# Patient Record
Sex: FEMALE | Race: WHITE | NOT HISPANIC OR LATINO | Employment: OTHER | URBAN - METROPOLITAN AREA
[De-identification: names, ages, dates, MRNs, and addresses within clinical notes are randomized per-mention and may not be internally consistent; named-entity substitution may affect disease eponyms.]

---

## 2017-11-29 ENCOUNTER — TELEPHONE (OUTPATIENT)
Dept: NEUROLOGY | Facility: MEDICAL CENTER | Age: 52
End: 2017-11-29

## 2017-11-29 ENCOUNTER — OFFICE VISIT (OUTPATIENT)
Dept: NEUROLOGY | Facility: MEDICAL CENTER | Age: 52
End: 2017-11-29
Payer: COMMERCIAL

## 2017-11-29 VITALS
TEMPERATURE: 98.4 F | HEIGHT: 66 IN | DIASTOLIC BLOOD PRESSURE: 70 MMHG | HEART RATE: 85 BPM | WEIGHT: 236 LBS | BODY MASS INDEX: 37.93 KG/M2 | OXYGEN SATURATION: 98 % | SYSTOLIC BLOOD PRESSURE: 110 MMHG | RESPIRATION RATE: 16 BRPM

## 2017-11-29 DIAGNOSIS — L93.0 LUPUS ERYTHEMATOSUS, UNSPECIFIED FORM: ICD-10-CM

## 2017-11-29 DIAGNOSIS — R42 DIZZY SPELLS: ICD-10-CM

## 2017-11-29 PROBLEM — M32.9 LUPUS (HCC): Status: ACTIVE | Noted: 2017-11-29

## 2017-11-29 PROCEDURE — 99204 OFFICE O/P NEW MOD 45 MIN: CPT | Performed by: PSYCHIATRY & NEUROLOGY

## 2017-11-29 RX ORDER — OXYCODONE AND ACETAMINOPHEN 10; 325 MG/1; MG/1
1-2 TABLET ORAL EVERY 4 HOURS PRN
Status: ON HOLD | COMMUNITY
End: 2021-10-14

## 2017-11-29 RX ORDER — HYDROXYCHLOROQUINE SULFATE 200 MG/1
400 TABLET, FILM COATED ORAL DAILY
COMMUNITY

## 2017-11-29 RX ORDER — MELOXICAM 7.5 MG/1
7.5 TABLET ORAL 2 TIMES DAILY
COMMUNITY
End: 2022-02-15

## 2017-11-29 NOTE — PATIENT INSTRUCTIONS
IMPRESSION:    1. Dizziness --since April 2017-- 50% of time, the patient could not even function, could not focus, come and go--responded to steroid-- the detailed symptoms -- see the patient's self-reports  2. Hot flashes in the both ears -- since April 2017  3. Hx of lupus, Fibromyalgia, Osteoarthritis (hip replacement)    PLAN/RECOMMENDATIONS:      Explain to the patient, I do agree with the CNS origin of these dizziness  These are stereotyped, recurrent and somewhat reversible-- which are characteristic of brain malfunction-- like subtle seizures  And these brain malfunction could be secondary to brain inflammation-- such as CNS lupus or autoimmune cerebritis    Infection and carcinoma are remotely possible --- less likely in my opinions    Therefore, we will offer  MRI of brain  EEG  Blood Tests    We may need to offer spinal tap after blood tests            SIGNATURE:  Lin Rascon    CC:  Dolly Araya M.D.

## 2017-11-30 RX ORDER — PREDNISONE 50 MG/1
50 TABLET ORAL DAILY
Qty: 10 TAB | Refills: 0 | Status: SHIPPED | OUTPATIENT
Start: 2017-11-30 | End: 2020-01-22

## 2017-11-30 NOTE — TELEPHONE ENCOUNTER
Patient called was seen today. She would like to try steroids and anti inflammatories, she was told to call if she wanted them.

## 2017-12-03 LAB
25(OH)D3+25(OH)D2 SERPL-MCNC: 55.7 NG/ML (ref 30–100)
ANA HOMOGEN TITR SER: ABNORMAL {TITER}
ANA INTERCELL BRIDGE TITR SER IF: ABNORMAL {TITER}
ANA NUCLEAR DOTS TITR SER IF: ABNORMAL {TITER}
ANA NUCLEAR MEMBRANE PORES TITR SER IF: ABNORMAL {TITER}
ANA NUCLEOLAR TITR SER: ABNORMAL {TITER}
ANA SPECKLED TITR SER: ABNORMAL {TITER}
ANA TITR SER IF: POSITIVE {TITER}
B2 GLYCOPROT1 IGG SER-ACNC: <9 GPI IGG UNITS (ref 0–20)
B2 GLYCOPROT1 IGM SER-ACNC: <9 GPI IGM UNITS (ref 0–32)
C3 SERPL-MCNC: 105 MG/DL (ref 82–167)
C4 SERPL-MCNC: 19 MG/DL (ref 14–44)
CARDIOLIPIN IGG SER IA-ACNC: <9 GPL U/ML (ref 0–14)
CARDIOLIPIN IGM SER IA-ACNC: <9 MPL U/ML (ref 0–12)
CCP IGA+IGG SERPL IA-ACNC: 6 UNITS (ref 0–19)
CENTROMERE AB TITR SER IF: ABNORMAL {TITER}
CENTROMERE B AB SER-ACNC: <0.2 AI (ref 0–0.9)
CHROMATIN AB SERPL-ACNC: <0.2 AI (ref 0–0.9)
CRP SERPL-MCNC: 1.2 MG/L (ref 0–4.9)
DEPRECATED CENTRIOLE AB TITR SER IF: ABNORMAL {TITER}
DSDNA AB SER-ACNC: 5 IU/ML (ref 0–9)
ENA JO1 AB SER-ACNC: <0.2 AI (ref 0–0.9)
ENA PCNA AB TITR SER IF: ABNORMAL {TITER}
ENA RNP AB SER-ACNC: <0.2 AI (ref 0–0.9)
ENA SCL70 AB SER-ACNC: <0.2 AI (ref 0–0.9)
ENA SM AB SER-ACNC: <0.2 AI (ref 0–0.9)
ENA SS-A AB SER-ACNC: <0.2 AI (ref 0–0.9)
ENA SS-B AB SER-ACNC: <0.2 AI (ref 0–0.9)
ERYTHROCYTE [SEDIMENTATION RATE] IN BLOOD BY WESTERGREN METHOD: 4 MM/HR (ref 0–40)
MITOTIC SPINDLE APP AB TITR SERPL IF: ABNORMAL {TITER}
NOTE 016271: ABNORMAL
RHEUMATOID FACT SERPL-ACNC: <10 IU/ML (ref 0–13.9)
THYROGLOB AB SERPL-ACNC: <1 IU/ML (ref 0–0.9)
THYROPEROXIDASE AB SERPL-ACNC: 11 IU/ML (ref 0–34)
TSH SERPL DL<=0.005 MIU/L-ACNC: 0.95 UIU/ML (ref 0.45–4.5)
VIT B12 SERPL-MCNC: 399 PG/ML (ref 211–946)

## 2017-12-07 ENCOUNTER — HOSPITAL ENCOUNTER (OUTPATIENT)
Dept: RADIOLOGY | Facility: MEDICAL CENTER | Age: 52
End: 2017-12-07
Attending: PSYCHIATRY & NEUROLOGY
Payer: COMMERCIAL

## 2017-12-07 DIAGNOSIS — L93.0 LUPUS ERYTHEMATOSUS, UNSPECIFIED FORM: ICD-10-CM

## 2017-12-07 DIAGNOSIS — R42 DIZZY SPELLS: ICD-10-CM

## 2017-12-07 PROCEDURE — 70551 MRI BRAIN STEM W/O DYE: CPT

## 2017-12-12 ENCOUNTER — TELEPHONE (OUTPATIENT)
Dept: NEUROLOGY | Facility: MEDICAL CENTER | Age: 52
End: 2017-12-12

## 2017-12-12 NOTE — TELEPHONE ENCOUNTER
Prednisone 50mg daily for 10 days   Reminds her--- after blood tests---   Also report to us after 10 days of steroid     Patient called has not started prednisone she went to the pharmacy and they told her to call here because there is no instructions to taper off medication when she was done with the 10 days and this was a high dose not to taper.    She did her MRI and wants results.    Please advise    Thank you

## 2017-12-14 NOTE — TELEPHONE ENCOUNTER
Do not need to listen to pharmacy   This is not high dose and no need to taper   This is just trial     Called and spoke to patient gave above information. I asked to call with any concerns.

## 2020-01-22 DIAGNOSIS — Z01.810 PRE-OPERATIVE CARDIOVASCULAR EXAMINATION: ICD-10-CM

## 2020-01-22 DIAGNOSIS — Z01.812 PRE-OPERATIVE LABORATORY EXAMINATION: ICD-10-CM

## 2020-01-22 LAB
ANION GAP SERPL CALC-SCNC: 11 MMOL/L (ref 0–11.9)
BUN SERPL-MCNC: 10 MG/DL (ref 8–22)
CALCIUM SERPL-MCNC: 9.7 MG/DL (ref 8.5–10.5)
CHLORIDE SERPL-SCNC: 104 MMOL/L (ref 96–112)
CO2 SERPL-SCNC: 26 MMOL/L (ref 20–33)
CREAT SERPL-MCNC: 0.93 MG/DL (ref 0.5–1.4)
ERYTHROCYTE [DISTWIDTH] IN BLOOD BY AUTOMATED COUNT: 45.5 FL (ref 35.9–50)
GLUCOSE SERPL-MCNC: 106 MG/DL (ref 65–99)
HCG SERPL QL: NEGATIVE
HCT VFR BLD AUTO: 44.4 % (ref 37–47)
HGB BLD-MCNC: 14.5 G/DL (ref 12–16)
MCH RBC QN AUTO: 29.9 PG (ref 27–33)
MCHC RBC AUTO-ENTMCNC: 32.7 G/DL (ref 33.6–35)
MCV RBC AUTO: 91.5 FL (ref 81.4–97.8)
PLATELET # BLD AUTO: 290 K/UL (ref 164–446)
PMV BLD AUTO: 9.4 FL (ref 9–12.9)
POTASSIUM SERPL-SCNC: 4.4 MMOL/L (ref 3.6–5.5)
RBC # BLD AUTO: 4.85 M/UL (ref 4.2–5.4)
SODIUM SERPL-SCNC: 141 MMOL/L (ref 135–145)
WBC # BLD AUTO: 5.1 K/UL (ref 4.8–10.8)

## 2020-01-22 PROCEDURE — 80048 BASIC METABOLIC PNL TOTAL CA: CPT

## 2020-01-22 PROCEDURE — 84703 CHORIONIC GONADOTROPIN ASSAY: CPT

## 2020-01-22 PROCEDURE — 36415 COLL VENOUS BLD VENIPUNCTURE: CPT

## 2020-01-22 PROCEDURE — 85027 COMPLETE CBC AUTOMATED: CPT

## 2020-01-22 RX ORDER — ROPINIROLE 1 MG/1
1 TABLET, FILM COATED ORAL 2 TIMES DAILY
COMMUNITY

## 2020-01-22 RX ORDER — AMLODIPINE BESYLATE 5 MG/1
5 TABLET ORAL EVERY EVENING
Status: ON HOLD | COMMUNITY
End: 2020-01-23

## 2020-01-22 RX ORDER — AZELASTINE HYDROCHLORIDE, FLUTICASONE PROPIONATE 137; 50 UG/1; UG/1
1 SPRAY, METERED NASAL 3 TIMES DAILY
COMMUNITY
End: 2022-02-15

## 2020-01-22 SDOH — HEALTH STABILITY: MENTAL HEALTH: HOW OFTEN DO YOU HAVE A DRINK CONTAINING ALCOHOL?: 2-4 TIMES A MONTH

## 2020-01-23 ENCOUNTER — HOSPITAL ENCOUNTER (OUTPATIENT)
Facility: MEDICAL CENTER | Age: 55
End: 2020-01-23
Attending: SURGERY | Admitting: SURGERY
Payer: COMMERCIAL

## 2020-01-23 ENCOUNTER — ANESTHESIA (OUTPATIENT)
Dept: SURGERY | Facility: MEDICAL CENTER | Age: 55
End: 2020-01-23
Payer: COMMERCIAL

## 2020-01-23 ENCOUNTER — ANESTHESIA EVENT (OUTPATIENT)
Dept: SURGERY | Facility: MEDICAL CENTER | Age: 55
End: 2020-01-23
Payer: COMMERCIAL

## 2020-01-23 VITALS
BODY MASS INDEX: 43.34 KG/M2 | DIASTOLIC BLOOD PRESSURE: 58 MMHG | WEIGHT: 260.14 LBS | OXYGEN SATURATION: 96 % | SYSTOLIC BLOOD PRESSURE: 125 MMHG | HEART RATE: 69 BPM | HEIGHT: 65 IN | RESPIRATION RATE: 18 BRPM | TEMPERATURE: 98.5 F

## 2020-01-23 PROCEDURE — 500423 HCHG DRESSING, ABD COMBINE: Performed by: SURGERY

## 2020-01-23 PROCEDURE — 160002 HCHG RECOVERY MINUTES (STAT): Performed by: SURGERY

## 2020-01-23 PROCEDURE — 160025 RECOVERY II MINUTES (STATS): Performed by: SURGERY

## 2020-01-23 PROCEDURE — 160035 HCHG PACU - 1ST 60 MINS PHASE I: Performed by: SURGERY

## 2020-01-23 PROCEDURE — A6407 PACKING STRIPS, NON-IMPREG: HCPCS | Performed by: SURGERY

## 2020-01-23 PROCEDURE — 160046 HCHG PACU - 1ST 60 MINS PHASE II: Performed by: SURGERY

## 2020-01-23 PROCEDURE — 700102 HCHG RX REV CODE 250 W/ 637 OVERRIDE(OP): Performed by: ANESTHESIOLOGY

## 2020-01-23 PROCEDURE — 700101 HCHG RX REV CODE 250

## 2020-01-23 PROCEDURE — A9270 NON-COVERED ITEM OR SERVICE: HCPCS | Performed by: ANESTHESIOLOGY

## 2020-01-23 PROCEDURE — 700101 HCHG RX REV CODE 250: Performed by: ANESTHESIOLOGY

## 2020-01-23 PROCEDURE — 160048 HCHG OR STATISTICAL LEVEL 1-5: Performed by: SURGERY

## 2020-01-23 PROCEDURE — 160009 HCHG ANES TIME/MIN: Performed by: SURGERY

## 2020-01-23 PROCEDURE — 160027 HCHG SURGERY MINUTES - 1ST 30 MINS LEVEL 2: Performed by: SURGERY

## 2020-01-23 PROCEDURE — 160038 HCHG SURGERY MINUTES - EA ADDL 1 MIN LEVEL 2: Performed by: SURGERY

## 2020-01-23 PROCEDURE — 700101 HCHG RX REV CODE 250: Performed by: SURGERY

## 2020-01-23 PROCEDURE — 700111 HCHG RX REV CODE 636 W/ 250 OVERRIDE (IP): Performed by: ANESTHESIOLOGY

## 2020-01-23 PROCEDURE — 700105 HCHG RX REV CODE 258: Performed by: SURGERY

## 2020-01-23 RX ORDER — OXYCODONE HCL 5 MG/5 ML
10 SOLUTION, ORAL ORAL
Status: DISCONTINUED | OUTPATIENT
Start: 2020-01-23 | End: 2020-01-23 | Stop reason: HOSPADM

## 2020-01-23 RX ORDER — ONDANSETRON 2 MG/ML
INJECTION INTRAMUSCULAR; INTRAVENOUS PRN
Status: DISCONTINUED | OUTPATIENT
Start: 2020-01-23 | End: 2020-01-23 | Stop reason: HOSPADM

## 2020-01-23 RX ORDER — GABAPENTIN 300 MG/1
300 CAPSULE ORAL ONCE
Status: COMPLETED | OUTPATIENT
Start: 2020-01-23 | End: 2020-01-23

## 2020-01-23 RX ORDER — CEFAZOLIN SODIUM 1 G/3ML
INJECTION, POWDER, FOR SOLUTION INTRAMUSCULAR; INTRAVENOUS PRN
Status: DISCONTINUED | OUTPATIENT
Start: 2020-01-23 | End: 2020-01-23 | Stop reason: SURG

## 2020-01-23 RX ORDER — DIPHENHYDRAMINE HYDROCHLORIDE 50 MG/ML
12.5 INJECTION INTRAMUSCULAR; INTRAVENOUS
Status: DISCONTINUED | OUTPATIENT
Start: 2020-01-23 | End: 2020-01-23 | Stop reason: HOSPADM

## 2020-01-23 RX ORDER — LIDOCAINE HYDROCHLORIDE 40 MG/ML
SOLUTION TOPICAL PRN
Status: DISCONTINUED | OUTPATIENT
Start: 2020-01-23 | End: 2020-01-23 | Stop reason: SURG

## 2020-01-23 RX ORDER — HALOPERIDOL 5 MG/ML
1 INJECTION INTRAMUSCULAR
Status: DISCONTINUED | OUTPATIENT
Start: 2020-01-23 | End: 2020-01-23 | Stop reason: HOSPADM

## 2020-01-23 RX ORDER — SODIUM CHLORIDE, SODIUM LACTATE, POTASSIUM CHLORIDE, CALCIUM CHLORIDE 600; 310; 30; 20 MG/100ML; MG/100ML; MG/100ML; MG/100ML
INJECTION, SOLUTION INTRAVENOUS CONTINUOUS
Status: DISCONTINUED | OUTPATIENT
Start: 2020-01-23 | End: 2020-01-23 | Stop reason: HOSPADM

## 2020-01-23 RX ORDER — ONDANSETRON 2 MG/ML
4 INJECTION INTRAMUSCULAR; INTRAVENOUS
Status: DISCONTINUED | OUTPATIENT
Start: 2020-01-23 | End: 2020-01-23 | Stop reason: HOSPADM

## 2020-01-23 RX ORDER — BUPIVACAINE HYDROCHLORIDE AND EPINEPHRINE 5; 5 MG/ML; UG/ML
INJECTION, SOLUTION EPIDURAL; INTRACAUDAL; PERINEURAL
Status: DISCONTINUED | OUTPATIENT
Start: 2020-01-23 | End: 2020-01-23 | Stop reason: HOSPADM

## 2020-01-23 RX ORDER — LABETALOL HYDROCHLORIDE 5 MG/ML
5 INJECTION, SOLUTION INTRAVENOUS
Status: DISCONTINUED | OUTPATIENT
Start: 2020-01-23 | End: 2020-01-23 | Stop reason: HOSPADM

## 2020-01-23 RX ORDER — MEPERIDINE HYDROCHLORIDE 25 MG/ML
25 INJECTION INTRAMUSCULAR; INTRAVENOUS; SUBCUTANEOUS
Status: DISCONTINUED | OUTPATIENT
Start: 2020-01-23 | End: 2020-01-23 | Stop reason: HOSPADM

## 2020-01-23 RX ORDER — MEPERIDINE HYDROCHLORIDE 25 MG/ML
INJECTION INTRAMUSCULAR; INTRAVENOUS; SUBCUTANEOUS PRN
Status: DISCONTINUED | OUTPATIENT
Start: 2020-01-23 | End: 2020-01-23 | Stop reason: SURG

## 2020-01-23 RX ORDER — AMLODIPINE BESYLATE 5 MG/1
7.5 TABLET ORAL EVERY EVENING
COMMUNITY

## 2020-01-23 RX ORDER — DEXAMETHASONE SODIUM PHOSPHATE 4 MG/ML
INJECTION, SOLUTION INTRA-ARTICULAR; INTRALESIONAL; INTRAMUSCULAR; INTRAVENOUS; SOFT TISSUE PRN
Status: DISCONTINUED | OUTPATIENT
Start: 2020-01-23 | End: 2020-01-23 | Stop reason: SURG

## 2020-01-23 RX ORDER — ACETAMINOPHEN 500 MG
1000 TABLET ORAL ONCE
Status: COMPLETED | OUTPATIENT
Start: 2020-01-23 | End: 2020-01-23

## 2020-01-23 RX ORDER — CELECOXIB 200 MG/1
200 CAPSULE ORAL ONCE
Status: COMPLETED | OUTPATIENT
Start: 2020-01-23 | End: 2020-01-23

## 2020-01-23 RX ORDER — OXYCODONE HCL 5 MG/5 ML
5 SOLUTION, ORAL ORAL
Status: DISCONTINUED | OUTPATIENT
Start: 2020-01-23 | End: 2020-01-23 | Stop reason: HOSPADM

## 2020-01-23 RX ORDER — MIDAZOLAM HYDROCHLORIDE 1 MG/ML
1 INJECTION INTRAMUSCULAR; INTRAVENOUS
Status: DISCONTINUED | OUTPATIENT
Start: 2020-01-23 | End: 2020-01-23 | Stop reason: HOSPADM

## 2020-01-23 RX ADMIN — MEPERIDINE HYDROCHLORIDE 25 MG: 25 INJECTION INTRAMUSCULAR; INTRAVENOUS; SUBCUTANEOUS at 12:58

## 2020-01-23 RX ADMIN — DEXAMETHASONE SODIUM PHOSPHATE 4 MG: 4 INJECTION, SOLUTION INTRA-ARTICULAR; INTRALESIONAL; INTRAMUSCULAR; INTRAVENOUS; SOFT TISSUE at 12:58

## 2020-01-23 RX ADMIN — SODIUM CHLORIDE, POTASSIUM CHLORIDE, SODIUM LACTATE AND CALCIUM CHLORIDE: 600; 310; 30; 20 INJECTION, SOLUTION INTRAVENOUS at 12:36

## 2020-01-23 RX ADMIN — LIDOCAINE HYDROCHLORIDE 20 MG: 20 INJECTION, SOLUTION INTRAVENOUS at 12:49

## 2020-01-23 RX ADMIN — ROCURONIUM BROMIDE 5 MG: 10 INJECTION, SOLUTION INTRAVENOUS at 12:49

## 2020-01-23 RX ADMIN — ACETAMINOPHEN 1000 MG: 500 TABLET ORAL at 12:33

## 2020-01-23 RX ADMIN — SUCCINYLCHOLINE CHLORIDE 140 MG: 20 INJECTION, SOLUTION INTRAMUSCULAR; INTRAVENOUS at 12:51

## 2020-01-23 RX ADMIN — CEFAZOLIN 3 G: 330 INJECTION, POWDER, FOR SOLUTION INTRAMUSCULAR; INTRAVENOUS at 12:46

## 2020-01-23 RX ADMIN — ONDANSETRON 4 MG: 2 INJECTION INTRAMUSCULAR; INTRAVENOUS at 13:07

## 2020-01-23 RX ADMIN — CELECOXIB 200 MG: 200 CAPSULE ORAL at 12:33

## 2020-01-23 RX ADMIN — LIDOCAINE HYDROCHLORIDE 160 MG: 40 SOLUTION TOPICAL at 12:52

## 2020-01-23 RX ADMIN — PROPOFOL 20 MG: 10 INJECTION, EMULSION INTRAVENOUS at 12:49

## 2020-01-23 RX ADMIN — PROPOFOL 100 MG: 10 INJECTION, EMULSION INTRAVENOUS at 12:51

## 2020-01-23 RX ADMIN — GABAPENTIN 300 MG: 300 CAPSULE ORAL at 12:34

## 2020-01-23 RX ADMIN — ALFENTANIL HYDROCHLORIDE 1000 MCG: 500 INJECTION INTRAVENOUS at 12:49

## 2020-01-23 ASSESSMENT — PAIN SCALES - GENERAL: PAIN_LEVEL: 0

## 2020-01-23 NOTE — OP REPORT
DATE OF SERVICE:  2020    OPERATING SURGEON:  Michael Ballesteros MD    PROCEDURE:  Excision and curettage of pilonidal abscess.    ANESTHESIA:  General.    ESTIMATED BLOOD LOSS:  Less than 10 mL.    PREOPERATIVE DIAGNOSES:  Pilonidal abscess and sinus.    POSTOPERATIVE DIAGNOSES:  Pilonidal abscess and sinus.    SUMMARY:  This 54-year-old female had pilonidal disease about 30 years ago,   but recently developed swelling at the  cleft and drainage.  She was   found to have a pilonidal abscess and referred for correction.      DESCRIPTION OF PROCEDURE:  The patient was taken to the operating room and   satisfactory general anesthesia was induced via endotracheal intubation.  The   patient was placed in jackknife position and prepped and draped.      To the right of the haile cleft at its apex, sinus tract was demonstrated and   explored.  This showed the sinus was deep down to the level of the fascia and   an elliptical incision was marked out and excised at this area in the midline.    This was then curetted out.      The wound was then irrigated and fulgurated with local instilled, was then   packed with 1 inch moist gauze and a dressing applied.  The patient was sent   to the recovery room in good condition.  Specimen sent to pathology labeled   pilonidal abscess.       ____________________________________     MICHAEL BALLESTEROS MD    TER / NTS    DD:  2020 13:16:25  DT:  2020 13:21:03    D#:  [id number]  Job#:  [id number]

## 2020-01-23 NOTE — ANESTHESIA TIME REPORT
Anesthesia Start and Stop Event Times     Date Time Event    1/23/2020 1158 Ready for Procedure     1246 Anesthesia Start     1319 Anesthesia Stop        Responsible Staff  01/23/20    Name Role Begin End    Emery Harman M.D. Anesth 1246 1319        Preop Diagnosis (Free Text):  Pre-op Diagnosis     PILONIDAL CYST WITH ABSCESS        Preop Diagnosis (Codes):    Post op Diagnosis  Pilonidal cyst and sinus      Premium Reason  Non-Premium    Comments:

## 2020-01-23 NOTE — ANESTHESIA PREPROCEDURE EVALUATION
Relevant Problems   No relevant active problems       Physical Exam    Airway   Mallampati: II  TM distance: >3 FB  Neck ROM: full       Cardiovascular - normal exam  Rhythm: regular  Rate: normal     Dental - normal exam         Pulmonary - normal exam  Breath sounds clear to auscultation     Abdominal    Neurological - normal exam                 Anesthesia Plan    ASA 3   ASA physical status 3 criteria: morbid obesity - BMI greater than or equal to 40    Plan - general       Airway plan will be ETT        Induction: intravenous    Postoperative Plan: Postoperative administration of opioids is intended.    Pertinent diagnostic labs and testing reviewed    Informed Consent:    Anesthetic plan and risks discussed with patient.    Use of blood products discussed with: patient whom consented to blood products.

## 2020-01-23 NOTE — OR SURGEON
Immediate Post OP Note    PreOp Diagnosis: pilonidal abscess    PostOp Diagnosis:   same    Procedure(s):  INCISION AND DRAINAGE OF PILONIDAL CYST - Wound Class: Dirty or Infected  Excision and curettage  Surgeon(s):  Michael Ceron M.D.    Anesthesiologist/Type of Anesthesia:  Anesthesiologist: Emery Harman M.D./General    Surgical Staff:  Circulator: Pretty Marquis R.N.  Relief Circulator: Jinny White R.N.  Scrub Person: Ashlee Brown R.N.    Specimens removed if any:  * No specimens in log *    Estimated Blood Loss:       Findings:       Complications:           1/23/2020 1:13 PM Michael Ceron M.D.

## 2020-01-23 NOTE — ANESTHESIA QCDR
2019 [facility] Clinical Data Registry (for Quality Improvement)     Postoperative nausea/vomiting risk protocol (Adult = 18 yrs and Pediatric 3-17 yrs)- (430 and 463)  General inhalation anesthetic (NOT TIVA) with PONV risk factors: No  Provision of anti-emetic therapy with at least 2 different classes of agents: N/A  Patient DID NOT receive anti-emetic therapy and reason is documented in Medical Record: N/A    Multimodal Pain Management- (477)  Non-emergent surgery AND patient age >= 18: Yes  Use of Multimodal Pain Management, two or more drugs and/or interventions, NOT including systemic opioids: Yes  Exception: Documented allergy to multiple classes of analgesics: N/A    Smoking Abstinence (404)  Patient is current smoker (cigarette, pipe, e-cig, marijuanna): No  Elective Surgery:   Abstinence instructions provided prior to day of surgery:   Patient abstained from smoking on day of surgery:     Pre-Op Beta-Blocker in Isolated CABG (44)  Isolated CABG AND patient age >= 18: No  Beta-blocker admin within 24 hours of surgical incision:   Exception:of medical reason(s) for not administering beta blocker within 24 hours prior to surgical incision (e.g., not  indicated,other medical reason):     PACU assessment of acute postoperative pain prior to Anesthesia Care End- Applies to Patients Age = 18- (ABG7)  Initial PACU pain score is which of the following: < 7/10  Patient unable to report pain score: N/A    Post-anesthetic transfer of care checklist/protocol to PACU/ICU- (426 and 427)  Upon conclusion of case, patient transferred to which of the following locations: PACU/Non-ICU  Use of transfer checklist/protocol: Yes  Exclusion: Service Performed in Patient Hospital Room (and thus did not require transfer): N/A  Unplanned admission to ICU related to anesthesia service up through end of PACU care- (MD51)  Unplanned admission to ICU (not initially anticipated at anesthesia start time): No

## 2020-01-23 NOTE — ANESTHESIA POSTPROCEDURE EVALUATION
Patient: Magali Leal    Procedure Summary     Date:  01/23/20 Room / Location:  [facility] / SURG[facility]    Anesthesia Start:  1246 Anesthesia Stop:  1319    Procedure:  INCISION AND DRAINAGE OF PILONIDAL CYST (Buttocks) Diagnosis:  (PILONIDAL CYST WITH ABSCESS)    Surgeon:  Michael Ceron M.D. Responsible Provider:  Emery Harman M.D.    Anesthesia Type:  general ASA Status:  3          Final Anesthesia Type: general  Last vitals  BP   Blood Pressure: 137/57    Temp   36.7 °C (98 °F)    Pulse   Pulse: 67   Resp   18    SpO2   98 %      Anesthesia Post Evaluation    Patient location during evaluation: PACU  Patient participation: complete - patient participated  Level of consciousness: awake and alert  Pain score: 0    Airway patency: patent  Anesthetic complications: no  Cardiovascular status: hemodynamically stable  Respiratory status: acceptable  Hydration status: euvolemic    PONV: none

## 2020-01-23 NOTE — DISCHARGE INSTRUCTIONS
ACTIVITY: Rest and take it easy for the first 24 hours.  A responsible adult is recommended to remain with you during that time.  It is normal to feel sleepy.  We encourage you to not do anything that requires balance, judgment or coordination.    MILD FLU-LIKE SYMPTOMS ARE NORMAL. YOU MAY EXPERIENCE GENERALIZED MUSCLE ACHES, THROAT IRRITATION, HEADACHE AND/OR SOME NAUSEA.    FOR 24 HOURS DO NOT:  Drive, operate machinery or run household appliances.  Drink beer or alcoholic beverages.   Make important decisions or sign legal documents.    SPECIAL INSTRUCTIONS: Keep dressing clean, dry, and intact until follow up appointment.    DIET: To avoid nausea, slowly advance diet as tolerated, avoiding spicy or greasy foods for the first day.  Add more substantial food to your diet according to your physician's instructions.  Babies can be fed formula or breast milk as soon as they are hungry.  INCREASE FLUIDS AND FIBER TO AVOID CONSTIPATION.    SURGICAL DRESSING/BATHING: Call Dr. Ceron's office post-op day 1 (1/24/20) to schedule dressing change with office.    FOLLOW-UP APPOINTMENT:  A follow-up appointment should be arranged with your doctor in 1-2 weeks; call to schedule.    You should CALL YOUR PHYSICIAN if you develop:  Fever greater than 101 degrees F.  Pain not relieved by medication, or persistent nausea or vomiting.  Excessive bleeding (blood soaking through dressing) or unexpected drainage from the wound.  Extreme redness or swelling around the incision site, drainage of pus or foul smelling drainage.  Inability to urinate or empty your bladder within 8 hours.  Problems with breathing or chest pain.    You should call 911 if you develop problems with breathing or chest pain.  If you are unable to contact your doctor or surgical center, you should go to the nearest emergency room or urgent care center.  Physician's telephone #: Dr. Ceron [phone]    If any questions arise, call your doctor.  If your  doctor is not available, please feel free to call the Surgical Center at [phone].  The Center is open Monday through Friday from 7AM to 7PM.  You can also call the HEALTH HOTLINE open 24 hours/day, 7 days/week and speak to a nurse at [phone], or toll free at [phone].    A registered nurse may call you a few days after your surgery to see how you are doing after your procedure.    MEDICATIONS: Resume taking daily medication.  Take prescribed pain medication with food.  If no medication is prescribed, you may take non-aspirin pain medication if needed.  PAIN MEDICATION CAN BE VERY CONSTIPATING.  Take a stool softener or laxative such as senokot, pericolace, or milk of magnesia if needed.    If your physician has prescribed pain medication that includes Acetaminophen (Tylenol), do not take additional Acetaminophen (Tylenol) while taking the prescribed medication.    Depression / Suicide Risk    As you are discharged from this [facility] facility, it is important to learn how to keep safe from harming yourself.    Recognize the warning signs:  · Abrupt changes in personality, positive or negative- including increase in energy   · Giving away possessions  · Change in eating patterns- significant weight changes-  positive or negative  · Change in sleeping patterns- unable to sleep or sleeping all the time   · Unwillingness or inability to communicate  · Depression  · Unusual sadness, discouragement and loneliness  · Talk of wanting to die  · Neglect of personal appearance   · Rebelliousness- reckless behavior  · Withdrawal from people/activities they love  · Confusion- inability to concentrate     If you or a loved one observes any of these behaviors or has concerns about self-harm, here's what you can do:  · Talk about it- your feelings and reasons for harming yourself  · Remove any means that you might use to hurt yourself (examples: pills, rope, extension cords, firearm)  · Get professional help  from the community (Mental Health, Substance Abuse, psychological counseling)  · Do not be alone:Call your Safe Contact- someone whom you trust who will be there for you.  · Call your local CRISIS HOTLINE 422-8833 or 566-154-7282  · Call your local Children's Mobile Crisis Response Team [place] (724) 930-3874 or www.Dentalink  · Call the toll free National Suicide Prevention Hotlines   · National Suicide Prevention Lifeline 821-818-VVLS (3433)  · National Hope Line Network 800-SUICIDE (893-6981)

## 2020-01-23 NOTE — PROGRESS NOTES
Pt continues to deny pain and nausea and expresses she would like to get dressed and go home. Sx site unchanged from initial assessment. IV removed, tip intact.    Discharge instructions reviewed with pt and family. Pt not prescribed pain medication. Pt to f/u with MD tomorrow.    Pt discharged via wheelchair with all personal belongings after all questions were answered.

## 2020-01-23 NOTE — ANESTHESIA PROCEDURE NOTES
Airway  Date/Time: 1/23/2020 12:52 PM  Performed by: Emery Harman M.D.  Authorized by: Emery Harman M.D.     Location:  OR  Urgency:  Elective  Indications for Airway Management:  Anesthesia  Spontaneous Ventilation: absent    Sedation Level:  Deep  Preoxygenated: Yes    Patient Position:  Sniffing  Final Airway Type:  Endotracheal airway  Final Endotracheal Airway:  ETT  Cuffed: Yes    Technique Used for Successful ETT Placement:  Direct laryngoscopy  Devices/Methods Used in Placement:  Intubating stylet and cricoid pressure  Insertion Site:  Oral  Blade Type:  Bautista  Laryngoscope Blade/Videolaryngoscope Blade Size:  2  ETT Size (mm):  8.0  Measured from:  Lips  ETT to Lips (cm):  22  Placement Verified by: auscultation and capnometry    Cormack-Lehane Classification:  Grade IIa - partial view of glottis  Number of Attempts at Approach:  1

## 2020-01-23 NOTE — PROGRESS NOTES
Pt arrived to phase II at 1413 after report from Travon in PACU.    A/O x 4, ambulated from gurney to chair, denies pain and nausea.   Sx site to sacrum/coccyx with small amount of serosanguinous shadowing to gauze and medipore dressing.    Per report, packing to wound. Family Gosia to bedside, who states she was told by MD to call office tomorrow to have office remove packing and change dressing. Call light within reach, rounding in place.

## 2021-03-15 DIAGNOSIS — Z23 NEED FOR VACCINATION: ICD-10-CM

## 2021-09-21 ENCOUNTER — HOSPITAL ENCOUNTER (INPATIENT)
Dept: HOSPITAL 8 - OUT | Age: 56
LOS: 1 days | Discharge: HOME | DRG: 755 | End: 2021-09-22
Attending: SPECIALIST | Admitting: SPECIALIST
Payer: COMMERCIAL

## 2021-09-21 VITALS — WEIGHT: 227.3 LBS | HEIGHT: 66 IN | BODY MASS INDEX: 36.53 KG/M2

## 2021-09-21 DIAGNOSIS — G89.29: ICD-10-CM

## 2021-09-21 DIAGNOSIS — Z20.822: ICD-10-CM

## 2021-09-21 DIAGNOSIS — D64.9: ICD-10-CM

## 2021-09-21 DIAGNOSIS — F43.10: ICD-10-CM

## 2021-09-21 DIAGNOSIS — N39.0: ICD-10-CM

## 2021-09-21 DIAGNOSIS — R59.0: ICD-10-CM

## 2021-09-21 DIAGNOSIS — Z88.5: ICD-10-CM

## 2021-09-21 DIAGNOSIS — C54.1: Primary | ICD-10-CM

## 2021-09-21 PROCEDURE — 30233N1 TRANSFUSION OF NONAUTOLOGOUS RED BLOOD CELLS INTO PERIPHERAL VEIN, PERCUTANEOUS APPROACH: ICD-10-PCS | Performed by: SPECIALIST

## 2021-09-22 VITALS — DIASTOLIC BLOOD PRESSURE: 67 MMHG | SYSTOLIC BLOOD PRESSURE: 112 MMHG

## 2021-10-07 ENCOUNTER — APPOINTMENT (OUTPATIENT)
Dept: ADMISSIONS | Facility: MEDICAL CENTER | Age: 56
End: 2021-10-07
Payer: COMMERCIAL

## 2021-10-11 ENCOUNTER — PRE-ADMISSION TESTING (OUTPATIENT)
Dept: ADMISSIONS | Facility: MEDICAL CENTER | Age: 56
End: 2021-10-11
Attending: SPECIALIST
Payer: COMMERCIAL

## 2021-10-11 DIAGNOSIS — Z01.812 PRE-OPERATIVE LABORATORY EXAMINATION: ICD-10-CM

## 2021-10-11 LAB
ALBUMIN SERPL BCP-MCNC: 4.2 G/DL (ref 3.2–4.9)
ALBUMIN/GLOB SERPL: 1.4 G/DL
ALP SERPL-CCNC: 146 U/L (ref 30–99)
ALT SERPL-CCNC: 41 U/L (ref 2–50)
ANION GAP SERPL CALC-SCNC: 11 MMOL/L (ref 7–16)
APTT PPP: 24.9 SEC (ref 24.7–36)
AST SERPL-CCNC: 19 U/L (ref 12–45)
BASOPHILS # BLD AUTO: 0.3 % (ref 0–1.8)
BASOPHILS # BLD: 0.03 K/UL (ref 0–0.12)
BILIRUB SERPL-MCNC: 0.2 MG/DL (ref 0.1–1.5)
BUN SERPL-MCNC: 13 MG/DL (ref 8–22)
CALCIUM SERPL-MCNC: 9.3 MG/DL (ref 8.5–10.5)
CHLORIDE SERPL-SCNC: 105 MMOL/L (ref 96–112)
CO2 SERPL-SCNC: 25 MMOL/L (ref 20–33)
CREAT SERPL-MCNC: 0.79 MG/DL (ref 0.5–1.4)
EOSINOPHIL # BLD AUTO: 0.08 K/UL (ref 0–0.51)
EOSINOPHIL NFR BLD: 0.7 % (ref 0–6.9)
ERYTHROCYTE [DISTWIDTH] IN BLOOD BY AUTOMATED COUNT: 49.5 FL (ref 35.9–50)
GLOBULIN SER CALC-MCNC: 2.9 G/DL (ref 1.9–3.5)
GLUCOSE SERPL-MCNC: 104 MG/DL (ref 65–99)
HCT VFR BLD AUTO: 35.6 % (ref 37–47)
HGB BLD-MCNC: 10.9 G/DL (ref 12–16)
IMM GRANULOCYTES # BLD AUTO: 0.19 K/UL (ref 0–0.11)
IMM GRANULOCYTES NFR BLD AUTO: 1.7 % (ref 0–0.9)
INR PPP: 1.04 (ref 0.87–1.13)
LYMPHOCYTES # BLD AUTO: 3.53 K/UL (ref 1–4.8)
LYMPHOCYTES NFR BLD: 31.6 % (ref 22–41)
MCH RBC QN AUTO: 24.8 PG (ref 27–33)
MCHC RBC AUTO-ENTMCNC: 30.6 G/DL (ref 33.6–35)
MCV RBC AUTO: 80.9 FL (ref 81.4–97.8)
MONOCYTES # BLD AUTO: 0.61 K/UL (ref 0–0.85)
MONOCYTES NFR BLD AUTO: 5.5 % (ref 0–13.4)
NEUTROPHILS # BLD AUTO: 6.73 K/UL (ref 2–7.15)
NEUTROPHILS NFR BLD: 60.2 % (ref 44–72)
NRBC # BLD AUTO: 0 K/UL
NRBC BLD-RTO: 0 /100 WBC
PLATELET # BLD AUTO: 309 K/UL (ref 164–446)
PMV BLD AUTO: 9.5 FL (ref 9–12.9)
POTASSIUM SERPL-SCNC: 3.8 MMOL/L (ref 3.6–5.5)
PROT SERPL-MCNC: 7.1 G/DL (ref 6–8.2)
PROTHROMBIN TIME: 13.3 SEC (ref 12–14.6)
RBC # BLD AUTO: 4.4 M/UL (ref 4.2–5.4)
SARS-COV-2 RNA RESP QL NAA+PROBE: NOTDETECTED
SODIUM SERPL-SCNC: 141 MMOL/L (ref 135–145)
SPECIMEN SOURCE: NORMAL
WBC # BLD AUTO: 11.2 K/UL (ref 4.8–10.8)

## 2021-10-11 PROCEDURE — 85730 THROMBOPLASTIN TIME PARTIAL: CPT

## 2021-10-11 PROCEDURE — 80053 COMPREHEN METABOLIC PANEL: CPT

## 2021-10-11 PROCEDURE — C9803 HOPD COVID-19 SPEC COLLECT: HCPCS

## 2021-10-11 PROCEDURE — 85610 PROTHROMBIN TIME: CPT

## 2021-10-11 PROCEDURE — U0003 INFECTIOUS AGENT DETECTION BY NUCLEIC ACID (DNA OR RNA); SEVERE ACUTE RESPIRATORY SYNDROME CORONAVIRUS 2 (SARS-COV-2) (CORONAVIRUS DISEASE [COVID-19]), AMPLIFIED PROBE TECHNIQUE, MAKING USE OF HIGH THROUGHPUT TECHNOLOGIES AS DESCRIBED BY CMS-2020-01-R: HCPCS

## 2021-10-11 PROCEDURE — 36415 COLL VENOUS BLD VENIPUNCTURE: CPT

## 2021-10-11 PROCEDURE — U0005 INFEC AGEN DETEC AMPLI PROBE: HCPCS

## 2021-10-11 PROCEDURE — 85025 COMPLETE CBC W/AUTO DIFF WBC: CPT

## 2021-10-11 RX ORDER — PROPRANOLOL HYDROCHLORIDE 20 MG/1
20 TABLET ORAL DAILY
COMMUNITY

## 2021-10-11 RX ORDER — BACLOFEN 10 MG/1
10 TABLET ORAL 3 TIMES DAILY
COMMUNITY

## 2021-10-11 RX ORDER — ESZOPICLONE 3 MG/1
1 TABLET, FILM COATED ORAL
Status: ON HOLD | COMMUNITY
End: 2022-03-28

## 2021-10-13 NOTE — PROGRESS NOTES
COVID-19 Pre-surgery screening:  ?  1. Do you have an undiagnosed respiratory illness or symptoms such as coughing or sneezing, SOB, loss of taste or smell? No     2. Do you have an unexplained fever greater than 100.4 degrees Fahrenheit or 38 degrees Celsius? No     3. Have you had direct exposure to a patient who tested positive for Covid-19? No  ?  4. Have you traveled within the last 14 days? No  ?  Informed of visitor policy and mask use requirement

## 2021-10-14 ENCOUNTER — HOSPITAL ENCOUNTER (OUTPATIENT)
Facility: MEDICAL CENTER | Age: 56
End: 2021-10-14
Attending: SPECIALIST | Admitting: SPECIALIST
Payer: COMMERCIAL

## 2021-10-14 ENCOUNTER — ANESTHESIA EVENT (OUTPATIENT)
Dept: SURGERY | Facility: MEDICAL CENTER | Age: 56
End: 2021-10-14
Payer: COMMERCIAL

## 2021-10-14 ENCOUNTER — ANESTHESIA (OUTPATIENT)
Dept: SURGERY | Facility: MEDICAL CENTER | Age: 56
End: 2021-10-14
Payer: COMMERCIAL

## 2021-10-14 ENCOUNTER — APPOINTMENT (OUTPATIENT)
Dept: RADIOLOGY | Facility: MEDICAL CENTER | Age: 56
End: 2021-10-14
Attending: SPECIALIST
Payer: COMMERCIAL

## 2021-10-14 VITALS
BODY MASS INDEX: 35.15 KG/M2 | HEIGHT: 66 IN | WEIGHT: 218.7 LBS | TEMPERATURE: 97.8 F | SYSTOLIC BLOOD PRESSURE: 104 MMHG | HEART RATE: 73 BPM | OXYGEN SATURATION: 97 % | DIASTOLIC BLOOD PRESSURE: 57 MMHG | RESPIRATION RATE: 16 BRPM

## 2021-10-14 DIAGNOSIS — Z51.11 MAINTENANCE CHEMOTHERAPY: Primary | ICD-10-CM

## 2021-10-14 DIAGNOSIS — C54.1 ENDOMETRIAL CANCER DETERMINED BY UTERINE BIOPSY (HCC): ICD-10-CM

## 2021-10-14 PROCEDURE — A9270 NON-COVERED ITEM OR SERVICE: HCPCS | Performed by: ANESTHESIOLOGY

## 2021-10-14 PROCEDURE — 160009 HCHG ANES TIME/MIN: Performed by: SPECIALIST

## 2021-10-14 PROCEDURE — 160046 HCHG PACU - 1ST 60 MINS PHASE II: Performed by: SPECIALIST

## 2021-10-14 PROCEDURE — 160039 HCHG SURGERY MINUTES - EA ADDL 1 MIN LEVEL 3: Performed by: SPECIALIST

## 2021-10-14 PROCEDURE — 700102 HCHG RX REV CODE 250 W/ 637 OVERRIDE(OP): Performed by: ANESTHESIOLOGY

## 2021-10-14 PROCEDURE — 700111 HCHG RX REV CODE 636 W/ 250 OVERRIDE (IP): Performed by: SPECIALIST

## 2021-10-14 PROCEDURE — 700101 HCHG RX REV CODE 250: Performed by: ANESTHESIOLOGY

## 2021-10-14 PROCEDURE — 160035 HCHG PACU - 1ST 60 MINS PHASE I: Performed by: SPECIALIST

## 2021-10-14 PROCEDURE — 501838 HCHG SUTURE GENERAL: Performed by: SPECIALIST

## 2021-10-14 PROCEDURE — 700111 HCHG RX REV CODE 636 W/ 250 OVERRIDE (IP): Performed by: ANESTHESIOLOGY

## 2021-10-14 PROCEDURE — 700105 HCHG RX REV CODE 258: Performed by: SPECIALIST

## 2021-10-14 PROCEDURE — 160036 HCHG PACU - EA ADDL 30 MINS PHASE I: Performed by: SPECIALIST

## 2021-10-14 PROCEDURE — 160025 RECOVERY II MINUTES (STATS): Performed by: SPECIALIST

## 2021-10-14 PROCEDURE — 700117 HCHG RX CONTRAST REV CODE 255: Performed by: SPECIALIST

## 2021-10-14 PROCEDURE — 160002 HCHG RECOVERY MINUTES (STAT): Performed by: SPECIALIST

## 2021-10-14 PROCEDURE — 160048 HCHG OR STATISTICAL LEVEL 1-5: Performed by: SPECIALIST

## 2021-10-14 PROCEDURE — 160028 HCHG SURGERY MINUTES - 1ST 30 MINS LEVEL 3: Performed by: SPECIALIST

## 2021-10-14 PROCEDURE — C1788 PORT, INDWELLING, IMP: HCPCS | Performed by: SPECIALIST

## 2021-10-14 DEVICE — POWER PORTMRI COMPATABLE: Type: IMPLANTABLE DEVICE | Site: CHEST | Status: FUNCTIONAL

## 2021-10-14 RX ORDER — DEXAMETHASONE SODIUM PHOSPHATE 4 MG/ML
INJECTION, SOLUTION INTRA-ARTICULAR; INTRALESIONAL; INTRAMUSCULAR; INTRAVENOUS; SOFT TISSUE PRN
Status: DISCONTINUED | OUTPATIENT
Start: 2021-10-14 | End: 2021-10-14 | Stop reason: SURG

## 2021-10-14 RX ORDER — GABAPENTIN 300 MG/1
300 CAPSULE ORAL ONCE
Status: COMPLETED | OUTPATIENT
Start: 2021-10-14 | End: 2021-10-14

## 2021-10-14 RX ORDER — MIDAZOLAM HYDROCHLORIDE 1 MG/ML
1 INJECTION INTRAMUSCULAR; INTRAVENOUS
Status: DISCONTINUED | OUTPATIENT
Start: 2021-10-14 | End: 2021-10-14 | Stop reason: HOSPADM

## 2021-10-14 RX ORDER — PROPOFOL 10 MG/ML
INJECTION, EMULSION INTRAVENOUS PRN
Status: DISCONTINUED | OUTPATIENT
Start: 2021-10-14 | End: 2021-10-14 | Stop reason: SURG

## 2021-10-14 RX ORDER — SODIUM CHLORIDE, SODIUM LACTATE, POTASSIUM CHLORIDE, CALCIUM CHLORIDE 600; 310; 30; 20 MG/100ML; MG/100ML; MG/100ML; MG/100ML
INJECTION, SOLUTION INTRAVENOUS CONTINUOUS
Status: DISCONTINUED | OUTPATIENT
Start: 2021-10-14 | End: 2021-10-14 | Stop reason: HOSPADM

## 2021-10-14 RX ORDER — DEXAMETHASONE 4 MG/1
4 TABLET ORAL EVERY 6 HOURS
COMMUNITY
Start: 2021-09-22 | End: 2022-02-15

## 2021-10-14 RX ORDER — HALOPERIDOL 5 MG/ML
1 INJECTION INTRAMUSCULAR
Status: DISCONTINUED | OUTPATIENT
Start: 2021-10-14 | End: 2021-10-14 | Stop reason: HOSPADM

## 2021-10-14 RX ORDER — ACETAMINOPHEN 500 MG
1000 TABLET ORAL ONCE
Status: COMPLETED | OUTPATIENT
Start: 2021-10-14 | End: 2021-10-14

## 2021-10-14 RX ORDER — CYANOCOBALAMIN (VITAMIN B-12) 3000MCG/ML
1 DROPS SUBLINGUAL DAILY
Status: ON HOLD | COMMUNITY
End: 2022-03-28

## 2021-10-14 RX ORDER — MIRTAZAPINE 15 MG/1
22.5 TABLET, FILM COATED ORAL EVERY EVENING
COMMUNITY
Start: 2021-07-28

## 2021-10-14 RX ORDER — OXYCODONE HCL 5 MG/5 ML
5 SOLUTION, ORAL ORAL
Status: COMPLETED | OUTPATIENT
Start: 2021-10-14 | End: 2021-10-14

## 2021-10-14 RX ORDER — SODIUM CHLORIDE, SODIUM LACTATE, POTASSIUM CHLORIDE, CALCIUM CHLORIDE 600; 310; 30; 20 MG/100ML; MG/100ML; MG/100ML; MG/100ML
INJECTION, SOLUTION INTRAVENOUS CONTINUOUS
Status: ACTIVE | OUTPATIENT
Start: 2021-10-14 | End: 2021-10-14

## 2021-10-14 RX ORDER — LIDOCAINE HYDROCHLORIDE 20 MG/ML
INJECTION, SOLUTION EPIDURAL; INFILTRATION; INTRACAUDAL; PERINEURAL PRN
Status: DISCONTINUED | OUTPATIENT
Start: 2021-10-14 | End: 2021-10-14 | Stop reason: SURG

## 2021-10-14 RX ORDER — MIDAZOLAM HYDROCHLORIDE 1 MG/ML
INJECTION INTRAMUSCULAR; INTRAVENOUS PRN
Status: DISCONTINUED | OUTPATIENT
Start: 2021-10-14 | End: 2021-10-14 | Stop reason: SURG

## 2021-10-14 RX ORDER — OXYCODONE HCL 5 MG/5 ML
10 SOLUTION, ORAL ORAL
Status: COMPLETED | OUTPATIENT
Start: 2021-10-14 | End: 2021-10-14

## 2021-10-14 RX ORDER — DIPHENHYDRAMINE HYDROCHLORIDE 50 MG/ML
12.5 INJECTION INTRAMUSCULAR; INTRAVENOUS
Status: DISCONTINUED | OUTPATIENT
Start: 2021-10-14 | End: 2021-10-14 | Stop reason: HOSPADM

## 2021-10-14 RX ORDER — OXYCODONE HYDROCHLORIDE 15 MG/1
15 TABLET ORAL EVERY 4 HOURS PRN
Status: ON HOLD | COMMUNITY
Start: 2021-09-29 | End: 2022-03-24

## 2021-10-14 RX ORDER — CHOLECALCIFEROL (VITAMIN D3) 1MM UNIT/G
1 LIQUID (GRAM) MISCELLANEOUS DAILY
Status: ON HOLD | COMMUNITY
End: 2022-03-28

## 2021-10-14 RX ORDER — CELECOXIB 200 MG/1
400 CAPSULE ORAL ONCE
Status: COMPLETED | OUTPATIENT
Start: 2021-10-14 | End: 2021-10-14

## 2021-10-14 RX ORDER — ONDANSETRON 2 MG/ML
INJECTION INTRAMUSCULAR; INTRAVENOUS PRN
Status: DISCONTINUED | OUTPATIENT
Start: 2021-10-14 | End: 2021-10-14 | Stop reason: SURG

## 2021-10-14 RX ORDER — ONDANSETRON 4 MG/1
4 TABLET, FILM COATED ORAL EVERY 6 HOURS PRN
Status: ON HOLD | COMMUNITY
Start: 2021-10-07 | End: 2022-03-28

## 2021-10-14 RX ORDER — CEFAZOLIN SODIUM 1 G/3ML
INJECTION, POWDER, FOR SOLUTION INTRAMUSCULAR; INTRAVENOUS PRN
Status: DISCONTINUED | OUTPATIENT
Start: 2021-10-14 | End: 2021-10-14 | Stop reason: SURG

## 2021-10-14 RX ORDER — SULFAMETHOXAZOLE AND TRIMETHOPRIM 800; 160 MG/1; MG/1
1 TABLET ORAL 2 TIMES DAILY
Status: ON HOLD | COMMUNITY
Start: 2021-09-22 | End: 2021-12-10

## 2021-10-14 RX ADMIN — FENTANYL CITRATE 50 MCG: 50 INJECTION, SOLUTION INTRAMUSCULAR; INTRAVENOUS at 14:11

## 2021-10-14 RX ADMIN — GABAPENTIN 300 MG: 300 CAPSULE ORAL at 12:21

## 2021-10-14 RX ADMIN — SODIUM CHLORIDE, POTASSIUM CHLORIDE, SODIUM LACTATE AND CALCIUM CHLORIDE: 600; 310; 30; 20 INJECTION, SOLUTION INTRAVENOUS at 11:22

## 2021-10-14 RX ADMIN — CELECOXIB 400 MG: 200 CAPSULE ORAL at 12:21

## 2021-10-14 RX ADMIN — DEXAMETHASONE SODIUM PHOSPHATE 4 MG: 4 INJECTION, SOLUTION INTRA-ARTICULAR; INTRALESIONAL; INTRAMUSCULAR; INTRAVENOUS; SOFT TISSUE at 14:00

## 2021-10-14 RX ADMIN — ONDANSETRON 4 MG: 2 INJECTION INTRAMUSCULAR; INTRAVENOUS at 14:00

## 2021-10-14 RX ADMIN — EPHEDRINE SULFATE 10 MG: 50 INJECTION, SOLUTION INTRAVENOUS at 14:14

## 2021-10-14 RX ADMIN — CEFAZOLIN 2 G: 330 INJECTION, POWDER, FOR SOLUTION INTRAMUSCULAR; INTRAVENOUS at 14:00

## 2021-10-14 RX ADMIN — OXYCODONE HYDROCHLORIDE 10 MG: 5 SOLUTION ORAL at 15:28

## 2021-10-14 RX ADMIN — SODIUM CHLORIDE, POTASSIUM CHLORIDE, SODIUM LACTATE AND CALCIUM CHLORIDE: 600; 310; 30; 20 INJECTION, SOLUTION INTRAVENOUS at 13:57

## 2021-10-14 RX ADMIN — PROPOFOL 200 MG: 10 INJECTION, EMULSION INTRAVENOUS at 14:00

## 2021-10-14 RX ADMIN — LIDOCAINE HYDROCHLORIDE 100 MG: 20 INJECTION, SOLUTION EPIDURAL; INFILTRATION; INTRACAUDAL at 14:00

## 2021-10-14 RX ADMIN — ACETAMINOPHEN 1000 MG: 500 TABLET ORAL at 12:21

## 2021-10-14 RX ADMIN — FENTANYL CITRATE 50 MCG: 50 INJECTION, SOLUTION INTRAMUSCULAR; INTRAVENOUS at 14:00

## 2021-10-14 RX ADMIN — MIDAZOLAM HYDROCHLORIDE 2 MG: 1 INJECTION, SOLUTION INTRAMUSCULAR; INTRAVENOUS at 14:00

## 2021-10-14 ASSESSMENT — FIBROSIS 4 INDEX: FIB4 SCORE: 0.54

## 2021-10-14 ASSESSMENT — PAIN DESCRIPTION - PAIN TYPE
TYPE: SURGICAL PAIN

## 2021-10-14 NOTE — ANESTHESIA TIME REPORT
Anesthesia Start and Stop Event Times     Date Time Event    10/14/2021 1347 Ready for Procedure     1356 Anesthesia Start     1445 Anesthesia Stop        Responsible Staff  10/14/21    Name Role Begin End    Bobby Thomas M.D. Anesth 1356 1445        Preop Diagnosis (Free Text):  Pre-op Diagnosis     ENDOMETRIAL CANCER, CHEMOTHERAPY        Preop Diagnosis (Codes):    Premium Reason  Non-Premium    Comments:

## 2021-10-14 NOTE — PROGRESS NOTES
Med Rec completed per patient   Allergies reviewed  Patient started a 7 day course of Bactrim DS (One twice daily) on 9/22/21 but stopped taking it on 9/27/21 due to allergic reaction.    Patient finished a 5 day course of Zofran 4 mg, Dexamethasone 4 mg, and Mirtazapine 15 mg on 10/05/2021

## 2021-10-14 NOTE — OP REPORT
PreOp Diagnosis: Stage IIIc endometrial cancer  Poor peripheral IV access requiring central venous access ministration of chemotherapy      PostOp Diagnosis: Same      Procedure(s):  INSERTION, CATHETER - MEDIPORT - Wound Class: Clean    Surgeon(s):  Ray Dyson M.D.    Anesthesiologist/Type of Anesthesia:  Anesthesiologist: Bobby Thomas M.D./General    Surgical Staff:  Circulator: Sabrina Cantu, R.N.  Scrub Person: Marleny Cerna; Carito Gutierrez  Radiology Technologist: Feroz Conner    Specimens removed if any: None    Estimated Blood Loss: 10 cc    Complications: None    Indication: Mrs. Leal is a pleasant 56-year-old female who unfortunately is diagnosed with stage IIIc endometrial cancer. Patient is requiring neoadjuvant chemotherapy. She has poor peripheral IV access thus patient is being admitted to undergo central venous access placement for administration of chemotherapy.    Risk benefits and rationale procedures were reviewed with patient in detail patient's understanding of these risks and wished to proceed with the surgery as planned.    Findings: Normal anatomy    Procedure: The patient was given IV antibiotics prior to the procedure.     The patient was prepped and draped and placed in the supine position.  The    patient was then placed in Trendelenburg position.  An ultrasound was used to    locate the right internal jugular vein.  An 18-gauge Angiocath was then used    to access the right IJ.  On an initial pass, good venous blood flow was    returned.  A guidewire was subsequently passed through under direct    fluoroscopy.  After confirming the position of the guidewire being in the    superior vena cava a separate incision was made in the right upper pectoral    region.  The subcutaneous tissue was dissected down to the level of the    fascia.  After completion of this the PowerPort catheter was then tunneled    through the subcutaneous tissue and exited out through where the  Angiocath was   entered.  A small incision was made along here.  After the completion of    this, a venodilator was used to dilate the right IJ.  After the completion of    this the guidewire was removed.  The venodilator was removed and the catheter    was threaded through the venous sheath.  The entire venous sheath was removed    in its entirety without difficulty.  After completion of this, the position of   the PowerPort was confirmed under direct fluoroscopy.  Great care was    undertaken to ensure that there was no kinking.  We then injected the MediPort   PowerPort cath chamber with Conray dye to ensure that there was no evidence    of leak.  Once this was confirmed, the PowerPort catheter was then anchored    down to the fascia with 0 PDS suture.  After completion of this the    subcutaneous fat was irrigated with water.  The subcutaneous tissue was    reapproximated with #2-0 Vicryl suture.  The skin was reapproximated with #3-0   Monocryl suture.  After completion of this the PowerPort catheter was then    flushed with diluted heparin solution.  Good venous blood flow was returned    with minimal resistance.  After completion of this the area of the incision    site was infiltrated with 0.25% Marcaine for local anesthetic effect.  The    patient tolerated the procedure well without any difficulties and was    subsequently transferred to the PACU in stable condition extubated.

## 2021-10-14 NOTE — OR NURSING
1600 Report received from FABIOLA Rivas.     1611 Pt arrived to Phase II into room 32. AAOx4. VSS. Denies pain and nausea. Right chest dressing CDI and soft. Belongings returned to pt bedside. POC update given to pt and to wife at bedside.     1638 Discharge instructions reviewed with pt and wife. All verbalize understanding and demonstrate teach back. Discharge criteria met. PIV removed. Pt dressed and ambulated to bathroom.     1645 Discharged via wheelchair with CNA escort to responsible adult. All belongings with pt.

## 2021-10-14 NOTE — DISCHARGE INSTRUCTIONS
ACTIVITY: Rest and take it easy for the first 24 hours.  A responsible adult is recommended to remain with you during that time.  It is normal to feel sleepy.  We encourage you to not do anything that requires balance, judgment or coordination.    MILD FLU-LIKE SYMPTOMS ARE NORMAL. YOU MAY EXPERIENCE GENERALIZED MUSCLE ACHES, THROAT IRRITATION, HEADACHE AND/OR SOME NAUSEA.    FOR 24 HOURS DO NOT:  Drive, operate machinery or run household appliances.  Drink beer or alcoholic beverages.   Make important decisions or sign legal documents.    SPECIAL INSTRUCTIONS:   - REMOVE DRESSINGS TOMORROW  - DO NOT RAISE ARM ABOVE HEAD X1 WEEK  - CALL OFFICE FOR ANY FEVERS, CHILLS, ARM SWELLING, REDNESS OR TENDERNESS AT INCISION SITE  - MAY SHOWER    Implanted Port Insertion, Care After    What can I expect after the procedure?  After the procedure, it is common to have:  · Discomfort at the port insertion site.  · Bruising on the skin over the port. This should improve over 3-4 days.  Follow these instructions at home:  Port care  · After your port is placed, you will get a 's information card. The card has information about your port. Keep this card with you at all times.  · Take care of the port as told by your health care provider. Ask your health care provider if you or a family member can get training for taking care of the port at home. A home health care nurse may also take care of the port.  · Make sure to remember what type of port you have.  Incision care  · Follow instructions from your health care provider about how to take care of your port insertion site. Make sure you:  ? Wash your hands with soap and water before and after you change your bandage (dressing). If soap and water are not available, use hand .  ? Change your dressing as told by your health care provider.  ? Leave stitches (sutures), skin glue, or adhesive strips in place. These skin closures may need to stay in place for 2 weeks  or longer. If adhesive strip edges start to loosen and curl up, you may trim the loose edges. Do not remove adhesive strips completely unless your health care provider tells you to do that.  · Check your port insertion site every day for signs of infection. Check for:  ? Redness, swelling, or pain.  ? Fluid or blood.  ? Warmth.  ? Pus or a bad smell.  Activity  · Return to your normal activities as told by your health care provider. Ask your health care provider what activities are safe for you.  · Do not lift anything that is heavier than 10 lb (4.5 kg), or the limit that you are told, until your health care provider says that it is safe.  General instructions  · Take over-the-counter and prescription medicines only as told by your health care provider.  · Do not take baths, swim, or use a hot tub until your health care provider approves. Ask your health care provider if you may take showers. You may only be allowed to take sponge baths.  · Do not drive for 24 hours if you were given a sedative during your procedure.  · Wear a medical alert bracelet in case of an emergency. This will tell any health care providers that you have a port.  · Keep all follow-up visits as told by your health care provider. This is important.  Contact a health care provider if:  · You cannot flush your port with saline as directed, or you cannot draw blood from the port.  · You have a fever or chills.  · You have redness, swelling, or pain around your port insertion site.  · You have fluid or blood coming from your port insertion site.  · Your port insertion site feels warm to the touch.  · You have pus or a bad smell coming from the port insertion site.  Get help right away if:  · You have chest pain or shortness of breath.  · You have bleeding from your port that you cannot control.  Summary  · Take care of the port as told by your health care provider. Keep the 's information card with you at all times.  · Change your  dressing as told by your health care provider.  · Contact a health care provider if you have a fever or chills or if you have redness, swelling, or pain around your port insertion site.  · Keep all follow-up visits as told by your health care provider.  This information is not intended to replace advice given to you by your health care provider.     DIET: To avoid nausea, slowly advance diet as tolerated, avoiding spicy or greasy foods for the first day.  Add more substantial food to your diet according to your physician's instructions.  Babies can be fed formula or breast milk as soon as they are hungry.  INCREASE FLUIDS AND FIBER TO AVOID CONSTIPATION.    SURGICAL DRESSING/BATHING: Keep dressing dry for 1 week until follow-up appointment. Sponge bath only. Do not submerge in water or bath for 7 days.    FOLLOW-UP APPOINTMENT:  A follow-up appointment should be arranged with your doctor in 1 week with Dr. Dyson; call to schedule.    You should CALL YOUR PHYSICIAN if you develop:  Fever greater than 101 degrees F.  Pain not relieved by medication, or persistent nausea or vomiting.  Excessive bleeding (blood soaking through dressing) or unexpected drainage from the wound.  Extreme redness or swelling around the incision site, drainage of pus or foul smelling drainage.  Inability to urinate or empty your bladder within 8 hours.  Problems with breathing or chest pain.    You should call 911 if you develop problems with breathing or chest pain.  If you are unable to contact your doctor or surgical center, you should go to the nearest emergency room or urgent care center.  Physician's telephone #: Dr. Dyson [phone]    If any questions arise, call your doctor.  If your doctor is not available, please feel free to call the Surgical Center at [phone]. The Contact Center is open Monday through Friday 7AM to 5PM and may speak to a nurse at [phone], or toll free at [phone].     A registered nurse may call  you a few days after your surgery to see how you are doing after your procedure.    MEDICATIONS: Resume taking daily medication.  Take prescribed pain medication with food.  If no medication is prescribed, you may take non-aspirin pain medication if needed.  PAIN MEDICATION CAN BE VERY CONSTIPATING.  Take a stool softener or laxative such as senokot, pericolace, or milk of magnesia if needed.    If your physician has prescribed pain medication that includes Acetaminophen (Tylenol), do not take additional Acetaminophen (Tylenol) while taking the prescribed medication.    Depression / Suicide Risk    As you are discharged from this [facility] access hospital facility, it is important to learn how to keep safe from harming yourself.    Recognize the warning signs:  · Abrupt changes in personality, positive or negative- including increase in energy   · Giving away possessions  · Change in eating patterns- significant weight changes-  positive or negative  · Change in sleeping patterns- unable to sleep or sleeping all the time   · Unwillingness or inability to communicate  · Depression  · Unusual sadness, discouragement and loneliness  · Talk of wanting to die  · Neglect of personal appearance   · Rebelliousness- reckless behavior  · Withdrawal from people/activities they love  · Confusion- inability to concentrate     If you or a loved one observes any of these behaviors or has concerns about self-harm, here's what you can do:  · Talk about it- your feelings and reasons for harming yourself  · Remove any means that you might use to hurt yourself (examples: pills, rope, extension cords, firearm)  · Get professional help from the community (Mental Health, Substance Abuse, psychological counseling)  · Do not be alone:Call your Safe Contact- someone whom you trust who will be there for you.  · Call your local CRISIS HOTLINE 078-5535 or 673-637-0172  · Call your local Children's Mobile Crisis Response Team [place] (268) 662-0523  or www.[employer]  · Call the toll free National Suicide Prevention Hotlines   · National Suicide Prevention Lifeline 506-228-MVRM (4638)  · National [employer] Line Network 800-SUICIDE (848-3513)

## 2021-10-14 NOTE — ANESTHESIA PROCEDURE NOTES
Airway    Date/Time: 10/14/2021 2:02 PM  Performed by: Bobby Thomas M.D.  Authorized by: Bobby Thomas M.D.     Location:  OR  Urgency:  Elective  Indications for Airway Management:  Anesthesia      Spontaneous Ventilation: absent    Sedation Level:  Deep  Preoxygenated: Yes    Mask Difficulty Assessment:  0 - not attempted  Final Airway Type:  Supraglottic airway  Final Supraglottic Airway:  Standard LMA    SGA Size:  4  Number of Attempts at Approach:  1

## 2021-10-14 NOTE — ANESTHESIA POSTPROCEDURE EVALUATION
Patient: Magali Leal    Procedure Summary     Date: 10/14/21 Room / Location: [place] [address] / SURGERY [facility]    Anesthesia Start: 1356 Anesthesia Stop: 1445    Procedure: INSERTION, CATHETER - MEDIPORT (Right Chest) Diagnosis: (ENDOMETRIAL CANCER, CHEMOTHERAPY)    Surgeons: Ray Dyson M.D. Responsible Provider: Bobby Thomas M.D.    Anesthesia Type: general ASA Status: 2          Final Anesthesia Type: general  Last vitals  BP   Blood Pressure: 121/72    Temp   36.1 °C (97 °F)    Pulse   89   Resp   16    SpO2   97 %      Anesthesia Post Evaluation    Patient location during evaluation: PACU  Patient participation: complete - patient participated  Level of consciousness: awake and alert    Airway patency: patent  Anesthetic complications: no  Cardiovascular status: hemodynamically stable  Respiratory status: acceptable  Hydration status: euvolemic    PONV: none          No complications documented.     Nurse Pain Score: 0 (NPRS)

## 2021-10-14 NOTE — OR NURSING
1447: Pt arrives to PACU asleep and calm. Dressings CDI.     1515: Pt denies pain or nausea. Gosia pts wife called and updated.    1600: Pt states pain is tolerable and denies nausea. Report called to Jannet HICKS, efrains CDI.

## 2021-10-14 NOTE — ANESTHESIA PREPROCEDURE EVALUATION
Endometrial cancer  HTN    Relevant Problems   Other   (positive) Lupus (HCC)       Physical Exam    Airway   Mallampati: II  TM distance: >3 FB  Neck ROM: full       Cardiovascular - normal exam  Rhythm: regular  Rate: normal  (-) murmur     Dental - normal exam           Pulmonary - normal exam  Breath sounds clear to auscultation     Abdominal    Neurological - normal exam                 Anesthesia Plan    ASA 2       Plan - general       Airway plan will be ETT          Induction: intravenous      Pertinent diagnostic labs and testing reviewed    Informed Consent:    Anesthetic plan and risks discussed with patient.

## 2021-10-19 ENCOUNTER — HOSPITAL ENCOUNTER (OUTPATIENT)
Dept: LAB | Facility: MEDICAL CENTER | Age: 56
End: 2021-10-19
Attending: NURSE PRACTITIONER
Payer: COMMERCIAL

## 2021-10-19 LAB
ALBUMIN SERPL BCP-MCNC: 3.8 G/DL (ref 3.2–4.9)
ALBUMIN/GLOB SERPL: 1.2 G/DL
ALP SERPL-CCNC: 137 U/L (ref 30–99)
ALT SERPL-CCNC: 28 U/L (ref 2–50)
ANION GAP SERPL CALC-SCNC: 10 MMOL/L (ref 7–16)
ANISOCYTOSIS BLD QL SMEAR: ABNORMAL
AST SERPL-CCNC: 31 U/L (ref 12–45)
BASOPHILS # BLD AUTO: 0.6 % (ref 0–1.8)
BASOPHILS # BLD: 0.04 K/UL (ref 0–0.12)
BILIRUB SERPL-MCNC: 0.3 MG/DL (ref 0.1–1.5)
BUN SERPL-MCNC: 12 MG/DL (ref 8–22)
CALCIUM SERPL-MCNC: 8.9 MG/DL (ref 8.5–10.5)
CANCER AG125 SERPL-ACNC: 46.4 U/ML (ref 0–35)
CHLORIDE SERPL-SCNC: 106 MMOL/L (ref 96–112)
CO2 SERPL-SCNC: 25 MMOL/L (ref 20–33)
COMMENT 1642: NORMAL
CREAT SERPL-MCNC: 0.87 MG/DL (ref 0.5–1.4)
EOSINOPHIL # BLD AUTO: 0.04 K/UL (ref 0–0.51)
EOSINOPHIL NFR BLD: 0.6 % (ref 0–6.9)
ERYTHROCYTE [DISTWIDTH] IN BLOOD BY AUTOMATED COUNT: 52 FL (ref 35.9–50)
GLOBULIN SER CALC-MCNC: 3.1 G/DL (ref 1.9–3.5)
GLUCOSE SERPL-MCNC: 82 MG/DL (ref 65–99)
HCT VFR BLD AUTO: 34.4 % (ref 37–47)
HGB BLD-MCNC: 10.2 G/DL (ref 12–16)
HYPOCHROMIA BLD QL SMEAR: ABNORMAL
IMM GRANULOCYTES # BLD AUTO: 0.05 K/UL (ref 0–0.11)
IMM GRANULOCYTES NFR BLD AUTO: 0.7 % (ref 0–0.9)
LYMPHOCYTES # BLD AUTO: 2.5 K/UL (ref 1–4.8)
LYMPHOCYTES NFR BLD: 35.3 % (ref 22–41)
MAGNESIUM SERPL-MCNC: 2.2 MG/DL (ref 1.5–2.5)
MCH RBC QN AUTO: 24.5 PG (ref 27–33)
MCHC RBC AUTO-ENTMCNC: 29.7 G/DL (ref 33.6–35)
MCV RBC AUTO: 82.5 FL (ref 81.4–97.8)
MICROCYTES BLD QL SMEAR: ABNORMAL
MONOCYTES # BLD AUTO: 0.3 K/UL (ref 0–0.85)
MONOCYTES NFR BLD AUTO: 4.2 % (ref 0–13.4)
MORPHOLOGY BLD-IMP: NORMAL
NEUTROPHILS # BLD AUTO: 4.16 K/UL (ref 2–7.15)
NEUTROPHILS NFR BLD: 58.6 % (ref 44–72)
NRBC # BLD AUTO: 0 K/UL
NRBC BLD-RTO: 0 /100 WBC
PLATELET # BLD AUTO: 355 K/UL (ref 164–446)
PLATELET BLD QL SMEAR: NORMAL
PMV BLD AUTO: 10 FL (ref 9–12.9)
POTASSIUM SERPL-SCNC: 4.4 MMOL/L (ref 3.6–5.5)
PROT SERPL-MCNC: 6.9 G/DL (ref 6–8.2)
RBC # BLD AUTO: 4.17 M/UL (ref 4.2–5.4)
RBC BLD AUTO: PRESENT
SODIUM SERPL-SCNC: 141 MMOL/L (ref 135–145)
WBC # BLD AUTO: 7.1 K/UL (ref 4.8–10.8)

## 2021-10-19 PROCEDURE — 36415 COLL VENOUS BLD VENIPUNCTURE: CPT

## 2021-10-19 PROCEDURE — 85025 COMPLETE CBC W/AUTO DIFF WBC: CPT

## 2021-10-19 PROCEDURE — 80053 COMPREHEN METABOLIC PANEL: CPT

## 2021-10-19 PROCEDURE — 83735 ASSAY OF MAGNESIUM: CPT

## 2021-10-19 PROCEDURE — 86304 IMMUNOASSAY TUMOR CA 125: CPT

## 2021-11-08 ENCOUNTER — HOSPITAL ENCOUNTER (OUTPATIENT)
Dept: LAB | Facility: MEDICAL CENTER | Age: 56
End: 2021-11-08
Attending: NURSE PRACTITIONER
Payer: COMMERCIAL

## 2021-11-08 LAB
ALBUMIN SERPL BCP-MCNC: 3.6 G/DL (ref 3.2–4.9)
ALBUMIN/GLOB SERPL: 1.4 G/DL
ALP SERPL-CCNC: 120 U/L (ref 30–99)
ALT SERPL-CCNC: 31 U/L (ref 2–50)
ANION GAP SERPL CALC-SCNC: 10 MMOL/L (ref 7–16)
ANISOCYTOSIS BLD QL SMEAR: ABNORMAL
AST SERPL-CCNC: 13 U/L (ref 12–45)
BASOPHILS # BLD AUTO: 0.8 % (ref 0–1.8)
BASOPHILS # BLD: 0.06 K/UL (ref 0–0.12)
BILIRUB SERPL-MCNC: 0.2 MG/DL (ref 0.1–1.5)
BUN SERPL-MCNC: 15 MG/DL (ref 8–22)
CALCIUM SERPL-MCNC: 9.1 MG/DL (ref 8.5–10.5)
CANCER AG125 SERPL-ACNC: 35.2 U/ML (ref 0–35)
CHLORIDE SERPL-SCNC: 106 MMOL/L (ref 96–112)
CO2 SERPL-SCNC: 26 MMOL/L (ref 20–33)
COMMENT 1642: NORMAL
CREAT SERPL-MCNC: 0.89 MG/DL (ref 0.5–1.4)
EOSINOPHIL # BLD AUTO: 0 K/UL (ref 0–0.51)
EOSINOPHIL NFR BLD: 0 % (ref 0–6.9)
ERYTHROCYTE [DISTWIDTH] IN BLOOD BY AUTOMATED COUNT: 65.3 FL (ref 35.9–50)
GLOBULIN SER CALC-MCNC: 2.6 G/DL (ref 1.9–3.5)
GLUCOSE SERPL-MCNC: 84 MG/DL (ref 65–99)
HCT VFR BLD AUTO: 31.8 % (ref 37–47)
HGB BLD-MCNC: 9.7 G/DL (ref 12–16)
HYPOCHROMIA BLD QL SMEAR: ABNORMAL
IMM GRANULOCYTES # BLD AUTO: 0.04 K/UL (ref 0–0.11)
IMM GRANULOCYTES NFR BLD AUTO: 0.5 % (ref 0–0.9)
LYMPHOCYTES # BLD AUTO: 1.92 K/UL (ref 1–4.8)
LYMPHOCYTES NFR BLD: 25.1 % (ref 22–41)
MAGNESIUM SERPL-MCNC: 2 MG/DL (ref 1.5–2.5)
MCH RBC QN AUTO: 25.7 PG (ref 27–33)
MCHC RBC AUTO-ENTMCNC: 30.5 G/DL (ref 33.6–35)
MCV RBC AUTO: 84.1 FL (ref 81.4–97.8)
MICROCYTES BLD QL SMEAR: ABNORMAL
MONOCYTES # BLD AUTO: 0.41 K/UL (ref 0–0.85)
MONOCYTES NFR BLD AUTO: 5.4 % (ref 0–13.4)
MORPHOLOGY BLD-IMP: NORMAL
NEUTROPHILS # BLD AUTO: 5.21 K/UL (ref 2–7.15)
NEUTROPHILS NFR BLD: 68.2 % (ref 44–72)
NRBC # BLD AUTO: 0 K/UL
NRBC BLD-RTO: 0 /100 WBC
PLATELET # BLD AUTO: 176 K/UL (ref 164–446)
PLATELET BLD QL SMEAR: NORMAL
PMV BLD AUTO: 9.8 FL (ref 9–12.9)
POIKILOCYTOSIS BLD QL SMEAR: NORMAL
POTASSIUM SERPL-SCNC: 4.4 MMOL/L (ref 3.6–5.5)
PROT SERPL-MCNC: 6.2 G/DL (ref 6–8.2)
RBC # BLD AUTO: 3.78 M/UL (ref 4.2–5.4)
RBC BLD AUTO: PRESENT
SODIUM SERPL-SCNC: 142 MMOL/L (ref 135–145)
STOMATOCYTES BLD QL SMEAR: NORMAL
WBC # BLD AUTO: 7.6 K/UL (ref 4.8–10.8)

## 2021-11-08 PROCEDURE — 85025 COMPLETE CBC W/AUTO DIFF WBC: CPT

## 2021-11-08 PROCEDURE — 82306 VITAMIN D 25 HYDROXY: CPT

## 2021-11-08 PROCEDURE — 80053 COMPREHEN METABOLIC PANEL: CPT

## 2021-11-08 PROCEDURE — 86304 IMMUNOASSAY TUMOR CA 125: CPT

## 2021-11-08 PROCEDURE — 83735 ASSAY OF MAGNESIUM: CPT

## 2021-11-08 PROCEDURE — 36415 COLL VENOUS BLD VENIPUNCTURE: CPT

## 2021-11-10 LAB — 25(OH)D3 SERPL-MCNC: 26 NG/ML (ref 30–80)

## 2021-11-29 ENCOUNTER — HOSPITAL ENCOUNTER (OUTPATIENT)
Dept: RADIOLOGY | Facility: MEDICAL CENTER | Age: 56
End: 2021-11-29
Attending: SPECIALIST
Payer: COMMERCIAL

## 2021-11-29 ENCOUNTER — HOSPITAL ENCOUNTER (OUTPATIENT)
Dept: LAB | Facility: MEDICAL CENTER | Age: 56
End: 2021-11-29
Attending: SPECIALIST
Payer: COMMERCIAL

## 2021-11-29 DIAGNOSIS — C54.1 MALIGNANT NEOPLASM OF ENDOMETRIUM (HCC): ICD-10-CM

## 2021-11-29 DIAGNOSIS — Z51.11 ENCOUNTER FOR ANTINEOPLASTIC CHEMOTHERAPY: ICD-10-CM

## 2021-11-29 LAB
ALBUMIN SERPL BCP-MCNC: 3.8 G/DL (ref 3.2–4.9)
ALBUMIN/GLOB SERPL: 1.7 G/DL
ALP SERPL-CCNC: 108 U/L (ref 30–99)
ALT SERPL-CCNC: 16 U/L (ref 2–50)
ANION GAP SERPL CALC-SCNC: 10 MMOL/L (ref 7–16)
ANISOCYTOSIS BLD QL SMEAR: ABNORMAL
AST SERPL-CCNC: 17 U/L (ref 12–45)
BASOPHILS # BLD AUTO: 0.9 % (ref 0–1.8)
BASOPHILS # BLD: 0.07 K/UL (ref 0–0.12)
BILIRUB SERPL-MCNC: 0.2 MG/DL (ref 0.1–1.5)
BUN SERPL-MCNC: 13 MG/DL (ref 8–22)
CALCIUM SERPL-MCNC: 8.8 MG/DL (ref 8.5–10.5)
CANCER AG125 SERPL-ACNC: 49.4 U/ML (ref 0–35)
CHLORIDE SERPL-SCNC: 100 MMOL/L (ref 96–112)
CO2 SERPL-SCNC: 25 MMOL/L (ref 20–33)
COMMENT 1642: NORMAL
CREAT SERPL-MCNC: 0.76 MG/DL (ref 0.5–1.4)
EOSINOPHIL # BLD AUTO: 0 K/UL (ref 0–0.51)
EOSINOPHIL NFR BLD: 0 % (ref 0–6.9)
ERYTHROCYTE [DISTWIDTH] IN BLOOD BY AUTOMATED COUNT: 75.3 FL (ref 35.9–50)
GLOBULIN SER CALC-MCNC: 2.2 G/DL (ref 1.9–3.5)
GLUCOSE SERPL-MCNC: 90 MG/DL (ref 65–99)
HCT VFR BLD AUTO: 29.4 % (ref 37–47)
HGB BLD-MCNC: 9 G/DL (ref 12–16)
IMM GRANULOCYTES # BLD AUTO: 0.14 K/UL (ref 0–0.11)
IMM GRANULOCYTES NFR BLD AUTO: 1.8 % (ref 0–0.9)
LYMPHOCYTES # BLD AUTO: 2.19 K/UL (ref 1–4.8)
LYMPHOCYTES NFR BLD: 28.7 % (ref 22–41)
MACROCYTES BLD QL SMEAR: ABNORMAL
MAGNESIUM SERPL-MCNC: 1.7 MG/DL (ref 1.5–2.5)
MCH RBC QN AUTO: 26.8 PG (ref 27–33)
MCHC RBC AUTO-ENTMCNC: 30.6 G/DL (ref 33.6–35)
MCV RBC AUTO: 87.5 FL (ref 81.4–97.8)
MICROCYTES BLD QL SMEAR: ABNORMAL
MONOCYTES # BLD AUTO: 0.56 K/UL (ref 0–0.85)
MONOCYTES NFR BLD AUTO: 7.3 % (ref 0–13.4)
MORPHOLOGY BLD-IMP: NORMAL
NEUTROPHILS # BLD AUTO: 4.68 K/UL (ref 2–7.15)
NEUTROPHILS NFR BLD: 61.3 % (ref 44–72)
NRBC # BLD AUTO: 0.03 K/UL
NRBC BLD-RTO: 0.4 /100 WBC
PLATELET # BLD AUTO: 228 K/UL (ref 164–446)
PLATELET BLD QL SMEAR: NORMAL
PMV BLD AUTO: 10.7 FL (ref 9–12.9)
POLYCHROMASIA BLD QL SMEAR: NORMAL
POTASSIUM SERPL-SCNC: 3.9 MMOL/L (ref 3.6–5.5)
PROT SERPL-MCNC: 6 G/DL (ref 6–8.2)
RBC # BLD AUTO: 3.36 M/UL (ref 4.2–5.4)
RBC BLD AUTO: PRESENT
SODIUM SERPL-SCNC: 135 MMOL/L (ref 135–145)
WBC # BLD AUTO: 7.6 K/UL (ref 4.8–10.8)

## 2021-11-29 PROCEDURE — 86304 IMMUNOASSAY TUMOR CA 125: CPT

## 2021-11-29 PROCEDURE — 85025 COMPLETE CBC W/AUTO DIFF WBC: CPT

## 2021-11-29 PROCEDURE — 700111 HCHG RX REV CODE 636 W/ 250 OVERRIDE (IP): Performed by: RADIOLOGY

## 2021-11-29 PROCEDURE — 36415 COLL VENOUS BLD VENIPUNCTURE: CPT

## 2021-11-29 PROCEDURE — 80053 COMPREHEN METABOLIC PANEL: CPT

## 2021-11-29 PROCEDURE — 71260 CT THORAX DX C+: CPT

## 2021-11-29 PROCEDURE — 700117 HCHG RX CONTRAST REV CODE 255: Performed by: SPECIALIST

## 2021-11-29 PROCEDURE — 83735 ASSAY OF MAGNESIUM: CPT

## 2021-11-29 RX ORDER — HEPARIN SODIUM (PORCINE) LOCK FLUSH IV SOLN 100 UNIT/ML 100 UNIT/ML
300-500 SOLUTION INTRAVENOUS PRN
Status: DISCONTINUED | OUTPATIENT
Start: 2021-11-29 | End: 2021-11-30 | Stop reason: HOSPADM

## 2021-11-29 RX ORDER — HEPARIN SODIUM (PORCINE) LOCK FLUSH IV SOLN 100 UNIT/ML 100 UNIT/ML
300-500 SOLUTION INTRAVENOUS EVERY 12 HOURS
Status: DISCONTINUED | OUTPATIENT
Start: 2021-11-29 | End: 2021-11-30 | Stop reason: HOSPADM

## 2021-11-29 RX ADMIN — IOHEXOL 100 ML: 350 INJECTION, SOLUTION INTRAVENOUS at 15:05

## 2021-11-29 RX ADMIN — HEPARIN 500 UNITS: 100 SYRINGE at 15:10

## 2021-11-29 RX ADMIN — IOHEXOL 25 ML: 240 INJECTION, SOLUTION INTRATHECAL; INTRAVASCULAR; INTRAVENOUS; ORAL at 14:49

## 2021-12-07 ENCOUNTER — PRE-ADMISSION TESTING (OUTPATIENT)
Dept: ADMISSIONS | Facility: MEDICAL CENTER | Age: 56
End: 2021-12-07
Attending: SPECIALIST
Payer: COMMERCIAL

## 2021-12-07 DIAGNOSIS — Z01.812 PRE-OPERATIVE LABORATORY EXAMINATION: ICD-10-CM

## 2021-12-07 LAB
ABO GROUP BLD: NORMAL
APTT PPP: 20.2 SEC (ref 24.7–36)
BLD GP AB SCN SERPL QL: NORMAL
EKG IMPRESSION: NORMAL
INR PPP: 1.09 (ref 0.87–1.13)
PROTHROMBIN TIME: 13.7 SEC (ref 12–14.6)
RH BLD: NORMAL
SARS-COV-2 RNA RESP QL NAA+PROBE: NOTDETECTED
SPECIMEN SOURCE: NORMAL

## 2021-12-07 PROCEDURE — U0005 INFEC AGEN DETEC AMPLI PROBE: HCPCS

## 2021-12-07 PROCEDURE — 85610 PROTHROMBIN TIME: CPT

## 2021-12-07 PROCEDURE — 36415 COLL VENOUS BLD VENIPUNCTURE: CPT

## 2021-12-07 PROCEDURE — U0003 INFECTIOUS AGENT DETECTION BY NUCLEIC ACID (DNA OR RNA); SEVERE ACUTE RESPIRATORY SYNDROME CORONAVIRUS 2 (SARS-COV-2) (CORONAVIRUS DISEASE [COVID-19]), AMPLIFIED PROBE TECHNIQUE, MAKING USE OF HIGH THROUGHPUT TECHNOLOGIES AS DESCRIBED BY CMS-2020-01-R: HCPCS

## 2021-12-07 PROCEDURE — 86901 BLOOD TYPING SEROLOGIC RH(D): CPT

## 2021-12-07 PROCEDURE — C9803 HOPD COVID-19 SPEC COLLECT: HCPCS

## 2021-12-07 PROCEDURE — 86900 BLOOD TYPING SEROLOGIC ABO: CPT

## 2021-12-07 PROCEDURE — 85730 THROMBOPLASTIN TIME PARTIAL: CPT

## 2021-12-07 PROCEDURE — 86850 RBC ANTIBODY SCREEN: CPT

## 2021-12-07 PROCEDURE — 93010 ELECTROCARDIOGRAM REPORT: CPT | Performed by: INTERNAL MEDICINE

## 2021-12-07 PROCEDURE — 93005 ELECTROCARDIOGRAM TRACING: CPT

## 2021-12-07 ASSESSMENT — FIBROSIS 4 INDEX: FIB4 SCORE: 1.04

## 2021-12-10 ENCOUNTER — ANESTHESIA (OUTPATIENT)
Dept: SURGERY | Facility: MEDICAL CENTER | Age: 56
End: 2021-12-10
Payer: COMMERCIAL

## 2021-12-10 ENCOUNTER — ANESTHESIA EVENT (OUTPATIENT)
Dept: SURGERY | Facility: MEDICAL CENTER | Age: 56
End: 2021-12-10
Payer: COMMERCIAL

## 2021-12-10 ENCOUNTER — HOSPITAL ENCOUNTER (OUTPATIENT)
Facility: MEDICAL CENTER | Age: 56
End: 2021-12-10
Attending: SPECIALIST | Admitting: SPECIALIST
Payer: COMMERCIAL

## 2021-12-10 ENCOUNTER — APPOINTMENT (OUTPATIENT)
Dept: RADIOLOGY | Facility: MEDICAL CENTER | Age: 56
End: 2021-12-10
Attending: SPECIALIST
Payer: COMMERCIAL

## 2021-12-10 VITALS
RESPIRATION RATE: 18 BRPM | TEMPERATURE: 97.7 F | BODY MASS INDEX: 35.71 KG/M2 | HEIGHT: 66 IN | HEART RATE: 98 BPM | OXYGEN SATURATION: 97 % | WEIGHT: 222.22 LBS | SYSTOLIC BLOOD PRESSURE: 104 MMHG | DIASTOLIC BLOOD PRESSURE: 69 MMHG

## 2021-12-10 LAB — ABO + RH BLD: NORMAL

## 2021-12-10 PROCEDURE — 500892 HCHG PACK, PERI-GYN: Performed by: SPECIALIST

## 2021-12-10 PROCEDURE — 700111 HCHG RX REV CODE 636 W/ 250 OVERRIDE (IP): Performed by: ANESTHESIOLOGY

## 2021-12-10 PROCEDURE — 160002 HCHG RECOVERY MINUTES (STAT): Performed by: SPECIALIST

## 2021-12-10 PROCEDURE — 160028 HCHG SURGERY MINUTES - 1ST 30 MINS LEVEL 3: Performed by: SPECIALIST

## 2021-12-10 PROCEDURE — 700101 HCHG RX REV CODE 250: Performed by: ANESTHESIOLOGY

## 2021-12-10 PROCEDURE — 700111 HCHG RX REV CODE 636 W/ 250 OVERRIDE (IP): Performed by: SPECIALIST

## 2021-12-10 PROCEDURE — 160025 RECOVERY II MINUTES (STATS): Performed by: SPECIALIST

## 2021-12-10 PROCEDURE — 160009 HCHG ANES TIME/MIN: Performed by: SPECIALIST

## 2021-12-10 PROCEDURE — 700117 HCHG RX CONTRAST REV CODE 255: Performed by: SPECIALIST

## 2021-12-10 PROCEDURE — 160039 HCHG SURGERY MINUTES - EA ADDL 1 MIN LEVEL 3: Performed by: SPECIALIST

## 2021-12-10 PROCEDURE — 160046 HCHG PACU - 1ST 60 MINS PHASE II: Performed by: SPECIALIST

## 2021-12-10 PROCEDURE — 700105 HCHG RX REV CODE 258: Performed by: SPECIALIST

## 2021-12-10 PROCEDURE — 700101 HCHG RX REV CODE 250: Performed by: SPECIALIST

## 2021-12-10 PROCEDURE — 160048 HCHG OR STATISTICAL LEVEL 1-5: Performed by: SPECIALIST

## 2021-12-10 PROCEDURE — 160035 HCHG PACU - 1ST 60 MINS PHASE I: Performed by: SPECIALIST

## 2021-12-10 RX ORDER — MEPERIDINE HYDROCHLORIDE 25 MG/ML
12.5 INJECTION INTRAMUSCULAR; INTRAVENOUS; SUBCUTANEOUS
Status: DISCONTINUED | OUTPATIENT
Start: 2021-12-10 | End: 2021-12-10 | Stop reason: HOSPADM

## 2021-12-10 RX ORDER — PROPOFOL 10 MG/ML
INJECTION, EMULSION INTRAVENOUS PRN
Status: DISCONTINUED | OUTPATIENT
Start: 2021-12-10 | End: 2021-12-10 | Stop reason: SURG

## 2021-12-10 RX ORDER — BUPIVACAINE HYDROCHLORIDE AND EPINEPHRINE 2.5; 5 MG/ML; UG/ML
INJECTION, SOLUTION EPIDURAL; INFILTRATION; INTRACAUDAL; PERINEURAL
Status: DISCONTINUED | OUTPATIENT
Start: 2021-12-10 | End: 2021-12-10 | Stop reason: HOSPADM

## 2021-12-10 RX ORDER — SODIUM CHLORIDE, SODIUM LACTATE, POTASSIUM CHLORIDE, CALCIUM CHLORIDE 600; 310; 30; 20 MG/100ML; MG/100ML; MG/100ML; MG/100ML
INJECTION, SOLUTION INTRAVENOUS CONTINUOUS
Status: DISCONTINUED | OUTPATIENT
Start: 2021-12-10 | End: 2021-12-10

## 2021-12-10 RX ORDER — HEPARIN SODIUM,PORCINE 1000/ML
VIAL (ML) INJECTION
Status: DISCONTINUED | OUTPATIENT
Start: 2021-12-10 | End: 2021-12-10 | Stop reason: HOSPADM

## 2021-12-10 RX ORDER — SODIUM CHLORIDE, SODIUM LACTATE, POTASSIUM CHLORIDE, CALCIUM CHLORIDE 600; 310; 30; 20 MG/100ML; MG/100ML; MG/100ML; MG/100ML
INJECTION, SOLUTION INTRAVENOUS CONTINUOUS
Status: DISCONTINUED | OUTPATIENT
Start: 2021-12-10 | End: 2021-12-10 | Stop reason: HOSPADM

## 2021-12-10 RX ORDER — ONDANSETRON 2 MG/ML
4 INJECTION INTRAMUSCULAR; INTRAVENOUS
Status: DISCONTINUED | OUTPATIENT
Start: 2021-12-10 | End: 2021-12-10 | Stop reason: HOSPADM

## 2021-12-10 RX ORDER — DIPHENHYDRAMINE HYDROCHLORIDE 50 MG/ML
12.5 INJECTION INTRAMUSCULAR; INTRAVENOUS
Status: DISCONTINUED | OUTPATIENT
Start: 2021-12-10 | End: 2021-12-10 | Stop reason: HOSPADM

## 2021-12-10 RX ORDER — DEXAMETHASONE SODIUM PHOSPHATE 4 MG/ML
INJECTION, SOLUTION INTRA-ARTICULAR; INTRALESIONAL; INTRAMUSCULAR; INTRAVENOUS; SOFT TISSUE PRN
Status: DISCONTINUED | OUTPATIENT
Start: 2021-12-10 | End: 2021-12-10 | Stop reason: SURG

## 2021-12-10 RX ORDER — MIDAZOLAM HYDROCHLORIDE 1 MG/ML
INJECTION INTRAMUSCULAR; INTRAVENOUS PRN
Status: DISCONTINUED | OUTPATIENT
Start: 2021-12-10 | End: 2021-12-10 | Stop reason: SURG

## 2021-12-10 RX ORDER — HALOPERIDOL 5 MG/ML
1 INJECTION INTRAMUSCULAR
Status: DISCONTINUED | OUTPATIENT
Start: 2021-12-10 | End: 2021-12-10 | Stop reason: HOSPADM

## 2021-12-10 RX ORDER — ONDANSETRON 2 MG/ML
INJECTION INTRAMUSCULAR; INTRAVENOUS PRN
Status: DISCONTINUED | OUTPATIENT
Start: 2021-12-10 | End: 2021-12-10 | Stop reason: SURG

## 2021-12-10 RX ORDER — LIDOCAINE HYDROCHLORIDE 20 MG/ML
INJECTION, SOLUTION EPIDURAL; INFILTRATION; INTRACAUDAL; PERINEURAL PRN
Status: DISCONTINUED | OUTPATIENT
Start: 2021-12-10 | End: 2021-12-10 | Stop reason: SURG

## 2021-12-10 RX ADMIN — SODIUM CHLORIDE, POTASSIUM CHLORIDE, SODIUM LACTATE AND CALCIUM CHLORIDE: 600; 310; 30; 20 INJECTION, SOLUTION INTRAVENOUS at 10:23

## 2021-12-10 RX ADMIN — PROPOFOL 30 MG: 10 INJECTION, EMULSION INTRAVENOUS at 11:33

## 2021-12-10 RX ADMIN — FENTANYL CITRATE 50 MCG: 50 INJECTION, SOLUTION INTRAMUSCULAR; INTRAVENOUS at 11:39

## 2021-12-10 RX ADMIN — PROPOFOL 50 MG: 10 INJECTION, EMULSION INTRAVENOUS at 11:37

## 2021-12-10 RX ADMIN — FENTANYL CITRATE 50 MCG: 50 INJECTION, SOLUTION INTRAMUSCULAR; INTRAVENOUS at 11:25

## 2021-12-10 RX ADMIN — ONDANSETRON 4 MG: 2 INJECTION INTRAMUSCULAR; INTRAVENOUS at 11:40

## 2021-12-10 RX ADMIN — DEXAMETHASONE SODIUM PHOSPHATE 8 MG: 4 INJECTION, SOLUTION INTRA-ARTICULAR; INTRALESIONAL; INTRAMUSCULAR; INTRAVENOUS; SOFT TISSUE at 11:28

## 2021-12-10 RX ADMIN — LIDOCAINE HYDROCHLORIDE 100 MG: 20 INJECTION, SOLUTION EPIDURAL; INFILTRATION; INTRACAUDAL at 11:28

## 2021-12-10 RX ADMIN — MIDAZOLAM HYDROCHLORIDE 1 MG: 1 INJECTION, SOLUTION INTRAMUSCULAR; INTRAVENOUS at 11:25

## 2021-12-10 RX ADMIN — PROPOFOL 170 MG: 10 INJECTION, EMULSION INTRAVENOUS at 11:28

## 2021-12-10 ASSESSMENT — FIBROSIS 4 INDEX: FIB4 SCORE: 1.04

## 2021-12-10 ASSESSMENT — PAIN SCALES - GENERAL: PAIN_LEVEL: 0

## 2021-12-10 NOTE — DISCHARGE INSTRUCTIONS
ACTIVITY: Rest and take it easy for the first 24 hours.  A responsible adult is recommended to remain with you during that time.  It is normal to feel sleepy.  We encourage you to not do anything that requires balance, judgment or coordination.    MILD FLU-LIKE SYMPTOMS ARE NORMAL. YOU MAY EXPERIENCE GENERALIZED MUSCLE ACHES, THROAT IRRITATION, HEADACHE AND/OR SOME NAUSEA.    FOR 24 HOURS DO NOT:  Drive, operate machinery or run household appliances.  Drink beer or alcoholic beverages.   Make important decisions or sign legal documents.    SPECIAL INSTRUCTIONS:   1. Return to our office as directed and call to confirm appointment  Call our office [phone] if you develop any fevers, chills, nausea/vomiting, heavy vaginal bleeding, or redness, tenderness, and/or drainage from your wound, if you have persistent watery discharge while ambulating or stool draining from the vagina .  2. You may have vaginal spotting or light bleeding which is normal.  3. If you have not had a bowel movement for 2 days, please take over the counter Milk of Magnesium, 1 tablespoon every 4 hours. After 4 doses and if you still have not had a bowel movement, please call your doctor.   4. You may eat soft diet, such as soup, liquid, for day #1 and if tolerating you may resume your regular diet.     DIET: To avoid nausea, slowly advance diet as tolerated, avoiding spicy or greasy foods for the first day.  Add more substantial food to your diet according to your physician's instructions.  Babies can be fed formula or breast milk as soon as they are hungry.  INCREASE FLUIDS AND FIBER TO AVOID CONSTIPATION.    FOLLOW-UP APPOINTMENT:  A follow-up appointment should be arranged with your doctor; call to schedule.    You should CALL YOUR PHYSICIAN if you develop:  Fever greater than 101 degrees F.  Pain not relieved by medication, or persistent nausea or vomiting.  Excessive bleeding (blood soaking through dressing) or unexpected drainage from  the wound.  Extreme redness or swelling around the incision site, drainage of pus or foul smelling drainage.  Inability to urinate or empty your bladder within 8 hours.  Problems with breathing or chest pain.    You should call 911 if you develop problems with breathing or chest pain.  If you are unable to contact your doctor or surgical center, you should go to the nearest emergency room or urgent care center.  Physician's telephone #: Dr Dyson [phone]    If any questions arise, call your doctor.  If your doctor is not available, please feel free to call the Surgical Center at [phone].     A registered nurse may call you a few days after your surgery to see how you are doing after your procedure.    MEDICATIONS: Resume taking daily medication.  Take prescribed pain medication with food.  If no medication is prescribed, you may take non-aspirin pain medication if needed.  PAIN MEDICATION CAN BE VERY CONSTIPATING.  Take a stool softener or laxative such as senokot, pericolace, or milk of magnesia if needed.      If your physician has prescribed pain medication that includes Acetaminophen (Tylenol), do not take additional Acetaminophen (Tylenol) while taking the prescribed medication.    Depression / Suicide Risk    As you are discharged from this [facility] facility, it is important to learn how to keep safe from harming yourself.    Recognize the warning signs:  · Abrupt changes in personality, positive or negative- including increase in energy   · Giving away possessions  · Change in eating patterns- significant weight changes-  positive or negative  · Change in sleeping patterns- unable to sleep or sleeping all the time   · Unwillingness or inability to communicate  · Depression  · Unusual sadness, discouragement and loneliness  · Talk of wanting to die  · Neglect of personal appearance   · Rebelliousness- reckless behavior  · Withdrawal from people/activities they love  · Confusion- inability to  concentrate     If you or a loved one observes any of these behaviors or has concerns about self-harm, here's what you can do:  · Talk about it- your feelings and reasons for harming yourself  · Remove any means that you might use to hurt yourself (examples: pills, rope, extension cords, firearm)  · Get professional help from the community (Mental Health, Substance Abuse, psychological counseling)  · Do not be alone:Call your Safe Contact- someone whom you trust who will be there for you.  · Call your local CRISIS HOTLINE 370-0525 or 750-077-4041  · Call your local Children's Mobile Crisis Response Team [place] (665) 872-7986 or www.[employer]  · Call the toll free National Suicide Prevention Hotlines   · National Suicide Prevention Lifeline 966-150-KNRL (3242)  · National Hope Line Network 800-SUICIDE (492-1736)

## 2021-12-10 NOTE — ANESTHESIA TIME REPORT
Anesthesia Start and Stop Event Times     Date Time Event    12/10/2021 1106 Ready for Procedure     1122 Anesthesia Start     1213 Anesthesia Stop        Responsible Staff  12/10/21    Name Role Begin End    Omkar Carmichael M.D. Anesth 1122 1213        Preop Diagnosis (Free Text):  Pre-op Diagnosis     ENDOMETRIAL CANCER        Preop Diagnosis (Codes):    Premium Reason  Non-Premium    Comments:

## 2021-12-10 NOTE — ANESTHESIA PROCEDURE NOTES
Airway    Date/Time: 12/10/2021 11:29 AM  Performed by: Omkar Carmichael M.D.  Authorized by: Omkar Carmichael M.D.     Location:  OR  Urgency:  Elective  Indications for Airway Management:  Anesthesia      Spontaneous Ventilation: absent    Sedation Level:  Deep  Preoxygenated: Yes    Final Airway Type:  Supraglottic airway  Final Supraglottic Airway:  Standard LMA    SGA Size:  4  Number of Attempts at Approach:  1

## 2021-12-10 NOTE — OR NURSING
Pt's VSS; denies N/V; denies pain. IV dc'd. Pt changed into clothing with assistance. Discharge instructions given; pt and family verbalized understanding and questions answered. Patient states ready to d/c home. No prescriptions given. Pt dc'd in w/c with RN in stable condition.

## 2021-12-10 NOTE — ANESTHESIA POSTPROCEDURE EVALUATION
Patient: Magali Leal    Procedure Summary     Date: 12/10/21 Room / Location: [place] [address] / SURGERY [facility]    Anesthesia Start: 1122 Anesthesia Stop: 1213    Procedure: EXAM UNDER ANESTHESIA, PELVIS (Vagina ) Diagnosis: (ENDOMETRIAL CANCER)    Surgeons: Ray Dyson M.D. Responsible Provider: Omkar Carmichael M.D.    Anesthesia Type: general ASA Status: 2          Final Anesthesia Type: general  Last vitals  BP   Blood Pressure: 104/69    Temp   36.5 °C (97.7 °F)    Pulse   98   Resp   18    SpO2   97 %      Anesthesia Post Evaluation    Patient location during evaluation: PACU  Patient participation: complete - patient participated  Level of consciousness: awake  Pain score: 0    Airway patency: patent  Anesthetic complications: no  Cardiovascular status: hemodynamically stable  Respiratory status: acceptable  Hydration status: balanced    PONV: none          No complications documented.     Nurse Pain Score: 0 (NPRS)
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
## 2021-12-10 NOTE — OR NURSING
1213: Pt arrived from OR, handoff received from anesthesiologist and RN. Band-Aid to R chest port site C/D/I, small amount of vaginal bleeding present on pad, breathing even and unlabored.     1234: Call made to patient's spouse to update patient status.     1242: handoff to Lorenzo RN in phase 2.

## 2021-12-11 NOTE — OR SURGEON
Immediate Post OP Note    PreOp Diagnosis: Stage III endometrial cancer.   S/P 4 cycles of neoadjuvant chemotherapy  Question non functioning mediport.       PostOp Diagnosis: Functioning port.   Excellent response to chemotherapy with resolution of vaginal tumor      Procedure(s):  EXAM UNDER ANESTHESIA, PELVIS - Wound Class: None    Surgeon(s):  Ray Dyson M.D.    Anesthesiologist/Type of Anesthesia:  Anesthesiologist: Omkar Carmichael M.D./General    Surgical Staff:  Assistant: EILEEN Dumont  Circulator: Gena Vargas R.N.  Scrub Person: Marleny Cerna    Specimens removed if any: none    Estimated Blood Loss: minimal     Findings: Examination anesthesia revealed underlying to be intact.  We are able to place the pediatric speculum.  Examination of the vaginal canal revealed resolution of the previous tumor that was noted.  Bimanual examination revealed palpation of the normal cervix.  Uterus is still in large approximately 14 weeks in size.  No evidence of induration.  Parametria.    After examination under anesthesia of the pelvis we then turned our focus towards the Mediport.  The right upper chest was prepped in sterile fashion.  We then accessed the Mediport under fluoroscopy.  Fluoroscopy dye was then injected.  There was no evidence of extravasation port there was minimal resistance.  The tip of the catheter was noted to be in the superior vena cava.  Injection of it revealed the dye to be flowing into the tip of the catheter into the vena cava.  I was not able to get blood return however I suspect that there may be a small sheet that is preventing the blood from flowing back into the syringe.      Complications: none        12/10/2021 5:45 PM Ray Dyson M.D.
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
## 2021-12-20 ENCOUNTER — HOSPITAL ENCOUNTER (OUTPATIENT)
Dept: LAB | Facility: MEDICAL CENTER | Age: 56
End: 2021-12-20
Attending: SPECIALIST
Payer: COMMERCIAL

## 2021-12-20 LAB
ALBUMIN SERPL BCP-MCNC: 3.8 G/DL (ref 3.2–4.9)
ALBUMIN/GLOB SERPL: 1.4 G/DL
ALP SERPL-CCNC: 93 U/L (ref 30–99)
ALT SERPL-CCNC: 14 U/L (ref 2–50)
ANION GAP SERPL CALC-SCNC: 13 MMOL/L (ref 7–16)
ANISOCYTOSIS BLD QL SMEAR: ABNORMAL
AST SERPL-CCNC: 18 U/L (ref 12–45)
BASOPHILS # BLD AUTO: 0 % (ref 0–1.8)
BASOPHILS # BLD: 0 K/UL (ref 0–0.12)
BILIRUB SERPL-MCNC: 0.4 MG/DL (ref 0.1–1.5)
BUN SERPL-MCNC: 12 MG/DL (ref 8–22)
CALCIUM SERPL-MCNC: 8.8 MG/DL (ref 8.5–10.5)
CANCER AG125 SERPL-ACNC: 67.7 U/ML (ref 0–35)
CHLORIDE SERPL-SCNC: 103 MMOL/L (ref 96–112)
CO2 SERPL-SCNC: 26 MMOL/L (ref 20–33)
CREAT SERPL-MCNC: 1.1 MG/DL (ref 0.5–1.4)
DACRYOCYTES BLD QL SMEAR: NORMAL
EOSINOPHIL # BLD AUTO: 0.06 K/UL (ref 0–0.51)
EOSINOPHIL NFR BLD: 1.9 % (ref 0–6.9)
ERYTHROCYTE [DISTWIDTH] IN BLOOD BY AUTOMATED COUNT: 87.4 FL (ref 35.9–50)
GLOBULIN SER CALC-MCNC: 2.8 G/DL (ref 1.9–3.5)
GLUCOSE SERPL-MCNC: 79 MG/DL (ref 65–99)
HCT VFR BLD AUTO: 25.9 % (ref 37–47)
HGB BLD-MCNC: 8.1 G/DL (ref 12–16)
LG PLATELETS BLD QL SMEAR: NORMAL
LYMPHOCYTES # BLD AUTO: 1.17 K/UL (ref 1–4.8)
LYMPHOCYTES NFR BLD: 37.6 % (ref 22–41)
MACROCYTES BLD QL SMEAR: ABNORMAL
MAGNESIUM SERPL-MCNC: 1.7 MG/DL (ref 1.5–2.5)
MANUAL DIFF BLD: NORMAL
MCH RBC QN AUTO: 28.9 PG (ref 27–33)
MCHC RBC AUTO-ENTMCNC: 31.3 G/DL (ref 33.6–35)
MCV RBC AUTO: 92.5 FL (ref 81.4–97.8)
MONOCYTES # BLD AUTO: 0.03 K/UL (ref 0–0.85)
MONOCYTES NFR BLD AUTO: 0.9 % (ref 0–13.4)
MORPHOLOGY BLD-IMP: NORMAL
NEUTROPHILS # BLD AUTO: 1.85 K/UL (ref 2–7.15)
NEUTROPHILS NFR BLD: 59.6 % (ref 44–72)
NRBC # BLD AUTO: 0 K/UL
NRBC BLD-RTO: 0 /100 WBC
OVALOCYTES BLD QL SMEAR: NORMAL
PLATELET # BLD AUTO: 89 K/UL (ref 164–446)
PLATELET BLD QL SMEAR: NORMAL
PMV BLD AUTO: 11.1 FL (ref 9–12.9)
POIKILOCYTOSIS BLD QL SMEAR: NORMAL
POLYCHROMASIA BLD QL SMEAR: NORMAL
POTASSIUM SERPL-SCNC: 3.4 MMOL/L (ref 3.6–5.5)
PROT SERPL-MCNC: 6.6 G/DL (ref 6–8.2)
RBC # BLD AUTO: 2.8 M/UL (ref 4.2–5.4)
RBC BLD AUTO: PRESENT
SMUDGE CELLS BLD QL SMEAR: NORMAL
SODIUM SERPL-SCNC: 142 MMOL/L (ref 135–145)
WBC # BLD AUTO: 3.1 K/UL (ref 4.8–10.8)

## 2021-12-20 PROCEDURE — 85027 COMPLETE CBC AUTOMATED: CPT

## 2021-12-20 PROCEDURE — 80053 COMPREHEN METABOLIC PANEL: CPT

## 2021-12-20 PROCEDURE — 86304 IMMUNOASSAY TUMOR CA 125: CPT

## 2021-12-20 PROCEDURE — 83735 ASSAY OF MAGNESIUM: CPT

## 2021-12-20 PROCEDURE — 85007 BL SMEAR W/DIFF WBC COUNT: CPT

## 2021-12-20 PROCEDURE — 36415 COLL VENOUS BLD VENIPUNCTURE: CPT

## 2021-12-22 DIAGNOSIS — D64.81 ANEMIA ASSOCIATED WITH CHEMOTHERAPY: ICD-10-CM

## 2021-12-22 DIAGNOSIS — T45.1X5A ANEMIA ASSOCIATED WITH CHEMOTHERAPY: ICD-10-CM

## 2021-12-22 RX ORDER — DIPHENHYDRAMINE HCL 25 MG
25 TABLET ORAL ONCE
Status: CANCELLED | OUTPATIENT
Start: 2021-12-24 | End: 2021-12-24

## 2021-12-22 RX ORDER — SODIUM CHLORIDE 9 MG/ML
INJECTION, SOLUTION INTRAVENOUS CONTINUOUS
Status: CANCELLED | OUTPATIENT
Start: 2021-12-24

## 2021-12-22 RX ORDER — HEPARIN SODIUM (PORCINE) LOCK FLUSH IV SOLN 100 UNIT/ML 100 UNIT/ML
500 SOLUTION INTRAVENOUS PRN
Status: CANCELLED | OUTPATIENT
Start: 2021-12-24

## 2021-12-22 RX ORDER — 0.9 % SODIUM CHLORIDE 0.9 %
VIAL (ML) INJECTION PRN
Status: CANCELLED | OUTPATIENT
Start: 2021-12-24

## 2021-12-22 RX ORDER — ACETAMINOPHEN 325 MG/1
650 TABLET ORAL ONCE
Status: CANCELLED | OUTPATIENT
Start: 2021-12-24

## 2021-12-24 ENCOUNTER — OUTPATIENT INFUSION SERVICES (OUTPATIENT)
Dept: ONCOLOGY | Facility: MEDICAL CENTER | Age: 56
End: 2021-12-24
Attending: SPECIALIST
Payer: COMMERCIAL

## 2021-12-24 VITALS
DIASTOLIC BLOOD PRESSURE: 63 MMHG | SYSTOLIC BLOOD PRESSURE: 104 MMHG | BODY MASS INDEX: 38.79 KG/M2 | TEMPERATURE: 98.1 F | HEIGHT: 65 IN | OXYGEN SATURATION: 95 % | HEART RATE: 77 BPM | RESPIRATION RATE: 16 BRPM | WEIGHT: 232.81 LBS

## 2021-12-24 DIAGNOSIS — D64.81 ANEMIA ASSOCIATED WITH CHEMOTHERAPY: ICD-10-CM

## 2021-12-24 DIAGNOSIS — T45.1X5A ANEMIA ASSOCIATED WITH CHEMOTHERAPY: ICD-10-CM

## 2021-12-24 LAB
ABO GROUP BLD: NORMAL
BARCODED ABORH UBTYP: 5100
BARCODED PRD CODE UBPRD: NORMAL
BARCODED UNIT NUM UBUNT: NORMAL
BLD GP AB SCN SERPL QL: NORMAL
COMPONENT R 8504R: NORMAL
PRODUCT TYPE UPROD: NORMAL
RH BLD: NORMAL
UNIT STATUS USTAT: NORMAL

## 2021-12-24 PROCEDURE — 700111 HCHG RX REV CODE 636 W/ 250 OVERRIDE (IP): Performed by: NURSE PRACTITIONER

## 2021-12-24 PROCEDURE — 86900 BLOOD TYPING SEROLOGIC ABO: CPT

## 2021-12-24 PROCEDURE — 700102 HCHG RX REV CODE 250 W/ 637 OVERRIDE(OP): Performed by: NURSE PRACTITIONER

## 2021-12-24 PROCEDURE — A4212 NON CORING NEEDLE OR STYLET: HCPCS

## 2021-12-24 PROCEDURE — 86923 COMPATIBILITY TEST ELECTRIC: CPT

## 2021-12-24 PROCEDURE — A9270 NON-COVERED ITEM OR SERVICE: HCPCS | Performed by: NURSE PRACTITIONER

## 2021-12-24 PROCEDURE — 36591 DRAW BLOOD OFF VENOUS DEVICE: CPT

## 2021-12-24 PROCEDURE — P9016 RBC LEUKOCYTES REDUCED: HCPCS

## 2021-12-24 PROCEDURE — 306780 HCHG STAT FOR TRANSFUSION PER CASE

## 2021-12-24 PROCEDURE — 36430 TRANSFUSION BLD/BLD COMPNT: CPT

## 2021-12-24 PROCEDURE — 700111 HCHG RX REV CODE 636 W/ 250 OVERRIDE (IP)

## 2021-12-24 PROCEDURE — 86850 RBC ANTIBODY SCREEN: CPT

## 2021-12-24 PROCEDURE — 86901 BLOOD TYPING SEROLOGIC RH(D): CPT

## 2021-12-24 RX ORDER — LIDOCAINE HYDROCHLORIDE 10 MG/ML
INJECTION, SOLUTION EPIDURAL; INFILTRATION; INTRACAUDAL; PERINEURAL
Status: COMPLETED
Start: 2021-12-24 | End: 2021-12-24

## 2021-12-24 RX ORDER — ACETAMINOPHEN 325 MG/1
650 TABLET ORAL ONCE
Status: CANCELLED | OUTPATIENT
Start: 2021-12-24

## 2021-12-24 RX ORDER — ACETAMINOPHEN 325 MG/1
650 TABLET ORAL ONCE
Status: COMPLETED | OUTPATIENT
Start: 2021-12-24 | End: 2021-12-24

## 2021-12-24 RX ORDER — 0.9 % SODIUM CHLORIDE 0.9 %
VIAL (ML) INJECTION PRN
Status: CANCELLED | OUTPATIENT
Start: 2021-12-24

## 2021-12-24 RX ORDER — SODIUM CHLORIDE 9 MG/ML
INJECTION, SOLUTION INTRAVENOUS CONTINUOUS
Status: DISCONTINUED | OUTPATIENT
Start: 2021-12-24 | End: 2021-12-24

## 2021-12-24 RX ORDER — HEPARIN SODIUM (PORCINE) LOCK FLUSH IV SOLN 100 UNIT/ML 100 UNIT/ML
500 SOLUTION INTRAVENOUS PRN
Status: CANCELLED | OUTPATIENT
Start: 2021-12-24

## 2021-12-24 RX ORDER — HEPARIN SODIUM (PORCINE) LOCK FLUSH IV SOLN 100 UNIT/ML 100 UNIT/ML
500 SOLUTION INTRAVENOUS PRN
Status: DISCONTINUED | OUTPATIENT
Start: 2021-12-24 | End: 2021-12-24 | Stop reason: HOSPADM

## 2021-12-24 RX ORDER — DIPHENHYDRAMINE HCL 25 MG
25 TABLET ORAL ONCE
Status: CANCELLED | OUTPATIENT
Start: 2021-12-24 | End: 2021-12-24

## 2021-12-24 RX ORDER — SODIUM CHLORIDE 9 MG/ML
INJECTION, SOLUTION INTRAVENOUS CONTINUOUS
Status: CANCELLED | OUTPATIENT
Start: 2021-12-24

## 2021-12-24 RX ORDER — DIPHENHYDRAMINE HCL 25 MG
25 TABLET ORAL ONCE
Status: COMPLETED | OUTPATIENT
Start: 2021-12-24 | End: 2021-12-24

## 2021-12-24 RX ADMIN — DIPHENHYDRAMINE HYDROCHLORIDE 25 MG: 25 TABLET ORAL at 10:52

## 2021-12-24 RX ADMIN — LIDOCAINE HYDROCHLORIDE 2 ML: 10 INJECTION, SOLUTION EPIDURAL; INFILTRATION; INTRACAUDAL; PERINEURAL at 09:54

## 2021-12-24 RX ADMIN — ACETAMINOPHEN 650 MG: 325 TABLET ORAL at 10:52

## 2021-12-24 RX ADMIN — HEPARIN 500 UNITS: 100 SYRINGE at 14:05

## 2021-12-24 ASSESSMENT — PAIN DESCRIPTION - PAIN TYPE: TYPE: CHRONIC PAIN

## 2021-12-24 ASSESSMENT — FIBROSIS 4 INDEX: FIB4 SCORE: 3.03

## 2021-12-24 NOTE — PROGRESS NOTES
Pt ambulatory to Infusion for COD and 1 unit of PRBC.  Pt w/ no s/s of infection, pt has no complaints at this time.  Pt had CBC drawn 12-20, pt Hgb of 8.1 meets parameters for tx today.  Pt PORT accessed by Carito HICKS, blood return noted, lab drawn per order, flushed per protocol.  Pre meds given.  1 unit PRBC transfused w/ no AE.  PORT flushed and HL, de-accessed, gauze and tape bandage applied.  Pt left on foot in care of self in NAD.  No f/u appt needed at this time.

## 2022-01-10 ENCOUNTER — HOSPITAL ENCOUNTER (OUTPATIENT)
Dept: LAB | Facility: MEDICAL CENTER | Age: 57
End: 2022-01-10
Attending: SPECIALIST
Payer: COMMERCIAL

## 2022-01-10 LAB
ALBUMIN SERPL BCP-MCNC: 3.8 G/DL (ref 3.2–4.9)
ALBUMIN/GLOB SERPL: 1.5 G/DL
ALP SERPL-CCNC: 98 U/L (ref 30–99)
ALT SERPL-CCNC: 13 U/L (ref 2–50)
ANION GAP SERPL CALC-SCNC: 15 MMOL/L (ref 7–16)
ANISOCYTOSIS BLD QL SMEAR: ABNORMAL
AST SERPL-CCNC: 18 U/L (ref 12–45)
BASOPHILS # BLD AUTO: 0.7 % (ref 0–1.8)
BASOPHILS # BLD: 0.04 K/UL (ref 0–0.12)
BILIRUB SERPL-MCNC: 0.2 MG/DL (ref 0.1–1.5)
BUN SERPL-MCNC: 15 MG/DL (ref 8–22)
CALCIUM SERPL-MCNC: 8.6 MG/DL (ref 8.5–10.5)
CANCER AG125 SERPL-ACNC: 51.5 U/ML (ref 0–35)
CHLORIDE SERPL-SCNC: 103 MMOL/L (ref 96–112)
CO2 SERPL-SCNC: 24 MMOL/L (ref 20–33)
COMMENT 1642: NORMAL
CREAT SERPL-MCNC: 1.18 MG/DL (ref 0.5–1.4)
DACRYOCYTES BLD QL SMEAR: NORMAL
EOSINOPHIL # BLD AUTO: 0.15 K/UL (ref 0–0.51)
EOSINOPHIL NFR BLD: 2.6 % (ref 0–6.9)
ERYTHROCYTE [DISTWIDTH] IN BLOOD BY AUTOMATED COUNT: 81.8 FL (ref 35.9–50)
GLOBULIN SER CALC-MCNC: 2.6 G/DL (ref 1.9–3.5)
GLUCOSE SERPL-MCNC: 105 MG/DL (ref 65–99)
HCT VFR BLD AUTO: 26.8 % (ref 37–47)
HGB BLD-MCNC: 8.5 G/DL (ref 12–16)
IMM GRANULOCYTES # BLD AUTO: 0.07 K/UL (ref 0–0.11)
IMM GRANULOCYTES NFR BLD AUTO: 1.2 % (ref 0–0.9)
LYMPHOCYTES # BLD AUTO: 1.71 K/UL (ref 1–4.8)
LYMPHOCYTES NFR BLD: 29.2 % (ref 22–41)
MACROCYTES BLD QL SMEAR: ABNORMAL
MAGNESIUM SERPL-MCNC: 1.5 MG/DL (ref 1.5–2.5)
MCH RBC QN AUTO: 30.4 PG (ref 27–33)
MCHC RBC AUTO-ENTMCNC: 31.7 G/DL (ref 33.6–35)
MCV RBC AUTO: 95.7 FL (ref 81.4–97.8)
MICROCYTES BLD QL SMEAR: ABNORMAL
MONOCYTES # BLD AUTO: 0.44 K/UL (ref 0–0.85)
MONOCYTES NFR BLD AUTO: 7.5 % (ref 0–13.4)
MORPHOLOGY BLD-IMP: NORMAL
NEUTROPHILS # BLD AUTO: 3.44 K/UL (ref 2–7.15)
NEUTROPHILS NFR BLD: 58.8 % (ref 44–72)
NRBC # BLD AUTO: 0 K/UL
NRBC BLD-RTO: 0 /100 WBC
OVALOCYTES BLD QL SMEAR: NORMAL
PLATELET # BLD AUTO: 233 K/UL (ref 164–446)
PLATELET BLD QL SMEAR: NORMAL
PMV BLD AUTO: 10.1 FL (ref 9–12.9)
POIKILOCYTOSIS BLD QL SMEAR: NORMAL
POLYCHROMASIA BLD QL SMEAR: NORMAL
POTASSIUM SERPL-SCNC: 3.3 MMOL/L (ref 3.6–5.5)
PROT SERPL-MCNC: 6.4 G/DL (ref 6–8.2)
RBC # BLD AUTO: 2.8 M/UL (ref 4.2–5.4)
RBC BLD AUTO: PRESENT
SODIUM SERPL-SCNC: 142 MMOL/L (ref 135–145)
WBC # BLD AUTO: 5.9 K/UL (ref 4.8–10.8)

## 2022-01-10 PROCEDURE — 86304 IMMUNOASSAY TUMOR CA 125: CPT

## 2022-01-10 PROCEDURE — 80053 COMPREHEN METABOLIC PANEL: CPT

## 2022-01-10 PROCEDURE — 85025 COMPLETE CBC W/AUTO DIFF WBC: CPT

## 2022-01-10 PROCEDURE — 36415 COLL VENOUS BLD VENIPUNCTURE: CPT

## 2022-01-10 PROCEDURE — 83735 ASSAY OF MAGNESIUM: CPT

## 2022-02-03 ENCOUNTER — HOSPITAL ENCOUNTER (OUTPATIENT)
Dept: RADIOLOGY | Facility: MEDICAL CENTER | Age: 57
End: 2022-02-03
Attending: SPECIALIST
Payer: COMMERCIAL

## 2022-02-03 DIAGNOSIS — Z51.11 ENCOUNTER FOR ANTINEOPLASTIC CHEMOTHERAPY: ICD-10-CM

## 2022-02-03 DIAGNOSIS — T82.594D OCCLUSION OF CENTRAL LINE, SUBSEQUENT ENCOUNTER: ICD-10-CM

## 2022-02-03 DIAGNOSIS — Z45.2 FITTING AND ADJUSTMENT OF VASCULAR CATHETER: ICD-10-CM

## 2022-02-03 DIAGNOSIS — C54.1 ENDOMETRIAL SARCOMA (HCC): ICD-10-CM

## 2022-02-03 DIAGNOSIS — T82.594A OBSTRUCTION OF CENTRAL LINE, INITIAL ENCOUNTER (HCC): ICD-10-CM

## 2022-02-03 PROCEDURE — 71260 CT THORAX DX C+: CPT

## 2022-02-03 PROCEDURE — 700117 HCHG RX CONTRAST REV CODE 255: Performed by: SPECIALIST

## 2022-02-03 RX ORDER — HEPARIN SODIUM (PORCINE) LOCK FLUSH IV SOLN 100 UNIT/ML 100 UNIT/ML
500 SOLUTION INTRAVENOUS PRN
Status: DISCONTINUED | OUTPATIENT
Start: 2022-02-03 | End: 2022-02-04 | Stop reason: HOSPADM

## 2022-02-03 RX ADMIN — IOHEXOL 25 ML: 240 INJECTION, SOLUTION INTRATHECAL; INTRAVASCULAR; INTRAVENOUS; ORAL at 13:48

## 2022-02-03 RX ADMIN — IOHEXOL 100 ML: 350 INJECTION, SOLUTION INTRAVENOUS at 14:13

## 2022-02-04 ENCOUNTER — HOSPITAL ENCOUNTER (OUTPATIENT)
Dept: LAB | Facility: MEDICAL CENTER | Age: 57
End: 2022-02-04
Attending: SPECIALIST
Payer: COMMERCIAL

## 2022-02-04 LAB
ALBUMIN SERPL BCP-MCNC: 4 G/DL (ref 3.2–4.9)
ALBUMIN/GLOB SERPL: 1.5 G/DL
ALP SERPL-CCNC: 81 U/L (ref 30–99)
ALT SERPL-CCNC: 10 U/L (ref 2–50)
ANION GAP SERPL CALC-SCNC: 12 MMOL/L (ref 7–16)
AST SERPL-CCNC: 19 U/L (ref 12–45)
BILIRUB SERPL-MCNC: 0.2 MG/DL (ref 0.1–1.5)
BUN SERPL-MCNC: 13 MG/DL (ref 8–22)
CALCIUM SERPL-MCNC: 9.4 MG/DL (ref 8.5–10.5)
CANCER AG125 SERPL-ACNC: 52.6 U/ML (ref 0–35)
CHLORIDE SERPL-SCNC: 105 MMOL/L (ref 96–112)
CO2 SERPL-SCNC: 26 MMOL/L (ref 20–33)
CREAT SERPL-MCNC: 0.91 MG/DL (ref 0.5–1.4)
GLOBULIN SER CALC-MCNC: 2.6 G/DL (ref 1.9–3.5)
GLUCOSE SERPL-MCNC: 96 MG/DL (ref 65–99)
POTASSIUM SERPL-SCNC: 3.6 MMOL/L (ref 3.6–5.5)
PROT SERPL-MCNC: 6.6 G/DL (ref 6–8.2)
SODIUM SERPL-SCNC: 143 MMOL/L (ref 135–145)

## 2022-02-04 PROCEDURE — 80053 COMPREHEN METABOLIC PANEL: CPT

## 2022-02-04 PROCEDURE — 36415 COLL VENOUS BLD VENIPUNCTURE: CPT

## 2022-02-04 PROCEDURE — 86304 IMMUNOASSAY TUMOR CA 125: CPT

## 2022-02-04 PROCEDURE — 85025 COMPLETE CBC W/AUTO DIFF WBC: CPT

## 2022-02-05 DIAGNOSIS — N39.0 URINARY TRACT INFECTION WITHOUT HEMATURIA, SITE UNSPECIFIED: ICD-10-CM

## 2022-02-05 LAB
ANISOCYTOSIS BLD QL SMEAR: ABNORMAL
BASOPHILS # BLD AUTO: 0.4 % (ref 0–1.8)
BASOPHILS # BLD: 0.01 K/UL (ref 0–0.12)
COMMENT 1642: NORMAL
DACRYOCYTES BLD QL SMEAR: NORMAL
EOSINOPHIL # BLD AUTO: 0.02 K/UL (ref 0–0.51)
EOSINOPHIL NFR BLD: 0.8 % (ref 0–6.9)
ERYTHROCYTE [DISTWIDTH] IN BLOOD BY AUTOMATED COUNT: 66.8 FL (ref 35.9–50)
HCT VFR BLD AUTO: 25.5 % (ref 37–47)
HGB BLD-MCNC: 7.9 G/DL (ref 12–16)
IMM GRANULOCYTES # BLD AUTO: 0.01 K/UL (ref 0–0.11)
IMM GRANULOCYTES NFR BLD AUTO: 0.4 % (ref 0–0.9)
LYMPHOCYTES # BLD AUTO: 0.95 K/UL (ref 1–4.8)
LYMPHOCYTES NFR BLD: 36.1 % (ref 22–41)
MACROCYTES BLD QL SMEAR: ABNORMAL
MCH RBC QN AUTO: 31.6 PG (ref 27–33)
MCHC RBC AUTO-ENTMCNC: 31 G/DL (ref 33.6–35)
MCV RBC AUTO: 102 FL (ref 81.4–97.8)
MICROCYTES BLD QL SMEAR: ABNORMAL
MONOCYTES # BLD AUTO: 0.17 K/UL (ref 0–0.85)
MONOCYTES NFR BLD AUTO: 6.5 % (ref 0–13.4)
MORPHOLOGY BLD-IMP: NORMAL
NEUTROPHILS # BLD AUTO: 1.47 K/UL (ref 2–7.15)
NEUTROPHILS NFR BLD: 55.8 % (ref 44–72)
NRBC # BLD AUTO: 0 K/UL
NRBC BLD-RTO: 0 /100 WBC
OVALOCYTES BLD QL SMEAR: NORMAL
PLATELET # BLD AUTO: 102 K/UL (ref 164–446)
PLATELET BLD QL SMEAR: NORMAL
PMV BLD AUTO: 12.4 FL (ref 9–12.9)
POIKILOCYTOSIS BLD QL SMEAR: NORMAL
POLYCHROMASIA BLD QL SMEAR: NORMAL
RBC # BLD AUTO: 2.5 M/UL (ref 4.2–5.4)
RBC BLD AUTO: PRESENT
WBC # BLD AUTO: 2.6 K/UL (ref 4.8–10.8)

## 2022-02-07 ENCOUNTER — HOSPITAL ENCOUNTER (OUTPATIENT)
Dept: LAB | Facility: MEDICAL CENTER | Age: 57
End: 2022-02-07
Payer: COMMERCIAL

## 2022-02-07 DIAGNOSIS — N39.0 URINARY TRACT INFECTION WITHOUT HEMATURIA, SITE UNSPECIFIED: ICD-10-CM

## 2022-02-07 LAB
APPEARANCE UR: ABNORMAL
BACTERIA #/AREA URNS HPF: NEGATIVE /HPF
BILIRUB UR QL STRIP.AUTO: ABNORMAL
CAOX CRY #/AREA URNS HPF: ABNORMAL /HPF
COLOR UR: ABNORMAL
EPI CELLS #/AREA URNS HPF: NEGATIVE /HPF
GLUCOSE UR STRIP.AUTO-MCNC: NEGATIVE MG/DL
HYALINE CASTS #/AREA URNS LPF: ABNORMAL /LPF
KETONES UR STRIP.AUTO-MCNC: ABNORMAL MG/DL
LEUKOCYTE ESTERASE UR QL STRIP.AUTO: ABNORMAL
MICRO URNS: ABNORMAL
NITRITE UR QL STRIP.AUTO: NEGATIVE
PH UR STRIP.AUTO: 5.5 [PH] (ref 5–8)
PROT UR QL STRIP: 30 MG/DL
RBC # URNS HPF: ABNORMAL /HPF
RBC UR QL AUTO: ABNORMAL
SP GR UR STRIP.AUTO: 1.02
UROBILINOGEN UR STRIP.AUTO-MCNC: 0.2 MG/DL
WBC #/AREA URNS HPF: ABNORMAL /HPF

## 2022-02-07 PROCEDURE — 81001 URINALYSIS AUTO W/SCOPE: CPT

## 2022-02-07 PROCEDURE — 87086 URINE CULTURE/COLONY COUNT: CPT

## 2022-02-09 ENCOUNTER — HOSPITAL ENCOUNTER (OUTPATIENT)
Facility: MEDICAL CENTER | Age: 57
End: 2022-02-09
Attending: SPECIALIST | Admitting: SPECIALIST
Payer: COMMERCIAL

## 2022-02-09 LAB
BACTERIA UR CULT: NORMAL
SIGNIFICANT IND 70042: NORMAL
SITE SITE: NORMAL
SOURCE SOURCE: NORMAL

## 2022-02-13 DIAGNOSIS — T45.1X5A ANEMIA ASSOCIATED WITH CHEMOTHERAPY: ICD-10-CM

## 2022-02-13 DIAGNOSIS — D64.81 ANEMIA ASSOCIATED WITH CHEMOTHERAPY: ICD-10-CM

## 2022-02-14 ENCOUNTER — HOSPITAL ENCOUNTER (OUTPATIENT)
Dept: LAB | Facility: MEDICAL CENTER | Age: 57
End: 2022-02-14
Attending: STUDENT IN AN ORGANIZED HEALTH CARE EDUCATION/TRAINING PROGRAM
Payer: COMMERCIAL

## 2022-02-14 ENCOUNTER — HOSPITAL ENCOUNTER (OUTPATIENT)
Facility: MEDICAL CENTER | Age: 57
End: 2022-02-16
Attending: EMERGENCY MEDICINE | Admitting: STUDENT IN AN ORGANIZED HEALTH CARE EDUCATION/TRAINING PROGRAM
Payer: COMMERCIAL

## 2022-02-14 ENCOUNTER — APPOINTMENT (OUTPATIENT)
Dept: RADIOLOGY | Facility: MEDICAL CENTER | Age: 57
End: 2022-02-14
Attending: EMERGENCY MEDICINE
Payer: COMMERCIAL

## 2022-02-14 DIAGNOSIS — D64.81 ANEMIA ASSOCIATED WITH CHEMOTHERAPY: ICD-10-CM

## 2022-02-14 DIAGNOSIS — E87.6 HYPOKALEMIA: ICD-10-CM

## 2022-02-14 DIAGNOSIS — R79.89 ELEVATED TROPONIN: ICD-10-CM

## 2022-02-14 DIAGNOSIS — T45.1X5A ANEMIA ASSOCIATED WITH CHEMOTHERAPY: ICD-10-CM

## 2022-02-14 DIAGNOSIS — D64.9 ANEMIA, UNSPECIFIED TYPE: ICD-10-CM

## 2022-02-14 DIAGNOSIS — E83.52 HYPERCALCEMIA: ICD-10-CM

## 2022-02-14 DIAGNOSIS — R53.1 WEAKNESS: ICD-10-CM

## 2022-02-14 DIAGNOSIS — R82.81 PYURIA: ICD-10-CM

## 2022-02-14 DIAGNOSIS — E83.42 HYPOMAGNESEMIA: ICD-10-CM

## 2022-02-14 DIAGNOSIS — R91.1 PULMONARY NODULE: ICD-10-CM

## 2022-02-14 LAB
ALBUMIN SERPL BCP-MCNC: 4 G/DL (ref 3.2–4.9)
ALBUMIN/GLOB SERPL: 1.2 G/DL
ALP SERPL-CCNC: 79 U/L (ref 30–99)
ALT SERPL-CCNC: 8 U/L (ref 2–50)
ANION GAP SERPL CALC-SCNC: 15 MMOL/L (ref 7–16)
ANISOCYTOSIS BLD QL SMEAR: ABNORMAL
AST SERPL-CCNC: 23 U/L (ref 12–45)
BASOPHILS # BLD AUTO: 0 % (ref 0–1.8)
BASOPHILS # BLD: 0 K/UL (ref 0–0.12)
BILIRUB SERPL-MCNC: 0.3 MG/DL (ref 0.1–1.5)
BUN SERPL-MCNC: 12 MG/DL (ref 8–22)
CALCIUM SERPL-MCNC: 11.3 MG/DL (ref 8.5–10.5)
CHLORIDE SERPL-SCNC: 100 MMOL/L (ref 96–112)
CO2 SERPL-SCNC: 23 MMOL/L (ref 20–33)
COMMENT 1642: NORMAL
CREAT SERPL-MCNC: 0.92 MG/DL (ref 0.5–1.4)
EOSINOPHIL # BLD AUTO: 0 K/UL (ref 0–0.51)
EOSINOPHIL NFR BLD: 0 % (ref 0–6.9)
ERYTHROCYTE [DISTWIDTH] IN BLOOD BY AUTOMATED COUNT: 69.5 FL (ref 35.9–50)
GIANT PLATELETS BLD QL SMEAR: NORMAL
GLOBULIN SER CALC-MCNC: 3.4 G/DL (ref 1.9–3.5)
GLUCOSE SERPL-MCNC: 95 MG/DL (ref 65–99)
HCT VFR BLD AUTO: 26.9 % (ref 37–47)
HGB BLD-MCNC: 8.5 G/DL (ref 12–16)
HYPOCHROMIA BLD QL SMEAR: ABNORMAL
IMM GRANULOCYTES # BLD AUTO: 0.03 K/UL (ref 0–0.11)
IMM GRANULOCYTES NFR BLD AUTO: 0.6 % (ref 0–0.9)
LYMPHOCYTES # BLD AUTO: 1.25 K/UL (ref 1–4.8)
LYMPHOCYTES NFR BLD: 25.9 % (ref 22–41)
MACROCYTES BLD QL SMEAR: ABNORMAL
MCH RBC QN AUTO: 32.4 PG (ref 27–33)
MCHC RBC AUTO-ENTMCNC: 31.6 G/DL (ref 33.6–35)
MCV RBC AUTO: 102.7 FL (ref 81.4–97.8)
MONOCYTES # BLD AUTO: 0.5 K/UL (ref 0–0.85)
MONOCYTES NFR BLD AUTO: 10.4 % (ref 0–13.4)
MORPHOLOGY BLD-IMP: NORMAL
NEUTROPHILS # BLD AUTO: 3.05 K/UL (ref 2–7.15)
NEUTROPHILS NFR BLD: 63.1 % (ref 44–72)
NRBC # BLD AUTO: 0 K/UL
NRBC BLD-RTO: 0 /100 WBC
PLATELET # BLD AUTO: 351 K/UL (ref 164–446)
PLATELET BLD QL SMEAR: NORMAL
PMV BLD AUTO: 10.1 FL (ref 9–12.9)
POLYCHROMASIA BLD QL SMEAR: NORMAL
POTASSIUM SERPL-SCNC: 3.6 MMOL/L (ref 3.6–5.5)
PROT SERPL-MCNC: 7.4 G/DL (ref 6–8.2)
RBC # BLD AUTO: 2.62 M/UL (ref 4.2–5.4)
RBC BLD AUTO: PRESENT
SODIUM SERPL-SCNC: 138 MMOL/L (ref 135–145)
WBC # BLD AUTO: 4.8 K/UL (ref 4.8–10.8)

## 2022-02-14 PROCEDURE — 80053 COMPREHEN METABOLIC PANEL: CPT | Mod: 91

## 2022-02-14 PROCEDURE — 85025 COMPLETE CBC W/AUTO DIFF WBC: CPT | Mod: 91

## 2022-02-14 PROCEDURE — 80053 COMPREHEN METABOLIC PANEL: CPT

## 2022-02-14 PROCEDURE — 36415 COLL VENOUS BLD VENIPUNCTURE: CPT

## 2022-02-14 PROCEDURE — 82330 ASSAY OF CALCIUM: CPT

## 2022-02-14 PROCEDURE — 85025 COMPLETE CBC W/AUTO DIFF WBC: CPT

## 2022-02-14 PROCEDURE — 85379 FIBRIN DEGRADATION QUANT: CPT

## 2022-02-14 PROCEDURE — 99285 EMERGENCY DEPT VISIT HI MDM: CPT

## 2022-02-14 PROCEDURE — 93005 ELECTROCARDIOGRAM TRACING: CPT | Performed by: EMERGENCY MEDICINE

## 2022-02-14 PROCEDURE — 84484 ASSAY OF TROPONIN QUANT: CPT

## 2022-02-14 PROCEDURE — 71045 X-RAY EXAM CHEST 1 VIEW: CPT

## 2022-02-14 PROCEDURE — 83735 ASSAY OF MAGNESIUM: CPT

## 2022-02-14 ASSESSMENT — FIBROSIS 4 INDEX: FIB4 SCORE: 1.3

## 2022-02-15 ENCOUNTER — APPOINTMENT (OUTPATIENT)
Dept: CARDIOLOGY | Facility: MEDICAL CENTER | Age: 57
End: 2022-02-15
Attending: STUDENT IN AN ORGANIZED HEALTH CARE EDUCATION/TRAINING PROGRAM
Payer: COMMERCIAL

## 2022-02-15 ENCOUNTER — APPOINTMENT (OUTPATIENT)
Dept: RADIOLOGY | Facility: MEDICAL CENTER | Age: 57
End: 2022-02-15
Attending: EMERGENCY MEDICINE
Payer: COMMERCIAL

## 2022-02-15 PROBLEM — E83.52 HYPERCALCEMIA: Status: ACTIVE | Noted: 2022-02-15

## 2022-02-15 PROBLEM — R06.02 SHORTNESS OF BREATH: Status: ACTIVE | Noted: 2022-02-15

## 2022-02-15 PROBLEM — E83.42 HYPOMAGNESEMIA: Status: ACTIVE | Noted: 2022-02-15

## 2022-02-15 PROBLEM — E87.8 ELECTROLYTE ABNORMALITY: Status: ACTIVE | Noted: 2022-02-15

## 2022-02-15 PROBLEM — C54.1 ENDOMETRIAL SARCOMA (HCC): Status: ACTIVE | Noted: 2022-02-15

## 2022-02-15 PROBLEM — N39.0 RECURRENT URINARY TRACT INFECTION: Status: ACTIVE | Noted: 2022-02-15

## 2022-02-15 PROBLEM — E87.6 HYPOKALEMIA: Status: ACTIVE | Noted: 2022-02-15

## 2022-02-15 PROBLEM — E87.8 ELECTROLYTE ABNORMALITY: Status: RESOLVED | Noted: 2022-02-15 | Resolved: 2022-02-15

## 2022-02-15 PROBLEM — E66.09 CLASS 2 OBESITY DUE TO EXCESS CALORIES WITHOUT SERIOUS COMORBIDITY WITH BODY MASS INDEX (BMI) OF 36.0 TO 36.9 IN ADULT: Status: ACTIVE | Noted: 2022-02-15

## 2022-02-15 LAB
ALBUMIN SERPL BCP-MCNC: 3.7 G/DL (ref 3.2–4.9)
ALBUMIN SERPL BCP-MCNC: 3.8 G/DL (ref 3.2–4.9)
ALBUMIN/GLOB SERPL: 1.3 G/DL
ALBUMIN/GLOB SERPL: 1.5 G/DL
ALP SERPL-CCNC: 74 U/L (ref 30–99)
ALP SERPL-CCNC: 80 U/L (ref 30–99)
ALT SERPL-CCNC: 7 U/L (ref 2–50)
ALT SERPL-CCNC: 9 U/L (ref 2–50)
ANION GAP SERPL CALC-SCNC: 11 MMOL/L (ref 7–16)
ANION GAP SERPL CALC-SCNC: 14 MMOL/L (ref 7–16)
APPEARANCE UR: ABNORMAL
AST SERPL-CCNC: 23 U/L (ref 12–45)
AST SERPL-CCNC: 23 U/L (ref 12–45)
BACTERIA #/AREA URNS HPF: NEGATIVE /HPF
BASOPHILS # BLD AUTO: 0.2 % (ref 0–1.8)
BASOPHILS # BLD AUTO: 0.2 % (ref 0–1.8)
BASOPHILS # BLD: 0.01 K/UL (ref 0–0.12)
BASOPHILS # BLD: 0.01 K/UL (ref 0–0.12)
BILIRUB SERPL-MCNC: 0.2 MG/DL (ref 0.1–1.5)
BILIRUB SERPL-MCNC: 0.3 MG/DL (ref 0.1–1.5)
BILIRUB UR QL STRIP.AUTO: NEGATIVE
BUN SERPL-MCNC: 12 MG/DL (ref 8–22)
BUN SERPL-MCNC: 14 MG/DL (ref 8–22)
CA-I SERPL-SCNC: 1.4 MMOL/L (ref 1.1–1.3)
CALCIUM SERPL-MCNC: 10.2 MG/DL (ref 8.5–10.5)
CALCIUM SERPL-MCNC: 10.9 MG/DL (ref 8.5–10.5)
CHLORIDE SERPL-SCNC: 100 MMOL/L (ref 96–112)
CHLORIDE SERPL-SCNC: 103 MMOL/L (ref 96–112)
CHOLEST SERPL-MCNC: 163 MG/DL (ref 100–199)
CO2 SERPL-SCNC: 23 MMOL/L (ref 20–33)
CO2 SERPL-SCNC: 23 MMOL/L (ref 20–33)
COLOR UR: YELLOW
CREAT SERPL-MCNC: 0.85 MG/DL (ref 0.5–1.4)
CREAT SERPL-MCNC: 1.06 MG/DL (ref 0.5–1.4)
D DIMER PPP IA.FEU-MCNC: 2.19 UG/ML (FEU) (ref 0–0.5)
EKG IMPRESSION: NORMAL
EOSINOPHIL # BLD AUTO: 0 K/UL (ref 0–0.51)
EOSINOPHIL # BLD AUTO: 0 K/UL (ref 0–0.51)
EOSINOPHIL NFR BLD: 0 % (ref 0–6.9)
EOSINOPHIL NFR BLD: 0 % (ref 0–6.9)
EPI CELLS #/AREA URNS HPF: NEGATIVE /HPF
ERYTHROCYTE [DISTWIDTH] IN BLOOD BY AUTOMATED COUNT: 69 FL (ref 35.9–50)
ERYTHROCYTE [DISTWIDTH] IN BLOOD BY AUTOMATED COUNT: 69.8 FL (ref 35.9–50)
EST. AVERAGE GLUCOSE BLD GHB EST-MCNC: 111 MG/DL
FERRITIN SERPL-MCNC: 201 NG/ML (ref 10–291)
GLOBULIN SER CALC-MCNC: 2.5 G/DL (ref 1.9–3.5)
GLOBULIN SER CALC-MCNC: 2.9 G/DL (ref 1.9–3.5)
GLUCOSE SERPL-MCNC: 118 MG/DL (ref 65–99)
GLUCOSE SERPL-MCNC: 98 MG/DL (ref 65–99)
GLUCOSE UR STRIP.AUTO-MCNC: NEGATIVE MG/DL
HBA1C MFR BLD: 5.5 % (ref 4–5.6)
HCT VFR BLD AUTO: 24.4 % (ref 37–47)
HCT VFR BLD AUTO: 25.6 % (ref 37–47)
HDLC SERPL-MCNC: 58 MG/DL
HGB BLD-MCNC: 7.4 G/DL (ref 12–16)
HGB BLD-MCNC: 7.9 G/DL (ref 12–16)
HGB RETIC QN AUTO: 28 PG/CELL (ref 29–35)
HYALINE CASTS #/AREA URNS LPF: ABNORMAL /LPF
IMM GRANULOCYTES # BLD AUTO: 0.02 K/UL (ref 0–0.11)
IMM GRANULOCYTES # BLD AUTO: 0.03 K/UL (ref 0–0.11)
IMM GRANULOCYTES NFR BLD AUTO: 0.5 % (ref 0–0.9)
IMM GRANULOCYTES NFR BLD AUTO: 0.6 % (ref 0–0.9)
IMM RETICS NFR: 33.3 % (ref 9.3–17.4)
IRON SATN MFR SERPL: 10 % (ref 15–55)
IRON SERPL-MCNC: 24 UG/DL (ref 40–170)
KETONES UR STRIP.AUTO-MCNC: NEGATIVE MG/DL
LDLC SERPL CALC-MCNC: 83 MG/DL
LEUKOCYTE ESTERASE UR QL STRIP.AUTO: ABNORMAL
LV EJECT FRACT  99904: 60
LV EJECT FRACT MOD 2C 99903: 56.58
LV EJECT FRACT MOD 4C 99902: 54.17
LV EJECT FRACT MOD BP 99901: 55.17
LYMPHOCYTES # BLD AUTO: 1.3 K/UL (ref 1–4.8)
LYMPHOCYTES # BLD AUTO: 1.67 K/UL (ref 1–4.8)
LYMPHOCYTES NFR BLD: 31.7 % (ref 22–41)
LYMPHOCYTES NFR BLD: 35 % (ref 22–41)
MAGNESIUM SERPL-MCNC: 1.4 MG/DL (ref 1.5–2.5)
MAGNESIUM SERPL-MCNC: 1.8 MG/DL (ref 1.5–2.5)
MCH RBC QN AUTO: 31.4 PG (ref 27–33)
MCH RBC QN AUTO: 32.1 PG (ref 27–33)
MCHC RBC AUTO-ENTMCNC: 30.3 G/DL (ref 33.6–35)
MCHC RBC AUTO-ENTMCNC: 30.9 G/DL (ref 33.6–35)
MCV RBC AUTO: 103.4 FL (ref 81.4–97.8)
MCV RBC AUTO: 104.1 FL (ref 81.4–97.8)
MICRO URNS: ABNORMAL
MONOCYTES # BLD AUTO: 0.44 K/UL (ref 0–0.85)
MONOCYTES # BLD AUTO: 0.45 K/UL (ref 0–0.85)
MONOCYTES NFR BLD AUTO: 10.7 % (ref 0–13.4)
MONOCYTES NFR BLD AUTO: 9.4 % (ref 0–13.4)
NEUTROPHILS # BLD AUTO: 2.33 K/UL (ref 2–7.15)
NEUTROPHILS # BLD AUTO: 2.61 K/UL (ref 2–7.15)
NEUTROPHILS NFR BLD: 54.8 % (ref 44–72)
NEUTROPHILS NFR BLD: 56.9 % (ref 44–72)
NITRITE UR QL STRIP.AUTO: NEGATIVE
NRBC # BLD AUTO: 0 K/UL
NRBC # BLD AUTO: 0 K/UL
NRBC BLD-RTO: 0 /100 WBC
NRBC BLD-RTO: 0 /100 WBC
PH UR STRIP.AUTO: 5 [PH] (ref 5–8)
PLATELET # BLD AUTO: 302 K/UL (ref 164–446)
PLATELET # BLD AUTO: 328 K/UL (ref 164–446)
PMV BLD AUTO: 9.5 FL (ref 9–12.9)
PMV BLD AUTO: 9.8 FL (ref 9–12.9)
POTASSIUM SERPL-SCNC: 2.9 MMOL/L (ref 3.6–5.5)
POTASSIUM SERPL-SCNC: 3.7 MMOL/L (ref 3.6–5.5)
PROCALCITONIN SERPL-MCNC: 0.26 NG/ML
PROT SERPL-MCNC: 6.2 G/DL (ref 6–8.2)
PROT SERPL-MCNC: 6.7 G/DL (ref 6–8.2)
PROT UR QL STRIP: NEGATIVE MG/DL
RBC # BLD AUTO: 2.36 M/UL (ref 4.2–5.4)
RBC # BLD AUTO: 2.46 M/UL (ref 4.2–5.4)
RBC # URNS HPF: ABNORMAL /HPF
RBC UR QL AUTO: ABNORMAL
RETICS # AUTO: 0.11 M/UL (ref 0.04–0.06)
RETICS/RBC NFR: 4.6 % (ref 0.8–2.1)
SODIUM SERPL-SCNC: 137 MMOL/L (ref 135–145)
SODIUM SERPL-SCNC: 137 MMOL/L (ref 135–145)
SP GR UR STRIP.AUTO: 1.02
TIBC SERPL-MCNC: 252 UG/DL (ref 250–450)
TRIGL SERPL-MCNC: 110 MG/DL (ref 0–149)
TROPONIN T SERPL-MCNC: 44 NG/L (ref 6–19)
TROPONIN T SERPL-MCNC: 50 NG/L (ref 6–19)
UIBC SERPL-MCNC: 228 UG/DL (ref 110–370)
UROBILINOGEN UR STRIP.AUTO-MCNC: 0.2 MG/DL
VIT B12 SERPL-MCNC: 2394 PG/ML (ref 211–911)
WBC # BLD AUTO: 4.1 K/UL (ref 4.8–10.8)
WBC # BLD AUTO: 4.8 K/UL (ref 4.8–10.8)
WBC #/AREA URNS HPF: ABNORMAL /HPF

## 2022-02-15 PROCEDURE — 85046 RETICYTE/HGB CONCENTRATE: CPT

## 2022-02-15 PROCEDURE — A9270 NON-COVERED ITEM OR SERVICE: HCPCS | Performed by: STUDENT IN AN ORGANIZED HEALTH CARE EDUCATION/TRAINING PROGRAM

## 2022-02-15 PROCEDURE — 83540 ASSAY OF IRON: CPT

## 2022-02-15 PROCEDURE — 82728 ASSAY OF FERRITIN: CPT

## 2022-02-15 PROCEDURE — 700102 HCHG RX REV CODE 250 W/ 637 OVERRIDE(OP): Performed by: STUDENT IN AN ORGANIZED HEALTH CARE EDUCATION/TRAINING PROGRAM

## 2022-02-15 PROCEDURE — G0378 HOSPITAL OBSERVATION PER HR: HCPCS

## 2022-02-15 PROCEDURE — 700117 HCHG RX CONTRAST REV CODE 255: Performed by: EMERGENCY MEDICINE

## 2022-02-15 PROCEDURE — 85025 COMPLETE CBC W/AUTO DIFF WBC: CPT

## 2022-02-15 PROCEDURE — 80053 COMPREHEN METABOLIC PANEL: CPT

## 2022-02-15 PROCEDURE — 84145 PROCALCITONIN (PCT): CPT

## 2022-02-15 PROCEDURE — 80061 LIPID PANEL: CPT

## 2022-02-15 PROCEDURE — 96365 THER/PROPH/DIAG IV INF INIT: CPT

## 2022-02-15 PROCEDURE — 700105 HCHG RX REV CODE 258: Performed by: EMERGENCY MEDICINE

## 2022-02-15 PROCEDURE — 83735 ASSAY OF MAGNESIUM: CPT

## 2022-02-15 PROCEDURE — 81001 URINALYSIS AUTO W/SCOPE: CPT

## 2022-02-15 PROCEDURE — 71275 CT ANGIOGRAPHY CHEST: CPT

## 2022-02-15 PROCEDURE — 84484 ASSAY OF TROPONIN QUANT: CPT

## 2022-02-15 PROCEDURE — 700111 HCHG RX REV CODE 636 W/ 250 OVERRIDE (IP): Performed by: EMERGENCY MEDICINE

## 2022-02-15 PROCEDURE — 82607 VITAMIN B-12: CPT

## 2022-02-15 PROCEDURE — 700111 HCHG RX REV CODE 636 W/ 250 OVERRIDE (IP): Performed by: STUDENT IN AN ORGANIZED HEALTH CARE EDUCATION/TRAINING PROGRAM

## 2022-02-15 PROCEDURE — 83550 IRON BINDING TEST: CPT

## 2022-02-15 PROCEDURE — 96375 TX/PRO/DX INJ NEW DRUG ADDON: CPT

## 2022-02-15 PROCEDURE — A9270 NON-COVERED ITEM OR SERVICE: HCPCS | Performed by: EMERGENCY MEDICINE

## 2022-02-15 PROCEDURE — 93306 TTE W/DOPPLER COMPLETE: CPT | Mod: 26 | Performed by: INTERNAL MEDICINE

## 2022-02-15 PROCEDURE — 83036 HEMOGLOBIN GLYCOSYLATED A1C: CPT

## 2022-02-15 PROCEDURE — 700105 HCHG RX REV CODE 258: Performed by: STUDENT IN AN ORGANIZED HEALTH CARE EDUCATION/TRAINING PROGRAM

## 2022-02-15 PROCEDURE — 93306 TTE W/DOPPLER COMPLETE: CPT

## 2022-02-15 PROCEDURE — 96372 THER/PROPH/DIAG INJ SC/IM: CPT

## 2022-02-15 PROCEDURE — 700102 HCHG RX REV CODE 250 W/ 637 OVERRIDE(OP): Performed by: EMERGENCY MEDICINE

## 2022-02-15 PROCEDURE — 87086 URINE CULTURE/COLONY COUNT: CPT

## 2022-02-15 PROCEDURE — 93005 ELECTROCARDIOGRAM TRACING: CPT | Performed by: STUDENT IN AN ORGANIZED HEALTH CARE EDUCATION/TRAINING PROGRAM

## 2022-02-15 RX ORDER — PROMETHAZINE HYDROCHLORIDE 25 MG/1
12.5-25 TABLET ORAL EVERY 4 HOURS PRN
Status: DISCONTINUED | OUTPATIENT
Start: 2022-02-15 | End: 2022-02-16 | Stop reason: HOSPADM

## 2022-02-15 RX ORDER — SODIUM CHLORIDE, SODIUM LACTATE, POTASSIUM CHLORIDE, CALCIUM CHLORIDE 600; 310; 30; 20 MG/100ML; MG/100ML; MG/100ML; MG/100ML
INJECTION, SOLUTION INTRAVENOUS CONTINUOUS
Status: ACTIVE | OUTPATIENT
Start: 2022-02-15 | End: 2022-02-15

## 2022-02-15 RX ORDER — MIRTAZAPINE 15 MG/1
15 TABLET, FILM COATED ORAL EVERY EVENING
Status: DISCONTINUED | OUTPATIENT
Start: 2022-02-15 | End: 2022-02-15

## 2022-02-15 RX ORDER — KETOROLAC TROMETHAMINE 30 MG/ML
30 INJECTION, SOLUTION INTRAMUSCULAR; INTRAVENOUS EVERY 6 HOURS PRN
Status: DISCONTINUED | OUTPATIENT
Start: 2022-02-15 | End: 2022-02-15

## 2022-02-15 RX ORDER — NITROFURANTOIN 25; 75 MG/1; MG/1
100 CAPSULE ORAL 2 TIMES DAILY
Status: ON HOLD | COMMUNITY
Start: 2022-02-05 | End: 2022-02-16

## 2022-02-15 RX ORDER — OXYCODONE HYDROCHLORIDE AND ACETAMINOPHEN 5; 325 MG/1; MG/1
1 TABLET ORAL EVERY 4 HOURS PRN
Status: DISCONTINUED | OUTPATIENT
Start: 2022-02-15 | End: 2022-02-15

## 2022-02-15 RX ORDER — ACETAMINOPHEN 325 MG/1
650 TABLET ORAL EVERY 6 HOURS PRN
Status: DISCONTINUED | OUTPATIENT
Start: 2022-02-15 | End: 2022-02-16 | Stop reason: HOSPADM

## 2022-02-15 RX ORDER — POTASSIUM CHLORIDE 20 MEQ/1
40 TABLET, EXTENDED RELEASE ORAL ONCE
Status: COMPLETED | OUTPATIENT
Start: 2022-02-15 | End: 2022-02-15

## 2022-02-15 RX ORDER — AMLODIPINE BESYLATE 5 MG/1
2.5 TABLET ORAL EVERY EVENING
Status: DISCONTINUED | OUTPATIENT
Start: 2022-02-15 | End: 2022-02-16 | Stop reason: HOSPADM

## 2022-02-15 RX ORDER — MELOXICAM 15 MG/1
7.5 TABLET ORAL 2 TIMES DAILY
Status: ON HOLD | COMMUNITY
Start: 2021-12-03 | End: 2022-03-28

## 2022-02-15 RX ORDER — BISACODYL 10 MG
10 SUPPOSITORY, RECTAL RECTAL
Status: DISCONTINUED | OUTPATIENT
Start: 2022-02-15 | End: 2022-02-16 | Stop reason: HOSPADM

## 2022-02-15 RX ORDER — ASPIRIN 81 MG/1
324 TABLET, CHEWABLE ORAL ONCE
Status: COMPLETED | OUTPATIENT
Start: 2022-02-15 | End: 2022-02-15

## 2022-02-15 RX ORDER — ZOLPIDEM TARTRATE 5 MG/1
5 TABLET ORAL
Status: DISCONTINUED | OUTPATIENT
Start: 2022-02-15 | End: 2022-02-16 | Stop reason: HOSPADM

## 2022-02-15 RX ORDER — PROCHLORPERAZINE EDISYLATE 5 MG/ML
5-10 INJECTION INTRAMUSCULAR; INTRAVENOUS EVERY 4 HOURS PRN
Status: DISCONTINUED | OUTPATIENT
Start: 2022-02-15 | End: 2022-02-16 | Stop reason: HOSPADM

## 2022-02-15 RX ORDER — SODIUM CHLORIDE 9 MG/ML
1000 INJECTION, SOLUTION INTRAVENOUS ONCE
Status: COMPLETED | OUTPATIENT
Start: 2022-02-15 | End: 2022-02-15

## 2022-02-15 RX ORDER — AMOXICILLIN 250 MG
2 CAPSULE ORAL 2 TIMES DAILY
Status: DISCONTINUED | OUTPATIENT
Start: 2022-02-15 | End: 2022-02-16 | Stop reason: HOSPADM

## 2022-02-15 RX ORDER — AZELASTINE HYDROCHLORIDE, FLUTICASONE PROPIONATE 137; 50 UG/1; UG/1
1 SPRAY, METERED NASAL 3 TIMES DAILY
Status: DISCONTINUED | OUTPATIENT
Start: 2022-02-15 | End: 2022-02-15

## 2022-02-15 RX ORDER — ONDANSETRON 2 MG/ML
4 INJECTION INTRAMUSCULAR; INTRAVENOUS EVERY 4 HOURS PRN
Status: DISCONTINUED | OUTPATIENT
Start: 2022-02-15 | End: 2022-02-16 | Stop reason: HOSPADM

## 2022-02-15 RX ORDER — MAGNESIUM SULFATE HEPTAHYDRATE 40 MG/ML
2 INJECTION, SOLUTION INTRAVENOUS ONCE
Status: COMPLETED | OUTPATIENT
Start: 2022-02-15 | End: 2022-02-15

## 2022-02-15 RX ORDER — BACLOFEN 10 MG/1
10 TABLET ORAL 3 TIMES DAILY
Status: DISCONTINUED | OUTPATIENT
Start: 2022-02-15 | End: 2022-02-16 | Stop reason: HOSPADM

## 2022-02-15 RX ORDER — ROPINIROLE 0.5 MG/1
1 TABLET, FILM COATED ORAL EVERY EVENING
Status: DISCONTINUED | OUTPATIENT
Start: 2022-02-15 | End: 2022-02-16 | Stop reason: HOSPADM

## 2022-02-15 RX ORDER — LABETALOL HYDROCHLORIDE 5 MG/ML
10 INJECTION, SOLUTION INTRAVENOUS EVERY 4 HOURS PRN
Status: DISCONTINUED | OUTPATIENT
Start: 2022-02-15 | End: 2022-02-16 | Stop reason: HOSPADM

## 2022-02-15 RX ORDER — HYDROXYCHLOROQUINE SULFATE 200 MG/1
400 TABLET, FILM COATED ORAL DAILY
Status: DISCONTINUED | OUTPATIENT
Start: 2022-02-15 | End: 2022-02-16 | Stop reason: HOSPADM

## 2022-02-15 RX ORDER — POLYETHYLENE GLYCOL 3350 17 G/17G
1 POWDER, FOR SOLUTION ORAL
Status: DISCONTINUED | OUTPATIENT
Start: 2022-02-15 | End: 2022-02-16 | Stop reason: HOSPADM

## 2022-02-15 RX ORDER — PROMETHAZINE HYDROCHLORIDE 25 MG/1
12.5-25 SUPPOSITORY RECTAL EVERY 4 HOURS PRN
Status: DISCONTINUED | OUTPATIENT
Start: 2022-02-15 | End: 2022-02-16 | Stop reason: HOSPADM

## 2022-02-15 RX ORDER — SODIUM CHLORIDE, SODIUM LACTATE, POTASSIUM CHLORIDE, AND CALCIUM CHLORIDE .6; .31; .03; .02 G/100ML; G/100ML; G/100ML; G/100ML
500 INJECTION, SOLUTION INTRAVENOUS
Status: DISCONTINUED | OUTPATIENT
Start: 2022-02-15 | End: 2022-02-16 | Stop reason: HOSPADM

## 2022-02-15 RX ORDER — AZELASTINE HYDROCHLORIDE 137 UG/1
1 SPRAY, METERED NASAL 3 TIMES DAILY
COMMUNITY
Start: 2021-12-03

## 2022-02-15 RX ORDER — ONDANSETRON 4 MG/1
4 TABLET, ORALLY DISINTEGRATING ORAL EVERY 4 HOURS PRN
Status: DISCONTINUED | OUTPATIENT
Start: 2022-02-15 | End: 2022-02-16 | Stop reason: HOSPADM

## 2022-02-15 RX ORDER — DEXAMETHASONE 4 MG/1
4 TABLET ORAL EVERY 6 HOURS
Status: DISCONTINUED | OUTPATIENT
Start: 2022-02-15 | End: 2022-02-15

## 2022-02-15 RX ADMIN — ROPINIROLE HYDROCHLORIDE 1 MG: 0.5 TABLET, FILM COATED ORAL at 17:48

## 2022-02-15 RX ADMIN — HYDROXYCHLOROQUINE SULFATE 400 MG: 200 TABLET ORAL at 08:44

## 2022-02-15 RX ADMIN — BACLOFEN 10 MG: 10 TABLET ORAL at 08:43

## 2022-02-15 RX ADMIN — POTASSIUM CHLORIDE 40 MEQ: 1500 TABLET, EXTENDED RELEASE ORAL at 06:15

## 2022-02-15 RX ADMIN — DOCUSATE SODIUM 50 MG AND SENNOSIDES 8.6 MG 2 TABLET: 8.6; 5 TABLET, FILM COATED ORAL at 17:48

## 2022-02-15 RX ADMIN — OXYCODONE HYDROCHLORIDE 15 MG: 10 TABLET ORAL at 20:21

## 2022-02-15 RX ADMIN — OXYCODONE HYDROCHLORIDE AND ACETAMINOPHEN 1 TABLET: 5; 325 TABLET ORAL at 12:45

## 2022-02-15 RX ADMIN — OXYCODONE HYDROCHLORIDE 15 MG: 10 TABLET ORAL at 15:27

## 2022-02-15 RX ADMIN — OXYCODONE HYDROCHLORIDE AND ACETAMINOPHEN 1 TABLET: 5; 325 TABLET ORAL at 08:43

## 2022-02-15 RX ADMIN — SODIUM CHLORIDE 1000 ML: 9 INJECTION, SOLUTION INTRAVENOUS at 00:13

## 2022-02-15 RX ADMIN — ASPIRIN 324 MG: 81 TABLET, CHEWABLE ORAL at 02:06

## 2022-02-15 RX ADMIN — BACLOFEN 10 MG: 10 TABLET ORAL at 20:21

## 2022-02-15 RX ADMIN — IOHEXOL 60 ML: 350 INJECTION, SOLUTION INTRAVENOUS at 01:45

## 2022-02-15 RX ADMIN — POTASSIUM CHLORIDE 40 MEQ: 1500 TABLET, EXTENDED RELEASE ORAL at 00:54

## 2022-02-15 RX ADMIN — BACLOFEN 10 MG: 10 TABLET ORAL at 15:27

## 2022-02-15 RX ADMIN — AMLODIPINE BESYLATE 2.5 MG: 5 TABLET ORAL at 17:47

## 2022-02-15 RX ADMIN — ENOXAPARIN SODIUM 40 MG: 40 INJECTION SUBCUTANEOUS at 06:15

## 2022-02-15 RX ADMIN — SODIUM CHLORIDE, POTASSIUM CHLORIDE, SODIUM LACTATE AND CALCIUM CHLORIDE: 600; 310; 30; 20 INJECTION, SOLUTION INTRAVENOUS at 03:30

## 2022-02-15 RX ADMIN — MAGNESIUM SULFATE HEPTAHYDRATE 2 G: 40 INJECTION, SOLUTION INTRAVENOUS at 00:57

## 2022-02-15 RX ADMIN — CEFTRIAXONE SODIUM 1 G: 10 INJECTION, POWDER, FOR SOLUTION INTRAVENOUS at 05:18

## 2022-02-15 ASSESSMENT — ENCOUNTER SYMPTOMS
NAUSEA: 1
FEVER: 0
WHEEZING: 0
CHILLS: 0
HEADACHES: 0
DEPRESSION: 0
DIARRHEA: 0
CONSTIPATION: 0
MYALGIAS: 1
EYES NEGATIVE: 1
FOCAL WEAKNESS: 0
VOMITING: 0
PALPITATIONS: 0
BLOOD IN STOOL: 0
WEAKNESS: 1
SHORTNESS OF BREATH: 1
VOMITING: 1
HEARTBURN: 0
DOUBLE VISION: 0
NECK PAIN: 0
LOSS OF CONSCIOUSNESS: 0
INSOMNIA: 0
SHORTNESS OF BREATH: 0
COUGH: 0
BRUISES/BLEEDS EASILY: 0
BLURRED VISION: 0
WEAKNESS: 0
BACK PAIN: 0
ABDOMINAL PAIN: 0
SORE THROAT: 0

## 2022-02-15 ASSESSMENT — LIFESTYLE VARIABLES
ON A TYPICAL DAY WHEN YOU DRINK ALCOHOL HOW MANY DRINKS DO YOU HAVE: 0
TOTAL SCORE: 0
DOES PATIENT WANT TO STOP DRINKING: NO
TOTAL SCORE: 0
EVER HAD A DRINK FIRST THING IN THE MORNING TO STEADY YOUR NERVES TO GET RID OF A HANGOVER: NO
CONSUMPTION TOTAL: NEGATIVE
EVER FELT BAD OR GUILTY ABOUT YOUR DRINKING: NO
HOW MANY TIMES IN THE PAST YEAR HAVE YOU HAD 5 OR MORE DRINKS IN A DAY: 0
ALCOHOL_USE: NO
AVERAGE NUMBER OF DAYS PER WEEK YOU HAVE A DRINK CONTAINING ALCOHOL: 0
HAVE YOU EVER FELT YOU SHOULD CUT DOWN ON YOUR DRINKING: NO
HAVE PEOPLE ANNOYED YOU BY CRITICIZING YOUR DRINKING: NO
TOTAL SCORE: 0

## 2022-02-15 ASSESSMENT — COGNITIVE AND FUNCTIONAL STATUS - GENERAL
MOBILITY SCORE: 24
SUGGESTED CMS G CODE MODIFIER DAILY ACTIVITY: CH
SUGGESTED CMS G CODE MODIFIER MOBILITY: CH
DAILY ACTIVITIY SCORE: 24

## 2022-02-15 ASSESSMENT — PATIENT HEALTH QUESTIONNAIRE - PHQ9
SUM OF ALL RESPONSES TO PHQ9 QUESTIONS 1 AND 2: 0
2. FEELING DOWN, DEPRESSED, IRRITABLE, OR HOPELESS: NOT AT ALL
1. LITTLE INTEREST OR PLEASURE IN DOING THINGS: NOT AT ALL

## 2022-02-15 ASSESSMENT — PAIN DESCRIPTION - PAIN TYPE
TYPE: CHRONIC PAIN;ACUTE PAIN
TYPE: ACUTE PAIN
TYPE: CHRONIC PAIN;ACUTE PAIN
TYPE: CHRONIC PAIN;ACUTE PAIN

## 2022-02-15 ASSESSMENT — FIBROSIS 4 INDEX: FIB4 SCORE: 1.61

## 2022-02-15 NOTE — ED NOTES
"Assumed care, bedside report received from Pearl RN, POC discussed.   Introduced self as RN, call light within reach, no s/s of distress noted. SO at bedside, POC discussed.  Pt ambulatory to BR to void SBA, reports slight spotting and bladder incontinence \"droplets\" on pad in place.  Pt reporting pain returning, medication will be provided.     "

## 2022-02-15 NOTE — ASSESSMENT & PLAN NOTE
Body mass index is 36.58 kg/m².  Outpatient weight loss counseling  
CTA negative for PE  Echocardiogram ordered and pending  Chest x-ray as above  No oxygen requirement  D-dimer positive with negative CTA for large vessel PE, consider VQ scan inpatient versus outpatient as timing of contrast not optimal during examination  
Ca on admission 11.3.  Continue IVF  Monitor   
Likely secondary to poor p.o. intake/nausea vomiting  2 g magnesium sulfate given in ED and we will recheck magnesium levels in a.m.  
Magnesium transfusion prior to potassium administration  40 mEq K-Dur given in ED and we will give again at 7 AM  CMP in a.m.  Likely secondary nausea vomiting and poor oral intake  Antiemetics ordered for nausea vomiting  
Per patient she will be going for outpatient surgery for excision of endometrial mass this upcoming week  Follow-up outpatient PCP/hematology oncology  Finished chemotherapy January 2022  May still be immunosuppressed and repeat CBC in a.m.  Analgesics for pain control  
States her lupus is purely neurological in nature and has never manifested with rash, oral ulcers, malar rash, arthralgias, pleuritis, lupus nephritis  Follow-up outpatient PCP/rheumatologist  Continue Plaquenil 400 mg p.o. daily, follow-up outpatient ophthalmologist yearly  
Transfuse hemoglobin less than 7  Iron/TIBC/ferritin/B12/reticulocyte count ordered and pending  Monitor  
Urinalysis equivocal, no bacteria seen however pyuria and recently stopped antibiotics.  1 dose Rocephin given in ED and we will check procalcitonin/urine culture to determine if continuation of IV antibiotics at this time.  
Never smoker

## 2022-02-15 NOTE — ED TRIAGE NOTES
Chief Complaint   Patient presents with   • Sent by MD Dr. Dyson   • Abnormal Labs     ca+ 11.3, hgb 8.5, wbc 4.8   • Weakness   • N/V     denies blood in emesis   • Unsteady Gait     ambulatory with cane     Patient to triage via ambualtion, with personal cane, patient A&O x4. Wife at patient side.  Appropriate precautions in place.     Patient followed by Dr. Dyson, last cancer treatment 01/13/2021.  Due to patient current symptoms, recommended by Dr. Dyson to follow up ED.     Explained wait time and triage process to pt. Pt placed back in lobby, told to notify ED tech or triage RN of any changes, verbalized understanding.

## 2022-02-15 NOTE — H&P
Hospital Medicine History & Physical Note    Date of Service  2/15/2022    Primary Care Physician  Ty Bethea M.D.    Consultants  None    Code Status  Full Code    Chief Complaint  Chief Complaint   Patient presents with   • Sent by MD Dr. Dyson   • Abnormal Labs     ca+ 11.3, hgb 8.5, wbc 4.8   • Weakness   • N/V     denies blood in emesis   • Unsteady Gait     ambulatory with cane       History of Presenting Illness  Magali Leal is a 56 y.o. female who presented 2/14/2022 with wife at bedside called into ED sent from primary care physician after abnormal labs of calcium 11.3, hemoglobin 8.5 and leukopenia 4.8.  Patient states that she has had poor oral intake for the past few days and multiple episodes of nausea vomiting without hematemesis or diarrhea, melena or hematochezia.  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  Wife states that patient will be undergoing surgical removal within the upcoming week.  Patient states that she recently stopped Macrobid antibiotic for urinary tract infection.  She is noted to have low-grade fever and possibly immunosuppressed not amounting immune response on CBC showing no signs of infection however urinalysis consistent with mild UTI.  She also states that she has had some shortness of breath with ambulation without chest pain and CTA chest performed in ED to rule out PE which revealed suboptimal contrast bolus timing without no large or central pulmonary embolism, small right pleural effusion with adjacent atelectasis and new 5 mm left upper lobe and new 7 mm right middle lobe pulmonary nodules may represent focal infection/inflammatory changes or new pulmonary metastatic disease.  Patient otherwise denies the following: Anosmia, ageusia, constipation or diarrhea, melena, hematochezia, dysuria, hematuria, incoordination vertigo, syncope, visual changes.    Vitals at time of  presentation were as follows: 100.1, 119, 18, 137/84, 94% room air.    Chest x-ray reveals no acute cardiopulmonary disease processes.  CT scan as above.    Labs reveal hypercalcemia 10.9, hypokalemia 2.9, hypomagnesemia 1.4, D-dimer 2.14, ionized calcium 1.4.  CBC reveals moderate to severe macrocytic anemia with hemoglobin 7.9, hematocrit 25.6 and MCV of 104.1 otherwise no neutrophilia, monocytosis, immature granulocytosis, leukocytosis or leukopenia indicating infection however patient had last chemotherapy 1 month ago and may still be immunosuppressed.  Troponin T elevated at 50, twelve-lead EKG ordered and pending.  Urinalysis reveals pyuria without bacteria and mildly concentrated.    IV antibiotics given in ED x1, procal ordered and pending.  Hospitalist consulted for admission.  Patient agreeable to observation admission for electrolyte deficiency and need for replacement.  Patient agreeable to full CODE STATUS at time of evaluation and alert and oriented x4.  She will be optimized in ED and subsequently transferred to observation with cardiac monitoring for further optimization of medical management.    I discussed the plan of care with patient and family.    Review of Systems  Review of Systems   Constitutional: Negative for chills and fever.   HENT: Negative for congestion and sore throat.    Eyes: Negative for blurred vision and double vision.   Respiratory: Positive for shortness of breath. Negative for cough and wheezing.    Cardiovascular: Negative for chest pain and palpitations.   Gastrointestinal: Positive for nausea and vomiting. Negative for abdominal pain, blood in stool, constipation, diarrhea, heartburn and melena.   Genitourinary: Negative for dysuria and frequency.   Musculoskeletal: Negative for back pain and neck pain.   Skin: Negative for itching and rash.   Neurological: Negative for focal weakness, loss of consciousness, weakness and headaches.   Endo/Heme/Allergies: Negative for  "environmental allergies. Does not bruise/bleed easily.   Psychiatric/Behavioral: Negative for depression. The patient does not have insomnia.        Past Medical History   has a past medical history of Anemia, Anxiety, Arthritis, Cancer (HCC) (07/2021), Fibromyalgia, Fibromyalgia, Fibromyalgia, Lupus (HCC), Pilonidal cyst, Psychiatric problem, and Raynaud disease.    Surgical History   has a past surgical history that includes pilonidal cyst excision (1/23/2020); other orthopedic surgery; cath placement (Right, 10/14/2021); other (07/2020); and pr pelvic examination w anesth (12/10/2021).     Family History  family history includes Arthritis in her maternal grandmother; Genetic Disorder in her father and mother; Hyperlipidemia in her maternal grandfather and maternal grandmother; Hypertension in her maternal grandmother; Parkinson's Disease in her maternal grandmother.   Family history reviewed with patient. There is no family history that is pertinent to the chief complaint.     Social History   reports that she has never smoked. She has never used smokeless tobacco. She reports previous alcohol use. She reports that she does not use drugs.    Allergies  Allergies   Allergen Reactions   • Bactrim Ds Rash and Itching     \"Itchy rash\"   • Ciprofloxacin Rash     States \"something always goes wrong\" Dysthesia   • Morphine Unspecified     Family history of becoming violent \"Paranoia, aggression\"   • Tramadol Vomiting and Nausea   • Lavender Oil Swelling       Medications  Prior to Admission Medications   Prescriptions Last Dose Informant Patient Reported? Taking?   Azelastine-Fluticasone 137-50 MCG/ACT Suspension  Patient Yes No   Sig: Spray 1 Spray in nose 3 times a day.   BLACK COHOSH EXTRACT PO  Patient Yes No   Sig: Take 2 Tablets by mouth every day.   Cholecalciferol (VITAMIN D3) Liquid  Patient Yes No   Sig: Place 1 mL under the tongue every day.   Cyanocobalamin (VITAMIN B12) 3000 MCG/ML Liquid  Patient Yes No "   Sig: Place 1 mL under the tongue every day.   Loratadine (CLARITIN PO)  Patient Yes No   Sig: Take 1 Tablet by mouth one time.   NON SPECIFIED  Patient Yes No   Sig: Take 1 Tablet by mouth every day. Red Russ   ROPINIRole (REQUIP) 1 MG Tab  Patient Yes No   Sig: Take 1 mg by mouth every evening.   amLODIPine (NORVASC) 2.5 MG Tab  Patient Yes No   Sig: Take 2.5 mg by mouth every evening.   baclofen (LIORESAL) 10 MG Tab  Patient Yes No   Sig: Take 10 mg by mouth 3 times a day.   dexamethasone (DECADRON) 4 MG Tab  Patient Yes No   Sig: Take 4 mg by mouth every 6 hours.   eszopiclone (LUNESTA) 1 MG Tab tablet  Patient Yes No   Sig: Take 1 mg by mouth at bedtime as needed for Insomnia.   hydroxychloroquine (PLAQUENIL) 200 MG Tab  Patient Yes No   Sig: Take 400 mg by mouth every day.   meloxicam (MOBIC) 7.5 MG Tab  Patient Yes No   Sig: Take 7.5 mg by mouth 2 times a day.   mirtazapine (REMERON) 15 MG Tab  Patient Yes No   Sig: Take 15 mg by mouth every evening.   ondansetron (ZOFRAN) 4 MG Tab tablet  Patient Yes No   Sig: Take 4 mg by mouth 4 times a day.   oxycodone (OXY-IR) 15 MG immediate release tablet  Patient Yes No   Sig: Take 7.5 mg by mouth as needed for Severe Pain.   propranolol (INDERAL) 40 MG Tab  Patient Yes No   Sig: Take 40 mg by mouth as needed. anxiety 20-40 mg      Facility-Administered Medications: None       Physical Exam  Temp:  [37.8 °C (100.1 °F)] 37.8 °C (100.1 °F)  Pulse:  [110-119] 110  Resp:  [16-18] 16  BP: (135-137)/(67-84) 135/67  SpO2:  [93 %-94 %] 93 %  Blood Pressure: 135/67   Temperature: 37.8 °C (100.1 °F)   Pulse: (!) 110   Respiration: 16   Pulse Oximetry: 93 %       Physical Exam  Constitutional:       Appearance: Normal appearance. She is obese.   HENT:      Head: Normocephalic and atraumatic.      Mouth/Throat:      Mouth: Mucous membranes are dry.      Pharynx: Oropharynx is clear. No posterior oropharyngeal erythema.   Eyes:      General: No scleral icterus.     Extraocular  Movements: Extraocular movements intact.      Conjunctiva/sclera: Conjunctivae normal.      Pupils: Pupils are equal, round, and reactive to light.   Neck:      Vascular: No carotid bruit.   Cardiovascular:      Rate and Rhythm: Normal rate and regular rhythm.      Pulses: Normal pulses.      Heart sounds: Normal heart sounds. No murmur heard.  No friction rub. No gallop.    Pulmonary:      Effort: Pulmonary effort is normal.      Breath sounds: Normal breath sounds. No wheezing, rhonchi or rales.   Abdominal:      General: Abdomen is flat. Bowel sounds are normal. There is no distension.      Palpations: There is no mass.      Tenderness: There is no abdominal tenderness. There is no rebound.   Musculoskeletal:         General: No swelling. Normal range of motion.      Cervical back: Normal range of motion.      Right lower leg: No edema.      Left lower leg: No edema.   Lymphadenopathy:      Cervical: No cervical adenopathy.   Skin:     General: Skin is warm and dry.      Capillary Refill: Capillary refill takes less than 2 seconds.      Findings: No erythema or rash.   Neurological:      General: No focal deficit present.      Mental Status: She is alert and oriented to person, place, and time. Mental status is at baseline.      Cranial Nerves: No cranial nerve deficit.      Sensory: No sensory deficit.      Motor: No weakness.   Psychiatric:         Mood and Affect: Mood normal.         Behavior: Behavior normal.         Laboratory:  Recent Labs     02/14/22  1448 02/14/22 2322   WBC 4.8 4.8   RBC 2.62* 2.46*   HEMOGLOBIN 8.5* 7.9*   HEMATOCRIT 26.9* 25.6*   .7* 104.1*   MCH 32.4 32.1   MCHC 31.6* 30.9*   RDW 69.5* 69.0*   PLATELETCT 351 328   MPV 10.1 9.8     Recent Labs     02/14/22  1448 02/14/22 2322   SODIUM 138 137   POTASSIUM 3.6 2.9*   CHLORIDE 100 100   CO2 23 23   GLUCOSE 95 118*   BUN 12 14   CREATININE 0.92 1.06   CALCIUM 11.3* 10.9*     Recent Labs     02/14/22 1448 02/14/22 2322    ALTSGPT 8 9   ASTSGOT 23 23   ALKPHOSPHAT 79 80   TBILIRUBIN 0.3 0.3   GLUCOSE 95 118*         No results for input(s): NTPROBNP in the last 72 hours.      Recent Labs     02/14/22  2322   TROPONINT 50*   Twelve-lead EKG ordered and pending.    Imaging:  CT-CTA CHEST PULMONARY ARTERY W/ RECONS   Final Result      1.  Suboptimal contrast bolus timing. No large or central pulmonary embolism.   2.  Small right pleural effusion with adjacent atelectasis.   3.  New 5 mm left upper lobe and new 7 mm right middle lobe pulmonary nodules may represent focal infection/inflammation or new pulmonary metastatic disease from 2/3/2022 exam.         DX-CHEST-PORTABLE (1 VIEW)   Final Result      1.  No acute cardiac or pulmonary abnormalities are identified.      EC-ECHOCARDIOGRAM COMPLETE W/O CONT    (Results Pending)       I reviewed and interpreted the above chest x-ray do not appreciate any acute cardiopulmonary disease processes.    Assessment/Plan:  I anticipate this patient is appropriate for observation status at this time.    * Hypomagnesemia- (present on admission)  Assessment & Plan  Likely secondary to poor p.o. intake/nausea vomiting  2 g magnesium sulfate given in ED and we will recheck magnesium levels in a.m.    Class 2 obesity due to excess calories without serious comorbidity with body mass index (BMI) of 36.0 to 36.9 in adult- (present on admission)  Assessment & Plan  Strong recommendation for follow-up outpatient PCP for lifestyle modification including diet and exercise regiment of at least 30 min of brisk cardiovascular exercise 3-5 times weekly.  Lipid panel ordered and pending  Hemoglobin A1c ordered and pending      Recurrent urinary tract infection- (present on admission)  Assessment & Plan  Urinalysis equivocal, no bacteria seen however pyuria and recently stopped antibiotics.  1 dose Rocephin given in ED and we will check procalcitonin/urine culture to determine if continuation of IV antibiotics at  this time.      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  Analgesics for pain control      Shortness of breath- (present on admission)  Assessment & Plan  CTA negative for PE  Echocardiogram ordered and pending  Chest x-ray as above  No oxygen requirement  D-dimer positive with negative CTA for large vessel PE, consider VQ scan inpatient versus outpatient as timing of contrast not optimal during examination        Hypokalemia- (present on admission)  Assessment & Plan  Magnesium transfusion prior to potassium administration  40 mEq K-Dur given in ED and we will give again at 7 AM  CMP in a.m.  Likely secondary nausea vomiting and poor oral intake  Antiemetics ordered for nausea vomiting    Anemia associated with chemotherapy- (present on admission)  Assessment & Plan  Transfuse hemoglobin less than 7  Iron/TIBC/ferritin/B12/reticulocyte count ordered and pending  CBC in a.m.      Lupus (HCC)- (present on admission)  Assessment & Plan  States her lupus is purely neurological in nature and has never manifested with rash, oral ulcers, malar rash, arthralgias, pleuritis, lupus nephritis  Follow-up outpatient PCP/rheumatologist  Continue Plaquenil 400 mg p.o. daily, follow-up outpatient ophthalmologist yearly      VTE prophylaxis: enoxaparin ppx

## 2022-02-15 NOTE — ED NOTES
Pt ambulatory to BR to void by self.  Linens and gown changed, teeth brushed.  Pt tolerated well.

## 2022-02-15 NOTE — ED PROVIDER NOTES
ED Provider Note    Scribed for Grzegorz Garzon M.D. by Magali Layton. 2/14/2022,  11:03 PM.    Means of Arrival: Walk-in  History obtained from: Patient  History limited by: None    CHIEF COMPLAINT  Chief Complaint   Patient presents with   • Sent by MD Dr. Dyson   • Abnormal Labs     ca+ 11.3, hgb 8.5, wbc 4.8   • Weakness   • N/V     denies blood in emesis   • Unsteady Gait     ambulatory with cane       HPI  Magali Leal is a 56 y.o. female who presents to the Emergency Department with constant nausea and bilateral leg cramping onset one week ago. 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 She states she was diagnosed with an UTI after her last round of treatment on 1/13, so she was treated with antibiotics. She reports that the nausea and leg cramping started on 2/7 and then she began vomiting on 2/11. Patient was treated with Zofran, but it did not provide any alleviation. She also endorses lightheadedness, dizziness, abdominal cramping, shortness of breath, weakness, brain fogginess, fatigue, and leg tremors that have left the patient bed-ridden. She denies any fever, cough, chest pain, leg swelling, dysuria, or bladder discomfort. She states that Dr. Dyson advised the patient to visit the ED due to her abnormal calcium levels. She adds that her sense of taste has changed and now all foods taste very sweet. She notes that she experienced temporary losses of taste after chemo treatments, but has not experienced this symptom before.     REVIEW OF SYSTEMS  CONSTITUTIONAL:  No fever. Change in taste. Fatigue.   CARDIOVASCULAR:  No chest discomfort.  RESPIRATORY:  Shortness of breath. No cough.   GASTROINTESTINAL:  Nausea, vomiting, abdominal cramping.   GENITOURINARY:   No dysuria or bladder discomfort.  MUSCULOSKELETAL:  Leg cramping and tremors. No leg swelling.   NEUROLOGIC:   Lightheadedness, dizziness, brain fogginess, weakness.   See HPI for  further details.   All other systems are negative.     PAST MEDICAL HISTORY  Past Medical History:   Diagnosis Date   • Anemia    • Anxiety    • Arthritis     osteo    • Cancer (HCC) 07/2021    uterine   • Fibromyalgia    • Fibromyalgia    • Fibromyalgia    • Lupus (HCC)    • Pilonidal cyst    • Psychiatric problem     Anxiety, auto immune disorder    • Raynaud disease        FAMILY HISTORY  Family History   Problem Relation Age of Onset   • Genetic Disorder Mother    • Genetic Disorder Father    • Arthritis Maternal Grandmother    • Hypertension Maternal Grandmother    • Hyperlipidemia Maternal Grandmother    • Parkinson's Disease Maternal Grandmother    • Hyperlipidemia Maternal Grandfather        SOCIAL HISTORY   reports that she has never smoked. She has never used smokeless tobacco. She reports previous alcohol use. She reports that she does not use drugs.    SURGICAL HISTORY  Past Surgical History:   Procedure Laterality Date   • RI PELVIC EXAMINATION W ANESTH  12/10/2021    Procedure: EXAM UNDER ANESTHESIA, PELVIS;  Surgeon: Ray Dyson M.D.;  Location: SURGERY [facility];  Service: Gynecology Oncology   • CATH PLACEMENT Right 10/14/2021    Procedure: INSERTION, CATHETER - MEDIPORT;  Surgeon: Ray Dyson M.D.;  Location: SURGERY [facility];  Service: Gynecology Oncology   • OTHER  07/2020    pilonidal cyst    • PILONIDAL CYST EXCISION  1/23/2020    Procedure: INCISION AND DRAINAGE OF PILONIDAL CYST;  Surgeon: Michael Ceron M.D.;  Location: [facility];  Service: General   • OTHER ORTHOPEDIC SURGERY      urmila  hip replacement       CURRENT MEDICATIONS  Home Medications     Reviewed by Marva Birmingham R.N. (Registered Nurse) on 02/14/22 at 2145  Med List Status: Partial   Medication Last Dose Status   amLODIPine (NORVASC) 2.5 MG Tab  Active   Azelastine-Fluticasone 137-50 MCG/ACT Suspension  Active   baclofen (LIORESAL) 10 MG Tab  Active   BLACK COHOSH EXTRACT PO  Active   Cholecalciferol  "(VITAMIN D3) Liquid  Active   Cyanocobalamin (VITAMIN B12) 3000 MCG/ML Liquid  Active   dexamethasone (DECADRON) 4 MG Tab  Active   eszopiclone (LUNESTA) 1 MG Tab tablet  Active   hydroxychloroquine (PLAQUENIL) 200 MG Tab  Active   Loratadine (CLARITIN PO)  Active   meloxicam (MOBIC) 7.5 MG Tab  Active   mirtazapine (REMERON) 15 MG Tab  Active   NON SPECIFIED  Active   ondansetron (ZOFRAN) 4 MG Tab tablet  Active   oxycodone (OXY-IR) 15 MG immediate release tablet  Active   propranolol (INDERAL) 40 MG Tab  Active   ROPINIRole (REQUIP) 1 MG Tab  Active                ALLERGIES  Allergies   Allergen Reactions   • Bactrim Ds Rash and Itching     \"Itchy rash\"   • Ciprofloxacin Rash     States \"something always goes wrong\" Dysthesia   • Morphine Unspecified     Family history of becoming violent \"Paranoia, aggression\"   • Tramadol Vomiting and Nausea   • Lavender Oil Swelling       PHYSICAL EXAM  VITAL SIGNS: /84   Pulse (!) 119   Temp 37.8 °C (100.1 °F) (Temporal)   Resp 18   Ht 1.676 m (5' 6\")   Wt 103 kg (226 lb 10.1 oz)   SpO2 94%   BMI 36.58 kg/m²    Gen: Alert, no acute distress  HEENT: ATNC  Eyes: PERRL, EOMI, normal conjunctiva.   Neck: trachea midline, spontaneously ranges neck.  Resp: no respiratory distress, clear to auscultation bilaterally, no wheezes, rales, or crackles  CV: No JVD, regular rate and rhythm, no murmurs, gallops, or rubs  Abd: non-distended, soft, nontender  Ext: No deformities, no calf tenderness, no pedal edema  Psych: normal mood  Neuro: speech fluent, moves all extremities.  GCS 15.  No focal neurologic deficits.  Cranial nerves II through XII grossly intact.  No clonus or hyperreflexia.      DIAGNOSTIC STUDIES / PROCEDURES     EKG  Results for orders placed or performed during the hospital encounter of 02/14/22   EKG   Result Value Ref Range    Report       [facility]    Test Date:  2022-02-15  Pt Name:    REGAN WOODS              " Department: ER  MRN:        [id number]                      Room:       RD 02  Gender:     Female                       Technician: [id number]  :        1965                   Requested By:BOBBI MENSAH  Order #:    [id number]                    Reading MD: Grzegorz Garzon    Measurements  Intervals                                Axis  Rate:       93                           P:          33  RI:         152                          QRS:        48  QRSD:       100                          T:          -15  QT:         364  QTc:        453    Interpretive Statements  SINUS RHYTHM  BORDERLINE T ABNORMALITIES, INFERIOR LEADS  Compared to ECG 2021 14:59:04  T-wave abnormality now present  Electronically Signed On 2- 5:12:31 PST by Grzegorz Garzon          LABS  Labs Reviewed   CBC WITH DIFFERENTIAL - Abnormal; Notable for the following components:       Result Value    RBC 2.46 (*)     Hemoglobin 7.9 (*)     Hematocrit 25.6 (*)     .1 (*)     MCHC 30.9 (*)     RDW 69.0 (*)     All other components within normal limits   COMP METABOLIC PANEL - Abnormal; Notable for the following components:    Potassium 2.9 (*)     Glucose 118 (*)     Calcium 10.9 (*)     All other components within normal limits   IONIZED CALCIUM - Abnormal; Notable for the following components:    Ionized Calcium 1.4 (*)     All other components within normal limits   D-DIMER - Abnormal; Notable for the following components:    D-Dimer Screen 2.19 (*)     All other components within normal limits   URINALYSIS - Abnormal; Notable for the following components:    Character Cloudy (*)     Leukocyte Esterase Moderate (*)     Occult Blood Small (*)     All other components within normal limits   TROPONIN - Abnormal; Notable for the following components:    Troponin T 50 (*)     All other components within normal limits   MAGNESIUM - Abnormal; Notable for the following components:    Magnesium 1.4 (*)     All other components within normal limits    ESTIMATED GFR - Abnormal; Notable for the following components:    GFR If Non  54 (*)     All other components within normal limits   URINE MICROSCOPIC (W/UA) - Abnormal; Notable for the following components:    WBC Packed (*)     RBC 2-5 (*)     All other components within normal limits   RETICULOCYTES COUNT - Abnormal; Notable for the following components:    Reticulocyte Count 4.6 (*)     Retic, Absolute 0.11 (*)     Imm. Reticulocyte Fraction 33.3 (*)     Retic Hgb Equivalent 28.0 (*)     All other components within normal limits   IRON/TOTAL IRON BIND   MAGNESIUM   TROPONIN   PROCALCITONIN   LIPID PROFILE   HEMOGLOBIN A1C   URINE CULTURE-EXISTING-LESS THAN 48 HOURS   FERRITIN   VITAMIN B12   URINE CULTURE(NEW)     All labs reviewed by me.    RADIOLOGY  CT-CTA CHEST PULMONARY ARTERY W/ RECONS   Final Result      1.  Suboptimal contrast bolus timing. No large or central pulmonary embolism.   2.  Small right pleural effusion with adjacent atelectasis.   3.  New 5 mm left upper lobe and new 7 mm right middle lobe pulmonary nodules may represent focal infection/inflammation or new pulmonary metastatic disease from 2/3/2022 exam.         DX-CHEST-PORTABLE (1 VIEW)   Final Result      1.  No acute cardiac or pulmonary abnormalities are identified.      EC-ECHOCARDIOGRAM COMPLETE W/O CONT    (Results Pending)     The radiologist’s interpretation of all radiology studies have been reviewed by me.    COURSE & MEDICAL DECISION MAKING  Pertinent Labs & Imaging studies reviewed. (See chart for details)    11:03 PM Patient seen and examined at bedside. Patient will be treated with magnesium sulfate 2 g and Kdur 40mEq for her symptoms.    12:35 AM - Patient was reevaluated at bedside. Discussed lab results with the patient and informed them that their potassium and magnesium levels were low and their lab test was positive, so she will need a CT chest scan to rule out a possible blood clot in her lung.      1:32 AM - Patient was treated with aspiring 324 mg.     2:42 AM - Patient was reevaluated at bedside. Discussed CT results with the patient and informed them that she will need to be hospitalized. She was given the opportunity to ask questions and verbalizes agreement to the plan of care.     3:04 AM - I discussed the patient's case and the above findings with Dr. Arvizu (Hospitalist) who agrees to hospitalize the patient for further evaluation and treatment.     HYDRATION: Based on the patient's presentation of Tachycardia and Other Hypercalcemia the patient was given IV fluids. IV Hydration was used because oral hydration was not adequate alone. Upon recheck following hydration, the patient was improved.      PPE Note: I verified that the patient was wearing a mask and I was wearing appropriate PPE every time I entered the room. The patient's mask was on the patient at all times during my encounter except for a brief view of the oropharynx.     Scribe remained outside the patient's room and did not have any contact with the patient for the duration of patient encounter.     Medical Decision Making:  Patient with cancer recent undergoing chemotherapy presents with multiple complaints.  Regarding the patient's shortness of breath, chest x-ray negative for pneumonia.  Lung auscultation is clear.  She has no obvious stigmata of DVT/PE, will obtain D-dimer.  D-dimer is markedly elevated, will proceed with CT scan.  Of portion, CT scan nondiagnostic for small PEs but no evidence of large PE.  The patient does have mildly elevated troponin of unclear etiology.  We will plan for hospitalization for further evaluation of this.  Nonspecific EKG changes.  The patient also has mild hypercalcemia, which was treated with IV fluids.  She also has mild hypokalemia and hypomagnesemia, which were repleted.  Patient does have anemia but does not meet threshold for transfusion.  She was notified of the presence of the pulmonary  nodules and advised to follow-up with her oncologist.    DISPOSITION:  Patient will be hospitalized by Dr. Arvizu in guarded condition.      FINAL IMPRESSION  1. Weakness    2. Elevated troponin    3. Pyuria    4. Hypercalcemia    5. Hypokalemia    6. Hypomagnesemia    7. Anemia, unspecified type    8. Pulmonary nodule            Magali CUI (Scribe), am scribing for, and in the presence of, Grzegorz Garzon M.D..    Electronically signed by: Magali Layton (Bereniceibelizabeth), 2/14/2022    IGrzegorz M.D. personally performed the services described in this documentation, as scribed by Magali Layton in my presence, and it is both accurate and complete. C    The note accurately reflects work and decisions made by me.  Grzegorz Garzon M.D.  2/15/2022  5:14 AM    This dictation was created using voice recognition software. The accuracy of the dictation is limited to the abilities of the software. I expect there may be some errors of grammar and possibly content. The nursing notes were reviewed and certain aspects of this information were incorporated into this note.

## 2022-02-15 NOTE — ED NOTES
Med rec completed per pt   Allergies reviewed    Pt completed a 5 day course of Macrobid on 2/10/2022

## 2022-02-15 NOTE — PROGRESS NOTES
Hospital Medicine Daily Progress Note    Date of Service  2/15/2022    Chief Complaint  Magali Leal is a 56 y.o. female admitted 2/14/2022 with abnormal labs    Hospital Course  A 56-year-old woman with h/o fibromyalgia, lupus, endometrial cancer (finished chemo on Jan 16/22) was sent from primary care physician for abnormal labs of calcium 11.3, hemoglobin 8.5 and leukopenia 4.8. Patient states that she has had poor oral intake for the past few days and multiple episodes of nausea vomiting without hematemesis or diarrhea, melena or hematochezia.  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 Wife states that patient will be undergoing surgical removal within the upcoming week. Patient states that she recently stopped Macrobid antibiotic for urinary tract infection.   CTA chest revealed suboptimal contrast bolus timing without no large or central pulmonary embolism, small right pleural effusion with adjacent atelectasis and new 5 mm left upper lobe and new 7 mm right middle lobe pulmonary nodules may represent focal infection/inflammatory changes or new pulmonary metastatic disease.    Vitals at time of presentation were as follows: 100.1, 119, 18, 137/84, 94% room air.  Chest x-ray reveals no acute cardiopulmonary disease processes.    Labs reveal hypercalcemia 10.9, hypokalemia 2.9, hypomagnesemia 1.4, D-dimer 2.14, ionized calcium 1.4.  CBC reveals moderate to severe macrocytic anemia with hemoglobin 7.9, hematocrit 25.6 and MCV of 104.1 otherwise no neutrophilia, monocytosis, immature granulocytosis, leukocytosis or leukopenia indicating infection however patient had last chemotherapy 1 month ago and may still be immunosuppressed.  Troponin T elevated at 50, twelve-lead EKG ordered and pending.  Urinalysis reveals pyuria without bacteria and mildly concentrated.     Interval Problem Update  Patient reports nausea, pain  throughout all body.  Afebrile, hemodynamically stable. On room air.  Magnesium 1.8. CPM unremarkable.    I have personally seen and examined the patient at bedside. I discussed the plan of care with patient, family and bedside RN.    Consultants/Specialty  None    Code Status  Full Code    Disposition  Patient is not medically cleared for discharge.   Anticipate discharge to to home with close outpatient follow-up.  I have placed the appropriate orders for post-discharge needs.    Review of Systems  Review of Systems   Constitutional: Positive for malaise/fatigue. Negative for chills and fever.   HENT: Negative.    Eyes: Negative.    Respiratory: Negative for cough and shortness of breath.    Cardiovascular: Negative for chest pain and leg swelling.   Gastrointestinal: Positive for nausea. Negative for abdominal pain, diarrhea and vomiting.   Genitourinary: Negative for dysuria.   Musculoskeletal: Positive for myalgias.   Skin: Negative for rash.   Neurological: Positive for weakness.        Physical Exam  Temp:  [37.8 °C (100.1 °F)] 37.8 °C (100.1 °F)  Pulse:  [] 82  Resp:  [16-20] 16  BP: (115-163)/(65-97) 149/66  SpO2:  [91 %-96 %] 94 %    Physical Exam  Vitals and nursing note reviewed.   Constitutional:       Appearance: She is obese. She is ill-appearing.   HENT:      Head: Normocephalic and atraumatic.      Comments: Total alopecia post chemotherapy     Nose: Nose normal.      Mouth/Throat:      Mouth: Mucous membranes are moist.      Pharynx: Oropharynx is clear.   Eyes:      Pupils: Pupils are equal, round, and reactive to light.   Cardiovascular:      Rate and Rhythm: Normal rate and regular rhythm.   Pulmonary:      Effort: Pulmonary effort is normal. No respiratory distress.      Breath sounds: No wheezing or rales.   Abdominal:      General: Abdomen is flat. Bowel sounds are normal. There is no distension.      Palpations: There is mass (lower abdomen).      Tenderness: There is no abdominal  tenderness.   Musculoskeletal:         General: Normal range of motion.      Cervical back: Normal range of motion.   Skin:     General: Skin is warm.   Neurological:      General: No focal deficit present.      Mental Status: She is alert and oriented to person, place, and time.         Fluids  No intake or output data in the 24 hours ending 02/15/22 1235    Laboratory  Recent Labs     02/14/22  1448 02/14/22  2322 02/15/22  1005   WBC 4.8 4.8 4.1*   RBC 2.62* 2.46* 2.36*   HEMOGLOBIN 8.5* 7.9* 7.4*   HEMATOCRIT 26.9* 25.6* 24.4*   .7* 104.1* 103.4*   MCH 32.4 32.1 31.4   MCHC 31.6* 30.9* 30.3*   RDW 69.5* 69.0* 69.8*   PLATELETCT 351 328 302   MPV 10.1 9.8 9.5     Recent Labs     02/14/22  1448 02/14/22  2322 02/15/22  1005   SODIUM 138 137 137   POTASSIUM 3.6 2.9* 3.7   CHLORIDE 100 100 103   CO2 23 23 23   GLUCOSE 95 118* 98   BUN 12 14 12   CREATININE 0.92 1.06 0.85   CALCIUM 11.3* 10.9* 10.2             Recent Labs     02/15/22  0443   TRIGLYCERIDE 110   HDL 58   LDL 83       Imaging  EC-ECHOCARDIOGRAM COMPLETE W/O CONT   Final Result      CT-CTA CHEST PULMONARY ARTERY W/ RECONS   Final Result      1.  Suboptimal contrast bolus timing. No large or central pulmonary embolism.   2.  Small right pleural effusion with adjacent atelectasis.   3.  New 5 mm left upper lobe and new 7 mm right middle lobe pulmonary nodules may represent focal infection/inflammation or new pulmonary metastatic disease from 2/3/2022 exam.         DX-CHEST-PORTABLE (1 VIEW)   Final Result      1.  No acute cardiac or pulmonary abnormalities are identified.           Assessment/Plan  * Hypercalcemia  Assessment & Plan  Ca on admission 11.3.  Continue IVF  Monitor     Class 2 obesity due to excess calories without serious comorbidity with body mass index (BMI) of 36.0 to 36.9 in adult- (present on admission)  Assessment & Plan  Body mass index is 36.58 kg/m².  Outpatient weight loss counseling    Recurrent urinary tract infection-  (present on admission)  Assessment & Plan  Urinalysis equivocal, no bacteria seen however pyuria and recently stopped antibiotics.  1 dose Rocephin given in ED and we will check procalcitonin/urine culture to determine if continuation of IV antibiotics at this time.    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  Analgesics for pain control    Shortness of breath- (present on admission)  Assessment & Plan  CTA negative for PE  Echocardiogram ordered and pending  Chest x-ray as above  No oxygen requirement  D-dimer positive with negative CTA for large vessel PE, consider VQ scan inpatient versus outpatient as timing of contrast not optimal during examination    Hypokalemia- (present on admission)  Assessment & Plan  Magnesium transfusion prior to potassium administration  40 mEq K-Dur given in ED and we will give again at 7 AM  CMP in a.m.  Likely secondary nausea vomiting and poor oral intake  Antiemetics ordered for nausea vomiting    Hypomagnesemia- (present on admission)  Assessment & Plan  Likely secondary to poor p.o. intake/nausea vomiting  2 g magnesium sulfate given in ED and we will recheck magnesium levels in a.m.    Anemia associated with chemotherapy- (present on admission)  Assessment & Plan  Transfuse hemoglobin less than 7  Iron/TIBC/ferritin/B12/reticulocyte count ordered and pending  Monitor    Lupus (HCC)- (present on admission)  Assessment & Plan  States her lupus is purely neurological in nature and has never manifested with rash, oral ulcers, malar rash, arthralgias, pleuritis, lupus nephritis  Follow-up outpatient PCP/rheumatologist  Continue Plaquenil 400 mg p.o. daily, follow-up outpatient ophthalmologist yearly       VTE  prophylaxis: enoxaparin ppx    I have performed a physical exam and reviewed and updated ROS and Plan today (2/15/2022). In review of yesterday's note (2/14/2022), there are no changes except as documented above.

## 2022-02-15 NOTE — ED NOTES
Pt ambulatory with cane to red 2. She is noted to become mildly dyspneic while walking. Pt changed into gown and attached to monitor. Chart up for ERP.

## 2022-02-16 VITALS
HEART RATE: 107 BPM | WEIGHT: 242.95 LBS | OXYGEN SATURATION: 97 % | SYSTOLIC BLOOD PRESSURE: 131 MMHG | RESPIRATION RATE: 18 BRPM | DIASTOLIC BLOOD PRESSURE: 83 MMHG | HEIGHT: 66 IN | TEMPERATURE: 97.2 F | BODY MASS INDEX: 39.04 KG/M2

## 2022-02-16 LAB
ALBUMIN SERPL BCP-MCNC: 3.2 G/DL (ref 3.2–4.9)
ALBUMIN/GLOB SERPL: 1.1 G/DL
ALP SERPL-CCNC: 72 U/L (ref 30–99)
ALT SERPL-CCNC: 6 U/L (ref 2–50)
ANION GAP SERPL CALC-SCNC: 12 MMOL/L (ref 7–16)
AST SERPL-CCNC: 22 U/L (ref 12–45)
BASOPHILS # BLD AUTO: 0.3 % (ref 0–1.8)
BASOPHILS # BLD: 0.01 K/UL (ref 0–0.12)
BILIRUB SERPL-MCNC: 0.2 MG/DL (ref 0.1–1.5)
BUN SERPL-MCNC: 12 MG/DL (ref 8–22)
CALCIUM SERPL-MCNC: 10.3 MG/DL (ref 8.5–10.5)
CHLORIDE SERPL-SCNC: 106 MMOL/L (ref 96–112)
CO2 SERPL-SCNC: 21 MMOL/L (ref 20–33)
CREAT SERPL-MCNC: 0.93 MG/DL (ref 0.5–1.4)
EOSINOPHIL # BLD AUTO: 0.03 K/UL (ref 0–0.51)
EOSINOPHIL NFR BLD: 0.9 % (ref 0–6.9)
ERYTHROCYTE [DISTWIDTH] IN BLOOD BY AUTOMATED COUNT: 69.4 FL (ref 35.9–50)
GLOBULIN SER CALC-MCNC: 2.9 G/DL (ref 1.9–3.5)
GLUCOSE SERPL-MCNC: 91 MG/DL (ref 65–99)
HCT VFR BLD AUTO: 24.2 % (ref 37–47)
HGB BLD-MCNC: 7.5 G/DL (ref 12–16)
IMM GRANULOCYTES # BLD AUTO: 0.02 K/UL (ref 0–0.11)
IMM GRANULOCYTES NFR BLD AUTO: 0.6 % (ref 0–0.9)
LYMPHOCYTES # BLD AUTO: 1.29 K/UL (ref 1–4.8)
LYMPHOCYTES NFR BLD: 36.6 % (ref 22–41)
MCH RBC QN AUTO: 31.9 PG (ref 27–33)
MCHC RBC AUTO-ENTMCNC: 31 G/DL (ref 33.6–35)
MCV RBC AUTO: 103 FL (ref 81.4–97.8)
MONOCYTES # BLD AUTO: 0.49 K/UL (ref 0–0.85)
MONOCYTES NFR BLD AUTO: 13.9 % (ref 0–13.4)
NEUTROPHILS # BLD AUTO: 1.68 K/UL (ref 2–7.15)
NEUTROPHILS NFR BLD: 47.7 % (ref 44–72)
NRBC # BLD AUTO: 0 K/UL
NRBC BLD-RTO: 0 /100 WBC
PLATELET # BLD AUTO: 313 K/UL (ref 164–446)
PMV BLD AUTO: 9.3 FL (ref 9–12.9)
POTASSIUM SERPL-SCNC: 3.8 MMOL/L (ref 3.6–5.5)
PROT SERPL-MCNC: 6.1 G/DL (ref 6–8.2)
RBC # BLD AUTO: 2.35 M/UL (ref 4.2–5.4)
SODIUM SERPL-SCNC: 139 MMOL/L (ref 135–145)
WBC # BLD AUTO: 3.5 K/UL (ref 4.8–10.8)

## 2022-02-16 PROCEDURE — 96372 THER/PROPH/DIAG INJ SC/IM: CPT

## 2022-02-16 PROCEDURE — A9270 NON-COVERED ITEM OR SERVICE: HCPCS | Performed by: STUDENT IN AN ORGANIZED HEALTH CARE EDUCATION/TRAINING PROGRAM

## 2022-02-16 PROCEDURE — 99217 PR OBSERVATION CARE DISCHARGE: CPT | Performed by: STUDENT IN AN ORGANIZED HEALTH CARE EDUCATION/TRAINING PROGRAM

## 2022-02-16 PROCEDURE — 80053 COMPREHEN METABOLIC PANEL: CPT

## 2022-02-16 PROCEDURE — G0378 HOSPITAL OBSERVATION PER HR: HCPCS

## 2022-02-16 PROCEDURE — 700102 HCHG RX REV CODE 250 W/ 637 OVERRIDE(OP): Performed by: STUDENT IN AN ORGANIZED HEALTH CARE EDUCATION/TRAINING PROGRAM

## 2022-02-16 PROCEDURE — 85025 COMPLETE CBC W/AUTO DIFF WBC: CPT

## 2022-02-16 PROCEDURE — 700111 HCHG RX REV CODE 636 W/ 250 OVERRIDE (IP): Performed by: STUDENT IN AN ORGANIZED HEALTH CARE EDUCATION/TRAINING PROGRAM

## 2022-02-16 RX ADMIN — DOCUSATE SODIUM 50 MG AND SENNOSIDES 8.6 MG 2 TABLET: 8.6; 5 TABLET, FILM COATED ORAL at 05:03

## 2022-02-16 RX ADMIN — OXYCODONE HYDROCHLORIDE 15 MG: 10 TABLET ORAL at 12:41

## 2022-02-16 RX ADMIN — OXYCODONE HYDROCHLORIDE 15 MG: 10 TABLET ORAL at 00:27

## 2022-02-16 RX ADMIN — OXYCODONE HYDROCHLORIDE 15 MG: 10 TABLET ORAL at 08:34

## 2022-02-16 RX ADMIN — OXYCODONE HYDROCHLORIDE 15 MG: 10 TABLET ORAL at 05:03

## 2022-02-16 RX ADMIN — BACLOFEN 10 MG: 10 TABLET ORAL at 07:41

## 2022-02-16 RX ADMIN — HYDROXYCHLOROQUINE SULFATE 400 MG: 200 TABLET ORAL at 07:41

## 2022-02-16 RX ADMIN — ENOXAPARIN SODIUM 40 MG: 40 INJECTION SUBCUTANEOUS at 05:05

## 2022-02-16 ASSESSMENT — PAIN DESCRIPTION - PAIN TYPE
TYPE: CHRONIC PAIN;ACUTE PAIN

## 2022-02-16 NOTE — CARE PLAN
The patient is Watcher - Medium risk of patient condition declining or worsening    Shift Goals  Clinical Goals: Monitor electrolytes and hemoglobin  Patient Goals: Go home, rest, comfort  Family Goals: None present at this time    Progress made toward(s) clinical / shift goals:    Problem: Knowledge Deficit - Standard  Goal: Patient and family/care givers will demonstrate understanding of plan of care, disease process/condition, diagnostic tests and medications  Outcome: Progressing  Pt educated on plan of care and oriented to room and unit. A&Ox4, verbalizes good understanding. Pt has appropriate judgement and safety awareness.     Patient is not progressing towards the following goals:  Pt's electrolytes have improved; pending tomorrow AM labs for further direction of plan of care and discharge planning.

## 2022-02-16 NOTE — PROGRESS NOTES
Received Bedside report. Assumed care at 1900. This pt is AOx4, ambulatory with no assistance required, 10/10 pain. Patient and RN discussed plan of care: questions answered. Labs noted, assessment complete, patient tolerating regular diet. Tele box in place. Pt is on room air. Call light in place, fall precautions in place, patient educated on importance of calling for assistance. No additional needs at this time. VSS

## 2022-02-16 NOTE — CARE PLAN
Problem: Pain - Standard  Goal: Alleviation of pain or a reduction in pain to the patient’s comfort goal  Outcome: Progressing  Flowsheets  Taken 2/16/2022 0340  OB Pain Intervention:   Medication - See MAR   Distraction  Taken 2/16/2022 0027  Pain Rating Scale (NPRS): 9  Note: Patient's pain is being controlled through distraction, rest, and pharmacological interventions     Problem: Knowledge Deficit - Standard  Goal: Patient and family/care givers will demonstrate understanding of plan of care, disease process/condition, diagnostic tests and medications  Outcome: Progressing   The patient is Watcher - Medium risk of patient condition declining or worsening    Shift Goals  Clinical Goals: Pain control, rest, monitor labs  Patient Goals: Rest, comfort, pain control  Family Goals: No family present    Progress made toward(s) clinical / shift goals:  Patient's pain is being controlled through distraction, rest, and pharmacological interventions       Patient is not progressing towards the following goals: N/A

## 2022-02-16 NOTE — DISCHARGE SUMMARY
Discharge Summary    CHIEF COMPLAINT ON ADMISSION  Chief Complaint   Patient presents with   • Sent by MD Dr. Dyson   • Abnormal Labs     ca+ 11.3, hgb 8.5, wbc 4.8   • Weakness   • N/V     denies blood in emesis   • Unsteady Gait     ambulatory with cane       Reason for Admission  Sent by MD; Abnormal Labs     Admission Date  2/14/2022    CODE STATUS  Full Code    HPI & HOSPITAL COURSE  This is a 56 y.o. female here with abnormal labs, weakness, nausea. Patient with history of endometrial cancer s/p chemotherapy, fibromyalgia, lupus, who was directed to ER for abnormal labs of calcium 11.3, hemoglobin 8.5 and leukopenia 4.8. Patient found to have hypercalcemia, treated with IV fluids with normalization of calcium levels. Nausea controlled with medications. Patient feeling well, tolerating meals well. Patient without signs of bleeding, CBC stable. She also was found to have hypokalemia and hypomagnesemia which improved with oral and IV supplementation. CTA chest performed to evaluate dyspnea, which was negative for pulmonary embolism. CT did show 5 mm left upper lobe and 7 mm right middle lobe lung nodules. Findings discussed with patient who is advised to follow up with her oncologist and PCP to follow these findings. Echocardiogram performed which was normal. Patient feeling well at time of discharge, vital signs stable.    Therefore, she is discharged in good and stable condition to home with close outpatient follow-up.    Discharge Date  2/16/2022    FOLLOW UP ITEMS POST DISCHARGE  Take medications as prescribed.  Follow up with oncology and PCP.    DISCHARGE DIAGNOSES  Principal Problem:    Hypercalcemia POA: Unknown  Active Problems:    Lupus (HCC) POA: Yes    Anemia associated with chemotherapy POA: Yes    Hypomagnesemia POA: Yes    Hypokalemia POA: Yes    Shortness of breath POA: Yes    Endometrial sarcoma (HCC) POA: Yes    Recurrent urinary tract infection POA: Yes    Class 2 obesity due to excess  calories without serious comorbidity with body mass index (BMI) of 36.0 to 36.9 in adult POA: Yes  Resolved Problems:    Electrolyte abnormality POA: Yes      FOLLOW UP  Future Appointments   Date Time Provider Department Center   2/18/2022  3:45 PM [facility] None     No follow-up provider specified.    MEDICATIONS ON DISCHARGE     Medication List      CONTINUE taking these medications      Instructions   amLODIPine 2.5 MG Tabs  Commonly known as: NORVASC   Take 2.5 mg by mouth every evening.  Dose: 2.5 mg     Azelastine HCl 137 MCG/SPRAY Soln   Administer 1 Spray into affected nostril(S) in the morning, at noon, and at bedtime.  Dose: 1 Spray     baclofen 10 MG Tabs  Commonly known as: LIORESAL   Take 10 mg by mouth 3 times a day.  Dose: 10 mg     BLACK COHOSH EXTRACT PO   Take 2 Tablets by mouth every day.  Dose: 2 Tablet     eszopiclone 1 MG Tabs tablet  Commonly known as: LUNESTA   Take 1 mg by mouth at bedtime as needed for Insomnia.  Dose: 1 mg     hydroxychloroquine 200 MG Tabs  Commonly known as: Plaquenil   Take 400 mg by mouth every day.  Dose: 400 mg     meloxicam 15 MG tablet  Commonly known as: MOBIC   Take 7.5 mg by mouth 2 times a day.  Dose: 7.5 mg     mirtazapine 15 MG Tabs  Commonly known as: Remeron   Take 15 mg by mouth every evening.  Dose: 15 mg     NON SPECIFIED   Take 1 Tablet by mouth every day. Red Russ  Dose: 1 Tablet     ondansetron 4 MG Tabs tablet  Commonly known as: ZOFRAN   Take 4 mg by mouth every 6 hours as needed for Nausea/Vomiting.  Dose: 4 mg     oxycodone 15 MG immediate release tablet  Commonly known as: OXY-IR   Take 15 mg by mouth every four hours as needed for Severe Pain.  Dose: 15 mg     propranolol 40 MG Tabs  Commonly known as: INDERAL   Take 20-40 mg by mouth 1 time a day as needed. Indications: Feeling Anxious  Dose: 20-40 mg     ROPINIRole 1 MG Tabs  Commonly known as: REQUIP   Take 1 mg by mouth every evening.  Dose: 1 mg     Vitamin B12 3000 MCG/ML Liqd    "Place 1 mL under the tongue every day.  Dose: 1 mL     Vitamin D3 Liqd   Place 1 mL under the tongue every day.  Dose: 1 mL        STOP taking these medications    nitrofurantoin 100 MG Caps  Commonly known as: MACROBID            Allergies  Allergies   Allergen Reactions   • Bactrim Ds Rash and Itching     \"Itchy rash\"   • Ciprofloxacin Rash     States \"something always goes wrong\" Dysthesia   • Morphine Unspecified     Family history of becoming violent \"Paranoia, aggression\"   • Tramadol Vomiting and Nausea   • Lavender Oil Swelling       DIET  Orders Placed This Encounter   Procedures   • Diet Order Diet: Regular     Standing Status:   Standing     Number of Occurrences:   1     Order Specific Question:   Diet:     Answer:   Regular [1]       ACTIVITY  As tolerated.  Weight bearing as tolerated    CONSULTATIONS  none    PROCEDURES  none    LABORATORY  Lab Results   Component Value Date    SODIUM 139 02/16/2022    POTASSIUM 3.8 02/16/2022    CHLORIDE 106 02/16/2022    CO2 21 02/16/2022    GLUCOSE 91 02/16/2022    BUN 12 02/16/2022    CREATININE 0.93 02/16/2022        Lab Results   Component Value Date    WBC 3.5 (L) 02/16/2022    HEMOGLOBIN 7.5 (L) 02/16/2022    HEMATOCRIT 24.2 (L) 02/16/2022    PLATELETCT 313 02/16/2022        Total time of the discharge process exceeds 33 minutes.  "

## 2022-02-16 NOTE — DISCHARGE PLANNING
Anticipated Discharge Disposition: Home with close outpatient follow-up     Action: pt with active discharge order, pt currently on 0 liters O2, 6 clicks are 24/24. Orientation: A&Ox4.      Barriers to Discharge: none noted     Plan: f/u with pt and medical team to discuss dc needs and barriers.

## 2022-02-16 NOTE — PROGRESS NOTES
Assumed care of pt. Pt A&Ox4, on RA, tele box applied. No complaints of pain at this time. Will continue to monitor.

## 2022-02-16 NOTE — PROGRESS NOTES
Pt. Discharged home with friend. Escorted in wheelchair. Verbalizes understanding of discharge instructions.

## 2022-02-16 NOTE — DISCHARGE INSTRUCTIONS
Discharge Instructions    Discharged to home by car with relative. Discharged via wheelchair, hospital escort: Yes.  Special equipment needed: Not Applicable    Be sure to schedule a follow-up appointment with your primary care doctor or any specialists as instructed.     Discharge Plan:   Influenza Vaccine Indication: Patient Refuses    I understand that a diet low in cholesterol, fat, and sodium is recommended for good health. Unless I have been given specific instructions below for another diet, I accept this instruction as my diet prescription.       Special Instructions: None    · Is patient discharged on Warfarin / Coumadin?   No     Depression / Suicide Risk    As you are discharged from this Critical access hospital facility, it is important to learn how to keep safe from harming yourself.    Recognize the warning signs:  · Abrupt changes in personality, positive or negative- including increase in energy   · Giving away possessions  · Change in eating patterns- significant weight changes-  positive or negative  · Change in sleeping patterns- unable to sleep or sleeping all the time   · Unwillingness or inability to communicate  · Depression  · Unusual sadness, discouragement and loneliness  · Talk of wanting to die  · Neglect of personal appearance   · Rebelliousness- reckless behavior  · Withdrawal from people/activities they love  · Confusion- inability to concentrate     If you or a loved one observes any of these behaviors or has concerns about self-harm, here's what you can do:  · Talk about it- your feelings and reasons for harming yourself  · Remove any means that you might use to hurt yourself (examples: pills, rope, extension cords, firearm)  · Get professional help from the community (Mental Health, Substance Abuse, psychological counseling)  · Do not be alone:Call your Safe Contact- someone whom you trust who will be there for you.  · Call your local CRISIS HOTLINE 935-7722 or 228-941-4855  · Call your local  Children's Mobile Crisis Response Team [place] (222) 585-5711 or www.[employer]  · Call the toll free National Suicide Prevention Hotlines   · National Suicide Prevention Lifeline 911-648-WWQY (5333)  · National Hope Line Network 800-SUICIDE (808-6054)

## 2022-02-16 NOTE — PROGRESS NOTES
4 Eyes Skin Assessment Completed by FABIOLA Underwood and FABIOLA Dominguez.    Head WDL  Ears WDL  Nose WDL  Mouth WDL  Neck WDL  Breast/Chest WDL  Shoulder Blades WDL  Spine WDL  (R) Arm/Elbow/Hand WDL  (L) Arm/Elbow/Hand WDL  Abdomen WDL  Groin WDL  Scrotum/Coccyx/Buttocks WDL  (R) Leg Edema  (L) Leg Edema  (R) Heel/Foot/Toe WDL  (L) Heel/Foot/Toe WDL, dry, callous          Devices In Places Tele Box and Pulse Ox      Interventions In Place Pillows and Pressure Redistribution Mattress    Possible Skin Injury No    Pictures Uploaded Into Epic N/A  Wound Consult Placed N/A  RN Wound Prevention Protocol Ordered No

## 2022-02-17 LAB
BACTERIA UR CULT: NORMAL
SIGNIFICANT IND 70042: NORMAL
SITE SITE: NORMAL
SOURCE SOURCE: NORMAL

## 2022-02-18 ENCOUNTER — PRE-ADMISSION TESTING (OUTPATIENT)
Dept: ADMISSIONS | Facility: MEDICAL CENTER | Age: 57
End: 2022-02-18
Attending: SPECIALIST
Payer: COMMERCIAL

## 2022-02-18 ENCOUNTER — HOSPITAL ENCOUNTER (OUTPATIENT)
Dept: RADIOLOGY | Facility: MEDICAL CENTER | Age: 57
End: 2022-02-18
Attending: SPECIALIST | Admitting: SPECIALIST
Payer: COMMERCIAL

## 2022-02-18 VITALS — WEIGHT: 224.43 LBS | BODY MASS INDEX: 35.22 KG/M2 | HEIGHT: 67 IN

## 2022-02-18 DIAGNOSIS — Z01.811 PRE-OPERATIVE RESPIRATORY EXAMINATION: ICD-10-CM

## 2022-02-18 DIAGNOSIS — Z01.812 PRE-OPERATIVE LABORATORY EXAMINATION: ICD-10-CM

## 2022-02-18 LAB
ABO GROUP BLD: NORMAL
ALBUMIN SERPL BCP-MCNC: 4.3 G/DL (ref 3.2–4.9)
ALBUMIN/GLOB SERPL: 1.3 G/DL
ALP SERPL-CCNC: 98 U/L (ref 30–99)
ALT SERPL-CCNC: 12 U/L (ref 2–50)
ANION GAP SERPL CALC-SCNC: 16 MMOL/L (ref 7–16)
ANISOCYTOSIS BLD QL SMEAR: ABNORMAL
APTT PPP: 29.7 SEC (ref 24.7–36)
AST SERPL-CCNC: 30 U/L (ref 12–45)
BASOPHILS # BLD AUTO: 0.9 % (ref 0–1.8)
BASOPHILS # BLD: 0.05 K/UL (ref 0–0.12)
BILIRUB SERPL-MCNC: 0.3 MG/DL (ref 0.1–1.5)
BLD GP AB SCN SERPL QL: NORMAL
BUN SERPL-MCNC: 9 MG/DL (ref 8–22)
CALCIUM SERPL-MCNC: 11.8 MG/DL (ref 8.5–10.5)
CANCER AG125 SERPL-ACNC: 52.8 U/ML (ref 0–35)
CHLORIDE SERPL-SCNC: 101 MMOL/L (ref 96–112)
CO2 SERPL-SCNC: 21 MMOL/L (ref 20–33)
CREAT SERPL-MCNC: 0.96 MG/DL (ref 0.5–1.4)
DACRYOCYTES BLD QL SMEAR: NORMAL
EOSINOPHIL # BLD AUTO: 0 K/UL (ref 0–0.51)
EOSINOPHIL NFR BLD: 0 % (ref 0–6.9)
ERYTHROCYTE [DISTWIDTH] IN BLOOD BY AUTOMATED COUNT: 65.9 FL (ref 35.9–50)
GLOBULIN SER CALC-MCNC: 3.4 G/DL (ref 1.9–3.5)
GLUCOSE SERPL-MCNC: 142 MG/DL (ref 65–99)
HCT VFR BLD AUTO: 29.2 % (ref 37–47)
HGB BLD-MCNC: 9 G/DL (ref 12–16)
INR PPP: 1.17 (ref 0.87–1.13)
LYMPHOCYTES # BLD AUTO: 1.64 K/UL (ref 1–4.8)
LYMPHOCYTES NFR BLD: 31.6 % (ref 22–41)
MACROCYTES BLD QL SMEAR: ABNORMAL
MANUAL DIFF BLD: NORMAL
MCH RBC QN AUTO: 31 PG (ref 27–33)
MCHC RBC AUTO-ENTMCNC: 30.8 G/DL (ref 33.6–35)
MCV RBC AUTO: 100.7 FL (ref 81.4–97.8)
METAMYELOCYTES NFR BLD MANUAL: 0.9 %
MICROCYTES BLD QL SMEAR: ABNORMAL
MONOCYTES # BLD AUTO: 0.41 K/UL (ref 0–0.85)
MONOCYTES NFR BLD AUTO: 7.9 % (ref 0–13.4)
MORPHOLOGY BLD-IMP: NORMAL
NEUTROPHILS # BLD AUTO: 3.05 K/UL (ref 2–7.15)
NEUTROPHILS NFR BLD: 58.7 % (ref 44–72)
NRBC # BLD AUTO: 0.02 K/UL
NRBC BLD-RTO: 0.4 /100 WBC
PLATELET # BLD AUTO: 587 K/UL (ref 164–446)
PLATELET BLD QL SMEAR: NORMAL
PMV BLD AUTO: 9.3 FL (ref 9–12.9)
POIKILOCYTOSIS BLD QL SMEAR: NORMAL
POLYCHROMASIA BLD QL SMEAR: NORMAL
POTASSIUM SERPL-SCNC: 3.6 MMOL/L (ref 3.6–5.5)
PROT SERPL-MCNC: 7.7 G/DL (ref 6–8.2)
PROTHROMBIN TIME: 14.5 SEC (ref 12–14.6)
RBC # BLD AUTO: 2.9 M/UL (ref 4.2–5.4)
RBC BLD AUTO: PRESENT
RH BLD: NORMAL
SODIUM SERPL-SCNC: 138 MMOL/L (ref 135–145)
WBC # BLD AUTO: 5.2 K/UL (ref 4.8–10.8)

## 2022-02-18 PROCEDURE — U0005 INFEC AGEN DETEC AMPLI PROBE: HCPCS

## 2022-02-18 PROCEDURE — 80053 COMPREHEN METABOLIC PANEL: CPT

## 2022-02-18 PROCEDURE — 36415 COLL VENOUS BLD VENIPUNCTURE: CPT

## 2022-02-18 PROCEDURE — U0003 INFECTIOUS AGENT DETECTION BY NUCLEIC ACID (DNA OR RNA); SEVERE ACUTE RESPIRATORY SYNDROME CORONAVIRUS 2 (SARS-COV-2) (CORONAVIRUS DISEASE [COVID-19]), AMPLIFIED PROBE TECHNIQUE, MAKING USE OF HIGH THROUGHPUT TECHNOLOGIES AS DESCRIBED BY CMS-2020-01-R: HCPCS

## 2022-02-18 PROCEDURE — C9803 HOPD COVID-19 SPEC COLLECT: HCPCS

## 2022-02-18 PROCEDURE — 86304 IMMUNOASSAY TUMOR CA 125: CPT

## 2022-02-18 PROCEDURE — 85007 BL SMEAR W/DIFF WBC COUNT: CPT

## 2022-02-18 PROCEDURE — 85027 COMPLETE CBC AUTOMATED: CPT

## 2022-02-18 PROCEDURE — 86900 BLOOD TYPING SEROLOGIC ABO: CPT

## 2022-02-18 PROCEDURE — 86901 BLOOD TYPING SEROLOGIC RH(D): CPT

## 2022-02-18 PROCEDURE — 85730 THROMBOPLASTIN TIME PARTIAL: CPT

## 2022-02-18 PROCEDURE — 86850 RBC ANTIBODY SCREEN: CPT

## 2022-02-18 PROCEDURE — 85610 PROTHROMBIN TIME: CPT

## 2022-02-18 PROCEDURE — 71045 X-RAY EXAM CHEST 1 VIEW: CPT

## 2022-02-18 RX ORDER — PROCHLORPERAZINE MALEATE 10 MG
10 TABLET ORAL EVERY 6 HOURS PRN
COMMUNITY
Start: 2022-02-14

## 2022-02-18 ASSESSMENT — FIBROSIS 4 INDEX: FIB4 SCORE: 1.61

## 2022-02-19 LAB
SARS-COV-2 RNA RESP QL NAA+PROBE: NOTDETECTED
SPECIMEN SOURCE: NORMAL

## 2022-02-23 ENCOUNTER — HOSPITAL ENCOUNTER (INPATIENT)
Facility: MEDICAL CENTER | Age: 57
LOS: 28 days | DRG: 740 | End: 2022-03-24
Attending: EMERGENCY MEDICINE | Admitting: SPECIALIST
Payer: COMMERCIAL

## 2022-02-23 ENCOUNTER — APPOINTMENT (OUTPATIENT)
Dept: RADIOLOGY | Facility: MEDICAL CENTER | Age: 57
DRG: 740 | End: 2022-02-23
Attending: EMERGENCY MEDICINE
Payer: COMMERCIAL

## 2022-02-23 DIAGNOSIS — M32.9 LUPUS (HCC): ICD-10-CM

## 2022-02-23 DIAGNOSIS — C54.1 ENDOMETRIAL SARCOMA (HCC): ICD-10-CM

## 2022-02-23 DIAGNOSIS — E87.6 HYPOKALEMIA: ICD-10-CM

## 2022-02-23 DIAGNOSIS — R11.2 NON-INTRACTABLE VOMITING WITH NAUSEA, UNSPECIFIED VOMITING TYPE: ICD-10-CM

## 2022-02-23 DIAGNOSIS — E83.52 HYPERCALCEMIA: ICD-10-CM

## 2022-02-23 DIAGNOSIS — S82.892D CLOSED FRACTURE OF LEFT ANKLE WITH ROUTINE HEALING, SUBSEQUENT ENCOUNTER: ICD-10-CM

## 2022-02-23 LAB
ALBUMIN SERPL BCP-MCNC: 4.4 G/DL (ref 3.2–4.9)
ALBUMIN/GLOB SERPL: 1.2 G/DL
ALP SERPL-CCNC: 96 U/L (ref 30–99)
ALT SERPL-CCNC: 14 U/L (ref 2–50)
ANION GAP SERPL CALC-SCNC: 18 MMOL/L (ref 7–16)
AST SERPL-CCNC: 43 U/L (ref 12–45)
BASOPHILS # BLD AUTO: 0.5 % (ref 0–1.8)
BASOPHILS # BLD: 0.03 K/UL (ref 0–0.12)
BILIRUB SERPL-MCNC: 0.3 MG/DL (ref 0.1–1.5)
BUN SERPL-MCNC: 13 MG/DL (ref 8–22)
CA-I SERPL-SCNC: 1.8 MMOL/L (ref 1.1–1.3)
CALCIUM SERPL-MCNC: 14.2 MG/DL (ref 8.5–10.5)
CHLORIDE SERPL-SCNC: 95 MMOL/L (ref 96–112)
CO2 SERPL-SCNC: 24 MMOL/L (ref 20–33)
CREAT SERPL-MCNC: 1.1 MG/DL (ref 0.5–1.4)
EKG IMPRESSION: NORMAL
EOSINOPHIL # BLD AUTO: 0 K/UL (ref 0–0.51)
EOSINOPHIL NFR BLD: 0 % (ref 0–6.9)
ERYTHROCYTE [DISTWIDTH] IN BLOOD BY AUTOMATED COUNT: 64.1 FL (ref 35.9–50)
FLUAV RNA SPEC QL NAA+PROBE: NEGATIVE
FLUBV RNA SPEC QL NAA+PROBE: NEGATIVE
GLOBULIN SER CALC-MCNC: 3.6 G/DL (ref 1.9–3.5)
GLUCOSE SERPL-MCNC: 91 MG/DL (ref 65–99)
HCT VFR BLD AUTO: 31.3 % (ref 37–47)
HGB BLD-MCNC: 9.7 G/DL (ref 12–16)
IMM GRANULOCYTES # BLD AUTO: 0.08 K/UL (ref 0–0.11)
IMM GRANULOCYTES NFR BLD AUTO: 1.3 % (ref 0–0.9)
LYMPHOCYTES # BLD AUTO: 1.8 K/UL (ref 1–4.8)
LYMPHOCYTES NFR BLD: 28.8 % (ref 22–41)
MAGNESIUM SERPL-MCNC: 1.5 MG/DL (ref 1.5–2.5)
MCH RBC QN AUTO: 30.7 PG (ref 27–33)
MCHC RBC AUTO-ENTMCNC: 31 G/DL (ref 33.6–35)
MCV RBC AUTO: 99.1 FL (ref 81.4–97.8)
MONOCYTES # BLD AUTO: 0.52 K/UL (ref 0–0.85)
MONOCYTES NFR BLD AUTO: 8.3 % (ref 0–13.4)
NEUTROPHILS # BLD AUTO: 3.82 K/UL (ref 2–7.15)
NEUTROPHILS NFR BLD: 61.1 % (ref 44–72)
NRBC # BLD AUTO: 0.03 K/UL
NRBC BLD-RTO: 0.5 /100 WBC
PLATELET # BLD AUTO: 684 K/UL (ref 164–446)
PMV BLD AUTO: 9.3 FL (ref 9–12.9)
POTASSIUM SERPL-SCNC: 3.2 MMOL/L (ref 3.6–5.5)
PROT SERPL-MCNC: 8 G/DL (ref 6–8.2)
RBC # BLD AUTO: 3.16 M/UL (ref 4.2–5.4)
RSV RNA SPEC QL NAA+PROBE: NEGATIVE
SARS-COV-2 RNA RESP QL NAA+PROBE: NOTDETECTED
SODIUM SERPL-SCNC: 137 MMOL/L (ref 135–145)
SPECIMEN SOURCE: NORMAL
WBC # BLD AUTO: 6.3 K/UL (ref 4.8–10.8)

## 2022-02-23 PROCEDURE — 96375 TX/PRO/DX INJ NEW DRUG ADDON: CPT

## 2022-02-23 PROCEDURE — 700111 HCHG RX REV CODE 636 W/ 250 OVERRIDE (IP): Performed by: EMERGENCY MEDICINE

## 2022-02-23 PROCEDURE — 0241U HCHG SARS-COV-2 COVID-19 NFCT DS RESP RNA 4 TRGT MIC: CPT

## 2022-02-23 PROCEDURE — 700105 HCHG RX REV CODE 258: Performed by: EMERGENCY MEDICINE

## 2022-02-23 PROCEDURE — 85025 COMPLETE CBC W/AUTO DIFF WBC: CPT

## 2022-02-23 PROCEDURE — 94760 N-INVAS EAR/PLS OXIMETRY 1: CPT

## 2022-02-23 PROCEDURE — 82330 ASSAY OF CALCIUM: CPT

## 2022-02-23 PROCEDURE — 71045 X-RAY EXAM CHEST 1 VIEW: CPT

## 2022-02-23 PROCEDURE — 83735 ASSAY OF MAGNESIUM: CPT

## 2022-02-23 PROCEDURE — 93005 ELECTROCARDIOGRAM TRACING: CPT | Performed by: EMERGENCY MEDICINE

## 2022-02-23 PROCEDURE — 36415 COLL VENOUS BLD VENIPUNCTURE: CPT

## 2022-02-23 PROCEDURE — C9803 HOPD COVID-19 SPEC COLLECT: HCPCS | Performed by: EMERGENCY MEDICINE

## 2022-02-23 PROCEDURE — 99285 EMERGENCY DEPT VISIT HI MDM: CPT

## 2022-02-23 PROCEDURE — 96376 TX/PRO/DX INJ SAME DRUG ADON: CPT

## 2022-02-23 PROCEDURE — 700111 HCHG RX REV CODE 636 W/ 250 OVERRIDE (IP)

## 2022-02-23 PROCEDURE — 80053 COMPREHEN METABOLIC PANEL: CPT

## 2022-02-23 RX ORDER — SODIUM CHLORIDE 9 MG/ML
1000 INJECTION, SOLUTION INTRAVENOUS ONCE
Status: COMPLETED | OUTPATIENT
Start: 2022-02-23 | End: 2022-02-24

## 2022-02-23 RX ORDER — POTASSIUM CHLORIDE 20 MEQ/1
40 TABLET, EXTENDED RELEASE ORAL ONCE
Status: DISCONTINUED | OUTPATIENT
Start: 2022-02-23 | End: 2022-02-24

## 2022-02-23 RX ORDER — SODIUM CHLORIDE, SODIUM LACTATE, POTASSIUM CHLORIDE, CALCIUM CHLORIDE 600; 310; 30; 20 MG/100ML; MG/100ML; MG/100ML; MG/100ML
1000 INJECTION, SOLUTION INTRAVENOUS ONCE
Status: DISCONTINUED | OUTPATIENT
Start: 2022-02-23 | End: 2022-02-23

## 2022-02-23 RX ORDER — ONDANSETRON 2 MG/ML
4 INJECTION INTRAMUSCULAR; INTRAVENOUS ONCE
Status: COMPLETED | OUTPATIENT
Start: 2022-02-23 | End: 2022-02-23

## 2022-02-23 RX ORDER — SODIUM CHLORIDE, SODIUM LACTATE, POTASSIUM CHLORIDE, CALCIUM CHLORIDE 600; 310; 30; 20 MG/100ML; MG/100ML; MG/100ML; MG/100ML
1000 INJECTION, SOLUTION INTRAVENOUS ONCE
Status: COMPLETED | OUTPATIENT
Start: 2022-02-23 | End: 2022-02-23

## 2022-02-23 RX ORDER — HYDROMORPHONE HYDROCHLORIDE 1 MG/ML
1 INJECTION, SOLUTION INTRAMUSCULAR; INTRAVENOUS; SUBCUTANEOUS ONCE
Status: COMPLETED | OUTPATIENT
Start: 2022-02-23 | End: 2022-02-23

## 2022-02-23 RX ORDER — HYDROMORPHONE HYDROCHLORIDE 1 MG/ML
1 INJECTION, SOLUTION INTRAMUSCULAR; INTRAVENOUS; SUBCUTANEOUS ONCE
Status: COMPLETED | OUTPATIENT
Start: 2022-02-24 | End: 2022-02-23

## 2022-02-23 RX ADMIN — SODIUM CHLORIDE 1000 ML: 9 INJECTION, SOLUTION INTRAVENOUS at 22:43

## 2022-02-23 RX ADMIN — HYDROMORPHONE HYDROCHLORIDE 1 MG: 1 INJECTION, SOLUTION INTRAMUSCULAR; INTRAVENOUS; SUBCUTANEOUS at 23:57

## 2022-02-23 RX ADMIN — HYDROMORPHONE HYDROCHLORIDE 1 MG: 1 INJECTION, SOLUTION INTRAMUSCULAR; INTRAVENOUS; SUBCUTANEOUS at 21:55

## 2022-02-23 RX ADMIN — SODIUM CHLORIDE, POTASSIUM CHLORIDE, SODIUM LACTATE AND CALCIUM CHLORIDE 1000 ML: 600; 310; 30; 20 INJECTION, SOLUTION INTRAVENOUS at 21:50

## 2022-02-23 RX ADMIN — ONDANSETRON 4 MG: 2 INJECTION INTRAMUSCULAR; INTRAVENOUS at 21:55

## 2022-02-23 ASSESSMENT — FIBROSIS 4 INDEX: FIB4 SCORE: 0.83

## 2022-02-24 LAB
ALBUMIN SERPL BCP-MCNC: 3.8 G/DL (ref 3.2–4.9)
ALBUMIN SERPL BCP-MCNC: 3.9 G/DL (ref 3.2–4.9)
ALBUMIN/GLOB SERPL: 1.3 G/DL
ALBUMIN/GLOB SERPL: 1.4 G/DL
ALP SERPL-CCNC: 80 U/L (ref 30–99)
ALP SERPL-CCNC: 85 U/L (ref 30–99)
ALT SERPL-CCNC: 10 U/L (ref 2–50)
ALT SERPL-CCNC: 9 U/L (ref 2–50)
ANION GAP SERPL CALC-SCNC: 13 MMOL/L (ref 7–16)
ANION GAP SERPL CALC-SCNC: 16 MMOL/L (ref 7–16)
AST SERPL-CCNC: 23 U/L (ref 12–45)
AST SERPL-CCNC: 31 U/L (ref 12–45)
BILIRUB SERPL-MCNC: 0.2 MG/DL (ref 0.1–1.5)
BILIRUB SERPL-MCNC: 0.3 MG/DL (ref 0.1–1.5)
BUN SERPL-MCNC: 10 MG/DL (ref 8–22)
BUN SERPL-MCNC: 11 MG/DL (ref 8–22)
CA-I SERPL-SCNC: 1.8 MMOL/L (ref 1.1–1.3)
CALCIUM SERPL-MCNC: 12.7 MG/DL (ref 8.5–10.5)
CALCIUM SERPL-MCNC: 12.9 MG/DL (ref 8.5–10.5)
CHLORIDE SERPL-SCNC: 101 MMOL/L (ref 96–112)
CHLORIDE SERPL-SCNC: 103 MMOL/L (ref 96–112)
CO2 SERPL-SCNC: 19 MMOL/L (ref 20–33)
CO2 SERPL-SCNC: 22 MMOL/L (ref 20–33)
CREAT SERPL-MCNC: 0.78 MG/DL (ref 0.5–1.4)
CREAT SERPL-MCNC: 0.84 MG/DL (ref 0.5–1.4)
GLOBULIN SER CALC-MCNC: 2.8 G/DL (ref 1.9–3.5)
GLOBULIN SER CALC-MCNC: 2.9 G/DL (ref 1.9–3.5)
GLUCOSE SERPL-MCNC: 81 MG/DL (ref 65–99)
GLUCOSE SERPL-MCNC: 95 MG/DL (ref 65–99)
POTASSIUM SERPL-SCNC: 3.5 MMOL/L (ref 3.6–5.5)
POTASSIUM SERPL-SCNC: 4.2 MMOL/L (ref 3.6–5.5)
PROT SERPL-MCNC: 6.7 G/DL (ref 6–8.2)
PROT SERPL-MCNC: 6.7 G/DL (ref 6–8.2)
SODIUM SERPL-SCNC: 136 MMOL/L (ref 135–145)
SODIUM SERPL-SCNC: 138 MMOL/L (ref 135–145)

## 2022-02-24 PROCEDURE — 700111 HCHG RX REV CODE 636 W/ 250 OVERRIDE (IP)

## 2022-02-24 PROCEDURE — 96376 TX/PRO/DX INJ SAME DRUG ADON: CPT

## 2022-02-24 PROCEDURE — 700105 HCHG RX REV CODE 258

## 2022-02-24 PROCEDURE — 700111 HCHG RX REV CODE 636 W/ 250 OVERRIDE (IP): Performed by: EMERGENCY MEDICINE

## 2022-02-24 PROCEDURE — 96365 THER/PROPH/DIAG IV INF INIT: CPT

## 2022-02-24 PROCEDURE — A9270 NON-COVERED ITEM OR SERVICE: HCPCS

## 2022-02-24 PROCEDURE — 700105 HCHG RX REV CODE 258: Performed by: EMERGENCY MEDICINE

## 2022-02-24 PROCEDURE — 96366 THER/PROPH/DIAG IV INF ADDON: CPT

## 2022-02-24 PROCEDURE — 36415 COLL VENOUS BLD VENIPUNCTURE: CPT

## 2022-02-24 PROCEDURE — 80053 COMPREHEN METABOLIC PANEL: CPT

## 2022-02-24 PROCEDURE — 82330 ASSAY OF CALCIUM: CPT

## 2022-02-24 PROCEDURE — 700102 HCHG RX REV CODE 250 W/ 637 OVERRIDE(OP)

## 2022-02-24 PROCEDURE — 770004 HCHG ROOM/CARE - ONCOLOGY PRIVATE *

## 2022-02-24 PROCEDURE — 700101 HCHG RX REV CODE 250: Performed by: SPECIALIST

## 2022-02-24 RX ORDER — ONDANSETRON 2 MG/ML
4 INJECTION INTRAMUSCULAR; INTRAVENOUS EVERY 6 HOURS PRN
Status: DISCONTINUED | OUTPATIENT
Start: 2022-02-24 | End: 2022-03-21

## 2022-02-24 RX ORDER — HYDROXYCHLOROQUINE SULFATE 200 MG/1
400 TABLET, FILM COATED ORAL DAILY
Status: DISCONTINUED | OUTPATIENT
Start: 2022-02-24 | End: 2022-03-24 | Stop reason: HOSPADM

## 2022-02-24 RX ORDER — PROCHLORPERAZINE EDISYLATE 5 MG/ML
10 INJECTION INTRAMUSCULAR; INTRAVENOUS EVERY 4 HOURS PRN
Status: DISCONTINUED | OUTPATIENT
Start: 2022-02-24 | End: 2022-03-24 | Stop reason: HOSPADM

## 2022-02-24 RX ORDER — HYDROMORPHONE HYDROCHLORIDE 1 MG/ML
1 INJECTION, SOLUTION INTRAMUSCULAR; INTRAVENOUS; SUBCUTANEOUS
Status: COMPLETED | OUTPATIENT
Start: 2022-02-24 | End: 2022-02-24

## 2022-02-24 RX ORDER — DIPHENHYDRAMINE HCL 25 MG
25 TABLET ORAL EVERY 6 HOURS PRN
Status: DISCONTINUED | OUTPATIENT
Start: 2022-02-24 | End: 2022-03-24 | Stop reason: HOSPADM

## 2022-02-24 RX ORDER — SODIUM CHLORIDE 9 MG/ML
INJECTION, SOLUTION INTRAVENOUS CONTINUOUS
Status: DISCONTINUED | OUTPATIENT
Start: 2022-02-24 | End: 2022-02-24

## 2022-02-24 RX ORDER — BACLOFEN 10 MG/1
10 TABLET ORAL 3 TIMES DAILY PRN
Status: DISCONTINUED | OUTPATIENT
Start: 2022-02-24 | End: 2022-02-25

## 2022-02-24 RX ORDER — ACETAMINOPHEN 325 MG/1
650 TABLET ORAL
Status: COMPLETED | OUTPATIENT
Start: 2022-02-24 | End: 2022-02-28

## 2022-02-24 RX ORDER — ONDANSETRON 2 MG/ML
4 INJECTION INTRAMUSCULAR; INTRAVENOUS
Status: COMPLETED | OUTPATIENT
Start: 2022-02-24 | End: 2022-02-24

## 2022-02-24 RX ORDER — AMLODIPINE BESYLATE 2.5 MG/1
2.5 TABLET ORAL EVERY EVENING
Status: DISCONTINUED | OUTPATIENT
Start: 2022-02-24 | End: 2022-03-01

## 2022-02-24 RX ORDER — DIPHENHYDRAMINE HCL 25 MG
25 TABLET ORAL EVERY 6 HOURS PRN
Status: ON HOLD | COMMUNITY
End: 2022-03-28

## 2022-02-24 RX ORDER — SODIUM CHLORIDE 9 MG/ML
1000 INJECTION, SOLUTION INTRAVENOUS ONCE
Status: COMPLETED | OUTPATIENT
Start: 2022-02-24 | End: 2022-02-24

## 2022-02-24 RX ORDER — HYDROMORPHONE HYDROCHLORIDE 1 MG/ML
1 INJECTION, SOLUTION INTRAMUSCULAR; INTRAVENOUS; SUBCUTANEOUS
Status: DISCONTINUED | OUTPATIENT
Start: 2022-02-24 | End: 2022-02-25

## 2022-02-24 RX ORDER — POTASSIUM CHLORIDE 7.45 MG/ML
10 INJECTION INTRAVENOUS
Status: COMPLETED | OUTPATIENT
Start: 2022-02-24 | End: 2022-02-24

## 2022-02-24 RX ORDER — ROPINIROLE 1 MG/1
1 TABLET, FILM COATED ORAL 2 TIMES DAILY
Status: DISCONTINUED | OUTPATIENT
Start: 2022-02-24 | End: 2022-03-24 | Stop reason: HOSPADM

## 2022-02-24 RX ORDER — PAROXETINE HYDROCHLORIDE 20 MG/1
60 TABLET, FILM COATED ORAL DAILY
COMMUNITY

## 2022-02-24 RX ORDER — ONDANSETRON 4 MG/1
4 TABLET, ORALLY DISINTEGRATING ORAL EVERY 6 HOURS PRN
Status: DISCONTINUED | OUTPATIENT
Start: 2022-02-24 | End: 2022-02-24

## 2022-02-24 RX ORDER — BACLOFEN 10 MG/1
10 TABLET ORAL 3 TIMES DAILY
Status: DISCONTINUED | OUTPATIENT
Start: 2022-02-24 | End: 2022-02-24

## 2022-02-24 RX ORDER — HYDROMORPHONE HYDROCHLORIDE 4 MG/1
4 TABLET ORAL
Status: DISCONTINUED | OUTPATIENT
Start: 2022-02-24 | End: 2022-02-25

## 2022-02-24 RX ORDER — MIRTAZAPINE 15 MG/1
22.5 TABLET, FILM COATED ORAL
Status: DISCONTINUED | OUTPATIENT
Start: 2022-02-24 | End: 2022-03-24 | Stop reason: HOSPADM

## 2022-02-24 RX ORDER — PAROXETINE HYDROCHLORIDE 20 MG/1
60 TABLET, FILM COATED ORAL DAILY
Status: DISCONTINUED | OUTPATIENT
Start: 2022-02-24 | End: 2022-03-24 | Stop reason: HOSPADM

## 2022-02-24 RX ORDER — SODIUM CHLORIDE AND POTASSIUM CHLORIDE 150; 450 MG/100ML; MG/100ML
INJECTION, SOLUTION INTRAVENOUS CONTINUOUS
Status: DISCONTINUED | OUTPATIENT
Start: 2022-02-24 | End: 2022-02-24

## 2022-02-24 RX ORDER — AZELASTINE HYDROCHLORIDE 137 UG/1
1 SPRAY, METERED NASAL 3 TIMES DAILY
Status: DISCONTINUED | OUTPATIENT
Start: 2022-02-24 | End: 2022-02-24

## 2022-02-24 RX ORDER — HYDROMORPHONE HYDROCHLORIDE 1 MG/ML
1 INJECTION, SOLUTION INTRAMUSCULAR; INTRAVENOUS; SUBCUTANEOUS
Status: DISCONTINUED | OUTPATIENT
Start: 2022-02-24 | End: 2022-02-24

## 2022-02-24 RX ADMIN — HYDROMORPHONE HYDROCHLORIDE 1 MG: 1 INJECTION, SOLUTION INTRAMUSCULAR; INTRAVENOUS; SUBCUTANEOUS at 06:40

## 2022-02-24 RX ADMIN — HYDROMORPHONE HYDROCHLORIDE 1 MG: 1 INJECTION, SOLUTION INTRAMUSCULAR; INTRAVENOUS; SUBCUTANEOUS at 17:42

## 2022-02-24 RX ADMIN — AMLODIPINE BESYLATE 2.5 MG: 2.5 TABLET ORAL at 17:42

## 2022-02-24 RX ADMIN — HYDROMORPHONE HYDROCHLORIDE 1 MG: 1 INJECTION, SOLUTION INTRAMUSCULAR; INTRAVENOUS; SUBCUTANEOUS at 15:15

## 2022-02-24 RX ADMIN — POTASSIUM CHLORIDE 10 MEQ: 7.46 INJECTION, SOLUTION INTRAVENOUS at 02:24

## 2022-02-24 RX ADMIN — POTASSIUM CHLORIDE 10 MEQ: 7.46 INJECTION, SOLUTION INTRAVENOUS at 03:27

## 2022-02-24 RX ADMIN — SODIUM CHLORIDE: 9 INJECTION, SOLUTION INTRAVENOUS at 02:31

## 2022-02-24 RX ADMIN — HYDROMORPHONE HYDROCHLORIDE 1 MG: 1 INJECTION, SOLUTION INTRAMUSCULAR; INTRAVENOUS; SUBCUTANEOUS at 09:03

## 2022-02-24 RX ADMIN — ONDANSETRON 4 MG: 2 INJECTION INTRAMUSCULAR; INTRAVENOUS at 07:58

## 2022-02-24 RX ADMIN — SODIUM CHLORIDE 1000 ML: 9 INJECTION, SOLUTION INTRAVENOUS at 12:17

## 2022-02-24 RX ADMIN — MIRTAZAPINE 22.5 MG: 15 TABLET, FILM COATED ORAL at 20:58

## 2022-02-24 RX ADMIN — ONDANSETRON 4 MG: 2 INJECTION INTRAMUSCULAR; INTRAVENOUS at 13:23

## 2022-02-24 RX ADMIN — HYDROMORPHONE HYDROCHLORIDE 1 MG: 1 INJECTION, SOLUTION INTRAMUSCULAR; INTRAVENOUS; SUBCUTANEOUS at 11:08

## 2022-02-24 RX ADMIN — HYDROMORPHONE HYDROCHLORIDE 1 MG: 1 INJECTION, SOLUTION INTRAMUSCULAR; INTRAVENOUS; SUBCUTANEOUS at 03:27

## 2022-02-24 RX ADMIN — POTASSIUM CHLORIDE 10 MEQ: 7.46 INJECTION, SOLUTION INTRAVENOUS at 16:43

## 2022-02-24 RX ADMIN — HYDROMORPHONE HYDROCHLORIDE 1 MG: 1 INJECTION, SOLUTION INTRAMUSCULAR; INTRAVENOUS; SUBCUTANEOUS at 19:31

## 2022-02-24 RX ADMIN — HYDROMORPHONE HYDROCHLORIDE 1 MG: 1 INJECTION, SOLUTION INTRAMUSCULAR; INTRAVENOUS; SUBCUTANEOUS at 00:42

## 2022-02-24 RX ADMIN — BACLOFEN 10 MG: 10 TABLET ORAL at 19:31

## 2022-02-24 RX ADMIN — HYDROMORPHONE HYDROCHLORIDE 1 MG: 1 INJECTION, SOLUTION INTRAMUSCULAR; INTRAVENOUS; SUBCUTANEOUS at 12:17

## 2022-02-24 RX ADMIN — POTASSIUM CHLORIDE 10 MEQ: 7.46 INJECTION, SOLUTION INTRAVENOUS at 01:00

## 2022-02-24 RX ADMIN — SODIUM CHLORIDE: 9 INJECTION, SOLUTION INTRAVENOUS at 06:40

## 2022-02-24 RX ADMIN — POTASSIUM CHLORIDE 10 MEQ: 7.46 INJECTION, SOLUTION INTRAVENOUS at 04:53

## 2022-02-24 RX ADMIN — PROCHLORPERAZINE EDISYLATE 10 MG: 5 INJECTION INTRAMUSCULAR; INTRAVENOUS at 16:49

## 2022-02-24 RX ADMIN — HYDROMORPHONE HYDROCHLORIDE 1 MG: 1 INJECTION, SOLUTION INTRAMUSCULAR; INTRAVENOUS; SUBCUTANEOUS at 07:58

## 2022-02-24 RX ADMIN — POTASSIUM CHLORIDE 10 MEQ: 7.46 INJECTION, SOLUTION INTRAVENOUS at 15:37

## 2022-02-24 RX ADMIN — HYDROMORPHONE HYDROCHLORIDE 1 MG: 1 INJECTION, SOLUTION INTRAMUSCULAR; INTRAVENOUS; SUBCUTANEOUS at 10:03

## 2022-02-24 RX ADMIN — HYDROMORPHONE HYDROCHLORIDE 4 MG: 4 TABLET ORAL at 21:00

## 2022-02-24 RX ADMIN — POTASSIUM CHLORIDE AND SODIUM CHLORIDE: 450; 150 INJECTION, SOLUTION INTRAVENOUS at 10:11

## 2022-02-24 RX ADMIN — PAROXETINE 60 MG: 30 TABLET, FILM COATED ORAL at 10:07

## 2022-02-24 RX ADMIN — POTASSIUM CHLORIDE 10 MEQ: 7.46 INJECTION, SOLUTION INTRAVENOUS at 14:36

## 2022-02-24 RX ADMIN — POTASSIUM CHLORIDE 10 MEQ: 7.46 INJECTION, SOLUTION INTRAVENOUS at 13:25

## 2022-02-24 RX ADMIN — HYDROMORPHONE HYDROCHLORIDE 1 MG: 1 INJECTION, SOLUTION INTRAMUSCULAR; INTRAVENOUS; SUBCUTANEOUS at 05:01

## 2022-02-24 RX ADMIN — HYDROMORPHONE HYDROCHLORIDE 1 MG: 1 INJECTION, SOLUTION INTRAMUSCULAR; INTRAVENOUS; SUBCUTANEOUS at 02:33

## 2022-02-24 RX ADMIN — DIPHENHYDRAMINE HYDROCHLORIDE 25 MG: 25 TABLET ORAL at 20:58

## 2022-02-24 RX ADMIN — HYDROMORPHONE HYDROCHLORIDE 4 MG: 4 TABLET ORAL at 23:57

## 2022-02-24 RX ADMIN — HYDROMORPHONE HYDROCHLORIDE 1 MG: 1 INJECTION, SOLUTION INTRAMUSCULAR; INTRAVENOUS; SUBCUTANEOUS at 13:23

## 2022-02-24 ASSESSMENT — COGNITIVE AND FUNCTIONAL STATUS - GENERAL
DAILY ACTIVITIY SCORE: 24
MOBILITY SCORE: 24
SUGGESTED CMS G CODE MODIFIER MOBILITY: CH
SUGGESTED CMS G CODE MODIFIER DAILY ACTIVITY: CH

## 2022-02-24 ASSESSMENT — LIFESTYLE VARIABLES
TOTAL SCORE: 0
EVER FELT BAD OR GUILTY ABOUT YOUR DRINKING: NO
ALCOHOL_USE: NO
AVERAGE NUMBER OF DAYS PER WEEK YOU HAVE A DRINK CONTAINING ALCOHOL: 0
EVER HAD A DRINK FIRST THING IN THE MORNING TO STEADY YOUR NERVES TO GET RID OF A HANGOVER: NO
HOW MANY TIMES IN THE PAST YEAR HAVE YOU HAD 5 OR MORE DRINKS IN A DAY: 0
DOES PATIENT WANT TO STOP DRINKING: NO
ON A TYPICAL DAY WHEN YOU DRINK ALCOHOL HOW MANY DRINKS DO YOU HAVE: 0
TOTAL SCORE: 0
HAVE PEOPLE ANNOYED YOU BY CRITICIZING YOUR DRINKING: NO
CONSUMPTION TOTAL: NEGATIVE
TOTAL SCORE: 0
HAVE YOU EVER FELT YOU SHOULD CUT DOWN ON YOUR DRINKING: NO

## 2022-02-24 ASSESSMENT — PAIN DESCRIPTION - PAIN TYPE: TYPE: ACUTE PAIN

## 2022-02-24 NOTE — ED PROVIDER NOTES
ED Provider Note    Scribed for You Carroll M.D. by Sedrick Jade. 2/23/2022, 9:25 PM.    Primary care provider: Ty Bethea M.D.  Means of arrival: Walk in  History obtained from: Patient  History limited by: None    CHIEF COMPLAINT  Chief Complaint   Patient presents with    Sent by MD     Sent here by oncologist due to abnormal lab work. Hx uterine CA. Currently going through chemo, last on 1/13. States was supposed to have hysterectomy tomorrow but has not been feeling well the last few days    N/V     Worsening the last few days     HPI  Magali Leal is a 56 y.o. female who presents to the Emergency Department for evaluation of worsening vomiting onset 3 days ago. The patient is followed by Dr. Dyson (Oncology) and she was prompted to visit the ED today due by his recommendation due to abnormal labs. .The patient is no longer on chemotherapy since 2/13/2022. The patient is currently scheduled for surgery tomorrow 2/23/2022. Associated symptoms include intermittent nausea, abodminal pain, hot flashes, and cold flashes. The patient denies any dyspnea on exertion, shortness of breath, hematemsis, diarrhea, sore throat, cough, muscle spasms. The patient has been medicating with a rotation of Zofran and Compazine every three hours with minimal alleviation. The patient states that she has a history of back pain, but last night was suddenly worse to the point that it kept her awake. The patient denies any family history of congestive heart failure.The patient has a previous diagnosis of fibromyalgia.The patient notes that she has received a COVID-19 vaccine, but has yet to receive a booster.     REVIEW OF SYSTEMS  Pertinent positives include vomiting, intermittent nausea, abodminal pain, hot flashes, and cold flashes . Pertinent negatives include dyspnea on exertion, shortness of breath, hematemsis, diarrhea, sore throat, cough, muscle spasms, or abdominal pain. All other systems negative.    PAST  MEDICAL HISTORY   has a past medical history of Anemia, Anxiety, Arthritis, Cancer (HCC) (07/2021), Fibromyalgia, Fibromyalgia, Fibromyalgia, Lupus (HCC), Pilonidal cyst, Psychiatric problem, and Raynaud disease.    SURGICAL HISTORY   has a past surgical history that includes pilonidal cyst excision (1/23/2020); other orthopedic surgery; cath placement (Right, 10/14/2021); other (07/2020); and pelvic examination w anesth (12/10/2021).    SOCIAL HISTORY  Social History     Tobacco Use    Smoking status: Never Smoker    Smokeless tobacco: Never Used   Vaping Use    Vaping Use: Never used   Substance Use Topics    Alcohol use: Not Currently    Drug use: No      Social History     Substance and Sexual Activity   Drug Use No       FAMILY HISTORY  Family History   Problem Relation Age of Onset    Genetic Disorder Mother     Genetic Disorder Father     Arthritis Maternal Grandmother     Hypertension Maternal Grandmother     Hyperlipidemia Maternal Grandmother     Parkinson's Disease Maternal Grandmother     Hyperlipidemia Maternal Grandfather        CURRENT MEDICATIONS  Home Medications       Reviewed by Faina Hdz (Pharmacy Tech) on 02/24/22 at 0031  Med List Status: Complete     Medication Last Dose Status   amLODIPine (NORVASC) 2.5 MG Tab 2/22/2022 Active   Azelastine HCl 137 MCG/SPRAY Solution 2/23/2022 Active   baclofen (LIORESAL) 10 MG Tab 2/23/2022 Active   BLACK COHOSH EXTRACT PO >2 weeks Active   Cholecalciferol (VITAMIN D3) Liquid >2 weeks Active   Cyanocobalamin (VITAMIN B12) 3000 MCG/ML Liquid >2 weeks Active   diphenhydrAMINE (BENADRYL) 25 MG Tab 2/22/2022 Active   Eszopiclone 3 MG Tab 2/22/2022 Active   hydroxychloroquine (PLAQUENIL) 200 MG Tab 2/22/2022 Active   meloxicam (MOBIC) 15 MG tablet >2 weeks Active   mirtazapine (REMERON) 15 MG Tab 2/22/2022 Active   ondansetron (ZOFRAN) 4 MG Tab tablet PRN Active   oxycodone (OXY-IR) 15 MG immediate release tablet 2/23/2022 Active   paroxetine (PAXIL)  "40 MG tablet 2/22/2022 Active   prochlorperazine (COMPAZINE) 10 MG Tab 2/23/2022 Active   propranolol (INDERAL) 40 MG Tab PRN Active   ROPINIRole (REQUIP) 1 MG Tab 2/22/2022 Active                    ALLERGIES  Allergies   Allergen Reactions    Bactrim Ds Rash and Itching     \"Itchy rash\"    Ciprofloxacin Rash     States \"something always goes wrong\" Dysthesia    Morphine Unspecified     Family history of becoming violent \"Paranoia, aggression\"    Tramadol Vomiting and Nausea    Lavender Oil Swelling       PHYSICAL EXAM  VITAL SIGNS: BP (!) 167/85   Pulse (!) 106   Temp 36.7 °C (98 °F) (Temporal)   Resp (!) 22   Ht 1.702 m (5' 7\")   Wt 99.1 kg (218 lb 7.6 oz)   SpO2 94%   BMI 34.22 kg/m²     Constitutional: Well developed, Well nourished, mild distress.   HENT: Normocephalic, Atraumatic, mask in place.  Eyes: Conjunctiva normal, No discharge.   Neck: Supple, No stridor, no vertebral point tenderness   Cardiovascular: Normal heart rate, Normal rhythm, No murmurs, equal pulses.   Pulmonary: Normal breath sounds, No respiratory distress, No wheezing, No rales, No rhonchi.  Chest: No chest wall tenderness or deformity.   Abdomen:Soft, No tenderness, no rebound, no guarding. Palpable uterus in the lower abdomen    Back: No CVA tenderness.   Musculoskeletal: No major deformities noted, No tenderness.   Skin: Warm, Dry, No erythema, No rash.   Neurologic: Alert & oriented x 3, Normal motor function,  No focal deficits noted.   Psychiatric: Affect normal, Judgment normal, Mood normal.     LABS  Results for orders placed or performed during the hospital encounter of 02/23/22   CBC WITH DIFFERENTIAL   Result Value Ref Range    WBC 6.3 4.8 - 10.8 K/uL    RBC 3.16 (L) 4.20 - 5.40 M/uL    Hemoglobin 9.7 (L) 12.0 - 16.0 g/dL    Hematocrit 31.3 (L) 37.0 - 47.0 %    MCV 99.1 (H) 81.4 - 97.8 fL    MCH 30.7 27.0 - 33.0 pg    MCHC 31.0 (L) 33.6 - 35.0 g/dL    RDW 64.1 (H) 35.9 - 50.0 fL    Platelet Count 684 (H) 164 - 446 K/uL "    MPV 9.3 9.0 - 12.9 fL    Neutrophils-Polys 61.10 44.00 - 72.00 %    Lymphocytes 28.80 22.00 - 41.00 %    Monocytes 8.30 0.00 - 13.40 %    Eosinophils 0.00 0.00 - 6.90 %    Basophils 0.50 0.00 - 1.80 %    Immature Granulocytes 1.30 (H) 0.00 - 0.90 %    Nucleated RBC 0.50 /100 WBC    Neutrophils (Absolute) 3.82 2.00 - 7.15 K/uL    Lymphs (Absolute) 1.80 1.00 - 4.80 K/uL    Monos (Absolute) 0.52 0.00 - 0.85 K/uL    Eos (Absolute) 0.00 0.00 - 0.51 K/uL    Baso (Absolute) 0.03 0.00 - 0.12 K/uL    Immature Granulocytes (abs) 0.08 0.00 - 0.11 K/uL    NRBC (Absolute) 0.03 K/uL   COMP METABOLIC PANEL   Result Value Ref Range    Sodium 137 135 - 145 mmol/L    Potassium 3.2 (L) 3.6 - 5.5 mmol/L    Chloride 95 (L) 96 - 112 mmol/L    Co2 24 20 - 33 mmol/L    Anion Gap 18.0 (H) 7.0 - 16.0    Glucose 91 65 - 99 mg/dL    Bun 13 8 - 22 mg/dL    Creatinine 1.10 0.50 - 1.40 mg/dL    Calcium 14.2 (HH) 8.5 - 10.5 mg/dL    AST(SGOT) 43 12 - 45 U/L    ALT(SGPT) 14 2 - 50 U/L    Alkaline Phosphatase 96 30 - 99 U/L    Total Bilirubin 0.3 0.1 - 1.5 mg/dL    Albumin 4.4 3.2 - 4.9 g/dL    Total Protein 8.0 6.0 - 8.2 g/dL    Globulin 3.6 (H) 1.9 - 3.5 g/dL    A-G Ratio 1.2 g/dL   IONIZED CALCIUM   Result Value Ref Range    Ionized Calcium 1.8 (H) 1.1 - 1.3 mmol/L   COV-2, FLU A/B, AND RSV BY PCR (2-4 HOURS CEPHEID): Collect NP swab in VTM    Specimen: Respirate   Result Value Ref Range    Influenza virus A RNA Negative Negative    Influenza virus B, PCR Negative Negative    RSV, PCR Negative Negative    SARS-CoV-2 by PCR NotDetected     SARS-CoV-2 Source NP Swab    MAGNESIUM   Result Value Ref Range    Magnesium 1.5 1.5 - 2.5 mg/dL   ESTIMATED GFR   Result Value Ref Range    GFR If African American >60 >60 mL/min/1.73 m 2    GFR If Non  51 (A) >60 mL/min/1.73 m 2   EKG (NOW)   Result Value Ref Range    Report       [facility] Emergency Dept.    Test Date:  2022-02-23  Pt Name:    REGAN WOODS               Department: ER  MRN:        [id number]                      Room:       RD 02  Gender:     Female                       Technician: [id number]  :        1965                   Requested By:GUIDO FAYE  Order #:    [id number]                    Reading MD: GUIDO FAYE MD    Measurements  Intervals                                Axis  Rate:       89                           P:          21  WI:         147                          QRS:        20  QRSD:       75                           T:          -10  QT:         365  QTc:        445    Interpretive Statements  Sinus rhythm, rate of 89, normal axis  Borderline T abnormalities, diffuse leads  Minimal ST elevation, inferior leads  Compared to ECG 02/15/2022 00:19:43  ST (T wave) deviation now present  T-wave abnormality still present  Electronically Signed On 2022 23:07:12 PST by GUIDO FAYE MD       All labs reviewed by me.    EKG  Interpreted by me, as detailed above.    RADIOLOGY  DX-CHEST-PORTABLE (1 VIEW)   Final Result         1.  No acute cardiopulmonary disease.        The radiologist's interpretation of all radiological studies have been reviewed by me.    COURSE & MEDICAL DECISION MAKING  Pertinent Labs & Imaging studies reviewed. (See chart for details)    9:25 PM - Patient seen and examined at bedside. Discussed ordering imaging and diagnostic studies before consulting with Dr. Dyson (Oncology) and developing a plan for admission given the patient's previously scheduled surgery tomorrow 2022. Patient will be treated with Zofran 4 mg, Bolus, and Dilaudid 1 mg. The patient will receive IV fluids for vomiting. Ordered DX-Chest, Magnesium, COV-2, Flu A/B, and RSV by PCR, CBC with diff, CMP, Ionized Calcium, and EKG to evaluate her symptoms. The differential diagnoses include but are not limited to: Electrolyte abnormality or COVID-19 .    10:30 PM - Ordered NS infusion 1,000 mL to treat the patient     11:08 PM - Paged  Dr. Dyson (Oncology). Ordered Potassium chloride SA (Kdur) 40 mEq to treat the patient    11:26 PM - Patient was reevaluated at bedside. Discussed lab and radiology results with the patient and informed them that the imaging revealed no acute pulmonary disease. Will proceed with plan for admission.     11:34 PM - Paged Dr Dyson again    11:53 PM - Nursing staff informed me that the patient is requesting more pain medication. Ordered Dilaudid 1 mg to treat the patient    12:14 AM - I discussed the patient's case and the above findings with Dr. Dyson (Oncology) who will admit. He recommends we continue hydrating measures and enter some holding orders.    12:17 AM - Nursing staff informed me that the patient is refusing the oral Potassium chloride SA (Kdur) 40 mEq due to her vomiting and is requesting an intravenous alternative.     12:30 AM -holding orders were placed.    Medical Decision Making: Patient presents with nausea and vomiting as well as elevated calcium level this is likely secondary to her cancer.  I have started her on IV fluid boluses to help bring this down.  I have replaced the patient's potassium IV.    DISPOSITION:  Patient will be hospitalized by Dr. Dyson (Oncology) in guarded condition.     FINAL IMPRESSION  1. Hypercalcemia    2. Non-intractable vomiting with nausea, unspecified vomiting type    3. Hypokalemia        Sedrick CUI ([place]), am scribing for, and in the presence of, You Carroll M.D.    Electronically signed by: Sedrick Jade ([place]), 2/23/2022 You GERARDO M.D. personally performed the services described in this documentation, as scribed by Sedrick Jade in my presence, and it is both accurate and complete.    The note accurately reflects work and decisions made by me.  You Carroll M.D.  2/24/2022  1:35 AM

## 2022-02-24 NOTE — ED NOTES
IV placed, labs drawn and sent. Fluids running and pt medicated per mar for nausea and pain. Family at bedside. Call light within reach. Will continue to monitor.

## 2022-02-24 NOTE — ED NOTES
Pt resting in bed, VSS on oxymask while sleeping s/p dilaudid admin, GCS 15, NAD, aware POC to admit and medicate per MAR

## 2022-02-24 NOTE — ED TRIAGE NOTES
Magali Leal  56 y.o. female  Chief Complaint   Patient presents with   • Sent by MD     Sent here by oncologist due to abnormal lab work. Hx uterine CA. Currently going through chemo, last on 1/13. States was supposed to have hysterectomy tomorrow but has not been feeling well the last few days   • N/V     Worsening the last few days     Pt WC to triage with family member for above complaints. Pt also reports some generalized abd pain.     Pt placed in family room, apologized for wait time. Pt informed to notify staff of any change in condition.

## 2022-02-24 NOTE — CARE PLAN
The patient is Stable - Low risk of patient condition declining or worsening    Shift Goals  Clinical Goals: Pain control  Patient Goals: Surgery    Progress made toward(s) clinical / shift goals:        Problem: Pain - Standard  Goal: Alleviation of pain or a reduction in pain to the patient’s comfort goal  Outcome: Progressing  Note: Pt reports pain using 0-10 scale, medicated per MAR.      Problem: Knowledge Deficit - Standard  Goal: Patient and family/care givers will demonstrate understanding of plan of care, disease process/condition, diagnostic tests and medications  Outcome: Progressing  Note: Discussed POC with pt, plan for surgery this afternoon, pt understands and agrees.
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
## 2022-02-24 NOTE — ED NOTES
Pt resting in bed, VSS on oxymask while sleeping s/p dilaudid, GCS 15, NAD, aware POC to admit, pt NPO for possible hysterectomy today per report

## 2022-02-25 LAB
ALBUMIN SERPL BCP-MCNC: 3.6 G/DL (ref 3.2–4.9)
ALBUMIN/GLOB SERPL: 1.2 G/DL
ALP SERPL-CCNC: 85 U/L (ref 30–99)
ALT SERPL-CCNC: 9 U/L (ref 2–50)
ANION GAP SERPL CALC-SCNC: 17 MMOL/L (ref 7–16)
AST SERPL-CCNC: 35 U/L (ref 12–45)
BASOPHILS # BLD AUTO: 0.4 % (ref 0–1.8)
BASOPHILS # BLD: 0.03 K/UL (ref 0–0.12)
BILIRUB SERPL-MCNC: 0.3 MG/DL (ref 0.1–1.5)
BUN SERPL-MCNC: 10 MG/DL (ref 8–22)
CALCIUM SERPL-MCNC: 13.4 MG/DL (ref 8.5–10.5)
CHLORIDE SERPL-SCNC: 100 MMOL/L (ref 96–112)
CO2 SERPL-SCNC: 18 MMOL/L (ref 20–33)
CREAT SERPL-MCNC: 0.83 MG/DL (ref 0.5–1.4)
EOSINOPHIL # BLD AUTO: 0 K/UL (ref 0–0.51)
EOSINOPHIL NFR BLD: 0 % (ref 0–6.9)
ERYTHROCYTE [DISTWIDTH] IN BLOOD BY AUTOMATED COUNT: 65 FL (ref 35.9–50)
GLOBULIN SER CALC-MCNC: 3.1 G/DL (ref 1.9–3.5)
GLUCOSE SERPL-MCNC: 83 MG/DL (ref 65–99)
HCT VFR BLD AUTO: 30.6 % (ref 37–47)
HGB BLD-MCNC: 9.5 G/DL (ref 12–16)
IMM GRANULOCYTES # BLD AUTO: 0.12 K/UL (ref 0–0.11)
IMM GRANULOCYTES NFR BLD AUTO: 1.5 % (ref 0–0.9)
LYMPHOCYTES # BLD AUTO: 1.26 K/UL (ref 1–4.8)
LYMPHOCYTES NFR BLD: 16 % (ref 22–41)
MCH RBC QN AUTO: 30.9 PG (ref 27–33)
MCHC RBC AUTO-ENTMCNC: 31 G/DL (ref 33.6–35)
MCV RBC AUTO: 99.7 FL (ref 81.4–97.8)
MONOCYTES # BLD AUTO: 0.56 K/UL (ref 0–0.85)
MONOCYTES NFR BLD AUTO: 7.1 % (ref 0–13.4)
NEUTROPHILS # BLD AUTO: 5.92 K/UL (ref 2–7.15)
NEUTROPHILS NFR BLD: 75 % (ref 44–72)
NRBC # BLD AUTO: 0.02 K/UL
NRBC BLD-RTO: 0.3 /100 WBC
PLATELET # BLD AUTO: 467 K/UL (ref 164–446)
PMV BLD AUTO: 8.9 FL (ref 9–12.9)
POTASSIUM SERPL-SCNC: 4 MMOL/L (ref 3.6–5.5)
PROT SERPL-MCNC: 6.7 G/DL (ref 6–8.2)
RBC # BLD AUTO: 3.07 M/UL (ref 4.2–5.4)
SODIUM SERPL-SCNC: 135 MMOL/L (ref 135–145)
WBC # BLD AUTO: 7.9 K/UL (ref 4.8–10.8)

## 2022-02-25 PROCEDURE — A9270 NON-COVERED ITEM OR SERVICE: HCPCS

## 2022-02-25 PROCEDURE — 700111 HCHG RX REV CODE 636 W/ 250 OVERRIDE (IP)

## 2022-02-25 PROCEDURE — 700102 HCHG RX REV CODE 250 W/ 637 OVERRIDE(OP)

## 2022-02-25 PROCEDURE — 80053 COMPREHEN METABOLIC PANEL: CPT

## 2022-02-25 PROCEDURE — 36415 COLL VENOUS BLD VENIPUNCTURE: CPT

## 2022-02-25 PROCEDURE — 85025 COMPLETE CBC W/AUTO DIFF WBC: CPT

## 2022-02-25 PROCEDURE — 700111 HCHG RX REV CODE 636 W/ 250 OVERRIDE (IP): Performed by: EMERGENCY MEDICINE

## 2022-02-25 PROCEDURE — 770004 HCHG ROOM/CARE - ONCOLOGY PRIVATE *

## 2022-02-25 RX ORDER — FUROSEMIDE 10 MG/ML
20 INJECTION INTRAMUSCULAR; INTRAVENOUS ONCE
Status: COMPLETED | OUTPATIENT
Start: 2022-02-25 | End: 2022-02-25

## 2022-02-25 RX ORDER — BACLOFEN 10 MG/1
10 TABLET ORAL 3 TIMES DAILY
Status: DISCONTINUED | OUTPATIENT
Start: 2022-02-25 | End: 2022-03-24 | Stop reason: HOSPADM

## 2022-02-25 RX ADMIN — PAROXETINE 60 MG: 30 TABLET, FILM COATED ORAL at 05:45

## 2022-02-25 RX ADMIN — ENOXAPARIN SODIUM 40 MG: 40 INJECTION SUBCUTANEOUS at 05:45

## 2022-02-25 RX ADMIN — BACLOFEN 10 MG: 10 TABLET ORAL at 17:58

## 2022-02-25 RX ADMIN — ROPINIROLE HYDROCHLORIDE 1 MG: 1 TABLET, FILM COATED ORAL at 17:57

## 2022-02-25 RX ADMIN — PROCHLORPERAZINE EDISYLATE 10 MG: 5 INJECTION INTRAMUSCULAR; INTRAVENOUS at 08:56

## 2022-02-25 RX ADMIN — HYDROXYCHLOROQUINE SULFATE 400 MG: 200 TABLET ORAL at 08:53

## 2022-02-25 RX ADMIN — HYDROMORPHONE HYDROCHLORIDE 4 MG: 4 TABLET ORAL at 02:58

## 2022-02-25 RX ADMIN — MIRTAZAPINE 22.5 MG: 15 TABLET, FILM COATED ORAL at 20:21

## 2022-02-25 RX ADMIN — HYDROMORPHONE HYDROCHLORIDE 4 MG: 4 TABLET ORAL at 08:53

## 2022-02-25 RX ADMIN — PROCHLORPERAZINE EDISYLATE 10 MG: 5 INJECTION INTRAMUSCULAR; INTRAVENOUS at 13:29

## 2022-02-25 RX ADMIN — DIPHENHYDRAMINE HYDROCHLORIDE 25 MG: 25 TABLET ORAL at 21:43

## 2022-02-25 RX ADMIN — HYDROMORPHONE HYDROCHLORIDE 4 MG: 4 TABLET ORAL at 14:57

## 2022-02-25 RX ADMIN — HYDROMORPHONE HYDROCHLORIDE 4 MG: 4 TABLET ORAL at 11:56

## 2022-02-25 RX ADMIN — HYDROMORPHONE HYDROCHLORIDE 4 MG: 4 TABLET ORAL at 17:58

## 2022-02-25 RX ADMIN — FUROSEMIDE 20 MG: 10 INJECTION, SOLUTION INTRAMUSCULAR; INTRAVENOUS at 13:30

## 2022-02-25 RX ADMIN — HYDROMORPHONE HYDROCHLORIDE 4 MG: 4 TABLET ORAL at 21:43

## 2022-02-25 RX ADMIN — HYDROMORPHONE HYDROCHLORIDE 4 MG: 4 TABLET ORAL at 05:45

## 2022-02-25 RX ADMIN — ONDANSETRON 4 MG: 2 INJECTION INTRAMUSCULAR; INTRAVENOUS at 10:40

## 2022-02-25 RX ADMIN — AMLODIPINE BESYLATE 2.5 MG: 2.5 TABLET ORAL at 17:58

## 2022-02-25 RX ADMIN — BACLOFEN 10 MG: 10 TABLET ORAL at 11:56

## 2022-02-25 RX ADMIN — ROPINIROLE HYDROCHLORIDE 1 MG: 1 TABLET, FILM COATED ORAL at 20:21

## 2022-02-25 ASSESSMENT — ENCOUNTER SYMPTOMS
DIARRHEA: 0
DIZZINESS: 0
WEAKNESS: 1
CHILLS: 0
FOCAL WEAKNESS: 0
SENSORY CHANGE: 0
SPEECH CHANGE: 0
ABDOMINAL PAIN: 1
EYE REDNESS: 0
PALPITATIONS: 0
EYE DISCHARGE: 0
FEVER: 0
VOMITING: 1
WHEEZING: 0
SHORTNESS OF BREATH: 0
NAUSEA: 1
MYALGIAS: 1
BACK PAIN: 1

## 2022-02-25 ASSESSMENT — PAIN DESCRIPTION - PAIN TYPE
TYPE: ACUTE PAIN

## 2022-02-25 NOTE — CARE PLAN
The patient is Watcher - Medium risk of patient condition declining or worsening    Shift Goals  Clinical Goals: Pain Control / monitor Labs  Patient Goals: Pain control    Progress made toward(s) clinical / shift goals:    Problem: Pain - Standard  Goal: Alleviation of pain or a reduction in pain to the patient’s comfort goal  Outcome: Progressing     Problem: Infection - Standard  Goal: Patient will remain free from infection  Outcome: Progressing       Patient is not progressing towards the following goals:

## 2022-02-25 NOTE — CARE PLAN
The patient is Watcher - Medium risk of patient condition declining or worsening    Shift Goals  Clinical Goals: Pain control, monitor hypercalcemia  Patient Goals: Rest    Progress made toward(s) clinical / shift goals:        Problem: Pain - Standard  Goal: Alleviation of pain or a reduction in pain to the patient’s comfort goal  Outcome: Progressing  Note: Pt reports pain using 0-10 scale, medicated per MAR.      Problem: Knowledge Deficit - Standard  Goal: Patient and family/care givers will demonstrate understanding of plan of care, disease process/condition, diagnostic tests and medications  Outcome: Progressing  Note: Discussed POC with pt, questions answered. Nausea medication administered as needed.

## 2022-02-25 NOTE — PROGRESS NOTES
GYN Oncology Progress Note               Author: EILEEN Miranda Date & Time created: 2/25/2022  3:53 PM     Interval History:  Had episode of emesis after trying to eat breakfast. Able to tolerate water and drank about 2L since midnight. Still feeling very fatigued but feels more stable on her feet when walking. Pain in low back much better with oral dilaudid, but still having severe pelvic pain described as her uterus cramping. Attributes these pains to her cancer. Reports ongoing urinary frequency (unchanged from her baseline),     Review of Systems:  Review of Systems   Constitutional: Positive for malaise/fatigue. Negative for chills and fever.   Eyes: Negative for discharge and redness.   Respiratory: Negative for shortness of breath and wheezing.    Cardiovascular: Negative for chest pain, palpitations and leg swelling.   Gastrointestinal: Positive for abdominal pain, nausea and vomiting. Negative for diarrhea.   Genitourinary: Positive for frequency. Negative for dysuria.   Musculoskeletal: Positive for back pain and myalgias.   Neurological: Positive for weakness. Negative for dizziness, sensory change, speech change and focal weakness.       Physical Exam:  Physical Exam  Constitutional:       General: She is not in acute distress.  HENT:      Head: Normocephalic.      Mouth/Throat:      Mouth: Mucous membranes are moist.   Eyes:      General: No scleral icterus.     Conjunctiva/sclera: Conjunctivae normal.   Cardiovascular:      Rate and Rhythm: Normal rate and regular rhythm.      Pulses: Normal pulses.   Pulmonary:      Effort: Pulmonary effort is normal. No respiratory distress.   Abdominal:      General: There is no distension.      Palpations: Abdomen is soft.      Tenderness: There is no abdominal tenderness. There is no guarding.   Musculoskeletal:         General: No swelling.      Right lower leg: No edema.      Left lower leg: No edema.   Skin:     General: Skin is warm and dry.       Capillary Refill: Capillary refill takes 2 to 3 seconds.   Neurological:      General: No focal deficit present.      Mental Status: She is alert and oriented to person, place, and time.      Gait: Gait normal.   Psychiatric:         Mood and Affect: Mood normal.         Behavior: Behavior normal.         Labs:          Recent Labs     22  0044   SODIUM 137 138 136 135   POTASSIUM 3.2* 3.5* 4.2 4.0   CHLORIDE 95* 103 101 100   CO2 24 22 19* 18*   BUN 13 11 10 10   CREATININE 1.10 0.84 0.78 0.83   MAGNESIUM 1.5  --   --   --    CALCIUM 14.2* 12.9* 12.7* 13.4*     Recent Labs     22  0044   ALTSGPT 10 9 9   ASTSGOT 23 31 35   ALKPHOSPHAT 85 80 85   TBILIRUBIN 0.2 0.3 0.3   GLUCOSE 95 81 83     Recent Labs     22  0044   RBC 3.16* 3.07*   HEMOGLOBIN 9.7* 9.5*   HEMATOCRIT 31.3* 30.6*   PLATELETCT 684* 467*     Recent Labs     22  0044   WBC 6.3  --   --  7.9   NEUTSPOLYS 61.10  --   --  75.00*   LYMPHOCYTES 28.80  --   --  16.00*   MONOCYTES 8.30  --   --  7.10   EOSINOPHILS 0.00  --   --  0.00   BASOPHILS 0.50  --   --  0.40   ASTSGOT 43 23 31 35   ALTSGPT 14 10 9 9   ALKPHOSPHAT 96 85 80 85   TBILIRUBIN 0.3 0.2 0.3 0.3     Recent Labs     22  0044   SODIUM 138 136 135   POTASSIUM 3.5* 4.2 4.0   CHLORIDE 103 101 100   CO2 22 19* 18*   GLUCOSE 95 81 83   BUN 11 10 10   CREATININE 0.84 0.78 0.83   CALCIUM 12.9* 12.7* 13.4*     Hemodynamics:  Temp (24hrs), Av.8 °C (98.3 °F), Min:36.3 °C (97.4 °F), Max:37.2 °C (99 °F)  Temperature: 36.3 °C (97.4 °F)  Pulse  Av.5  Min: 81  Max: 121   Blood Pressure: 133/79     Respiratory:    Respiration: 16, Pulse Oximetry: 92 %           Fluids:  No intake or output data in the 24 hours ending 22 4913     GI/Nutrition:  Orders Placed This Encounter   Procedures   • Diet Order Diet:  Regular     Standing Status:   Standing     Number of Occurrences:   1     Order Specific Question:   Diet:     Answer:   Regular [1]     Medical Decision Making, by Problem:  Active Hospital Problems    Diagnosis    • *Hypercalcemia [E83.52]        Assessment and Plan:  This is a 56 y.o. female with stage III (minimum) Grade 2 endometrial adenocarcinoma s/p 6 cycles of neoadjuvant chemotherapy with Taxol/Carbo completed on 1/13/22 , who presents with hypercalcemia, nausea, and vomiting.      1. Hypercalcemia: Likely secondary to malignancy and dehydration. Calcium 13.4 this AM. Received about 4 L of IVF (per MAR) since presentation to ER and has had good oral intake of water this AM. One time dose of lasix today. Recheck in AM. Bone scan ordered to evlauate for possible metastasis to bone.   2. Nausea vomiting: Secondary to above. Prn compazine and Zofran.   3. Dehydration: Secondary to above. Will hold further IVF for now with lasix. Now tolerating water PO.    4. Pelvic and low back pain: Acute on chronic. Patient with history of fibromyalgia. Seen outpatient by pain management. Poor pain control with home oxycodone 15 mg q 4 prn; changed to dilaudid 4 mg q 3 hours prn with IV dilaudid fore BTP.   5. Hypokalemia: Repleted in ER and on 2/24. K+ 4.0 this AM. Continue to monitor and replete prn.   6. Anemia: Secondary to chemotherapy. Currently stable. Monitor.   7. History of Lupus: continue home hydroxychloroquine  8. Hx of mood disorder: PTSD and anxiety. Continue home paroxetine and propranolol prn.   9. Hx Raynaud's disease: Continue amlodipine.   10. Dispo: inpatient management for hypercalcemia, n/v, and pain. Plan for surgery when after correction of hypercalcemia.       Case discussed with Dr. Dyson.     Quality-Core Measures

## 2022-02-25 NOTE — PROGRESS NOTES
Lab called with critical result of Calcium 13.4 at 0126. Critical lab result read back to attendent.   Dr. Dyson notified of critical lab result at 0200.  Critical lab result read back by Dr. Dyson. No new orders at this time.

## 2022-02-26 ENCOUNTER — APPOINTMENT (OUTPATIENT)
Dept: RADIOLOGY | Facility: MEDICAL CENTER | Age: 57
DRG: 740 | End: 2022-02-26
Attending: ORTHOPAEDIC SURGERY
Payer: COMMERCIAL

## 2022-02-26 ENCOUNTER — ANESTHESIA EVENT (OUTPATIENT)
Dept: SURGERY | Facility: MEDICAL CENTER | Age: 57
DRG: 740 | End: 2022-02-26
Payer: COMMERCIAL

## 2022-02-26 ENCOUNTER — APPOINTMENT (OUTPATIENT)
Dept: RADIOLOGY | Facility: MEDICAL CENTER | Age: 57
DRG: 740 | End: 2022-02-26
Payer: COMMERCIAL

## 2022-02-26 ENCOUNTER — ANESTHESIA (OUTPATIENT)
Dept: SURGERY | Facility: MEDICAL CENTER | Age: 57
DRG: 740 | End: 2022-02-26
Payer: COMMERCIAL

## 2022-02-26 ENCOUNTER — APPOINTMENT (OUTPATIENT)
Dept: RADIOLOGY | Facility: MEDICAL CENTER | Age: 57
DRG: 740 | End: 2022-02-26
Attending: SPECIALIST
Payer: COMMERCIAL

## 2022-02-26 ENCOUNTER — APPOINTMENT (OUTPATIENT)
Dept: RADIOLOGY | Facility: MEDICAL CENTER | Age: 57
DRG: 740 | End: 2022-02-26
Attending: INTERNAL MEDICINE
Payer: COMMERCIAL

## 2022-02-26 LAB
ALBUMIN SERPL BCP-MCNC: 3.5 G/DL (ref 3.2–4.9)
ALBUMIN/GLOB SERPL: 1.2 G/DL
ALP SERPL-CCNC: 81 U/L (ref 30–99)
ALT SERPL-CCNC: 11 U/L (ref 2–50)
ANION GAP SERPL CALC-SCNC: 16 MMOL/L (ref 7–16)
AST SERPL-CCNC: 34 U/L (ref 12–45)
BASE EXCESS BLDA CALC-SCNC: -1 MMOL/L (ref -4–3)
BILIRUB SERPL-MCNC: 0.2 MG/DL (ref 0.1–1.5)
BODY TEMPERATURE: ABNORMAL CENTIGRADE
BUN SERPL-MCNC: 15 MG/DL (ref 8–22)
CA-I SERPL-SCNC: 2 MMOL/L (ref 1.1–1.3)
CALCIUM SERPL-MCNC: 14.4 MG/DL (ref 8.5–10.5)
CHLORIDE SERPL-SCNC: 98 MMOL/L (ref 96–112)
CO2 SERPL-SCNC: 23 MMOL/L (ref 20–33)
CREAT SERPL-MCNC: 1.42 MG/DL (ref 0.5–1.4)
GLOBULIN SER CALC-MCNC: 2.9 G/DL (ref 1.9–3.5)
GLUCOSE BLD STRIP.AUTO-MCNC: 102 MG/DL (ref 65–99)
GLUCOSE SERPL-MCNC: 111 MG/DL (ref 65–99)
HCO3 BLDA-SCNC: 25 MMOL/L (ref 17–25)
PCO2 BLDA: 48.5 MMHG (ref 26–37)
PH BLDA: 7.33 [PH] (ref 7.4–7.5)
PO2 BLDA: 70.8 MMHG (ref 64–87)
POTASSIUM SERPL-SCNC: 4.2 MMOL/L (ref 3.6–5.5)
PROT SERPL-MCNC: 6.4 G/DL (ref 6–8.2)
SAO2 % BLDA: 91.3 % (ref 93–99)
SODIUM SERPL-SCNC: 137 MMOL/L (ref 135–145)

## 2022-02-26 PROCEDURE — 82803 BLOOD GASES ANY COMBINATION: CPT

## 2022-02-26 PROCEDURE — 64447 NJX AA&/STRD FEMORAL NRV IMG: CPT | Performed by: ORTHOPAEDIC SURGERY

## 2022-02-26 PROCEDURE — 82330 ASSAY OF CALCIUM: CPT

## 2022-02-26 PROCEDURE — 3E0T3BZ INTRODUCTION OF ANESTHETIC AGENT INTO PERIPHERAL NERVES AND PLEXI, PERCUTANEOUS APPROACH: ICD-10-PCS | Performed by: INTERNAL MEDICINE

## 2022-02-26 PROCEDURE — 700105 HCHG RX REV CODE 258: Performed by: INTERNAL MEDICINE

## 2022-02-26 PROCEDURE — 770004 HCHG ROOM/CARE - ONCOLOGY PRIVATE *

## 2022-02-26 PROCEDURE — 64445 NJX AA&/STRD SCIATIC NRV IMG: CPT | Performed by: ORTHOPAEDIC SURGERY

## 2022-02-26 PROCEDURE — 160036 HCHG PACU - EA ADDL 30 MINS PHASE I: Performed by: ORTHOPAEDIC SURGERY

## 2022-02-26 PROCEDURE — 82962 GLUCOSE BLOOD TEST: CPT

## 2022-02-26 PROCEDURE — 160035 HCHG PACU - 1ST 60 MINS PHASE I: Performed by: ORTHOPAEDIC SURGERY

## 2022-02-26 PROCEDURE — 160029 HCHG SURGERY MINUTES - 1ST 30 MINS LEVEL 4: Performed by: ORTHOPAEDIC SURGERY

## 2022-02-26 PROCEDURE — 160009 HCHG ANES TIME/MIN: Performed by: ORTHOPAEDIC SURGERY

## 2022-02-26 PROCEDURE — 160048 HCHG OR STATISTICAL LEVEL 1-5: Performed by: ORTHOPAEDIC SURGERY

## 2022-02-26 PROCEDURE — 80053 COMPREHEN METABOLIC PANEL: CPT

## 2022-02-26 PROCEDURE — 73600 X-RAY EXAM OF ANKLE: CPT | Mod: LT

## 2022-02-26 PROCEDURE — 700105 HCHG RX REV CODE 258

## 2022-02-26 PROCEDURE — 0QSH0ZZ REPOSITION LEFT TIBIA, OPEN APPROACH: ICD-10-PCS | Performed by: ORTHOPAEDIC SURGERY

## 2022-02-26 PROCEDURE — 700111 HCHG RX REV CODE 636 W/ 250 OVERRIDE (IP): Performed by: INTERNAL MEDICINE

## 2022-02-26 PROCEDURE — 700101 HCHG RX REV CODE 250: Performed by: INTERNAL MEDICINE

## 2022-02-26 PROCEDURE — 700111 HCHG RX REV CODE 636 W/ 250 OVERRIDE (IP): Performed by: ORTHOPAEDIC SURGERY

## 2022-02-26 PROCEDURE — C1713 ANCHOR/SCREW BN/BN,TIS/BN: HCPCS | Performed by: ORTHOPAEDIC SURGERY

## 2022-02-26 PROCEDURE — 500881 HCHG PACK, EXTREMITY: Performed by: ORTHOPAEDIC SURGERY

## 2022-02-26 PROCEDURE — 36415 COLL VENOUS BLD VENIPUNCTURE: CPT

## 2022-02-26 PROCEDURE — 160002 HCHG RECOVERY MINUTES (STAT): Performed by: ORTHOPAEDIC SURGERY

## 2022-02-26 PROCEDURE — 71045 X-RAY EXAM CHEST 1 VIEW: CPT

## 2022-02-26 PROCEDURE — 700102 HCHG RX REV CODE 250 W/ 637 OVERRIDE(OP)

## 2022-02-26 PROCEDURE — 160041 HCHG SURGERY MINUTES - EA ADDL 1 MIN LEVEL 4: Performed by: ORTHOPAEDIC SURGERY

## 2022-02-26 PROCEDURE — 0QSK04Z REPOSITION LEFT FIBULA WITH INTERNAL FIXATION DEVICE, OPEN APPROACH: ICD-10-PCS | Performed by: ORTHOPAEDIC SURGERY

## 2022-02-26 PROCEDURE — A6454 SELF-ADHER BAND W>=3" <5"/YD: HCPCS | Performed by: ORTHOPAEDIC SURGERY

## 2022-02-26 PROCEDURE — 73620 X-RAY EXAM OF FOOT: CPT | Mod: LT

## 2022-02-26 PROCEDURE — 500054 HCHG BANDAGE, ELASTIC 6: Performed by: ORTHOPAEDIC SURGERY

## 2022-02-26 PROCEDURE — 501838 HCHG SUTURE GENERAL: Performed by: ORTHOPAEDIC SURGERY

## 2022-02-26 PROCEDURE — 700111 HCHG RX REV CODE 636 W/ 250 OVERRIDE (IP)

## 2022-02-26 PROCEDURE — A9270 NON-COVERED ITEM OR SERVICE: HCPCS

## 2022-02-26 PROCEDURE — 73610 X-RAY EXAM OF ANKLE: CPT | Mod: LT

## 2022-02-26 DEVICE — SCREW CORT 3.5X10MM ST HEX (4TX6+1TX4+1TX3=31)(SDS=22): Type: IMPLANTABLE DEVICE | Site: ANKLE | Status: FUNCTIONAL

## 2022-02-26 DEVICE — SCREW LOCK 3.5X14MM ST T15 - (4SFLX6+LPPELVX4=28): Type: IMPLANTABLE DEVICE | Site: ANKLE | Status: FUNCTIONAL

## 2022-02-26 DEVICE — PLATE LCP 1/3 TUBULAR 8-H - (4SFLX1=4): Type: IMPLANTABLE DEVICE | Site: ANKLE | Status: FUNCTIONAL

## 2022-02-26 DEVICE — SCREW CORT 3.5X42MM ST HEX - (4SFLX6+MDFTX3=27): Type: IMPLANTABLE DEVICE | Site: ANKLE | Status: FUNCTIONAL

## 2022-02-26 DEVICE — SCREW LOCK 3.5X18MM ST T15 - (4SFLX6+LPPELVX4=28): Type: IMPLANTABLE DEVICE | Site: ANKLE | Status: FUNCTIONAL

## 2022-02-26 DEVICE — SCREW CORT 3.5X12MM ST HEX (4TX6+1TX4+1TX3=31)(SDS=22): Type: IMPLANTABLE DEVICE | Site: ANKLE | Status: FUNCTIONAL

## 2022-02-26 DEVICE — IMPLANTABLE DEVICE: Type: IMPLANTABLE DEVICE | Site: ANKLE | Status: FUNCTIONAL

## 2022-02-26 RX ORDER — HALOPERIDOL 5 MG/ML
1 INJECTION INTRAMUSCULAR
Status: DISCONTINUED | OUTPATIENT
Start: 2022-02-26 | End: 2022-02-26 | Stop reason: HOSPADM

## 2022-02-26 RX ORDER — CEFAZOLIN SODIUM 1 G/3ML
INJECTION, POWDER, FOR SOLUTION INTRAMUSCULAR; INTRAVENOUS PRN
Status: DISCONTINUED | OUTPATIENT
Start: 2022-02-26 | End: 2022-02-26 | Stop reason: SURG

## 2022-02-26 RX ORDER — MIDAZOLAM HYDROCHLORIDE 1 MG/ML
INJECTION INTRAMUSCULAR; INTRAVENOUS PRN
Status: DISCONTINUED | OUTPATIENT
Start: 2022-02-26 | End: 2022-02-26 | Stop reason: SURG

## 2022-02-26 RX ORDER — SODIUM CHLORIDE 9 MG/ML
1000 INJECTION, SOLUTION INTRAVENOUS ONCE
Status: COMPLETED | OUTPATIENT
Start: 2022-02-26 | End: 2022-02-26

## 2022-02-26 RX ORDER — ONDANSETRON 2 MG/ML
4 INJECTION INTRAMUSCULAR; INTRAVENOUS
Status: DISCONTINUED | OUTPATIENT
Start: 2022-02-26 | End: 2022-02-26 | Stop reason: HOSPADM

## 2022-02-26 RX ORDER — POTASSIUM CHLORIDE 20 MEQ/1
20 TABLET, EXTENDED RELEASE ORAL 2 TIMES DAILY
Status: DISCONTINUED | OUTPATIENT
Start: 2022-02-26 | End: 2022-02-26

## 2022-02-26 RX ORDER — CEFAZOLIN SODIUM 1 G/50ML
1 INJECTION, SOLUTION INTRAVENOUS EVERY 8 HOURS
Status: COMPLETED | OUTPATIENT
Start: 2022-02-26 | End: 2022-02-27

## 2022-02-26 RX ORDER — HYDROMORPHONE HYDROCHLORIDE 1 MG/ML
0.2 INJECTION, SOLUTION INTRAMUSCULAR; INTRAVENOUS; SUBCUTANEOUS
Status: DISCONTINUED | OUTPATIENT
Start: 2022-02-26 | End: 2022-02-26 | Stop reason: HOSPADM

## 2022-02-26 RX ORDER — HYDROMORPHONE HYDROCHLORIDE 1 MG/ML
1 INJECTION, SOLUTION INTRAMUSCULAR; INTRAVENOUS; SUBCUTANEOUS
Status: DISCONTINUED | OUTPATIENT
Start: 2022-02-26 | End: 2022-02-26

## 2022-02-26 RX ORDER — KETOROLAC TROMETHAMINE 30 MG/ML
INJECTION, SOLUTION INTRAMUSCULAR; INTRAVENOUS PRN
Status: DISCONTINUED | OUTPATIENT
Start: 2022-02-26 | End: 2022-02-26 | Stop reason: SURG

## 2022-02-26 RX ORDER — LORAZEPAM 2 MG/ML
0.5 INJECTION INTRAMUSCULAR
Status: DISCONTINUED | OUTPATIENT
Start: 2022-02-26 | End: 2022-02-26 | Stop reason: HOSPADM

## 2022-02-26 RX ORDER — METOPROLOL TARTRATE 1 MG/ML
INJECTION, SOLUTION INTRAVENOUS PRN
Status: DISCONTINUED | OUTPATIENT
Start: 2022-02-26 | End: 2022-02-26 | Stop reason: SURG

## 2022-02-26 RX ORDER — ACETAMINOPHEN 500 MG
1000 TABLET ORAL ONCE
Status: DISCONTINUED | OUTPATIENT
Start: 2022-02-26 | End: 2022-02-26 | Stop reason: HOSPADM

## 2022-02-26 RX ORDER — LABETALOL HYDROCHLORIDE 5 MG/ML
5 INJECTION, SOLUTION INTRAVENOUS
Status: DISCONTINUED | OUTPATIENT
Start: 2022-02-26 | End: 2022-02-26 | Stop reason: HOSPADM

## 2022-02-26 RX ORDER — DEXAMETHASONE SODIUM PHOSPHATE 4 MG/ML
INJECTION, SOLUTION INTRA-ARTICULAR; INTRALESIONAL; INTRAMUSCULAR; INTRAVENOUS; SOFT TISSUE PRN
Status: DISCONTINUED | OUTPATIENT
Start: 2022-02-26 | End: 2022-02-26 | Stop reason: SURG

## 2022-02-26 RX ORDER — SODIUM CHLORIDE, SODIUM LACTATE, POTASSIUM CHLORIDE, CALCIUM CHLORIDE 600; 310; 30; 20 MG/100ML; MG/100ML; MG/100ML; MG/100ML
INJECTION, SOLUTION INTRAVENOUS
Status: DISCONTINUED | OUTPATIENT
Start: 2022-02-26 | End: 2022-02-26 | Stop reason: SURG

## 2022-02-26 RX ORDER — KETAMINE HYDROCHLORIDE 50 MG/ML
INJECTION, SOLUTION INTRAMUSCULAR; INTRAVENOUS PRN
Status: DISCONTINUED | OUTPATIENT
Start: 2022-02-26 | End: 2022-02-26 | Stop reason: SURG

## 2022-02-26 RX ORDER — OXYCODONE HCL 5 MG/5 ML
10 SOLUTION, ORAL ORAL
Status: DISCONTINUED | OUTPATIENT
Start: 2022-02-26 | End: 2022-02-26 | Stop reason: HOSPADM

## 2022-02-26 RX ORDER — MEPERIDINE HYDROCHLORIDE 25 MG/ML
12.5 INJECTION INTRAMUSCULAR; INTRAVENOUS; SUBCUTANEOUS
Status: DISCONTINUED | OUTPATIENT
Start: 2022-02-26 | End: 2022-02-26 | Stop reason: HOSPADM

## 2022-02-26 RX ORDER — LIDOCAINE HYDROCHLORIDE 20 MG/ML
INJECTION, SOLUTION EPIDURAL; INFILTRATION; INTRACAUDAL; PERINEURAL PRN
Status: DISCONTINUED | OUTPATIENT
Start: 2022-02-26 | End: 2022-02-26 | Stop reason: SURG

## 2022-02-26 RX ORDER — ROPIVACAINE HYDROCHLORIDE 5 MG/ML
INJECTION, SOLUTION EPIDURAL; INFILTRATION; PERINEURAL
Status: COMPLETED | OUTPATIENT
Start: 2022-02-26 | End: 2022-02-26

## 2022-02-26 RX ORDER — HYDROMORPHONE HYDROCHLORIDE 2 MG/ML
2 INJECTION, SOLUTION INTRAMUSCULAR; INTRAVENOUS; SUBCUTANEOUS
Status: DISCONTINUED | OUTPATIENT
Start: 2022-02-26 | End: 2022-02-26

## 2022-02-26 RX ORDER — FUROSEMIDE 20 MG/1
20 TABLET ORAL EVERY 8 HOURS
Status: DISCONTINUED | OUTPATIENT
Start: 2022-02-26 | End: 2022-02-26

## 2022-02-26 RX ORDER — HYDROMORPHONE HYDROCHLORIDE 2 MG/ML
INJECTION, SOLUTION INTRAMUSCULAR; INTRAVENOUS; SUBCUTANEOUS PRN
Status: DISCONTINUED | OUTPATIENT
Start: 2022-02-26 | End: 2022-02-26 | Stop reason: SURG

## 2022-02-26 RX ORDER — DIPHENHYDRAMINE HYDROCHLORIDE 50 MG/ML
12.5 INJECTION INTRAMUSCULAR; INTRAVENOUS
Status: DISCONTINUED | OUTPATIENT
Start: 2022-02-26 | End: 2022-02-26 | Stop reason: HOSPADM

## 2022-02-26 RX ORDER — HYDROMORPHONE HYDROCHLORIDE 1 MG/ML
0.4 INJECTION, SOLUTION INTRAMUSCULAR; INTRAVENOUS; SUBCUTANEOUS
Status: DISCONTINUED | OUTPATIENT
Start: 2022-02-26 | End: 2022-02-26 | Stop reason: HOSPADM

## 2022-02-26 RX ORDER — FUROSEMIDE 10 MG/ML
20 INJECTION INTRAMUSCULAR; INTRAVENOUS ONCE
Status: DISCONTINUED | OUTPATIENT
Start: 2022-02-27 | End: 2022-02-26

## 2022-02-26 RX ORDER — FUROSEMIDE 10 MG/ML
20 INJECTION INTRAMUSCULAR; INTRAVENOUS EVERY 6 HOURS
Status: DISCONTINUED | OUTPATIENT
Start: 2022-02-27 | End: 2022-03-01

## 2022-02-26 RX ORDER — ONDANSETRON 2 MG/ML
INJECTION INTRAMUSCULAR; INTRAVENOUS PRN
Status: DISCONTINUED | OUTPATIENT
Start: 2022-02-26 | End: 2022-02-26 | Stop reason: SURG

## 2022-02-26 RX ORDER — POTASSIUM CHLORIDE 20 MEQ/1
20 TABLET, EXTENDED RELEASE ORAL 3 TIMES DAILY
Status: DISCONTINUED | OUTPATIENT
Start: 2022-02-26 | End: 2022-02-26

## 2022-02-26 RX ORDER — ROPIVACAINE HYDROCHLORIDE 5 MG/ML
INJECTION, SOLUTION EPIDURAL; INFILTRATION; PERINEURAL PRN
Status: DISCONTINUED | OUTPATIENT
Start: 2022-02-26 | End: 2022-02-26

## 2022-02-26 RX ORDER — HYDROMORPHONE HYDROCHLORIDE 1 MG/ML
0.1 INJECTION, SOLUTION INTRAMUSCULAR; INTRAVENOUS; SUBCUTANEOUS
Status: DISCONTINUED | OUTPATIENT
Start: 2022-02-26 | End: 2022-02-26 | Stop reason: HOSPADM

## 2022-02-26 RX ORDER — OXYCODONE HCL 5 MG/5 ML
5 SOLUTION, ORAL ORAL
Status: DISCONTINUED | OUTPATIENT
Start: 2022-02-26 | End: 2022-02-26 | Stop reason: HOSPADM

## 2022-02-26 RX ADMIN — PAROXETINE 60 MG: 30 TABLET, FILM COATED ORAL at 05:27

## 2022-02-26 RX ADMIN — KETAMINE HYDROCHLORIDE 15 MG: 50 INJECTION INTRAMUSCULAR; INTRAVENOUS at 14:53

## 2022-02-26 RX ADMIN — KETAMINE HYDROCHLORIDE 25 MG: 50 INJECTION INTRAMUSCULAR; INTRAVENOUS at 14:10

## 2022-02-26 RX ADMIN — PROPOFOL 150 MG: 10 INJECTION, EMULSION INTRAVENOUS at 14:10

## 2022-02-26 RX ADMIN — LIDOCAINE HYDROCHLORIDE 50 MG: 20 INJECTION, SOLUTION EPIDURAL; INFILTRATION; INTRACAUDAL at 14:10

## 2022-02-26 RX ADMIN — METOPROLOL TARTRATE 5 MG: 5 INJECTION, SOLUTION INTRAVENOUS at 14:30

## 2022-02-26 RX ADMIN — DEXAMETHASONE SODIUM PHOSPHATE 4 MG: 4 INJECTION, SOLUTION INTRA-ARTICULAR; INTRALESIONAL; INTRAMUSCULAR; INTRAVENOUS; SOFT TISSUE at 14:10

## 2022-02-26 RX ADMIN — SODIUM CHLORIDE 1000 ML: 9 INJECTION, SOLUTION INTRAVENOUS at 21:16

## 2022-02-26 RX ADMIN — Medication: at 00:39

## 2022-02-26 RX ADMIN — MIDAZOLAM HYDROCHLORIDE 2 MG: 1 INJECTION, SOLUTION INTRAMUSCULAR; INTRAVENOUS at 14:04

## 2022-02-26 RX ADMIN — BACLOFEN 10 MG: 10 TABLET ORAL at 05:27

## 2022-02-26 RX ADMIN — METOPROLOL TARTRATE 5 MG: 5 INJECTION, SOLUTION INTRAVENOUS at 15:34

## 2022-02-26 RX ADMIN — ROPIVACAINE HYDROCHLORIDE 30 ML: 5 INJECTION, SOLUTION EPIDURAL; INFILTRATION; PERINEURAL at 14:18

## 2022-02-26 RX ADMIN — HYDROMORPHONE HYDROCHLORIDE 1 MG: 2 INJECTION INTRAMUSCULAR; INTRAVENOUS; SUBCUTANEOUS at 14:10

## 2022-02-26 RX ADMIN — CEFAZOLIN 2 G: 330 INJECTION, POWDER, FOR SOLUTION INTRAMUSCULAR; INTRAVENOUS at 14:25

## 2022-02-26 RX ADMIN — CEFAZOLIN SODIUM 1 G: 1 INJECTION, SOLUTION INTRAVENOUS at 23:26

## 2022-02-26 RX ADMIN — KETOROLAC TROMETHAMINE 30 MG: 30 INJECTION, SOLUTION INTRAMUSCULAR at 15:45

## 2022-02-26 RX ADMIN — SODIUM CHLORIDE, POTASSIUM CHLORIDE, SODIUM LACTATE AND CALCIUM CHLORIDE: 600; 310; 30; 20 INJECTION, SOLUTION INTRAVENOUS at 14:04

## 2022-02-26 RX ADMIN — ONDANSETRON 4 MG: 2 INJECTION INTRAMUSCULAR; INTRAVENOUS at 15:42

## 2022-02-26 RX ADMIN — ROPINIROLE HYDROCHLORIDE 1 MG: 1 TABLET, FILM COATED ORAL at 05:26

## 2022-02-26 RX ADMIN — HYDROXYCHLOROQUINE SULFATE 400 MG: 200 TABLET ORAL at 05:26

## 2022-02-26 RX ADMIN — ROPIVACAINE HYDROCHLORIDE 5 ML: 5 INJECTION, SOLUTION EPIDURAL; INFILTRATION; PERINEURAL at 14:18

## 2022-02-26 ASSESSMENT — PAIN DESCRIPTION - PAIN TYPE
TYPE: SURGICAL PAIN
TYPE: ACUTE PAIN
TYPE: SURGICAL PAIN
TYPE: ACUTE PAIN
TYPE: SURGICAL PAIN
TYPE: SURGICAL PAIN
TYPE: ACUTE PAIN
TYPE: SURGICAL PAIN

## 2022-02-26 ASSESSMENT — ENCOUNTER SYMPTOMS
SHORTNESS OF BREATH: 0
DIZZINESS: 0
SPEECH CHANGE: 0
WEAKNESS: 0
MYALGIAS: 0
SENSORY CHANGE: 0
DIARRHEA: 0
FOCAL WEAKNESS: 0
VOMITING: 0
EYE REDNESS: 0
FEVER: 0
PALPITATIONS: 0
NAUSEA: 0
BACK PAIN: 0
EYE DISCHARGE: 0
WHEEZING: 0
ABDOMINAL PAIN: 0
CHILLS: 0

## 2022-02-26 NOTE — CONSULTS
DATE OF SERVICE:  02/26/2022     ORTHOPEDIC CONSULTATION     REQUESTING PHYSICIAN:  Ines Deleon, nurse practitioner, GYN/Oncology.     REASON FOR CONSULTATION:  Left ankle fracture.     CHIEF COMPLAINT:  Left ankle pain.     HISTORY OF PRESENT ILLNESS:  The patient is a 56-year-old female.  She was   pre-admitted yesterday to the gynecology service by Dr. Dyson for hypercalcemia.    She has a history of stage III endometrial adenocarcinoma, undergoing   chemotherapy.  She was feeling quite weak and essentially fell here in the   hospital and x-rays after a fall revealed a left trimalleolar ankle fracture   dislocation.  I was consulted to provide treatment recommendations.     PAST MEDICAL HISTORY:  ALLERGIES:  BACTRIM, CIPRO, MORPHINE, TRAMADOL.     OUTPATIENT MEDICATIONS:  Include Paxil, Benadryl, Compazine, azelastine,   meloxicam, multivitamins, oxycodone, Zofran, Remeron, eszopiclone, baclofen,   propranolol, amlodipine, Requip, Plaquenil.     PAST MEDICAL DIAGNOSES:  Include anemia, anxiety, osteoarthritis,   fibromyalgia, lupus, Raynaud's disease, endometrial adenocarcinoma.     PAST SURGICAL HISTORY:  Pilonidal cyst excision, bilateral hip arthroplasty.     SOCIAL HISTORY:  The patient is a nonsmoker, does not drink alcohol.  Denies   illicit drug use.     PHYSICAL EXAMINATION:  VITAL SIGNS:  Temperature 98.2, heart rate 117, respiratory rate 19, blood   pressure 133/69, pulse oximetry 92% on room air.  GENERAL APPEARANCE:  The patient is alert, oriented, in mild amount of   distress due to pain in left ankle.  MUSCULOSKELETAL:  Left lower extremity, she has valgus deformity of the left   ankle.  There are no open wounds present.  She is able to slightly flex and   extend the toes.  She has sensation intact to light touch in the foot with   palpable dorsalis pedis pulse.  There is no evidence of obvious traumatic   deformity of the right lower or bilateral upper extremities, which are grossly    neurovascularly intact.     DIAGNOSTIC IMAGING:  Plain x-rays, 3 views of the left ankle show a   trimalleolar ankle fracture dislocation.     ASSESSMENT:  This is a 56-year-old female admitted for hypercalcemia, nausea   and vomiting.  She has a history of an endometrial adenocarcinoma.  She was   admitted to the GYN/Oncology service.  She had a fall and sustained a left   displaced trimalleolar ankle fracture dislocation.     RECOMMENDATIONS:   1.  I discussed these findings with Suzan's wife who is with her here in preop   and I recommend surgical reduction and fixation for this unstable injury.  We   discussed potential risks of surgery including infection, nonunion, malunion,   loss of fixation, potential for implant prominence requiring removal,   potential for injury to neurovascular and tendinous structures and general   risks of anesthesia.  We also discussed that given her potential   immunocompromised status that her risk of nonunion and wound healing issues as   well as infection are somewhat higher.  2.  She expressed understanding and wished to proceed with surgical management   if possible.        ______________________________  MD LATISHA Burnett/ALFRED/RADHA    DD:  02/26/2022 14:02  DT:  02/26/2022 14:22    Job#:  [id number]

## 2022-02-26 NOTE — ANESTHESIA PROCEDURE NOTES
Peripheral Block    Date/Time: 2/26/2022 2:18 PM  Performed by: Rajat Peters M.D.  Authorized by: Rajat Peters M.D.     Patient Location:  OR  Start Time:  2/26/2022 2:18 PM  End Time:  2/26/2022 2:20 PM  Reason for Block: at surgeon's request and post-op pain management ONLY    patient identified, IV checked, site marked, risks and benefits discussed, surgical consent, monitors and equipment checked, pre-op evaluation and timeout performed    Patient Position:  Supine  Prep: ChloraPrep    Monitoring:  Heart rate, continuous pulse ox and cardiac monitor  Block Region:  Lower Extremity  Lower Extremity - Block Type:  SCIATIC nerve block, lateral approach    Laterality:  Left  Procedures: ultrasound guided  Image captured, interpreted and electronically stored.  Local Infiltration:  Lidocaine  Strength:  1 %  Dose:  3 ml  Block Type:  Single-shot  Needle Length:  100mm  Needle Gauge:  21 G  Needle Localization:  Ultrasound guidance  Injection Assessment:  Negative aspiration for heme, no paresthesia on injection, incremental injection and local visualized surrounding nerve on ultrasound  Evidence of intravascular injection: No     US Guided Sciatic Nerve Block   US probe placed several cm proximal to popliteal crease on posterior thigh and scanned caudad and cephalad until Sciatic Nerve (SN) identified superficial/lateral to popliteal artery.  Needle inserted lateral to probe in an in plane approach under direct visualization to a perineural position.  After negative aspiration LA injected with ease and visualized surrounding the SN.

## 2022-02-26 NOTE — CARE PLAN
The patient is Watcher - Medium risk of patient condition declining or worsening    Shift Goals  Clinical Goals: Pain control, monitor hypercalcemia  Patient Goals: Rest    Progress made toward(s) clinical / shift goals:    Problem: Pain - Standard  Goal: Alleviation of pain or a reduction in pain to the patient’s comfort goal  Outcome: Progressing     Problem: Knowledge Deficit - Standard  Goal: Patient and family/care givers will demonstrate understanding of plan of care, disease process/condition, diagnostic tests and medications  Outcome: Progressing     Problem: Psychosocial  Goal: Patient's level of anxiety will decrease  Outcome: Progressing     Problem: Mobility  Goal: Patient's capacity to carry out activities will improve  Outcome: Progressing     Problem: Self Care  Goal: Patient will have the ability to perform ADLs independently or with assistance (bathe, groom, dress, toilet and feed)  Outcome: Progressing     Problem: Infection - Standard  Goal: Patient will remain free from infection  Outcome: Progressing       Patient is not progressing towards the following goals:

## 2022-02-26 NOTE — ANESTHESIA PROCEDURE NOTES
Airway    Date/Time: 2/26/2022 2:11 PM  Performed by: Rajat Peters M.D.  Authorized by: Rajat Peters M.D.     Location:  OR  Urgency:  Elective  Indications for Airway Management:  Anesthesia      Spontaneous Ventilation: absent    Sedation Level:  Deep  Preoxygenated: Yes    Final Airway Type:  Supraglottic airway  Final Supraglottic Airway:  Standard LMA    SGA Size:  4  Number of Attempts at Approach:  1

## 2022-02-26 NOTE — ANESTHESIA PREPROCEDURE EVALUATION
" Case: [id number] Date/Time: 02/26/22 1340    Procedure: ORIF, ANKLE (Left Ankle)    Anesthesia type: General    Location: [place] [address] / SURGERY [facility]    Surgeons: Omkar Levy M.D.      Brief Past Medical History    Anesthesia: No Problems with Anesthesia (Hx of \"near flatline per patient in a distant anesthetic\"). Chronic Pain on opiates.   Cards: No History of CHF, CAD, or Stents.   Resp: No Asthma or COPD  GI: No Daily Symptomatic GERD. NPO per guidelines.  Neuro: No History of CVA , TIA, or Seizures.   Endo: No History of Diabetes  Renal: No active problems. Hypercalcemia now s/p fluid dehydration, normal renal labs, likely 2/2 malignancy.   MSK: No active problems      Relevant Problems   PULMONARY   (positive) Shortness of breath      Other   (positive) Anemia associated with chemotherapy   (positive) Class 2 obesity due to excess calories without serious comorbidity with body mass index (BMI) of 36.0 to 36.9 in adult   (positive) Endometrial sarcoma (HCC)   (positive) Hypercalcemia       Physical Exam    Airway   Mallampati: II  TM distance: >3 FB  Neck ROM: full       Cardiovascular - normal exam  Rhythm: regular  Rate: normal  (-) murmur     Dental - normal exam           Pulmonary - normal exam  Breath sounds clear to auscultation     Abdominal    Neurological - normal exam                 Anesthesia Plan    ASA 3 (Malignant Cancer )- EMERGENT   ASA physical status 3 criteria: other (comment)ASA physical status emergent criteria: compromised vital organ, limb or tissue    Plan - general and peripheral nerve block     Peripheral nerve block will be post-op pain control  Airway plan will be LMA          Induction: intravenous    Postoperative Plan: Postoperative administration of opioids is intended.    Pertinent diagnostic labs and testing reviewed    Informed Consent:    Anesthetic plan and risks discussed with patient.    Use of blood products discussed with: patient whom consented to blood " products.

## 2022-02-26 NOTE — PROGRESS NOTES
"GYN Oncology Progress Note               Author: EILEEN Miranda Date & Time created: 2/26/2022  10:11 AM     Interval History:  Had episode of emesis last night while in a lot of pain. N/V has resolved since she's had better pain control with her PCA. Went to get up to go to the bathroom last night and felt her left ankle give out; then fell. She did not hit her head .Pain in her ankle is \"okay\" denies numbness tingling, and is able to move toes. Feels sleepy with her PCA, but states that her pain is well controled and she got the best sleep she had in long time. Has not had breakfast but is drinking water. Voiding, has not had a BM.     Also noted to have right arm swelling yesterday evening around prior IV site.     Review of Systems:  Review of Systems   Constitutional: Positive for malaise/fatigue. Negative for chills and fever.   Eyes: Negative for discharge and redness.   Respiratory: Negative for shortness of breath and wheezing.    Cardiovascular: Negative for chest pain, palpitations and leg swelling.   Gastrointestinal: Negative for abdominal pain, diarrhea, nausea and vomiting.   Genitourinary: Positive for frequency. Negative for dysuria.   Musculoskeletal: Negative for back pain and myalgias.   Neurological: Negative for dizziness, sensory change, speech change, focal weakness and weakness.       Physical Exam:  Physical Exam  Constitutional:       General: She is not in acute distress.  HENT:      Head: Normocephalic.      Mouth/Throat:      Mouth: Mucous membranes are moist.   Eyes:      General: No scleral icterus.     Conjunctiva/sclera: Conjunctivae normal.   Cardiovascular:      Rate and Rhythm: Normal rate and regular rhythm.      Pulses: Normal pulses.   Pulmonary:      Effort: Pulmonary effort is normal. No respiratory distress.   Abdominal:      General: There is no distension.      Palpations: Abdomen is soft.      Tenderness: There is no abdominal tenderness. There is no guarding. "   Musculoskeletal:      Right lower leg: No edema.      Left lower leg: Tenderness present. Edema present.      Comments: Right arm swelling, mild erythema     Left ankle with generalized swelling, 2+ pedal pulse    Skin:     General: Skin is warm and dry.      Capillary Refill: Capillary refill takes 2 to 3 seconds.   Neurological:      General: No focal deficit present.      Mental Status: She is alert and oriented to person, place, and time.      Gait: Gait normal.   Psychiatric:         Mood and Affect: Mood normal.         Behavior: Behavior normal.         Labs:          Recent Labs     22  004   SODIUM 137 138 136 135   POTASSIUM 3.2* 3.5* 4.2 4.0   CHLORIDE 95* 103 101 100   CO2 24 22 19* 18*   BUN 13 11 10 10   CREATININE 1.10 0.84 0.78 0.83   MAGNESIUM 1.5  --   --   --    CALCIUM 14.2* 12.9* 12.7* 13.4*     Recent Labs     22  0044   ALTSGPT 10 9 9   ASTSGOT 23 31 35   ALKPHOSPHAT 85 80 85   TBILIRUBIN 0.2 0.3 0.3   GLUCOSE 95 81 83     Recent Labs     22  0044   RBC 3.16* 3.07*   HEMOGLOBIN 9.7* 9.5*   HEMATOCRIT 31.3* 30.6*   PLATELETCT 684* 467*     Recent Labs     22  0044   WBC 6.3  --   --  7.9   NEUTSPOLYS 61.10  --   --  75.00*   LYMPHOCYTES 28.80  --   --  16.00*   MONOCYTES 8.30  --   --  7.10   EOSINOPHILS 0.00  --   --  0.00   BASOPHILS 0.50  --   --  0.40   ASTSGOT 43 23 31 35   ALTSGPT 14 10 9 9   ALKPHOSPHAT 96 85 80 85   TBILIRUBIN 0.3 0.2 0.3 0.3     Recent Labs     22  0044   SODIUM 138 136 135   POTASSIUM 3.5* 4.2 4.0   CHLORIDE 103 101 100   CO2 22 19* 18*   GLUCOSE 95 81 83   BUN 11 10 10   CREATININE 0.84 0.78 0.83   CALCIUM 12.9* 12.7* 13.4*     Hemodynamics:  Temp (24hrs), Av.8 °C (98.3 °F), Min:36.3 °C (97.3 °F), Max:37.3 °C (99.1 °F)  Temperature: 37.1 °C (98.7 °F)  Pulse  Av.7   Min: 81  Max: 125   Blood Pressure: 136/80     Respiratory:    Respiration: 18, Pulse Oximetry: 96 %           Fluids:  No intake or output data in the 24 hours ending 02/25/22 1553     GI/Nutrition:  Orders Placed This Encounter   Procedures   • Diet Order Diet: Regular     Standing Status:   Standing     Number of Occurrences:   1     Order Specific Question:   Diet:     Answer:   Regular [1]     Medical Decision Making, by Problem:  Active Hospital Problems    Diagnosis    • *Hypercalcemia [E83.52]        Assessment and Plan:  This is a 56 y.o. female with stage III (minimum) Grade 2 endometrial adenocarcinoma s/p 6 cycles of neoadjuvant chemotherapy with Taxol/Carbo completed on 1/13/22 , who presents with hypercalcemia, nausea, and vomiting.      1. Hypercalcemia: Likely secondary to malignancy and dehydration. Calcium 14.2 on 2/23, down to 13.4 yesterday; recheck pending this AM. Received about 4 L of IVF (per MAR) since presentation to ER and has had good oral intake of water. One time dose of lasix on 2/25. AM labs pending. Bone scan ordered to evlauate for possible metastasis to bone.   2. Nausea vomiting: Secondary to above. Prn compazine and Zofran. Improved with better pain control this AM.  3. Dehydration: Secondary to above. Will hold further IVF for now with lasix. Now tolerating water PO. BUN 10 on 2/25.   4. Pelvic and low back pain: Acute on chronic. Patient with history of fibromyalgia. Seen outpatient by pain management. Poor pain control with home oxycodone 15 mg q 4 prn so was switched to dilaudid 4 mg q 3 hours prn with IV dilaudid fore BTP. Still with poor pain control on 2/25, so dilaudid PCA started. Pain now controlled, but somnolent this AM. Basal rate reduced from 1 mg to 0.5 mg/hr.   5. Left ankle fracture: 3 view ankle x-ray ordered. Elevate and do not bear weight. Will consult Ortho.   6. Right arm swelling: At prior IV site. Phlebitis vs DVT. Elevate and apply warm pack. Venous US  ordered to r/o DVT.   7. Hypokalemia: Repleted in ER and on 2/24. K+ 4.0 this AM. Continue to monitor and replete prn.   8. Anemia: Secondary to chemotherapy. Currently stable. Monitor.   9. History of Lupus: continue home hydroxychloroquine  10. Hx of mood disorder: PTSD and anxiety. Continue home paroxetine and propranolol prn.   11. Hx Raynaud's disease: Continue amlodipine.   10. Dispo: inpatient management for hypercalcemia, n/v, and pain. Plan for surgery when after correction of hypercalcemia.       Case discussed with Dr. Dyson.     Quality-Core Measures

## 2022-02-26 NOTE — PROGRESS NOTES
"0345: Called to patient room by patient, over call light states \"I think I just broke my ankle\". When entering room, patient sitting on the side of the bed and states \"I think I broke my ankle\". Patient laid down in bed and B ankles assessed. Patient states \" I had to go to the bathroom and I sat up and then stood at the side of the bed for a moment then when I took a step my left ankle twisted and I heard a crack as I went down\". Patient denies dizziness, lightheadedness or hitting head. Vital signs stable. Left pedal pulse palpable, bruising and deformity noted to Left inner ankle. Elevated left leg on three pillows and ice packs applied. Pain rated 9/10. Dr Dyson paged.     0420: Dr Dyson updated on incident and ordered xray of left foot.     0430: Updated patient on plan. Pain down to a 6-7/10.     0600: Xray completed. Pain down to a 3/10. Patient resting quietly. Pedal pulse palpable. Continue to monitor patient    0700: Care handed over to day shift Tricia LORENZO RN.  " no

## 2022-02-26 NOTE — ANESTHESIA PROCEDURE NOTES
Peripheral Block    Date/Time: 2/26/2022 2:18 PM  Performed by: Rajat Peters M.D.  Authorized by: Rajat Peters M.D.     Patient Location:  OR  Start Time:  2/26/2022 2:18 PM  Reason for Block: at surgeon's request and post-op pain management ONLY    patient identified, IV checked, site marked, risks and benefits discussed, surgical consent, monitors and equipment checked, pre-op evaluation and timeout performed    Patient Position:  Supine  Prep: ChloraPrep    Monitoring:  Heart rate, continuous pulse ox and cardiac monitor  Block Region:  Lower Extremity  Lower Extremity - Block Type:  Saphenous nerve block - OTHER peripheral nerve or branch    Laterality:  Left  Procedures: ultrasound guided  Image captured, interpreted and electronically stored.  Local Infiltration:  Lidocaine  Strength:  1 %  Dose:  3 ml  Block Type:  Single-shot  Needle Length:  100mm  Needle Gauge:  21 G  Needle Localization:  Ultrasound guidance and anatomical landmarks  Injection Assessment:  Negative aspiration for heme, no paresthesia on injection, incremental injection and local visualized surrounding nerve on ultrasound  Evidence of intravascular injection: No

## 2022-02-26 NOTE — PROGRESS NOTES
56yoF with fall in hospital and left trimalleolar ankle fracture/dislocation.  Planning surgical reduction and fixation in OR today.  See full dictated consultation for further details.

## 2022-02-27 ENCOUNTER — APPOINTMENT (OUTPATIENT)
Dept: RADIOLOGY | Facility: MEDICAL CENTER | Age: 57
DRG: 740 | End: 2022-02-27
Payer: COMMERCIAL

## 2022-02-27 LAB
ALBUMIN SERPL BCP-MCNC: 3.5 G/DL (ref 3.2–4.9)
ALBUMIN/GLOB SERPL: 1.3 G/DL
ALP SERPL-CCNC: 74 U/L (ref 30–99)
ALT SERPL-CCNC: 9 U/L (ref 2–50)
ANION GAP SERPL CALC-SCNC: 15 MMOL/L (ref 7–16)
ANION GAP SERPL CALC-SCNC: 17 MMOL/L (ref 7–16)
ANION GAP SERPL CALC-SCNC: 17 MMOL/L (ref 7–16)
ANISOCYTOSIS BLD QL SMEAR: ABNORMAL
APTT PPP: 24.5 SEC (ref 24.7–36)
APTT PPP: 33.5 SEC (ref 24.7–36)
APTT PPP: 69.6 SEC (ref 24.7–36)
AST SERPL-CCNC: 29 U/L (ref 12–45)
BASOPHILS # BLD AUTO: 0.1 % (ref 0–1.8)
BASOPHILS # BLD: 0.01 K/UL (ref 0–0.12)
BILIRUB SERPL-MCNC: 0.2 MG/DL (ref 0.1–1.5)
BUN SERPL-MCNC: 18 MG/DL (ref 8–22)
BUN SERPL-MCNC: 18 MG/DL (ref 8–22)
BUN SERPL-MCNC: 19 MG/DL (ref 8–22)
CALCIUM SERPL-MCNC: 12.2 MG/DL (ref 8.5–10.5)
CALCIUM SERPL-MCNC: 12.9 MG/DL (ref 8.5–10.5)
CALCIUM SERPL-MCNC: 13.5 MG/DL (ref 8.5–10.5)
CHLORIDE SERPL-SCNC: 93 MMOL/L (ref 96–112)
CHLORIDE SERPL-SCNC: 95 MMOL/L (ref 96–112)
CHLORIDE SERPL-SCNC: 96 MMOL/L (ref 96–112)
CO2 SERPL-SCNC: 21 MMOL/L (ref 20–33)
CO2 SERPL-SCNC: 22 MMOL/L (ref 20–33)
CO2 SERPL-SCNC: 25 MMOL/L (ref 20–33)
COMMENT 1642: NORMAL
CREAT SERPL-MCNC: 1.48 MG/DL (ref 0.5–1.4)
CREAT SERPL-MCNC: 1.5 MG/DL (ref 0.5–1.4)
CREAT SERPL-MCNC: 1.57 MG/DL (ref 0.5–1.4)
EOSINOPHIL # BLD AUTO: 0 K/UL (ref 0–0.51)
EOSINOPHIL NFR BLD: 0 % (ref 0–6.9)
ERYTHROCYTE [DISTWIDTH] IN BLOOD BY AUTOMATED COUNT: 65.1 FL (ref 35.9–50)
GLOBULIN SER CALC-MCNC: 2.7 G/DL (ref 1.9–3.5)
GLUCOSE SERPL-MCNC: 101 MG/DL (ref 65–99)
GLUCOSE SERPL-MCNC: 87 MG/DL (ref 65–99)
GLUCOSE SERPL-MCNC: 92 MG/DL (ref 65–99)
HCT VFR BLD AUTO: 24.1 % (ref 37–47)
HGB BLD-MCNC: 7.4 G/DL (ref 12–16)
IMM GRANULOCYTES # BLD AUTO: 0.12 K/UL (ref 0–0.11)
IMM GRANULOCYTES NFR BLD AUTO: 1 % (ref 0–0.9)
INR PPP: 1.22 (ref 0.87–1.13)
LYMPHOCYTES # BLD AUTO: 0.78 K/UL (ref 1–4.8)
LYMPHOCYTES NFR BLD: 6.4 % (ref 22–41)
MACROCYTES BLD QL SMEAR: ABNORMAL
MCH RBC QN AUTO: 30.5 PG (ref 27–33)
MCHC RBC AUTO-ENTMCNC: 30.7 G/DL (ref 33.6–35)
MCV RBC AUTO: 99.2 FL (ref 81.4–97.8)
MONOCYTES # BLD AUTO: 0.76 K/UL (ref 0–0.85)
MONOCYTES NFR BLD AUTO: 6.2 % (ref 0–13.4)
MORPHOLOGY BLD-IMP: NORMAL
NEUTROPHILS # BLD AUTO: 10.53 K/UL (ref 2–7.15)
NEUTROPHILS NFR BLD: 86.3 % (ref 44–72)
NRBC # BLD AUTO: 0 K/UL
NRBC BLD-RTO: 0 /100 WBC
PLATELET # BLD AUTO: 352 K/UL (ref 164–446)
PLATELET BLD QL SMEAR: NORMAL
PMV BLD AUTO: 9 FL (ref 9–12.9)
POTASSIUM SERPL-SCNC: 3.1 MMOL/L (ref 3.6–5.5)
POTASSIUM SERPL-SCNC: 3.9 MMOL/L (ref 3.6–5.5)
POTASSIUM SERPL-SCNC: 4 MMOL/L (ref 3.6–5.5)
PROT SERPL-MCNC: 6.2 G/DL (ref 6–8.2)
PROTHROMBIN TIME: 15.1 SEC (ref 12–14.6)
RBC # BLD AUTO: 2.43 M/UL (ref 4.2–5.4)
RBC BLD AUTO: PRESENT
SODIUM SERPL-SCNC: 131 MMOL/L (ref 135–145)
SODIUM SERPL-SCNC: 134 MMOL/L (ref 135–145)
SODIUM SERPL-SCNC: 136 MMOL/L (ref 135–145)
WBC # BLD AUTO: 12.2 K/UL (ref 4.8–10.8)

## 2022-02-27 PROCEDURE — 80048 BASIC METABOLIC PNL TOTAL CA: CPT

## 2022-02-27 PROCEDURE — 700111 HCHG RX REV CODE 636 W/ 250 OVERRIDE (IP)

## 2022-02-27 PROCEDURE — 93971 EXTREMITY STUDY: CPT | Mod: RT

## 2022-02-27 PROCEDURE — 700102 HCHG RX REV CODE 250 W/ 637 OVERRIDE(OP)

## 2022-02-27 PROCEDURE — 700101 HCHG RX REV CODE 250

## 2022-02-27 PROCEDURE — 80053 COMPREHEN METABOLIC PANEL: CPT

## 2022-02-27 PROCEDURE — 36415 COLL VENOUS BLD VENIPUNCTURE: CPT

## 2022-02-27 PROCEDURE — 85025 COMPLETE CBC W/AUTO DIFF WBC: CPT

## 2022-02-27 PROCEDURE — 700111 HCHG RX REV CODE 636 W/ 250 OVERRIDE (IP): Performed by: ORTHOPAEDIC SURGERY

## 2022-02-27 PROCEDURE — 97162 PT EVAL MOD COMPLEX 30 MIN: CPT

## 2022-02-27 PROCEDURE — 97166 OT EVAL MOD COMPLEX 45 MIN: CPT

## 2022-02-27 PROCEDURE — 85610 PROTHROMBIN TIME: CPT

## 2022-02-27 PROCEDURE — A9270 NON-COVERED ITEM OR SERVICE: HCPCS

## 2022-02-27 PROCEDURE — 770004 HCHG ROOM/CARE - ONCOLOGY PRIVATE *

## 2022-02-27 PROCEDURE — 85730 THROMBOPLASTIN TIME PARTIAL: CPT | Mod: 91

## 2022-02-27 PROCEDURE — 700105 HCHG RX REV CODE 258

## 2022-02-27 RX ORDER — HEPARIN SODIUM 1000 [USP'U]/ML
5000 INJECTION, SOLUTION INTRAVENOUS; SUBCUTANEOUS ONCE
Status: COMPLETED | OUTPATIENT
Start: 2022-02-27 | End: 2022-02-27

## 2022-02-27 RX ORDER — HYDROMORPHONE HYDROCHLORIDE 2 MG/1
2 TABLET ORAL ONCE
Status: COMPLETED | OUTPATIENT
Start: 2022-02-27 | End: 2022-02-27

## 2022-02-27 RX ORDER — HEPARIN SODIUM 5000 [USP'U]/100ML
INJECTION, SOLUTION INTRAVENOUS CONTINUOUS
Status: DISCONTINUED | OUTPATIENT
Start: 2022-02-27 | End: 2022-03-04

## 2022-02-27 RX ORDER — HYDROMORPHONE HYDROCHLORIDE 4 MG/1
4 TABLET ORAL
Status: DISCONTINUED | OUTPATIENT
Start: 2022-02-27 | End: 2022-02-27

## 2022-02-27 RX ORDER — LIDOCAINE AND PRILOCAINE 25; 25 MG/G; MG/G
CREAM TOPICAL PRN
Status: DISCONTINUED | OUTPATIENT
Start: 2022-02-27 | End: 2022-03-24 | Stop reason: HOSPADM

## 2022-02-27 RX ORDER — SODIUM CHLORIDE 9 MG/ML
INJECTION, SOLUTION INTRAVENOUS CONTINUOUS
Status: DISCONTINUED | OUTPATIENT
Start: 2022-02-27 | End: 2022-03-06

## 2022-02-27 RX ORDER — HYDROMORPHONE HYDROCHLORIDE 2 MG/1
2 TABLET ORAL PRN
Status: DISCONTINUED | OUTPATIENT
Start: 2022-02-27 | End: 2022-02-27

## 2022-02-27 RX ADMIN — PAROXETINE 60 MG: 30 TABLET, FILM COATED ORAL at 20:29

## 2022-02-27 RX ADMIN — FUROSEMIDE 20 MG: 10 INJECTION INTRAMUSCULAR; INTRAVENOUS at 09:27

## 2022-02-27 RX ADMIN — MIRTAZAPINE 22.5 MG: 15 TABLET, FILM COATED ORAL at 20:29

## 2022-02-27 RX ADMIN — AMLODIPINE BESYLATE 2.5 MG: 2.5 TABLET ORAL at 16:46

## 2022-02-27 RX ADMIN — BACLOFEN 10 MG: 10 TABLET ORAL at 02:39

## 2022-02-27 RX ADMIN — ONDANSETRON 4 MG: 2 INJECTION INTRAMUSCULAR; INTRAVENOUS at 13:09

## 2022-02-27 RX ADMIN — HYDROXYCHLOROQUINE SULFATE 400 MG: 200 TABLET ORAL at 09:27

## 2022-02-27 RX ADMIN — HYDROMORPHONE HYDROCHLORIDE 4 MG: 4 TABLET ORAL at 23:38

## 2022-02-27 RX ADMIN — LIDOCAINE AND PRILOCAINE 1 BOTTLE: 25; 25 CREAM TOPICAL at 09:39

## 2022-02-27 RX ADMIN — SODIUM CHLORIDE: 9 INJECTION, SOLUTION INTRAVENOUS at 10:47

## 2022-02-27 RX ADMIN — HEPARIN SODIUM 1000 UNITS/HR: 5000 INJECTION, SOLUTION INTRAVENOUS at 08:04

## 2022-02-27 RX ADMIN — HYDROMORPHONE HYDROCHLORIDE 4 MG: 4 TABLET ORAL at 10:44

## 2022-02-27 RX ADMIN — FUROSEMIDE 20 MG: 10 INJECTION INTRAMUSCULAR; INTRAVENOUS at 01:40

## 2022-02-27 RX ADMIN — ONDANSETRON 4 MG: 2 INJECTION INTRAMUSCULAR; INTRAVENOUS at 09:42

## 2022-02-27 RX ADMIN — ROPINIROLE HYDROCHLORIDE 1 MG: 1 TABLET, FILM COATED ORAL at 02:39

## 2022-02-27 RX ADMIN — ROPINIROLE HYDROCHLORIDE 1 MG: 1 TABLET, FILM COATED ORAL at 16:46

## 2022-02-27 RX ADMIN — HEPARIN SODIUM 5000 UNITS: 1000 INJECTION, SOLUTION INTRAVENOUS; SUBCUTANEOUS at 16:19

## 2022-02-27 RX ADMIN — BACLOFEN 10 MG: 10 TABLET ORAL at 16:46

## 2022-02-27 RX ADMIN — HYDROMORPHONE HYDROCHLORIDE 2 MG: 2 TABLET ORAL at 03:29

## 2022-02-27 RX ADMIN — FUROSEMIDE 20 MG: 10 INJECTION INTRAMUSCULAR; INTRAVENOUS at 13:44

## 2022-02-27 RX ADMIN — CEFAZOLIN SODIUM 1 G: 1 INJECTION, SOLUTION INTRAVENOUS at 06:05

## 2022-02-27 RX ADMIN — HYDROMORPHONE HYDROCHLORIDE 4 MG: 4 TABLET ORAL at 08:10

## 2022-02-27 RX ADMIN — HEPARIN SODIUM 5000 UNITS: 1000 INJECTION, SOLUTION INTRAVENOUS; SUBCUTANEOUS at 08:02

## 2022-02-27 RX ADMIN — PAMIDRONATE DISODIUM 60 MG: 9 INJECTION, SOLUTION INTRAVENOUS at 10:47

## 2022-02-27 RX ADMIN — HYDROMORPHONE HYDROCHLORIDE 2 MG: 2 TABLET ORAL at 06:03

## 2022-02-27 RX ADMIN — FUROSEMIDE 20 MG: 10 INJECTION INTRAMUSCULAR; INTRAVENOUS at 20:29

## 2022-02-27 RX ADMIN — BACLOFEN 10 MG: 10 TABLET ORAL at 13:14

## 2022-02-27 RX ADMIN — HYDROMORPHONE HYDROCHLORIDE 4 MG: 4 TABLET ORAL at 20:29

## 2022-02-27 RX ADMIN — HYDROMORPHONE HYDROCHLORIDE 4 MG: 4 TABLET ORAL at 13:58

## 2022-02-27 RX ADMIN — HYDROMORPHONE HYDROCHLORIDE 4 MG: 4 TABLET ORAL at 16:46

## 2022-02-27 RX ADMIN — HEPARIN SODIUM 1500 UNITS/HR: 5000 INJECTION, SOLUTION INTRAVENOUS at 16:19

## 2022-02-27 ASSESSMENT — ENCOUNTER SYMPTOMS
FEVER: 0
WHEEZING: 0
WEAKNESS: 0
SPEECH CHANGE: 0
CHILLS: 0
DIZZINESS: 0
EYE DISCHARGE: 0
FOCAL WEAKNESS: 0
VOMITING: 0
SHORTNESS OF BREATH: 0
SENSORY CHANGE: 1
PALPITATIONS: 0
DIARRHEA: 0
MYALGIAS: 0
EYE REDNESS: 0
BACK PAIN: 0
NAUSEA: 0
ABDOMINAL PAIN: 0

## 2022-02-27 ASSESSMENT — COGNITIVE AND FUNCTIONAL STATUS - GENERAL
TURNING FROM BACK TO SIDE WHILE IN FLAT BAD: A LITTLE
SUGGESTED CMS G CODE MODIFIER MOBILITY: CL
CLIMB 3 TO 5 STEPS WITH RAILING: TOTAL
STANDING UP FROM CHAIR USING ARMS: A LITTLE
HELP NEEDED FOR BATHING: A LOT
WALKING IN HOSPITAL ROOM: TOTAL
SUGGESTED CMS G CODE MODIFIER DAILY ACTIVITY: CK
DAILY ACTIVITIY SCORE: 18
MOBILITY SCORE: 13
DRESSING REGULAR LOWER BODY CLOTHING: A LOT
MOVING FROM LYING ON BACK TO SITTING ON SIDE OF FLAT BED: A LOT
TOILETING: A LOT
MOVING TO AND FROM BED TO CHAIR: A LITTLE

## 2022-02-27 ASSESSMENT — PAIN DESCRIPTION - PAIN TYPE
TYPE: ACUTE PAIN;CHRONIC PAIN
TYPE: ACUTE PAIN
TYPE: SURGICAL PAIN

## 2022-02-27 ASSESSMENT — PATIENT HEALTH QUESTIONNAIRE - PHQ9
SUM OF ALL RESPONSES TO PHQ9 QUESTIONS 1 AND 2: 0
1. LITTLE INTEREST OR PLEASURE IN DOING THINGS: NOT AT ALL
2. FEELING DOWN, DEPRESSED, IRRITABLE, OR HOPELESS: NOT AT ALL

## 2022-02-27 ASSESSMENT — ACTIVITIES OF DAILY LIVING (ADL): TOILETING: INDEPENDENT

## 2022-02-27 ASSESSMENT — GAIT ASSESSMENTS: GAIT LEVEL OF ASSIST: UNABLE TO PARTICIPATE

## 2022-02-27 NOTE — PROGRESS NOTES
Attempted to insert garza catheter per order. Patient very anxious. Resistance met by both myself and Chantale Juarez RN. Small/Moderate amount vaginal bleeding w/clots after. Updated Ines BRUNER.

## 2022-02-27 NOTE — ANESTHESIA TIME REPORT
Anesthesia Start and Stop Event Times     Date Time Event    2/26/2022 1358 Ready for Procedure     1404 Anesthesia Start     1605 Anesthesia Stop        Responsible Staff  02/26/22    Name Role Begin End    Rajat Peters M.D. Anesth 1404 1605        Preop Diagnosis (Free Text):  Pre-op Diagnosis     LEFT TRIMALLEOLAR ANKLE FRACTURE         Preop Diagnosis (Codes):    Premium Reason  E. Weekend    Comments:

## 2022-02-27 NOTE — OR NURSING
1602 - Pt arrived to PACU 4A. Pt is sleeping. Left foot elevated and ice applied, dressing is CD&I.    1625 - Wife, Gosia, updated.    1645 - Pt waking and denies pain. Pt suctioned due to wet sounding breath sounds.    1730 - Pt remains somnolent. Pt will wake and answer one yes or no question or say hi and then fall back to sleep. Pt continues to deny pain and nausea. Wife, Gosia, updated.    1800 - Discussed pt somnolence with Dr. Peters. Orders received.    1830 - Discussed CXR with Dr. Peters and will get ABG.    1840 - Wife, Gosia, updated.    1905 - Discussed ABG results with Dr Peters and called respiratory to start CPAP.    2000 - Pt on CPAP with 4L for 25 minutes. Pt more awake and able to talk in sentences.     2017 - Discussed CPAP and SBP in 90's with Dr. Peters. Pt is ok to go to floor. Dr. Peters also made aware of calcium=14.4

## 2022-02-27 NOTE — PROGRESS NOTES
GYN Oncology Progress Note               Author: EILEEN Miranda Date & Time created: 2/27/2022  7:43 AM     Interval History:  Very somnolent after surgery yesterday and overnight. More alert now, report that her pelvic and low back pain is ramping back up again; would like more pain medication. Sensation returning in let foot after surgery. No n/v. + appetite. Right arm swollen; denies SOB or CP.       Review of Systems:  Review of Systems   Constitutional: Negative for chills, fever and malaise/fatigue.   Eyes: Negative for discharge and redness.   Respiratory: Negative for shortness of breath and wheezing.    Cardiovascular: Negative for chest pain, palpitations and leg swelling.   Gastrointestinal: Negative for abdominal pain, diarrhea, nausea and vomiting.   Genitourinary: Positive for frequency. Negative for dysuria.   Musculoskeletal: Negative for back pain and myalgias.   Neurological: Positive for sensory change. Negative for dizziness, speech change, focal weakness and weakness.        Left foot        Physical Exam:  Physical Exam  Constitutional:       General: She is not in acute distress.  HENT:      Head: Normocephalic.      Mouth/Throat:      Mouth: Mucous membranes are moist.   Eyes:      General: No scleral icterus.     Conjunctiva/sclera: Conjunctivae normal.   Cardiovascular:      Rate and Rhythm: Normal rate and regular rhythm.      Pulses: Normal pulses.   Pulmonary:      Effort: Pulmonary effort is normal. No respiratory distress.   Abdominal:      General: There is no distension.      Palpations: Abdomen is soft.      Tenderness: There is no abdominal tenderness. There is no guarding.   Musculoskeletal:      Right lower leg: No edema.      Left lower leg: Tenderness present. Edema present.      Comments: Right arm swelling, mild erythema     Left ankle with surgical dressing.    Skin:     General: Skin is warm and dry.      Capillary Refill: Capillary refill takes 2 to 3 seconds.    Neurological:      General: No focal deficit present.      Mental Status: She is alert and oriented to person, place, and time.      Gait: Gait normal.   Psychiatric:         Mood and Affect: Mood normal.         Behavior: Behavior normal.         Labs:  Recent Labs     22   [id number] 7.33*   [id number] 48.5*   DMBZG625T 70.8   OOVQ7NXH 91.3*   ARTHCO3 25   ARTBE -1         Recent Labs     22  06   SODIUM 135 137 136   POTASSIUM 4.0 4.2 4.0   CHLORIDE 100 98 96   CO2 18* 23 25   BUN 10 15 19   CREATININE 0.83 1.42* 1.48*   CALCIUM 13.4* 14.4* 13.5*     Recent Labs     22  06   ALTSGPT 9 11 9   ASTSGOT 35 34 29   ALKPHOSPHAT 85 81 74   TBILIRUBIN 0.3 0.2 0.2   GLUCOSE 83 111* 101*     Recent Labs     22  0620   RBC 3.07* 2.43*   HEMOGLOBIN 9.5* 7.4*   HEMATOCRIT 30.6* 24.1*   PLATELETCT 467* 352   PROTHROMBTM  --  15.1*   APTT  --  24.5*   INR  --  1.22*     Recent Labs     22  0620   WBC 7.9  --  12.2*   NEUTSPOLYS 75.00*  --  86.30*   LYMPHOCYTES 16.00*  --  6.40*   MONOCYTES 7.10  --  6.20   EOSINOPHILS 0.00  --  0.00   BASOPHILS 0.40  --  0.10   ASTSGOT 35 34 29   ALTSGPT 9 11 9   ALKPHOSPHAT 85 81 74   TBILIRUBIN 0.3 0.2 0.2     Recent Labs     22  0620   SODIUM 135 137 136   POTASSIUM 4.0 4.2 4.0   CHLORIDE 100 98 96   CO2 18* 23 25   GLUCOSE 83 111* 101*   BUN 10 15 19   CREATININE 0.83 1.42* 1.48*   CALCIUM 13.4* 14.4* 13.5*     Hemodynamics:  Temp (24hrs), Av.7 °C (98 °F), Min:36.1 °C (97 °F), Max:37.6 °C (99.7 °F)  Temperature: 37.6 °C (99.7 °F)  Pulse  Av.6  Min: 81  Max: 125   Blood Pressure: 148/89     Respiratory:    Respiration: 16, Pulse Oximetry: 96 %           Fluids:  No intake or output data in the 24 hours ending 22 1557     GI/Nutrition:  Orders Placed This Encounter   Procedures   • Diet Order Diet:  Regular     Standing Status:   Standing     Number of Occurrences:   1     Order Specific Question:   Diet:     Answer:   Regular [1]     Medical Decision Making, by Problem:  Active Hospital Problems    Diagnosis    • *Hypercalcemia [E83.52]        Assessment and Plan:  This is a 56 y.o. female with stage III (minimum) Grade 2 endometrial adenocarcinoma s/p 6 cycles of neoadjuvant chemotherapy with Taxol/Carbo completed on 1/13/22 , who presents with hypercalcemia, nausea, and vomiting.      1. Hypercalcemia: Likely secondary to malignancy and dehydration. Calcium 14.2 on 2/23. Received about 4 L of IVF (per MAR) since presentation to ER and has had good oral intake of water. One time dose of lasix on 2/25. Got 1L NS overnight. On scheduled lasix 20 mg q 6, pamidronate 60 mg ordered. Start NS at 200 ml/h. Calcium down to 13.5 this AM from 14.4 this AM. Bone scan pending.   2. Nausea vomiting: Secondary to above. Prn compazine and Zofran. Improved this AM.   3. Dehydration: Secondary to above. Start NS @ 200/   4. Pelvic and low back pain: Acute on chronic. Patient with history of fibromyalgia. Seen outpatient by pain management. Poor pain control with home oxycodone 15 mg q 4 prn. Resume dilaudid 4 mg PO q 3 hours prn now that mentation has improved.    5.Left trimalleolar ankle fracture/dislocation: s/p ORIF 2/26.   6. Upper extremity DVT: Venous US positive for DVT. Start heparin gtt today.   7. Hypokalemia: Repleted in ER and on 2/24. Continue to monitor and replete prn.   8. Anemia: Secondary to chemotherapy. Hgb 7.4 this AM, likely secondary to loss in surgery and hemodilution. Mildly tachycardic but patient asymptomatic. Monitor.    9. ILIA: Cr 1.48. Likley secondary to dehydration. Start IVF and monitor.    10. History of Lupus: continue home hydroxychloroquine  11. Hx of mood disorder: PTSD and anxiety. Continue home paroxetine and propranolol prn.   12. Hx Raynaud's disease: Continue amlodipine.   13.  Dispo: inpatient management for hypercalcemia, n/v, and pain. Plan for surgery when after correction of hypercalcemia.       Case discussed with Dr. Dyson.     Quality-Core Measures

## 2022-02-27 NOTE — PROGRESS NOTES
56yoF with fall in hospital and left trimalleolar ankle fracture/dislocation s/p ORIF today.  Admitted to gyn onc with endometrial adenocarcinoma and hypercalcemia.      S: Doing okay in PACU waking from anesthesia    O:    Vitals:    03/24/22 0916   BP: 155/68   Pulse: 78   Resp: 16   Temp: 36.8 °C (98.3 °F)   SpO2: 95%     Exam:  General-NAD, somnolent but following simple commands  LLE-splint c/d/i, BCR in toes, flexing/extending toes slightly    A: 56yoF with fall in hospital and left trimalleolar ankle fracture/dislocation s/p ORIF today.  Admitted to gyn onc with endometrial adenocarcinoma and hypercalcemia.     Recs:  --readmit gyn onc postop  --NWB LLE in splint  --ancef x 2 doses postop  --PT/OT for mobilization ASAP  --fu 2 weeks postop

## 2022-02-27 NOTE — ANESTHESIA POSTPROCEDURE EVALUATION
Patient: Magali Leal    Procedure Summary     Date: 02/26/22 Room / Location: [place] [address] / SURGERY [facility]    Anesthesia Start: 1404 Anesthesia Stop: 1605    Procedure: ORIF, ANKLE (Left Ankle) Diagnosis: (LEFT TRIMALLEOLAR ANKLE FRACTURE )    Surgeons: Omkar Levy M.D. Responsible Provider: Rajat Peters M.D.    Anesthesia Type: general, peripheral nerve block ASA Status: 3 - Emergent          Final Anesthesia Type: general, peripheral nerve block  Last vitals  BP   Blood Pressure: 121/65    Temp   36.6 °C (97.8 °F)    Pulse   96   Resp   (!) 9    SpO2   90 %      Anesthesia Post Evaluation    Patient location during evaluation: PACU  Patient participation: complete - patient participated  Level of consciousness: awake and alert    Airway patency: patent  Anesthetic complications: no  Cardiovascular status: hemodynamically stable  Respiratory status: acceptable  Hydration status: euvolemic    PONV: none          No complications documented.     Nurse Pain Score: 0 (NPRS)

## 2022-02-27 NOTE — PROGRESS NOTES
"   Orthopaedic Progress Note    Interval changes:  Patient doing well  LLE splint CDI    ROS - Patient denies any new issues.  Pain well controlled.    /71   Pulse (!) 108   Temp 37.6 °C (99.6 °F) (Temporal)   Resp 16   Ht 1.702 m (5' 7\")   Wt 99.1 kg (218 lb 7.6 oz)   SpO2 96%       Patient seen and examined  No acute distress  Breathing non labored  RRR  LLE splint CDI, DNVI moves all toes, cap refill <2 sec.     Recent Labs     02/25/22  0044 02/27/22  0620   WBC 7.9 12.2*   RBC 3.07* 2.43*   HEMOGLOBIN 9.5* 7.4*   HEMATOCRIT 30.6* 24.1*   MCV 99.7* 99.2*   MCH 30.9 30.5   MCHC 31.0* 30.7*   RDW 65.0* 65.1*   PLATELETCT 467* 352   MPV 8.9* 9.0       Active Hospital Problems    Diagnosis    • Hypercalcemia [E83.52]        Assessment/Plan:  Patient doing well  LLE splint CDI  POD#1 S/P   1.  Open treatment with internal fixation, left trimalleolar ankle fracture with fixation of posterior lip.  2.  Open treatment without fixation of anterolateral distal tibia fracture with fragment excision.  3.  Surgical fixation of left distal tibiofibular joint disruption.  4.  Physician applied stress examination of the left ankle using fluoroscopy  Wt bearing status - NWB LLE  Wound care/Drains - Dressings to be changed every other day by nursing  Future Procedures - none planned   Sutures/Staples out- 14 days post operatively  PT/OT-initiated  Antibiotics: Perioperative completed  DVT Prophylaxis- TEDS/SCDs/Foot pumps  [place]-none planned   Case Coordination for Discharge Planning - Disposition pending    "

## 2022-02-27 NOTE — THERAPY
Occupational Therapy   Initial Evaluation     Patient Name: Magali Leal  Age:  56 y.o., Sex:  female  Medical Record #: [id number]  Today's Date: 2/27/2022     Precautions  Precautions: (P) Fall Risk,Non Weight Bearing Left Lower Extremity    Assessment  Patient is 56 y.o. female Pt initially admitted for hypercalcemia, nausea, vomiting, with history of stage III endometrial adenocarcinoma, while admitted pt had a fall upon standing to go to the bathroom in which patient sustained a left ankle fracture now s/p ORIF (NWB LLE). Anticipate pt had been independent prior to admission living with spouse who was initialy present but left upon return. Pt required Nicolas for bed mobility, modA for STS and transfer to chair with cueing for maintaining NWB of LLE, maxA for LB dressing, will continue to see for skilled therapy while admitted and recommend post-acute placement.    Plan    Recommend Occupational Therapy 3 times per week until therapy goals are met for the following treatments:  Adaptive Equipment, Neuro Re-Education / Balance, Self Care/Activities of Daily Living, Therapeutic Activities, and Therapeutic Exercises.    Objective       02/27/22 1140   Prior Living Situation   Prior Services Home-Independent   Housing / Facility [facility]   Bathroom Set up Walk In Shower   Lives with - Patient's Self Care Capacity Spouse   Prior Level of ADL Function   Self Feeding Independent   Grooming / Hygiene Independent   Bathing Independent   Dressing Independent   Toileting Independent   Prior Level of IADL Function   Prior Level Of Mobility Independent Without Device in Community   History of Falls   History of Falls Yes   Date of Last Fall   (during admission, 2/26)   Precautions   Precautions Fall Risk;Non Weight Bearing Left Lower Extremity   Cognition    Cognition / Consciousness X   Level of Consciousness Alert   Safety Awareness Impaired   New Learning Impaired   Attention Impaired   Active ROM Upper Body   Active  ROM Upper Body  WDL   Dominant Hand Right   Strength Upper Body   Upper Body Strength  WDL   Sensation Upper Body   Upper Extremity Sensation  WDL   Upper Body Muscle Tone   Upper Body Muscle Tone  WDL   Neurological Concerns   Neurological Concerns No   Coordination Upper Body   Coordination WDL   Balance Assessment   Sitting Balance (Static) Fair   Sitting Balance (Dynamic) Fair   Standing Balance (Static) Fair -   Standing Balance (Dynamic) Poor   Weight Shift Sitting Fair   Weight Shift Standing Poor   Comments w/ FWW   Bed Mobility    Supine to Sit Minimal Assist   Scooting Minimal Assist   Rolling Minimal Assist to Rt.   ADL Assessment   Grooming Supervision;Seated   Upper Body Dressing Supervision   Lower Body Dressing Maximal Assist   How much help from another person does the patient currently need...   Putting on and taking off regular lower body clothing? 2   Bathing (including washing, rinsing, and drying)? 2   Toileting, which includes using a toilet, bedpan, or urinal? 2   Putting on and taking off regular upper body clothing? 4   Taking care of personal grooming such as brushing teeth? 4   Eating meals? 4   6 Clicks Daily Activity Score 18   Functional Mobility   Sit to Stand Minimal Assist   Bed, Chair, Wheelchair Transfer Moderate Assist   Transfer Method Stand Step   Mobility bed mobility, transfer to chair   Comments w/ FWW   Short Term Goals   Short Term Goal # 1 Pt will complete ADL transfers with supervision   Short Term Goal # 2 Pt will complete LB dressing with supervision   Short Term Goal # 3 Pt will complete toileting with supervision   Education Group   Education Provided Role of Occupational Therapist;Weight Bearing Precautions   Role of Occupational Therapist Patient Response Patient;Acceptance;Explanation   Weight Bearing Precautions Patient Response Patient;Acceptance;Explanation;Reinforcement Needed;Action Demonstration   Problem List   Problem List Decreased Active Daily Living  Skills;Decreased Homemaking Skills;Decreased Functional Mobility;Decreased Activity Tolerance;Impaired Postural Control / Balance;Safety Awareness Deficits / Cognition   Interdisciplinary Plan of Care Collaboration   IDT Collaboration with  Nursing;Physical Therapist   Patient Position at End of Therapy Seated;Phone within Reach;Tray Table within Reach;Call Light within Reach;Chair Alarm On   Collaboration Comments RN updated

## 2022-02-27 NOTE — CARE PLAN
The patient is Watcher - Medium risk of patient condition declining or worsening    Shift Goals  Clinical Goals: safety  Patient Goals: rest, safety    Progress made toward(s) clinical / shift goals:    Problem: Pain - Standard  Goal: Alleviation of pain or a reduction in pain to the patient’s comfort goal  Outcome: Progressing     Problem: Knowledge Deficit - Standard  Goal: Patient and family/care givers will demonstrate understanding of plan of care, disease process/condition, diagnostic tests and medications  Outcome: Progressing     Problem: Psychosocial  Goal: Patient's level of anxiety will decrease  Outcome: Progressing       Patient is not progressing towards the following goals:

## 2022-02-27 NOTE — OR SURGEON
Immediate Post OP Note    PreOp Diagnosis: Left trimalleolar ankle fracture/dislocation      PostOp Diagnosis: same      Procedure(s):  ORIF, ANKLE - Wound Class: Clean    Surgeon(s):  Omkar Levy M.D.    Anesthesiologist/Type of Anesthesia:  Anesthesiologist: Rajat Peters M.D./General    Surgical Staff:  Circulator: Ernesto Palmer R.N.; Nanda Null R.N.  Relief Scrub: Geoff Velazquez  Scrub Person: John Hansen  First Assist: Amy Champagne P.A.-C.  Radiology Technologist: Tenzin Valverde    Specimens removed if any:  * No specimens in log *    Estimated Blood Loss: minimal    Findings: see dictation    Complications: none known    PLAN:  --readmit gyn onc postop  --NWB LLE in splint  --ancef x 2 doses postop  --PT/OT for mobilization ASAP  --fu 2 weeks postop        2/26/2022 4:07 PM Omkar Levy M.D.

## 2022-02-27 NOTE — OP REPORT
DATE OF SERVICE:  02/26/2022     PREOPERATIVE DIAGNOSIS:  Left trimalleolar ankle fracture dislocation.     POSTOPERATIVE DIAGNOSES:  1.  Left trimalleolar ankle fracture dislocation.  2.  Disruption of left distal tibiofibular joint and small fracture of the   anterolateral distal tibia.     PROCEDURES PERFORMED:    1.  Open treatment with internal fixation, left trimalleolar ankle fracture   with fixation of posterior lip.  2.  Surgical fixation of left distal tibiofibular joint disruption.  3.  Physician applied stress examination of the left ankle using fluoroscopy.     SURGEON:  Omkar Levy MD     ANESTHESIOLOGIST:  Rajat Peters MD     ANESTHESIA:  General.     ASSISTANT:  Amy Champagne PA-C     ESTIMATED BLOOD LOSS:  Minimal.     TOURNIQUET TIME:  80 minutes at 250 mmHg, left thigh.     IMPLANTS:  Synthes 1/3 tubular locking plate and 4.0 mm cannulated screws with   nonlocking 3.5 mm cortical screws.     INDICATIONS FOR PROCEDURE:  The patient is a 56-year-old female.  She has   a history of stage III endometrial adenocarcinoma.  She was admitted to the   gynecological oncology service with hypercalcemia.  She fell in the hospital,   sustained a left trimalleolar ankle fracture dislocation.  I was consulted to   provide treatment recommendations and I recommended surgical reduction and   fixation.  She signed informed consent preoperatively and wished to proceed   with surgery as outlined above.     DESCRIPTION OF PROCEDURE:  The patient was met in the preoperative holding   area.  Her surgical site was signed.  Her consent was confirmed to be   accurate.  She was taken back to the operating room and general anesthesia was   induced.  Dr. Peters performed a regional anesthetic at my request to help   with postoperative pain control.  Tourniquet was then applied to left thigh.    The left lower extremity was provisionally cleansed with isopropyl alcohol and   then prepped and draped in the usual  sterile fashion.  A formal timeout was   performed to confirm the patient's correct name, correct surgical site,   correct procedure and correct laterality.  The limb was then exsanguinated   with an Esmarch and the tourniquet was inflated to 250 mmHg.  A lateral   incision was made centered over the distal fibula fracture with a scalpel down   through skin.  Dissection was carried proximally through the subcutaneous   tissues with Metzenbaum scissors.  I did not encounter the superficial   peroneal nerve in the dissection.  I dissected down to bone more distally at   the fracture site, where there was comminution anteriorly.  There was also   extension of this anteromedial fibula fracture at the anterior distal   tibiofibular ligament with an avulsion off of the anterolateral distal tibia.    It was quite challenging to reduce this portion of the anterolateral distal   tibia and fibula as well as reduce the obliquely oriented fibula fracture.    So, I excised the anterolateral distal tibia fracture fragment and was able to   reduce the remnant anterior distal fibular comminution anteriorly and get the   fibula out to length and provisionally stabilized it with a posterolateral   1/3 tubular locking plate and fixed proximally with bicortical nonlocking   screw.  I then turned my attention to the medial malleolus.  I incised the   skin longitudinally and dissection was carried down to the fracture site. I   excised entrapped periosteum within the fracture site and reduced the medial   malleolus in anatomic alignment with a 2-point reduction forceps and then   placed guide pins for 4.0 cannulated screws in relative parallel fashion,   sequentially drilled and inserted the screws.  I confirmed they were well   positioned.  I removed the clamps and guidewires.  I then turned my attention   back to the fibula. I fine tuned the reduction and she had an atypical   appearance of the distal fibula, so I was able to obtain an  intraoperative   comparison view of the right ankle showing the similar appearance of her   distal fibula at the lateral gutter.  So I felt that the fibula was out to   length based on cortical reads as well as comparative x-ray. I then fixed   plate to bone more proximally with two bicortical nonlocking screws in   addition to the previous screw and two unicortical locking screws distally.  I   then used fluoroscopic guidance and percutaneous incision anteriorly and   placed an anterior to posterior 3.5 mm lag screw across the posterior   malleolus fracture compressing the fracture and then a positional screw just   lateral to that.  I then performed a stress examination using the cotton test   to the ankle syndesmosis.  There was some widening and a little bit of   rotational instability given the disruption of the anterior distal   tibiofibular ligament disruption, so I reduced that manually with my thumb and   placed a quadricortical 3.5 mm cortical screw from the fibula through the   plate to the tibia a couple of centimeters proximal to the ankle joint to   stabilize the syndesmosis.  Final fluoroscopic imaging then confirmed overall   acceptable alignment of the fracture and acceptable position of the implants.    The wounds were thoroughly irrigated with normal saline.  Reapproximated   subcutaneous layers with 0 Vicryl, 2-0 Vicryl and the skin edges with staples   laterally and 3-0 nylon anteriorly and medially.  The wounds were thoroughly   cleansed, dried and a sterile dressing and a well-padded short leg plaster   splint was applied.  The tourniquet had been deflated after about 80 minutes.    She was awoken from anesthesia and transferred on the [facility] and taken to   postanesthesia care unit in stable condition.     PLAN:    1.  The patient will be readmitted postoperatively.  2.  She should be nonweightbearing left lower extremity in her splint.  3.  She should work with physical and occupational  therapy as soon as possible   for mobilization.  4.  Ultimately, she can follow up with me in 2 weeks postop for routine wound   check and staple removal.    Amy Champagne PA-C was present and essential for the duration of the procedure.   Required assistance included positioning, draping, retracting, and wound closure.         ______________________________  MD LATISHA Burnett/KENDRA/ANN-MARIE/ANN-MARIE    DD:  02/26/2022 16:16  DT:  02/26/2022 16:56    Job#:  [id number]

## 2022-02-27 NOTE — DIETARY
"Nutrition services: Day 3 of admit.  Magali Leal is a 56 y.o. female with admitting DX of hypercalcemia, nausea/vomiting, hypokalema   S/p ankle and tibia surgery after in hospital fall  History includes stage III endometrial adenocarcinoma, anemia, anxiety, arthritis, fibromyalgia, lupus    · Patient with poor PO intake noted on admit screen, but no weight loss noted on admit screen.  · Patient stated current weight is close to usual weight; she stated has been unable to keep food down.    · This morning, she stated she kept down a couple of bites of potato.  She is requesting bland foods.  Discussed options to start with. She agreed to try Boost Breeze, jello, broth and other bland foods.    Assessment:  Height: 170.2 cm (5' 7\")  Weight: 99.1 kg (218 lb 7.6 oz)  Body mass index is 34.22 kg/m².  Diet/Intake: Regular diet    Evaluation:   1. Pertinent Medications include Lasix and Remeron    Malnutrition Risk: Not noted at this time    Recommendations/Interventions/Plan:    1. Nausea management per provider  2. Nutrition supplement order placed  3. Encourage intake of meals and supplements  4. Document intake of all PO as % taken in ADL's to provide interdisciplinary communication across all shifts.   5. Monitor weight.  6. Nutrition rep will continue to see patient for ongoing meal and snack preferences.     RD following    "

## 2022-02-27 NOTE — CARE PLAN
The patient is Watcher - Medium risk of patient condition declining or worsening    Shift Goals  Clinical Goals: safety  Patient Goals: rest, safety    Progress made toward(s) clinical / shift goals:  Pain better controlled per pt, pt and family     Patient is not progressing towards the following goals:    Problem: Pain - Standard  Goal: Alleviation of pain or a reduction in pain to the patient’s comfort goal  Outcome: Progressing     Problem: Psychosocial  Goal: Patient's level of anxiety will decrease  Outcome: Progressing

## 2022-02-28 ENCOUNTER — APPOINTMENT (OUTPATIENT)
Dept: RADIOLOGY | Facility: MEDICAL CENTER | Age: 57
DRG: 740 | End: 2022-02-28
Payer: COMMERCIAL

## 2022-02-28 LAB
ALBUMIN SERPL BCP-MCNC: 3 G/DL (ref 3.2–4.9)
ALBUMIN SERPL BCP-MCNC: 3.8 G/DL (ref 3.2–4.9)
ALBUMIN/GLOB SERPL: 1 G/DL
ALBUMIN/GLOB SERPL: 1.3 G/DL
ALP SERPL-CCNC: 78 U/L (ref 30–99)
ALP SERPL-CCNC: 78 U/L (ref 30–99)
ALT SERPL-CCNC: 6 U/L (ref 2–50)
ALT SERPL-CCNC: 6 U/L (ref 2–50)
ANION GAP SERPL CALC-SCNC: 16 MMOL/L (ref 7–16)
ANION GAP SERPL CALC-SCNC: 45 MMOL/L (ref 7–16)
APTT PPP: 49 SEC (ref 24.7–36)
APTT PPP: 67.7 SEC (ref 24.7–36)
APTT PPP: 69 SEC (ref 24.7–36)
AST SERPL-CCNC: 33 U/L (ref 12–45)
AST SERPL-CCNC: 35 U/L (ref 12–45)
BASOPHILS # BLD AUTO: 0.2 % (ref 0–1.8)
BASOPHILS # BLD: 0.02 K/UL (ref 0–0.12)
BILIRUB SERPL-MCNC: 0.3 MG/DL (ref 0.1–1.5)
BILIRUB SERPL-MCNC: 0.3 MG/DL (ref 0.1–1.5)
BUN SERPL-MCNC: 16 MG/DL (ref 8–22)
BUN SERPL-MCNC: 18 MG/DL (ref 8–22)
CALCIUM SERPL-MCNC: 10.5 MG/DL (ref 8.5–10.5)
CALCIUM SERPL-MCNC: 12.3 MG/DL (ref 8.5–10.5)
CHLORIDE SERPL-SCNC: 87 MMOL/L (ref 96–112)
CHLORIDE SERPL-SCNC: 98 MMOL/L (ref 96–112)
CO2 SERPL-SCNC: 18 MMOL/L (ref 20–33)
CO2 SERPL-SCNC: 23 MMOL/L (ref 20–33)
CREAT SERPL-MCNC: 1.23 MG/DL (ref 0.5–1.4)
CREAT SERPL-MCNC: 1.54 MG/DL (ref 0.5–1.4)
EOSINOPHIL # BLD AUTO: 0 K/UL (ref 0–0.51)
EOSINOPHIL NFR BLD: 0 % (ref 0–6.9)
ERYTHROCYTE [DISTWIDTH] IN BLOOD BY AUTOMATED COUNT: 64.7 FL (ref 35.9–50)
GLOBULIN SER CALC-MCNC: 2.9 G/DL (ref 1.9–3.5)
GLOBULIN SER CALC-MCNC: 3.1 G/DL (ref 1.9–3.5)
GLUCOSE SERPL-MCNC: 78 MG/DL (ref 65–99)
GLUCOSE SERPL-MCNC: 88 MG/DL (ref 65–99)
HCT VFR BLD AUTO: 27 % (ref 37–47)
HGB BLD-MCNC: 8.4 G/DL (ref 12–16)
IMM GRANULOCYTES # BLD AUTO: 0.12 K/UL (ref 0–0.11)
IMM GRANULOCYTES NFR BLD AUTO: 1.2 % (ref 0–0.9)
LYMPHOCYTES # BLD AUTO: 0.68 K/UL (ref 1–4.8)
LYMPHOCYTES NFR BLD: 6.7 % (ref 22–41)
MCH RBC QN AUTO: 30.5 PG (ref 27–33)
MCHC RBC AUTO-ENTMCNC: 31.1 G/DL (ref 33.6–35)
MCV RBC AUTO: 98.2 FL (ref 81.4–97.8)
MONOCYTES # BLD AUTO: 0.44 K/UL (ref 0–0.85)
MONOCYTES NFR BLD AUTO: 4.3 % (ref 0–13.4)
NEUTROPHILS # BLD AUTO: 8.92 K/UL (ref 2–7.15)
NEUTROPHILS NFR BLD: 87.6 % (ref 44–72)
NRBC # BLD AUTO: 0 K/UL
NRBC BLD-RTO: 0 /100 WBC
PLATELET # BLD AUTO: 418 K/UL (ref 164–446)
PMV BLD AUTO: 9.7 FL (ref 9–12.9)
POTASSIUM SERPL-SCNC: 3.1 MMOL/L (ref 3.6–5.5)
POTASSIUM SERPL-SCNC: 3.3 MMOL/L (ref 3.6–5.5)
PROT SERPL-MCNC: 6.1 G/DL (ref 6–8.2)
PROT SERPL-MCNC: 6.7 G/DL (ref 6–8.2)
RBC # BLD AUTO: 2.75 M/UL (ref 4.2–5.4)
SODIUM SERPL-SCNC: 137 MMOL/L (ref 135–145)
SODIUM SERPL-SCNC: 150 MMOL/L (ref 135–145)
WBC # BLD AUTO: 10.2 K/UL (ref 4.8–10.8)

## 2022-02-28 PROCEDURE — 700105 HCHG RX REV CODE 258: Performed by: SPECIALIST

## 2022-02-28 PROCEDURE — 700105 HCHG RX REV CODE 258

## 2022-02-28 PROCEDURE — 700102 HCHG RX REV CODE 250 W/ 637 OVERRIDE(OP): Performed by: EMERGENCY MEDICINE

## 2022-02-28 PROCEDURE — 85730 THROMBOPLASTIN TIME PARTIAL: CPT

## 2022-02-28 PROCEDURE — 36415 COLL VENOUS BLD VENIPUNCTURE: CPT

## 2022-02-28 PROCEDURE — A9503 TC99M MEDRONATE: HCPCS

## 2022-02-28 PROCEDURE — 700111 HCHG RX REV CODE 636 W/ 250 OVERRIDE (IP)

## 2022-02-28 PROCEDURE — 85025 COMPLETE CBC W/AUTO DIFF WBC: CPT

## 2022-02-28 PROCEDURE — A9270 NON-COVERED ITEM OR SERVICE: HCPCS | Performed by: EMERGENCY MEDICINE

## 2022-02-28 PROCEDURE — 700111 HCHG RX REV CODE 636 W/ 250 OVERRIDE (IP): Performed by: SPECIALIST

## 2022-02-28 PROCEDURE — 80053 COMPREHEN METABOLIC PANEL: CPT

## 2022-02-28 PROCEDURE — 700102 HCHG RX REV CODE 250 W/ 637 OVERRIDE(OP)

## 2022-02-28 PROCEDURE — A9270 NON-COVERED ITEM OR SERVICE: HCPCS

## 2022-02-28 PROCEDURE — 770004 HCHG ROOM/CARE - ONCOLOGY PRIVATE *

## 2022-02-28 RX ORDER — HYDROMORPHONE HYDROCHLORIDE 2 MG/1
2 TABLET ORAL ONCE
Status: DISCONTINUED | OUTPATIENT
Start: 2022-02-28 | End: 2022-02-28

## 2022-02-28 RX ORDER — POTASSIUM CHLORIDE 7.45 MG/ML
10 INJECTION INTRAVENOUS
Status: COMPLETED | OUTPATIENT
Start: 2022-02-28 | End: 2022-02-28

## 2022-02-28 RX ORDER — HYDROMORPHONE HYDROCHLORIDE 2 MG/ML
2 INJECTION, SOLUTION INTRAMUSCULAR; INTRAVENOUS; SUBCUTANEOUS ONCE
Status: COMPLETED | OUTPATIENT
Start: 2022-02-28 | End: 2022-02-28

## 2022-02-28 RX ADMIN — POTASSIUM CHLORIDE 10 MEQ: 7.46 INJECTION, SOLUTION INTRAVENOUS at 12:30

## 2022-02-28 RX ADMIN — HEPARIN SODIUM 25000 UNITS: 5000 INJECTION, SOLUTION INTRAVENOUS at 18:38

## 2022-02-28 RX ADMIN — SODIUM CHLORIDE: 9 INJECTION, SOLUTION INTRAVENOUS at 05:21

## 2022-02-28 RX ADMIN — AMLODIPINE BESYLATE 2.5 MG: 2.5 TABLET ORAL at 18:37

## 2022-02-28 RX ADMIN — FUROSEMIDE 20 MG: 10 INJECTION INTRAMUSCULAR; INTRAVENOUS at 19:47

## 2022-02-28 RX ADMIN — ROPINIROLE HYDROCHLORIDE 1 MG: 1 TABLET, FILM COATED ORAL at 10:31

## 2022-02-28 RX ADMIN — PAROXETINE 60 MG: 30 TABLET, FILM COATED ORAL at 20:53

## 2022-02-28 RX ADMIN — SODIUM CHLORIDE: 9 INJECTION, SOLUTION INTRAVENOUS at 14:25

## 2022-02-28 RX ADMIN — POTASSIUM CHLORIDE 10 MEQ: 7.46 INJECTION, SOLUTION INTRAVENOUS at 14:19

## 2022-02-28 RX ADMIN — BACLOFEN 10 MG: 10 TABLET ORAL at 18:37

## 2022-02-28 RX ADMIN — MIRTAZAPINE 22.5 MG: 15 TABLET, FILM COATED ORAL at 20:52

## 2022-02-28 RX ADMIN — HYDROMORPHONE HYDROCHLORIDE 2 MG: 2 INJECTION INTRAMUSCULAR; INTRAVENOUS; SUBCUTANEOUS at 14:39

## 2022-02-28 RX ADMIN — POTASSIUM CHLORIDE 10 MEQ: 7.46 INJECTION, SOLUTION INTRAVENOUS at 09:22

## 2022-02-28 RX ADMIN — ROPINIROLE HYDROCHLORIDE 1 MG: 1 TABLET, FILM COATED ORAL at 05:21

## 2022-02-28 RX ADMIN — HYDROMORPHONE HYDROCHLORIDE: 10 INJECTION, SOLUTION INTRAMUSCULAR; INTRAVENOUS; SUBCUTANEOUS at 00:38

## 2022-02-28 RX ADMIN — ALTEPLASE 2 MG: 2.2 INJECTION, POWDER, LYOPHILIZED, FOR SOLUTION INTRAVENOUS at 02:50

## 2022-02-28 RX ADMIN — HEPARIN SODIUM 1500 UNITS/HR: 5000 INJECTION, SOLUTION INTRAVENOUS at 01:23

## 2022-02-28 RX ADMIN — BACLOFEN 10 MG: 10 TABLET ORAL at 05:21

## 2022-02-28 RX ADMIN — POTASSIUM CHLORIDE 10 MEQ: 7.46 INJECTION, SOLUTION INTRAVENOUS at 10:39

## 2022-02-28 RX ADMIN — FUROSEMIDE 20 MG: 10 INJECTION INTRAMUSCULAR; INTRAVENOUS at 10:32

## 2022-02-28 RX ADMIN — FUROSEMIDE 20 MG: 10 INJECTION INTRAMUSCULAR; INTRAVENOUS at 01:23

## 2022-02-28 RX ADMIN — ACETAMINOPHEN 650 MG: 325 TABLET, FILM COATED ORAL at 00:05

## 2022-02-28 ASSESSMENT — ENCOUNTER SYMPTOMS
WHEEZING: 0
FEVER: 0
ABDOMINAL PAIN: 0
NAUSEA: 0
MYALGIAS: 0
PALPITATIONS: 0
BACK PAIN: 0
SENSORY CHANGE: 0
FOCAL WEAKNESS: 0
WEAKNESS: 0
SHORTNESS OF BREATH: 0
DIZZINESS: 0
VOMITING: 0
DIARRHEA: 0
EYE REDNESS: 0
CHILLS: 0
EYE DISCHARGE: 0
SPEECH CHANGE: 0

## 2022-02-28 ASSESSMENT — PAIN DESCRIPTION - PAIN TYPE
TYPE: ACUTE PAIN
TYPE: ACUTE PAIN

## 2022-02-28 NOTE — PROGRESS NOTES
"Dr. Dyson notified of current aPTT result, no changes needed to heparin infusion at this time. Dr. Dyson also notified of pt's current abd \"tumor\" pain unrelieved by oral dilaudid. Order received to discontinue PO dilaudid and restart PCA as well as give one time dose of tylenol.   "

## 2022-02-28 NOTE — CARE PLAN
The patient is Watcher - Medium risk of patient condition declining or worsening    Shift Goals  Clinical Goals: Control pain, maintain stable VS  Patient Goals: Pain control, sleep    Progress made toward(s) clinical / shift goals:  VSS, afebrile. BP and HR elevated, MD aware. Pt started on PCA for pain control. Heparin drip per order. Pt having good urine output. PAC unsuccessful for blood return after alteplase.  Problem: Mobility  Goal: Patient's capacity to carry out activities will improve  Outcome: Not Progressing       Patient is not progressing towards the following goals:      Problem: Mobility  Goal: Patient's capacity to carry out activities will improve  Outcome: Not Progressing

## 2022-02-28 NOTE — PROGRESS NOTES
56yoF with fall in hospital and left trimalleolar ankle fracture/dislocation s/p ORIF 2/26.     S: Wife at bedside.  Not much pain in ankle.  Doing okay overall.    O:  Vitals:    02/27/22 1722   BP: 150/76   Pulse: 75   Resp: 16   Temp: 36.9 °C (98.5 °F)   SpO2: 93%     Exam:  General-NAD, alert and following commands  LLE-splint c/d/i, BCR in toes, SILT in toes    A:  56yoF with fall in hospital and left trimalleolar ankle fracture/dislocation s/p ORIF 2/26.     Recs:  --NWB LLE in splint  --PT/OT for mobilization  --fu 2 weeks postop

## 2022-02-28 NOTE — PROGRESS NOTES
Dr. Dyson notified of pt's current BP, HR, IO status, LBM date of 2/23, and non-formulary home med request of pt. No new orders regarding VS and IO balance. New orders received for home meds and prn stool softeners.

## 2022-02-28 NOTE — THERAPY
Physical Therapy   Initial Evaluation     Patient Name: Magali Leal  Age:  56 y.o., Sex:  female  Medical Record #: [id number]  Today's Date: 2/27/2022     Precautions  Precautions: Fall Risk;Non Weight Bearing Left Lower Extremity    Assessment  Patient is 56 y.o. female that presented to [facility] with nausea and vomiting. PMHx significant for stage 3 endometrial adenocarcinoma. Patient sustained L ankle fracture while mobilizing in house and is now s/p ORIF. She presented to PT with apparent impaired cognition, pain, impaired balance and coordination, and decreased activity tolerance which are limiting her ability to safely perform mobility at [facility]. She mobilized as detailed below. PT facilitated NWB during STS but patient was able to maintain during stand and pivot into chair. Will follow.    Plan    Recommend Physical Therapy 4 times per week until therapy goals are met for the following treatments:  Bed Mobility, Community Re-integration, Equipment, Gait Training, Manual Therapy, Neuro Re-Education / Balance, Self Care/Home Evaluation, Stair Training, Therapeutic Activities, and Therapeutic Exercises    DC Equipment Recommendations: Unable to determine at this time  Discharge Recommendations: Recommend post-acute placement for additional physical therapy services prior to discharge home     Objective       02/27/22 1207   Total Time Spent   Total Time Spent (Mins) 27   Charge Group   PT Evaluation PT Evaluation Mod   Initial Contact Note    Initial Contact Note Order Received and Verified, Physical Therapy Evaluation in Progress with Full Report to Follow.   Precautions   Precautions Fall Risk;Non Weight Bearing Left Lower Extremity   Vitals   O2 (LPM) 2   O2 Delivery Device Oxymask   Pain 0 - 10 Group   Location Back;Generalized   Therapist Pain Assessment During Activity;Post Activity Pain Same as Prior to Activity;Nurse Notified   Prior Living Situation   Prior Services None   Housing / Facility [facility]    Lives with - Patient's Self Care Capacity Spouse   Comments Subjective deferred, need to verify home set up and PLOF   Prior Level of Functional Mobility   Bed Mobility Independent   Transfer Status Independent   Ambulation Independent   Distance Ambulation (Feet)   (community)   Assistive Devices Used Unable to Determine At This Time   Comments Above assumed based on presentation, need to verify   History of Falls   History of Falls Yes  (while in house with resulting ankle fx)   Cognition    Cognition / Consciousness X   Level of Consciousness Alert   Safety Awareness Impaired   New Learning Impaired   Attention Impaired   Comments pleasant, cooperative. appeared confused and at times with random comments. required repetitive cueing for technique and safety   Passive ROM Lower Body   Passive ROM Lower Body X   Comments L ankle in splint, others WFL   Active ROM Lower Body    Active ROM Lower Body  X   Comments as above   Strength Lower Body   Lower Body Strength  X   Comments LLE NWB, RLE WFL; patient able to move LLE in bed   Sensation Lower Body   Lower Extremity Sensation   Not Tested   Comments patient reported light touch intact in L toes   Lower Body Muscle Tone   Lower Body Muscle Tone  WDL   Neurological Concerns   Neurological Concerns Yes   Comments given cognition   Coordination Lower Body    Coordination Lower Body  X   Comments extra time and cueing   Balance Assessment   Sitting Balance (Static) Fair   Sitting Balance (Dynamic) Fair   Standing Balance (Static) Fair -   Standing Balance (Dynamic) Fair -   Weight Shift Sitting Fair   Weight Shift Standing Absent  (NWB LLE)   Comments with FWW   Gait Analysis   Gait Level Of Assist Unable to Participate   Comments stand pivot transfer EOB to chair   Bed Mobility    Supine to Sit Minimal Assist   Sit to Supine   (NT, left in chair)   Scooting Minimal Assist   Functional Mobility   Sit to Stand Minimal Assist   Bed, Chair, Wheelchair Transfer Moderate  Assist   Transfer Method Stand Pivot   How much difficulty does the patient currently have...   Turning over in bed (including adjusting bedclothes, sheets and blankets)? 3   Sitting down on and standing up from a chair with arms (e.g., wheelchair, bedside commode, etc.) 2   Moving from lying on back to sitting on the side of the bed? 3   How much help from another person does the patient currently need...   Moving to and from a bed to a chair (including a wheelchair)? 3   Need to walk in a hospital room? 1   Climbing 3-5 steps with a railing? 1   6 clicks Mobility Score 13   Activity Tolerance   Sitting in Chair left in chair   Sitting Edge of Bed 8 min   Standing 1-2 min   Comments limited by pain, cognition   Edema / Skin Assessment   Edema / Skin  Not Assessed   Patient / Family Goals    Patient / Family Goal #1 none stated   Short Term Goals    Short Term Goal # 1 Patient will move supine<>sitting EOB without bed features with supervision within 6tx in order to get in/out of bed   Short Term Goal # 2 Patient will move sitting<>standing with supervision within 6tx in order to initiate transfers and gait   Short Term Goal # 3 Patient will ambulate 15ft with supervision within 6tx in order to access environment   Short Term Goal # 4 Patient will self propel WC 50ft with supervision within 6tx in order to access community   Education Group   Education Provided Role of Physical Therapist   Role of Physical Therapist Patient Response Patient;Acceptance;Explanation;Verbal Demonstration   Problem List    Problems Pain;Impaired Transfers;Impaired Bed Mobility;Impaired Ambulation;Functional ROM Deficit;Functional Strength Deficit;Impaired Balance;Decreased Activity Tolerance;Safety Awareness Deficits / Cognition;Limited Knowledge of Post-Op Precautions;Motor Planning / Sequencing;Impaired Coordination   Anticipated Discharge Equipment and Recommendations   DC Equipment Recommendations Unable to determine at this time    Discharge Recommendations Recommend post-acute placement for additional physical therapy services prior to discharge home   Interdisciplinary Plan of Care Collaboration   IDT Collaboration with  Nursing;Occupational Therapist   Patient Position at End of Therapy In Bed;Call Light within Reach;Phone within Reach;Tray Table within Reach   Collaboration Comments RN aware of visit, response   Session Information   Date / Session Number  2/27-1 (1/4, 3/5)

## 2022-02-28 NOTE — PROGRESS NOTES
GYN Oncology Progress Note               Author: EILEEN Miranda Date & Time created: 2/28/2022  8:05 AM     Interval History:  Pelvic/back pain controlled on PCA. Slept great, but sleepy today. Did not hit PCA button at all overnight. No n/v. Wife at bedside; happy patient's pain is controlled. Pain in left ankle/foot well controlled. Both wanting to know about timeline for surgery.        Review of Systems:  Review of Systems   Constitutional: Negative for chills, fever and malaise/fatigue.   Eyes: Negative for discharge and redness.   Respiratory: Negative for shortness of breath and wheezing.    Cardiovascular: Negative for chest pain, palpitations and leg swelling.   Gastrointestinal: Negative for abdominal pain, diarrhea, nausea and vomiting.   Genitourinary: Positive for frequency. Negative for dysuria.   Musculoskeletal: Negative for back pain and myalgias.   Neurological: Negative for dizziness, sensory change, speech change, focal weakness and weakness.        Left foot        Physical Exam:  Physical Exam  Constitutional:       General: She is not in acute distress.  HENT:      Head: Normocephalic.      Mouth/Throat:      Mouth: Mucous membranes are moist.   Eyes:      General: No scleral icterus.     Conjunctiva/sclera: Conjunctivae normal.   Cardiovascular:      Rate and Rhythm: Normal rate and regular rhythm.      Pulses: Normal pulses.   Pulmonary:      Effort: Pulmonary effort is normal. No respiratory distress.   Abdominal:      General: There is no distension.      Palpations: Abdomen is soft.      Tenderness: There is no abdominal tenderness. There is no guarding.   Musculoskeletal:      Right lower leg: No edema.      Left lower leg: Tenderness present. Edema present.      Comments: Right arm swelling, mild erythema. Left ankle with surgical dressing.    Skin:     General: Skin is warm and dry.      Capillary Refill: Capillary refill takes 2 to 3 seconds.   Neurological:      General: No  focal deficit present.      Mental Status: She is alert and oriented to person, place, and time.      Gait: Gait normal.   Psychiatric:         Mood and Affect: Mood normal.         Behavior: Behavior normal.         Labs:  Recent Labs     22   [id number] 7.33*   [id number] 48.5*   [id number] 70.8   RBDG1DBE 91.3*   ARTHCO3 25   ARTBE -1         Recent Labs     22  0545   SODIUM 131* 134* 137   POTASSIUM 3.9 3.1* 3.1*   CHLORIDE 93* 95* 98   CO2    BUN 18 18 18   CREATININE 1.57* 1.50* 1.54*   CALCIUM 12.9* 12.2* 12.3*     Recent Labs     22  0545   ALTSGPT 11 9  --   --  6   ASTSGOT 34 29  --   --  35   ALKPHOSPHAT 81 74  --   --  78   TBILIRUBIN 0.2 0.2  --   --  0.3   GLUCOSE 111* 101* 92 87 88     Recent Labs     22  0545   RBC 2.43*  --   --  2.75*   HEMOGLOBIN 7.4*  --   --  8.4*   HEMATOCRIT 24.1*  --   --  27.0*   PLATELETCT 352  --   --  418   PROTHROMBTM 15.1*  --   --   --    APTT 24.5* 33.5 69.6* 49.0*   INR 1.22*  --   --   --      Recent Labs     22  0545   WBC  --  12.2* 10.2   NEUTSPOLYS  --  86.30* 87.60*   LYMPHOCYTES  --  6.40* 6.70*   MONOCYTES  --  6.20 4.30   EOSINOPHILS  --  0.00 0.00   BASOPHILS  --  0.10 0.20   ASTSGOT 34 29 35   ALTSGPT 11 9 6   ALKPHOSPHAT 81 74 78   TBILIRUBIN 0.2 0.2 0.3     Recent Labs     22  2228 22  0545   SODIUM 131* 134* 137   POTASSIUM 3.9 3.1* 3.1*   CHLORIDE 93* 95* 98   CO2 21 22 23   GLUCOSE 92 87 88   BUN 18 18 18   CREATININE 1.57* 1.50* 1.54*   CALCIUM 12.9* 12.2* 12.3*     Hemodynamics:  Temp (24hrs), Av.9 °C (98.5 °F), Min:36.5 °C (97.7 °F), Max:37.6 °C (99.6 °F)  Temperature: 36.5 °C (97.7 °F)  Pulse  Av.5  Min: 75  Max: 125   Blood Pressure: 144/84     Respiratory:    Respiration: 16, Pulse Oximetry: 95 %        RUL  Breath Sounds: Clear, RML Breath Sounds: Clear, RLL Breath Sounds: Diminished, MORENO Breath Sounds: Clear, LLL Breath Sounds: Diminished  Fluids:  No intake or output data in the 24 hours ending 02/25/22 1553     GI/Nutrition:  Orders Placed This Encounter   Procedures   • Diet Order Diet: Regular     Standing Status:   Standing     Number of Occurrences:   1     Order Specific Question:   Diet:     Answer:   Regular [1]     Medical Decision Making, by Problem:  Active Hospital Problems    Diagnosis    • *Hypercalcemia [E83.52]        Assessment and Plan:  This is a 56 y.o. female with stage III (minimum) Grade 2 endometrial adenocarcinoma s/p 6 cycles of neoadjuvant chemotherapy with Taxol/Carbo completed on 1/13/22 , who presents with hypercalcemia, nausea, and vomiting.      1. Hypercalcemia: Likely secondary to malignancy and dehydration. Calcium 14.2 on 2/23. Received about 4 L of IVF (per MAR) since presentation to ER and has had good oral intake of water. One time dose of lasix on 2/25. Got 1L NS overnight. On scheduled lasix 20 mg q 6, pamidronate 60 mg given on 2/27. NS at 200 ml/h. Calcium trending down. Will recheck CMP this evening. Bone scan pending.   2. Nausea vomiting: Secondary to above. Prn compazine and Zofran. Improved.   3. Dehydration: Secondary to above. Continue  ml/h  4. Pelvic and low back pain: Acute on chronic. Patient with history of fibromyalgia. Seen outpatient by pain management. Poor pain control with home oxycodone 15 mg q 4 prn. Necrotics stopped after surgery on 2/26 due to decreased LOC.Resumed prn PO dilaudid on 2/27 after improved mentation. Dilaudid PCA restarted overnight secondary to poor pain control on oral dilaudid. Pain controlled, but patient somnolent this AM. DC basal dilaudid.   5.Left trimalleolar ankle fracture/dislocation: s/p ORIF 2/26.   6. Upper extremity DVT: Venous US positive for DVT. On heparin gtt.    7. Hypokalemia: K 3.1 this AM. On scheduled lasix.  40 mEq KCl ordered today. Recheck this evening.   8. Anemia: Secondary to chemotherapy. Hgb 7.4 on 2/27, likely secondary to loss in surgery and hemodilution. Hgb up to 8.4 today.    9. ILIA: Cr 1.5. Likley secondary to dehydration. Continue IVF and monitor.    10. History of Lupus: continue home hydroxychloroquine  11. Hx of mood disorder: PTSD and anxiety. Continue home paroxetine and propranolol prn.   12. Hx Raynaud's disease: Continue amlodipine.   13. Dispo: inpatient management for hypercalcemia, n/v, and pain. Plan for surgery when after correction of hypercalcemia.       Case discussed with Dr. Dyson.     Quality-Core Measures

## 2022-03-01 LAB
ABO GROUP BLD: NORMAL
ALBUMIN SERPL BCP-MCNC: 3.2 G/DL (ref 3.2–4.9)
ALBUMIN/GLOB SERPL: 1.1 G/DL
ALP SERPL-CCNC: 79 U/L (ref 30–99)
ALT SERPL-CCNC: 7 U/L (ref 2–50)
ANION GAP SERPL CALC-SCNC: 15 MMOL/L (ref 7–16)
ANION GAP SERPL CALC-SCNC: 16 MMOL/L (ref 7–16)
APTT PPP: 59.5 SEC (ref 24.7–36)
APTT PPP: 73.7 SEC (ref 24.7–36)
AST SERPL-CCNC: 36 U/L (ref 12–45)
BASOPHILS # BLD AUTO: 0.4 % (ref 0–1.8)
BASOPHILS # BLD: 0.03 K/UL (ref 0–0.12)
BILIRUB SERPL-MCNC: 0.3 MG/DL (ref 0.1–1.5)
BLD GP AB SCN SERPL QL: NORMAL
BUN SERPL-MCNC: 15 MG/DL (ref 8–22)
BUN SERPL-MCNC: 16 MG/DL (ref 8–22)
CALCIUM SERPL-MCNC: 10.1 MG/DL (ref 8.5–10.5)
CALCIUM SERPL-MCNC: 10.4 MG/DL (ref 8.5–10.5)
CANCER AG125 SERPL-ACNC: 115 U/ML (ref 0–35)
CHLORIDE SERPL-SCNC: 97 MMOL/L (ref 96–112)
CHLORIDE SERPL-SCNC: 98 MMOL/L (ref 96–112)
CO2 SERPL-SCNC: 21 MMOL/L (ref 20–33)
CO2 SERPL-SCNC: 24 MMOL/L (ref 20–33)
CREAT SERPL-MCNC: 1.23 MG/DL (ref 0.5–1.4)
CREAT SERPL-MCNC: 1.25 MG/DL (ref 0.5–1.4)
EOSINOPHIL # BLD AUTO: 0.02 K/UL (ref 0–0.51)
EOSINOPHIL NFR BLD: 0.3 % (ref 0–6.9)
ERYTHROCYTE [DISTWIDTH] IN BLOOD BY AUTOMATED COUNT: 62.1 FL (ref 35.9–50)
GLOBULIN SER CALC-MCNC: 2.8 G/DL (ref 1.9–3.5)
GLUCOSE SERPL-MCNC: 74 MG/DL (ref 65–99)
GLUCOSE SERPL-MCNC: 86 MG/DL (ref 65–99)
HCT VFR BLD AUTO: 23.1 % (ref 37–47)
HGB BLD-MCNC: 7 G/DL (ref 12–16)
IMM GRANULOCYTES # BLD AUTO: 0.14 K/UL (ref 0–0.11)
IMM GRANULOCYTES NFR BLD AUTO: 1.9 % (ref 0–0.9)
LYMPHOCYTES # BLD AUTO: 0.57 K/UL (ref 1–4.8)
LYMPHOCYTES NFR BLD: 7.7 % (ref 22–41)
MAGNESIUM SERPL-MCNC: 1 MG/DL (ref 1.5–2.5)
MCH RBC QN AUTO: 29.5 PG (ref 27–33)
MCHC RBC AUTO-ENTMCNC: 30.3 G/DL (ref 33.6–35)
MCV RBC AUTO: 97.5 FL (ref 81.4–97.8)
MONOCYTES # BLD AUTO: 0.49 K/UL (ref 0–0.85)
MONOCYTES NFR BLD AUTO: 6.6 % (ref 0–13.4)
NEUTROPHILS # BLD AUTO: 6.13 K/UL (ref 2–7.15)
NEUTROPHILS NFR BLD: 83.1 % (ref 44–72)
NRBC # BLD AUTO: 0.02 K/UL
NRBC BLD-RTO: 0.3 /100 WBC
PLATELET # BLD AUTO: 350 K/UL (ref 164–446)
PMV BLD AUTO: 9.2 FL (ref 9–12.9)
POTASSIUM SERPL-SCNC: 3.2 MMOL/L (ref 3.6–5.5)
POTASSIUM SERPL-SCNC: 3.4 MMOL/L (ref 3.6–5.5)
PROT SERPL-MCNC: 6 G/DL (ref 6–8.2)
RBC # BLD AUTO: 2.37 M/UL (ref 4.2–5.4)
RH BLD: NORMAL
SODIUM SERPL-SCNC: 134 MMOL/L (ref 135–145)
SODIUM SERPL-SCNC: 137 MMOL/L (ref 135–145)
WBC # BLD AUTO: 7.4 K/UL (ref 4.8–10.8)

## 2022-03-01 PROCEDURE — 700111 HCHG RX REV CODE 636 W/ 250 OVERRIDE (IP)

## 2022-03-01 PROCEDURE — 86900 BLOOD TYPING SEROLOGIC ABO: CPT

## 2022-03-01 PROCEDURE — 86901 BLOOD TYPING SEROLOGIC RH(D): CPT

## 2022-03-01 PROCEDURE — 770004 HCHG ROOM/CARE - ONCOLOGY PRIVATE *

## 2022-03-01 PROCEDURE — 80048 BASIC METABOLIC PNL TOTAL CA: CPT

## 2022-03-01 PROCEDURE — 83735 ASSAY OF MAGNESIUM: CPT

## 2022-03-01 PROCEDURE — 700111 HCHG RX REV CODE 636 W/ 250 OVERRIDE (IP): Performed by: SPECIALIST

## 2022-03-01 PROCEDURE — 86850 RBC ANTIBODY SCREEN: CPT

## 2022-03-01 PROCEDURE — 85025 COMPLETE CBC W/AUTO DIFF WBC: CPT

## 2022-03-01 PROCEDURE — 36415 COLL VENOUS BLD VENIPUNCTURE: CPT

## 2022-03-01 PROCEDURE — 700102 HCHG RX REV CODE 250 W/ 637 OVERRIDE(OP)

## 2022-03-01 PROCEDURE — 86304 IMMUNOASSAY TUMOR CA 125: CPT

## 2022-03-01 PROCEDURE — 97530 THERAPEUTIC ACTIVITIES: CPT

## 2022-03-01 PROCEDURE — 80053 COMPREHEN METABOLIC PANEL: CPT

## 2022-03-01 PROCEDURE — 85730 THROMBOPLASTIN TIME PARTIAL: CPT

## 2022-03-01 PROCEDURE — A9270 NON-COVERED ITEM OR SERVICE: HCPCS

## 2022-03-01 PROCEDURE — 700105 HCHG RX REV CODE 258

## 2022-03-01 RX ORDER — POTASSIUM CHLORIDE 7.45 MG/ML
10 INJECTION INTRAVENOUS
Status: COMPLETED | OUTPATIENT
Start: 2022-03-02 | End: 2022-03-02

## 2022-03-01 RX ORDER — AMLODIPINE BESYLATE 5 MG/1
5 TABLET ORAL EVERY EVENING
Status: DISCONTINUED | OUTPATIENT
Start: 2022-03-02 | End: 2022-03-24 | Stop reason: HOSPADM

## 2022-03-01 RX ORDER — POTASSIUM CHLORIDE 7.45 MG/ML
10 INJECTION INTRAVENOUS
Status: COMPLETED | OUTPATIENT
Start: 2022-03-01 | End: 2022-03-01

## 2022-03-01 RX ORDER — MAGNESIUM SULFATE HEPTAHYDRATE 40 MG/ML
4 INJECTION, SOLUTION INTRAVENOUS ONCE
Status: COMPLETED | OUTPATIENT
Start: 2022-03-01 | End: 2022-03-02

## 2022-03-01 RX ADMIN — POTASSIUM CHLORIDE 10 MEQ: 7.46 INJECTION, SOLUTION INTRAVENOUS at 11:10

## 2022-03-01 RX ADMIN — FUROSEMIDE 20 MG: 10 INJECTION INTRAMUSCULAR; INTRAVENOUS at 09:06

## 2022-03-01 RX ADMIN — POTASSIUM CHLORIDE 10 MEQ: 7.46 INJECTION, SOLUTION INTRAVENOUS at 23:48

## 2022-03-01 RX ADMIN — SODIUM CHLORIDE: 9 INJECTION, SOLUTION INTRAVENOUS at 17:39

## 2022-03-01 RX ADMIN — BACLOFEN 10 MG: 10 TABLET ORAL at 17:38

## 2022-03-01 RX ADMIN — BACLOFEN 10 MG: 10 TABLET ORAL at 05:34

## 2022-03-01 RX ADMIN — Medication: at 18:09

## 2022-03-01 RX ADMIN — AMLODIPINE BESYLATE 2.5 MG: 2.5 TABLET ORAL at 17:38

## 2022-03-01 RX ADMIN — BACLOFEN 10 MG: 10 TABLET ORAL at 12:40

## 2022-03-01 RX ADMIN — SODIUM CHLORIDE: 9 INJECTION, SOLUTION INTRAVENOUS at 03:59

## 2022-03-01 RX ADMIN — SODIUM CHLORIDE: 9 INJECTION, SOLUTION INTRAVENOUS at 09:10

## 2022-03-01 RX ADMIN — ONDANSETRON 4 MG: 2 INJECTION INTRAMUSCULAR; INTRAVENOUS at 02:13

## 2022-03-01 RX ADMIN — MAGNESIUM SULFATE HEPTAHYDRATE 4 G: 40 INJECTION, SOLUTION INTRAVENOUS at 20:45

## 2022-03-01 RX ADMIN — POTASSIUM CHLORIDE 10 MEQ: 7.46 INJECTION, SOLUTION INTRAVENOUS at 12:14

## 2022-03-01 RX ADMIN — ROPINIROLE HYDROCHLORIDE 1 MG: 1 TABLET, FILM COATED ORAL at 05:34

## 2022-03-01 RX ADMIN — PAROXETINE 60 MG: 30 TABLET, FILM COATED ORAL at 20:05

## 2022-03-01 RX ADMIN — POTASSIUM CHLORIDE 10 MEQ: 7.46 INJECTION, SOLUTION INTRAVENOUS at 09:06

## 2022-03-01 RX ADMIN — POTASSIUM CHLORIDE 10 MEQ: 7.46 INJECTION, SOLUTION INTRAVENOUS at 10:12

## 2022-03-01 RX ADMIN — HEPARIN SODIUM 25000 UNITS: 5000 INJECTION, SOLUTION INTRAVENOUS at 12:05

## 2022-03-01 RX ADMIN — MIRTAZAPINE 22.5 MG: 15 TABLET, FILM COATED ORAL at 20:05

## 2022-03-01 RX ADMIN — ROPINIROLE HYDROCHLORIDE 1 MG: 1 TABLET, FILM COATED ORAL at 17:38

## 2022-03-01 RX ADMIN — FUROSEMIDE 20 MG: 10 INJECTION INTRAMUSCULAR; INTRAVENOUS at 01:09

## 2022-03-01 ASSESSMENT — ENCOUNTER SYMPTOMS
PALPITATIONS: 0
CHILLS: 0
SENSORY CHANGE: 0
SPEECH CHANGE: 0
MYALGIAS: 0
WHEEZING: 0
EYE REDNESS: 0
FOCAL WEAKNESS: 0
ABDOMINAL PAIN: 0
BACK PAIN: 0
FEVER: 0
DIARRHEA: 0
SHORTNESS OF BREATH: 0
WEAKNESS: 0
VOMITING: 0
DIZZINESS: 0
NAUSEA: 0
EYE DISCHARGE: 0

## 2022-03-01 ASSESSMENT — COGNITIVE AND FUNCTIONAL STATUS - GENERAL
MOVING FROM LYING ON BACK TO SITTING ON SIDE OF FLAT BED: UNABLE
MOBILITY SCORE: 12
TURNING FROM BACK TO SIDE WHILE IN FLAT BAD: A LITTLE
STANDING UP FROM CHAIR USING ARMS: A LITTLE
MOVING TO AND FROM BED TO CHAIR: A LITTLE
CLIMB 3 TO 5 STEPS WITH RAILING: TOTAL
SUGGESTED CMS G CODE MODIFIER MOBILITY: CL
WALKING IN HOSPITAL ROOM: TOTAL

## 2022-03-01 ASSESSMENT — GAIT ASSESSMENTS: GAIT LEVEL OF ASSIST: UNABLE TO PARTICIPATE

## 2022-03-01 ASSESSMENT — PAIN DESCRIPTION - PAIN TYPE
TYPE: ACUTE PAIN

## 2022-03-01 NOTE — PROGRESS NOTES
GYN Oncology Progress Note               Author: EILEEN Miranda Date & Time created: 3/1/2022  8:17 AM     Interval History:  Lost of back pain overnight causing poor sleep; fatigue from lack of sleep. Forgets to push her PCA. Having nausea; vomited last night and this morning. Small amount of perineal bleeding since attempt to place [facility], but denies any other obvious bleeding.      Wife discussed with RN that she want patient to be able to take home Azelastine for her allergies.       Review of Systems:  Review of Systems   Constitutional: Negative for chills, fever and malaise/fatigue.   Eyes: Negative for discharge and redness.   Respiratory: Negative for shortness of breath and wheezing.    Cardiovascular: Negative for chest pain, palpitations and leg swelling.   Gastrointestinal: Negative for abdominal pain, diarrhea, nausea and vomiting.   Genitourinary: Positive for frequency. Negative for dysuria.   Musculoskeletal: Negative for back pain and myalgias.   Neurological: Negative for dizziness, sensory change, speech change, focal weakness and weakness.        Left foot        Physical Exam:  Physical Exam  Constitutional:       General: She is not in acute distress.     Comments: drowsy    HENT:      Head: Normocephalic.      Mouth/Throat:      Mouth: Mucous membranes are moist.   Eyes:      General: No scleral icterus.     Conjunctiva/sclera: Conjunctivae normal.   Cardiovascular:      Rate and Rhythm: Normal rate and regular rhythm.      Pulses: Normal pulses.   Pulmonary:      Effort: Pulmonary effort is normal. No respiratory distress.   Abdominal:      General: There is no distension.      Palpations: Abdomen is soft.      Tenderness: There is no abdominal tenderness. There is no guarding.   Musculoskeletal:      Right lower leg: No edema.      Left lower leg: Tenderness present. Edema present.      Comments: Right arm swelling, mild erythema. Left ankle with surgical dressing.    Skin:      General: Skin is warm and dry.      Capillary Refill: Capillary refill takes less than 2 seconds.   Neurological:      General: No focal deficit present.      Mental Status: She is oriented to person, place, and time.   Psychiatric:         Mood and Affect: Mood normal.         Behavior: Behavior normal.         Labs:  Recent Labs     22  1845   [id number] 7.33*   [id number] 48.5*   [id number] 70.8   WOMZ4ACN 91.3*   ARTHCO3 25   ARTBE -1         Recent Labs     22  0545 22  044   SODIUM 137 150* 137   POTASSIUM 3.1* 3.3* 3.2*   CHLORIDE 98 87* 98   CO2 23 18* 24   BUN 18 16 15   CREATININE 1.54* 1.23 1.25   CALCIUM 12.3* 10.5 10.4     Recent Labs     2245 229   ALTSGPT 6 6 7   ASTSGOT 35 33 36   ALKPHOSPHAT 78 78 79   TBILIRUBIN 0.3 0.3 0.3   GLUCOSE 88 78 86     Recent Labs     22  0620 22  1457 22  0545 22  1112 22   RBC 2.43*  --  2.75*  --   --  2.37*   HEMOGLOBIN 7.4*  --  8.4*  --   --  7.0*   HEMATOCRIT 24.1*  --  27.0*  --   --  23.1*   PLATELETCT 352  --  418  --   --  350   PROTHROMBTM 15.1*  --   --   --   --   --    APTT 24.5*   < > 49.0* 67.7* 69.0* 59.5*   INR 1.22*  --   --   --   --   --     < > = values in this interval not displayed.     Recent Labs     22  0545 229   WBC 12.2* 10.2  --  7.4   NEUTSPOLYS 86.30* 87.60*  --  83.10*   LYMPHOCYTES 6.40* 6.70*  --  7.70*   MONOCYTES 6.20 4.30  --  6.60   EOSINOPHILS 0.00 0.00  --  0.30   BASOPHILS 0.10 0.20  --  0.40   ASTSGOT 29 35 33 36   ALTSGPT 9 6 6 7   ALKPHOSPHAT 74 78 78 79   TBILIRUBIN 0.2 0.3 0.3 0.3     Recent Labs     22  0545 22  0449   SODIUM 137 150* 137   POTASSIUM 3.1* 3.3* 3.2*   CHLORIDE 98 87* 98   CO2 23 18* 24   GLUCOSE 88 78 86   BUN 18 16 15   CREATININE 1.54* 1.23 1.25   CALCIUM 12.3* 10.5 10.4     Hemodynamics:  Temp (24hrs), Av.9 °C  (98.4 °F), Min:36.2 °C (97.2 °F), Max:37.3 °C (99.2 °F)  Temperature: 36.8 °C (98.2 °F)  Pulse  Av.7  Min: 75  Max: 125   Blood Pressure: (!) 170/84 (RN notified.)     Respiratory:    Respiration: 18, Pulse Oximetry: 97 %        RUL Breath Sounds: Clear, RML Breath Sounds: Clear, RLL Breath Sounds: Diminished, MORENO Breath Sounds: Clear, LLL Breath Sounds: Diminished  Fluids:  No intake or output data in the 24 hours ending 22 2553     GI/Nutrition:  Orders Placed This Encounter   Procedures   • Diet Order Diet: Regular     Standing Status:   Standing     Number of Occurrences:   1     Order Specific Question:   Diet:     Answer:   Regular [1]     Medical Decision Making, by Problem:  Active Hospital Problems    Diagnosis    • *Hypercalcemia [E83.52]        Assessment and Plan:  This is a 56 y.o. female with stage III (minimum) Grade 2 endometrial adenocarcinoma s/p 6 cycles of neoadjuvant chemotherapy with Taxol/Carbo completed on 22 , who presents with hypercalcemia, nausea, and vomiting.      1. Hypercalcemia: Likely secondary to malignancy and dehydration. Calcium 14.2 on . Received about 4 L of IVF (per MAR) on presentation to ER/early on admit to CNU had good oral intake of water. Intimated on scheduled lasix 20 mg q 6. Pamidronate 60 mg given on . NS at 200 ml/h. Calcium trending down; no normalized at 10.4. DC lasix today.    2. Nausea vomiting: Secondary to above. Prn compazine and Zofran.   3. Dehydration: Secondary to above. Continue  ml/h  4. Pelvic and low back pain: Acute on chronic. Patient with history of fibromyalgia. Seen outpatient by pain management. Poor pain control with home oxycodone 15 mg q 4 prn. Necrotics stopped after surgery on  due to decreased LOC.Resumed prn PO dilaudid on  after mentation improved. Dilaudid PCA then restarted secondary to poor pain control on oral dilaudid. Basal rate was then discontinued secondary to sedation. Reviewed PCA log;  patient not using demand dose consistently. Reviewed use of PCA with patient. Will continue to avoid basal dose for now given sedation this AM.   5. Stage III (minimum) Grade 2 endometrial adenocarcinoma:  s/p 6 cycles of neoadjuvant chemotherapy. Surgery delayed due to hypercalcemia and n/v preventing patient from tolerating bowel prep. Plan to go to OR on Thursday 3/3/22.   6.Left trimalleolar ankle fracture/dislocation: s/p ORIF 2/26.   7. Upper extremity DVT: Venous US positive for DVT. On heparin gtt. Therapeutic x2. Continue at 1,600 units/h. Daily PTT.     8. Hypokalemia: K 3.1 on 2/28; repeated with 40 mEq KCl IV. K 3.2 this AM; 40 mEq KCl ordered for today. Lasix discontinued.   9. Anemia: Secondary to chemotherapy. Hgb 7.4 on 2/27, likely secondary to loss in surgery and hemodilution. Hgb 7 today. Hemodynamically stable. Plan to transfuse on tomorrow in preparation for surgery on 3/3.   10. ILIA: Cr up to 1.57. Likley secondary to dehydration. Continue IVF. Cr now trending down. Monitor.   11. History of Lupus: continue home hydroxychloroquine  12. Hx of mood disorder: PTSD and anxiety. Continue home paroxetine and propranolol prn.   13. Hx Raynaud's disease: Continue amlodipine.   14. Hx of allergic rhinitis: Patient takes Azelastine for this, however, this medication is not on hospital formulary. Wife concerned that this needs to be restarted. Discussed with Pharmacy. Wife to bring in new bottle sealed in original packaging for verification of home med.    15. Dispo: inpatient management for hypercalcemia, n/v, and pain. Plan for surgery when after correction of hypercalcemia.       Case discussed with Dr. Dyson.     Quality-Core Measures

## 2022-03-01 NOTE — PROGRESS NOTES
"   Orthopaedic Progress Note    Interval changes:  Patient doing well  LLE splint CDI    ROS - Patient denies any new issues.  Pain well controlled.    BP (!) 176/86   Pulse (!) 104   Temp 36.8 °C (98.2 °F) (Temporal)   Resp 18   Ht 1.702 m (5' 7\")   Wt 99.1 kg (218 lb 7.6 oz)   SpO2 98%       Patient seen and examined  No acute distress  Breathing non labored  RRR  LLE splint CDI, DNVI moves all toes, cap refill <2 sec.     Recent Labs     02/27/22  0620 02/28/22  0545 03/01/22  0449   WBC 12.2* 10.2 7.4   RBC 2.43* 2.75* 2.37*   HEMOGLOBIN 7.4* 8.4* 7.0*   HEMATOCRIT 24.1* 27.0* 23.1*   MCV 99.2* 98.2* 97.5   MCH 30.5 30.5 29.5   MCHC 30.7* 31.1* 30.3*   RDW 65.1* 64.7* 62.1*   PLATELETCT 352 418 350   MPV 9.0 9.7 9.2       Active Hospital Problems    Diagnosis    • Hypercalcemia [E83.52]        Assessment/Plan:  Patient doing well  LLE splint CDI  POD#3 S/P   1.  Open treatment with internal fixation, left trimalleolar ankle fracture with fixation of posterior lip.  2.  Open treatment without fixation of anterolateral distal tibia fracture with fragment excision.  3.  Surgical fixation of left distal tibiofibular joint disruption.  4.  Physician applied stress examination of the left ankle using fluoroscopy  Wt bearing status - NWB LLE  Wound care/Drains - Dressings to be changed every other day by nursing  Future Procedures - none planned   Sutures/Staples out- 14 days post operatively  PT/OT-initiated  Antibiotics: Perioperative completed  DVT Prophylaxis- TEDS/SCDs/Foot pumps  Brenner-none planned   Case Coordination for Discharge Planning - Disposition pending     I have performed a physical exam and reviewed and updated ROS and Plan today (3/1). In review of yesterday's note (2/28), there are no changes except as documented above.   "

## 2022-03-01 NOTE — PROGRESS NOTES
Dr. Dyson notified of pt's current VS and aPTT result. No new orders. Continue heparin infusion with no change.

## 2022-03-01 NOTE — PROGRESS NOTES
"   Orthopaedic Progress Note    Interval changes:  Patient doing well  LLE splint CDI    ROS - Patient denies any new issues.  Pain well controlled.    /84   Pulse (!) 114   Temp 36.2 °C (97.2 °F) (Temporal)   Resp 18   Ht 1.702 m (5' 7\")   Wt 99.1 kg (218 lb 7.6 oz)   SpO2 95%       Patient seen and examined  No acute distress  Breathing non labored  RRR  LLE splint CDI, DNVI moves all toes, cap refill <2 sec.     Recent Labs     02/27/22  0620 02/28/22  0545   WBC 12.2* 10.2   RBC 2.43* 2.75*   HEMOGLOBIN 7.4* 8.4*   HEMATOCRIT 24.1* 27.0*   MCV 99.2* 98.2*   MCH 30.5 30.5   MCHC 30.7* 31.1*   RDW 65.1* 64.7*   PLATELETCT 352 418   MPV 9.0 9.7       Active Hospital Problems    Diagnosis    • Hypercalcemia [E83.52]        Assessment/Plan:  Patient doing well  LLE splint CDI  POD#2 S/P   1.  Open treatment with internal fixation, left trimalleolar ankle fracture with fixation of posterior lip.  2.  Open treatment without fixation of anterolateral distal tibia fracture with fragment excision.  3.  Surgical fixation of left distal tibiofibular joint disruption.  4.  Physician applied stress examination of the left ankle using fluoroscopy  Wt bearing status - NWB LLE  Wound care/Drains - Dressings to be changed every other day by nursing  Future Procedures - none planned   Sutures/Staples out- 14 days post operatively  PT/OT-initiated  Antibiotics: Perioperative completed  DVT Prophylaxis- TEDS/SCDs/Foot pumps  Brenner-none planned   Case Coordination for Discharge Planning - Disposition pending     I have performed a physical exam and reviewed and updated ROS and Plan today (2/28). In review of yesterday's note (2/27), there are no changes except as documented above.   "

## 2022-03-01 NOTE — CARE PLAN
Problem: Pain - Standard  Goal: Alleviation of pain or a reduction in pain to the patient’s comfort goal  Outcome: Progressing     Problem: Knowledge Deficit - Standard  Goal: Patient and family/care givers will demonstrate understanding of plan of care, disease process/condition, diagnostic tests and medications  Outcome: Progressing     Problem: Mobility  Goal: Patient's capacity to carry out activities will improve  Outcome: Not Met     Problem: Self Care  Goal: Patient will have the ability to perform ADLs independently or with assistance (bathe, groom, dress, toilet and feed)  Outcome: Not Met   The patient is stable    Shift Goals  Clinical Goals: Control pain, maintain stable VS  Patient Goals: Pain control, sleep    Progress made toward(s) clinical / shift goals: pain control    Patient is not progressing towards the following goals:      Problem: Mobility  Goal: Patient's capacity to carry out activities will improve  Outcome: Not Met     Problem: Self Care  Goal: Patient will have the ability to perform ADLs independently or with assistance (bathe, groom, dress, toilet and feed)  Outcome: Not Met

## 2022-03-01 NOTE — THERAPY
"Physical Therapy   Daily Treatment     Patient Name: Magali Leal  Age:  56 y.o., Sex:  female  Medical Record #: [id number]  Today's Date: 3/1/2022     Precautions  Precautions: Fall Risk;Non Weight Bearing Left Lower Extremity    Assessment    Patient continues to be limited by decreased activity tolerance and functional weakness. She also reported feeling nauseous and having bouts of emesis prior to session. Today she mobilized as detailed below; she required increased assist with continued time out of bed, anticipate due to fatigue. She tolerated sitting at EOB for approximately 15 minutes but declined transfer to chair following 2 bouts of standing. Will continue to follow. Recommend patient mobilize to EOB 3x/day at meal times and to chair 1x/day with RN staff as able to progress strength and tolerance.     Plan    Continue current treatment plan.    DC Equipment Recommendations: Unable to determine at this time  Discharge Recommendations: Recommend post-acute placement for additional physical therapy services prior to discharge home      Subjective    \"You're evil.\"  \"You'll help me get my confidence back.\"     Objective       03/01/22 1053   Total Time Spent   Total Time Spent (Mins) 35   Charge Group   Charges  Yes   PT Therapeutic Activities 2   Precautions   Precautions Fall Risk;Non Weight Bearing Left Lower Extremity   Vitals   O2 Delivery Device Silicone Nasal Cannula   Pain 0 - 10 Group   Therapist Pain Assessment   (no pain complaint during session)   Cognition    Cognition / Consciousness X   Level of Consciousness Alert   Attention Impaired   Comments improved arousal once OOB, reported feeling loopy while in bed but was appropriate throughout activity. repetitive cueing for sequencing required   Balance   Sitting Balance (Static) Fair   Sitting Balance (Dynamic) Fair   Standing Balance (Static) Fair   Standing Balance (Dynamic) Fair -   Weight Shift Sitting Fair   Weight Shift Standing " Absent  (NWB LLE)   Skilled Intervention Compensatory Strategies;Facilitation;Sequencing;Tactile Cuing;Verbal Cuing   Comments with FWW in standing, no overt LOB   Gait Analysis   Gait Level Of Assist Unable to Participate   Weight Bearing Status NWB LLE   Comments attempts at ambulation limited by fatigue   Bed Mobility    Supine to Sit Supervised   Sit to Supine Supervised   Scooting Supervised   Skilled Intervention Verbal Cuing   Functional Mobility   Sit to Stand Moderate Assist  (min A for first attempt, mod A for second attempt)   Bed, Chair, Wheelchair Transfer Unable to Participate  (deferred given fatigue, patient agreeable to attempting later with RN staff)   Skilled Intervention Compensatory Strategies;Facilitation;Sequencing;Tactile Cuing;Verbal Cuing   How much difficulty does the patient currently have...   Turning over in bed (including adjusting bedclothes, sheets and blankets)? 3   Sitting down on and standing up from a chair with arms (e.g., wheelchair, bedside commode, etc.) 1   Moving from lying on back to sitting on the side of the bed? 3   How much help from another person does the patient currently need...   Moving to and from a bed to a chair (including a wheelchair)? 3   Need to walk in a hospital room? 1   Climbing 3-5 steps with a railing? 1   6 clicks Mobility Score 12   Activity Tolerance   Sitting in Chair NT   Sitting Edge of Bed 15 min   Standing 3-5 min total   Comments limited by fatigue   Short Term Goals    Short Term Goal # 1 Patient will move supine<>sitting EOB without bed features with supervision within 6tx in order to get in/out of bed   Goal Outcome # 1 Progressing as expected   Short Term Goal # 2 Patient will move sitting<>standing with supervision within 6tx in order to initiate transfers and gait   Goal Outcome # 2 Goal not met   Short Term Goal # 3 Patient will ambulate 15ft with supervision within 6tx in order to access environment   Goal Outcome # 3 Goal not met    Short Term Goal # 4 Patient will self propel WC 50ft with supervision within 6tx in order to access community   Goal Outcome # 4 Goal not met   Anticipated Discharge Equipment and Recommendations   DC Equipment Recommendations Unable to determine at this time   Discharge Recommendations Recommend post-acute placement for additional physical therapy services prior to discharge home   Interdisciplinary Plan of Care Collaboration   IDT Collaboration with  Nursing;Other (See Comments)  (friend at bedside)   Patient Position at End of Therapy In Bed;Bed Alarm On;Call Light within Reach;Tray Table within Reach;Phone within Reach;Family / Friend in Room   Collaboration Comments RN aware of visit, response   Session Information   Date / Session Number  3/1-2 (2/4, 3/5) attempted 2/28

## 2022-03-01 NOTE — PROGRESS NOTES
Patient's wife, Gosia, called with concern of patient not taking hydroxychloroquine. Wife informed that patient is refusing medication due to having to take it with food and feeling to nauseas to eat.   Patient educated on importance of medication and offered small bites of food or to have medication crushed in apple sauce. Patient still refusing medication.

## 2022-03-01 NOTE — CARE PLAN
The patient is Watcher - Medium risk of patient condition declining or worsening    Shift Goals  Clinical Goals: Adequate pain control and stable VS  Patient Goals: Pain control, sleep    Progress made toward(s) clinical / shift goals:    Problem: Pain - Standard  Goal: Alleviation of pain or a reduction in pain to the patient’s comfort goal  Outcome: Progressing     Problem: Knowledge Deficit - Standard  Goal: Patient and family/care givers will demonstrate understanding of plan of care, disease process/condition, diagnostic tests and medications  Outcome: Progressing     Problem: Psychosocial  Goal: Patient's level of anxiety will decrease  Outcome: Progressing  Goal: Patient's ability to verbalize feelings about condition will improve  Outcome: Progressing  Goal: Patient's ability to re-evaluate and adapt role responsibilities will improve  Outcome: Progressing  Goal: Patient and family will demonstrate ability to cope with life altering diagnosis and/or procedure  Outcome: Progressing  Goal: Spiritual and cultural needs incorporated into hospitalization  Outcome: Progressing     Problem: Mobility  Goal: Patient's capacity to carry out activities will improve  Outcome: Progressing     Problem: Self Care  Goal: Patient will have the ability to perform ADLs independently or with assistance (bathe, groom, dress, toilet and feed)  Outcome: Progressing     Problem: Infection - Standard  Goal: Patient will remain free from infection  Outcome: Progressing     Problem: Fall Risk  Goal: Patient will remain free from falls  Outcome: Progressing       Patient is not progressing towards the following goals:

## 2022-03-02 ENCOUNTER — APPOINTMENT (OUTPATIENT)
Dept: RADIOLOGY | Facility: MEDICAL CENTER | Age: 57
DRG: 740 | End: 2022-03-02
Payer: COMMERCIAL

## 2022-03-02 LAB
ALBUMIN SERPL BCP-MCNC: 3 G/DL (ref 3.2–4.9)
ALBUMIN/GLOB SERPL: 0.9 G/DL
ALP SERPL-CCNC: 85 U/L (ref 30–99)
ALT SERPL-CCNC: 6 U/L (ref 2–50)
ANION GAP SERPL CALC-SCNC: 17 MMOL/L (ref 7–16)
APTT PPP: 83.1 SEC (ref 24.7–36)
AST SERPL-CCNC: 28 U/L (ref 12–45)
BASOPHILS # BLD AUTO: 0.7 % (ref 0–1.8)
BASOPHILS # BLD: 0.05 K/UL (ref 0–0.12)
BILIRUB SERPL-MCNC: 0.3 MG/DL (ref 0.1–1.5)
BUN SERPL-MCNC: 16 MG/DL (ref 8–22)
CALCIUM SERPL-MCNC: 10.2 MG/DL (ref 8.5–10.5)
CHLORIDE SERPL-SCNC: 99 MMOL/L (ref 96–112)
CO2 SERPL-SCNC: 18 MMOL/L (ref 20–33)
CREAT SERPL-MCNC: 1.2 MG/DL (ref 0.5–1.4)
EOSINOPHIL # BLD AUTO: 0.04 K/UL (ref 0–0.51)
EOSINOPHIL NFR BLD: 0.5 % (ref 0–6.9)
ERYTHROCYTE [DISTWIDTH] IN BLOOD BY AUTOMATED COUNT: 61.9 FL (ref 35.9–50)
EXTERNAL QUALITY CONTROL: NORMAL
GLOBULIN SER CALC-MCNC: 3.3 G/DL (ref 1.9–3.5)
GLUCOSE SERPL-MCNC: 78 MG/DL (ref 65–99)
HCT VFR BLD AUTO: 26.9 % (ref 37–47)
HGB BLD-MCNC: 8.2 G/DL (ref 12–16)
IMM GRANULOCYTES # BLD AUTO: 0.31 K/UL (ref 0–0.11)
IMM GRANULOCYTES NFR BLD AUTO: 4.1 % (ref 0–0.9)
LYMPHOCYTES # BLD AUTO: 0.72 K/UL (ref 1–4.8)
LYMPHOCYTES NFR BLD: 9.4 % (ref 22–41)
MAGNESIUM SERPL-MCNC: 2.1 MG/DL (ref 1.5–2.5)
MCH RBC QN AUTO: 30.3 PG (ref 27–33)
MCHC RBC AUTO-ENTMCNC: 30.5 G/DL (ref 33.6–35)
MCV RBC AUTO: 99.3 FL (ref 81.4–97.8)
MONOCYTES # BLD AUTO: 0.74 K/UL (ref 0–0.85)
MONOCYTES NFR BLD AUTO: 9.7 % (ref 0–13.4)
NEUTROPHILS # BLD AUTO: 5.77 K/UL (ref 2–7.15)
NEUTROPHILS NFR BLD: 75.6 % (ref 44–72)
NRBC # BLD AUTO: 0 K/UL
NRBC BLD-RTO: 0 /100 WBC
PLATELET # BLD AUTO: 363 K/UL (ref 164–446)
PMV BLD AUTO: 9.3 FL (ref 9–12.9)
POTASSIUM SERPL-SCNC: 3.6 MMOL/L (ref 3.6–5.5)
PROT SERPL-MCNC: 6.3 G/DL (ref 6–8.2)
RBC # BLD AUTO: 2.71 M/UL (ref 4.2–5.4)
SARS-COV+SARS-COV-2 AG RESP QL IA.RAPID: NEGATIVE
SODIUM SERPL-SCNC: 134 MMOL/L (ref 135–145)
WBC # BLD AUTO: 7.6 K/UL (ref 4.8–10.8)

## 2022-03-02 PROCEDURE — 36415 COLL VENOUS BLD VENIPUNCTURE: CPT

## 2022-03-02 PROCEDURE — 770004 HCHG ROOM/CARE - ONCOLOGY PRIVATE *

## 2022-03-02 PROCEDURE — 700102 HCHG RX REV CODE 250 W/ 637 OVERRIDE(OP)

## 2022-03-02 PROCEDURE — B5181ZZ FLUOROSCOPY OF SUPERIOR VENA CAVA USING LOW OSMOLAR CONTRAST: ICD-10-PCS | Performed by: RADIOLOGY

## 2022-03-02 PROCEDURE — 75885 VEIN X-RAY LIVER W/HEMODYNAM: CPT

## 2022-03-02 PROCEDURE — 85730 THROMBOPLASTIN TIME PARTIAL: CPT

## 2022-03-02 PROCEDURE — 87426 SARSCOV CORONAVIRUS AG IA: CPT | Performed by: SPECIALIST

## 2022-03-02 PROCEDURE — 80053 COMPREHEN METABOLIC PANEL: CPT

## 2022-03-02 PROCEDURE — 85025 COMPLETE CBC W/AUTO DIFF WBC: CPT

## 2022-03-02 PROCEDURE — 83735 ASSAY OF MAGNESIUM: CPT

## 2022-03-02 PROCEDURE — 700105 HCHG RX REV CODE 258

## 2022-03-02 PROCEDURE — 97530 THERAPEUTIC ACTIVITIES: CPT | Mod: CQ

## 2022-03-02 PROCEDURE — 700101 HCHG RX REV CODE 250

## 2022-03-02 PROCEDURE — 700117 HCHG RX CONTRAST REV CODE 255: Performed by: RADIOLOGY

## 2022-03-02 PROCEDURE — A9270 NON-COVERED ITEM OR SERVICE: HCPCS

## 2022-03-02 PROCEDURE — 700111 HCHG RX REV CODE 636 W/ 250 OVERRIDE (IP)

## 2022-03-02 PROCEDURE — 700111 HCHG RX REV CODE 636 W/ 250 OVERRIDE (IP): Performed by: SPECIALIST

## 2022-03-02 RX ORDER — POTASSIUM CHLORIDE 7.45 MG/ML
10 INJECTION INTRAVENOUS
Status: COMPLETED | OUTPATIENT
Start: 2022-03-02 | End: 2022-03-02

## 2022-03-02 RX ADMIN — MIRTAZAPINE 22.5 MG: 15 TABLET, FILM COATED ORAL at 20:52

## 2022-03-02 RX ADMIN — SODIUM CHLORIDE: 9 INJECTION, SOLUTION INTRAVENOUS at 04:17

## 2022-03-02 RX ADMIN — SODIUM CHLORIDE: 9 INJECTION, SOLUTION INTRAVENOUS at 18:14

## 2022-03-02 RX ADMIN — ONDANSETRON 4 MG: 2 INJECTION INTRAMUSCULAR; INTRAVENOUS at 21:59

## 2022-03-02 RX ADMIN — POTASSIUM CHLORIDE 10 MEQ: 7.46 INJECTION, SOLUTION INTRAVENOUS at 09:43

## 2022-03-02 RX ADMIN — HEPARIN SODIUM 1600 UNITS: 5000 INJECTION, SOLUTION INTRAVENOUS at 20:52

## 2022-03-02 RX ADMIN — POTASSIUM CHLORIDE 10 MEQ: 7.46 INJECTION, SOLUTION INTRAVENOUS at 01:23

## 2022-03-02 RX ADMIN — POTASSIUM CHLORIDE 10 MEQ: 7.46 INJECTION, SOLUTION INTRAVENOUS at 02:24

## 2022-03-02 RX ADMIN — BACLOFEN 10 MG: 10 TABLET ORAL at 11:05

## 2022-03-02 RX ADMIN — POLYETHYLENE GLYCOL 3350, SODIUM SULFATE ANHYDROUS, SODIUM BICARBONATE, SODIUM CHLORIDE, POTASSIUM CHLORIDE 4 L: 236; 22.74; 6.74; 5.86; 2.97 POWDER, FOR SOLUTION ORAL at 18:13

## 2022-03-02 RX ADMIN — POTASSIUM CHLORIDE 10 MEQ: 7.46 INJECTION, SOLUTION INTRAVENOUS at 11:05

## 2022-03-02 RX ADMIN — ROPINIROLE HYDROCHLORIDE 1 MG: 1 TABLET, FILM COATED ORAL at 18:13

## 2022-03-02 RX ADMIN — ROPINIROLE HYDROCHLORIDE 1 MG: 1 TABLET, FILM COATED ORAL at 05:50

## 2022-03-02 RX ADMIN — IOHEXOL 5 ML: 300 INJECTION, SOLUTION INTRAVENOUS at 15:45

## 2022-03-02 RX ADMIN — HEPARIN SODIUM 1600 UNITS/HR: 5000 INJECTION, SOLUTION INTRAVENOUS at 04:17

## 2022-03-02 RX ADMIN — AMLODIPINE BESYLATE 5 MG: 5 TABLET ORAL at 18:13

## 2022-03-02 RX ADMIN — PAROXETINE 60 MG: 30 TABLET, FILM COATED ORAL at 20:53

## 2022-03-02 RX ADMIN — BACLOFEN 10 MG: 10 TABLET ORAL at 18:13

## 2022-03-02 RX ADMIN — BACLOFEN 10 MG: 10 TABLET ORAL at 05:50

## 2022-03-02 ASSESSMENT — ENCOUNTER SYMPTOMS
WEAKNESS: 0
ABDOMINAL PAIN: 0
SENSORY CHANGE: 0
WHEEZING: 0
FEVER: 0
BACK PAIN: 0
VOMITING: 0
SHORTNESS OF BREATH: 0
NAUSEA: 0
PALPITATIONS: 0
DIZZINESS: 0
SPEECH CHANGE: 0
EYE DISCHARGE: 0
FOCAL WEAKNESS: 0
MYALGIAS: 0
CHILLS: 0
DIARRHEA: 0
EYE REDNESS: 0

## 2022-03-02 ASSESSMENT — PAIN DESCRIPTION - PAIN TYPE
TYPE: ACUTE PAIN

## 2022-03-02 ASSESSMENT — COGNITIVE AND FUNCTIONAL STATUS - GENERAL
SUGGESTED CMS G CODE MODIFIER MOBILITY: CL
TURNING FROM BACK TO SIDE WHILE IN FLAT BAD: A LITTLE
CLIMB 3 TO 5 STEPS WITH RAILING: TOTAL
MOVING FROM LYING ON BACK TO SITTING ON SIDE OF FLAT BED: UNABLE
MOBILITY SCORE: 12
MOVING TO AND FROM BED TO CHAIR: A LITTLE
STANDING UP FROM CHAIR USING ARMS: A LITTLE
WALKING IN HOSPITAL ROOM: TOTAL

## 2022-03-02 ASSESSMENT — GAIT ASSESSMENTS: GAIT LEVEL OF ASSIST: UNABLE TO PARTICIPATE

## 2022-03-02 NOTE — PROGRESS NOTES
GYN Oncology Progress Note               Author: EILEEN Miranda Date & Time created: 3/2/2022  10:17 AM     Interval History:  Pain much better today, has been working on a schedule for when to push her PCA button. Vomited yesterday. No n/v today but poor apatite. Feels much better overall compared to the two days prior.       Review of Systems:  Review of Systems   Constitutional: Negative for chills, fever and malaise/fatigue.   Eyes: Negative for discharge and redness.   Respiratory: Negative for shortness of breath and wheezing.    Cardiovascular: Negative for chest pain, palpitations and leg swelling.   Gastrointestinal: Negative for abdominal pain, diarrhea, nausea and vomiting.   Genitourinary: Positive for frequency. Negative for dysuria.   Musculoskeletal: Negative for back pain and myalgias.   Neurological: Negative for dizziness, sensory change, speech change, focal weakness and weakness.        Left foot        Physical Exam:  Physical Exam  Constitutional:       General: She is not in acute distress.     Appearance: She is not ill-appearing.   HENT:      Head: Normocephalic.      Mouth/Throat:      Mouth: Mucous membranes are moist.   Eyes:      General: No scleral icterus.     Conjunctiva/sclera: Conjunctivae normal.   Cardiovascular:      Rate and Rhythm: Normal rate and regular rhythm.      Pulses: Normal pulses.      Heart sounds: Normal heart sounds.   Pulmonary:      Effort: Pulmonary effort is normal. No respiratory distress.   Abdominal:      General: There is no distension.      Palpations: Abdomen is soft.      Tenderness: There is no abdominal tenderness. There is no guarding.   Musculoskeletal:      Right lower leg: No edema.      Left lower leg: Tenderness present. Edema present.      Comments: Right arm swelling, mild erythema. Left ankle with surgical dressing.    Skin:     General: Skin is warm and dry.      Capillary Refill: Capillary refill takes less than 2 seconds.    Neurological:      General: No focal deficit present.      Mental Status: She is alert and oriented to person, place, and time.   Psychiatric:         Mood and Affect: Mood normal.         Behavior: Behavior normal.         Labs:          Recent Labs     22   SODIUM 137 134* 134*   POTASSIUM 3.2* 3.4* 3.6   CHLORIDE 98 97 99   CO2 24 21 18*   BUN 15 16 16   CREATININE 1.25 1.23 1.20   MAGNESIUM  --  1.0* 2.1   CALCIUM 10.4 10.1 10.2     Recent Labs     22   ALTSGPT 6 7  --  6   ASTSGOT 33 36  --  28   ALKPHOSPHAT 78 79  --  85   TBILIRUBIN 0.3 0.3  --  0.3   GLUCOSE 78 86 74 78     Recent Labs     22  11122  0950 22   RBC 2.75*  --   --  2.37*  --  2.71*   HEMOGLOBIN 8.4*  --   --  7.0*  --  8.2*   HEMATOCRIT 27.0*  --   --  23.1*  --  26.9*   PLATELETCT 418  --   --  350  --  363   APTT 49.0*   < > 69.0* 59.5* 73.7*  --     < > = values in this interval not displayed.     Recent Labs     22   WBC 10.2  --  7.4 7.6   NEUTSPOLYS 87.60*  --  83.10* 75.60*   LYMPHOCYTES 6.70*  --  7.70* 9.40*   MONOCYTES 4.30  --  6.60 9.70   EOSINOPHILS 0.00  --  0.30 0.50   BASOPHILS 0.20  --  0.40 0.70   ASTSGOT 35 33 36 28   ALTSGPT 6 6 7 6   ALKPHOSPHAT 78 78 79 85   TBILIRUBIN 0.3 0.3 0.3 0.3     Recent Labs     22   SODIUM 137 134* 134*   POTASSIUM 3.2* 3.4* 3.6   CHLORIDE 98 97 99   CO2 24 21 18*   GLUCOSE 86 74 78   BUN 15 16 16   CREATININE 1.25 1.23 1.20   CALCIUM 10.4 10.1 10.2     Hemodynamics:  Temp (24hrs), Av.9 °C (98.4 °F), Min:36.6 °C (97.8 °F), Max:37.1 °C (98.7 °F)  Temperature: 37.1 °C (98.7 °F)  Pulse  Av.8  Min: 75  Max: 125   Blood Pressure: 152/81     Respiratory:    Respiration: 16, Pulse Oximetry: 96 %        RUL Breath Sounds: Clear,  RML Breath Sounds: Clear, RLL Breath Sounds: Diminished, MORENO Breath Sounds: Clear, LLL Breath Sounds: Diminished  Fluids:  No intake or output data in the 24 hours ending 02/25/22 1553     GI/Nutrition:  Orders Placed This Encounter   Procedures   • Diet Order Diet: Clear Liquid     Standing Status:   Standing     Number of Occurrences:   1     Order Specific Question:   Diet:     Answer:   Clear Liquid [10]     Medical Decision Making, by Problem:  Active Hospital Problems    Diagnosis    • *Hypercalcemia [E83.52]        Assessment and Plan:  This is a 56 y.o. female with stage III (minimum) Grade 2 endometrial adenocarcinoma s/p 6 cycles of neoadjuvant chemotherapy with Taxol/Carbo completed on 1/13/22 , who presents with hypercalcemia, nausea, and vomiting.      1. Hypercalcemia: Likely secondary to malignancy and dehydration. Calcium 14.2 on 2/23. Received about 4 L of IVF (per MAR) on presentation to ER/early on admit to CNU had good oral intake of water. Intimated on scheduled lasix 20 mg q 6. Pamidronate 60 mg given on 2/27. Lasix DC'd on 3/1. Calcium trending down; now normalized at 10.2.   2. Nausea vomiting: Prn compazine and Zofran.   3. Dehydration: Secondary to above. IVF.   4. Pelvic and low back pain: Acute on chronic. Patient with history of fibromyalgia. Seen outpatient by pain management. Poor pain control with home oxycodone 15 mg q 4 prn. Necrotics stopped after surgery on 2/26 due to decreased LOC.Resumed prn PO dilaudid on 2/27 after mentation improved. Dilaudid PCA then restarted secondary to poor pain control on oral dilaudid. Basal rate was then discontinued secondary to sedation. Patient using demand doses effectively; better pain control today.   5. Stage III (minimum) Grade 2 endometrial adenocarcinoma:  s/p 6 cycles of neoadjuvant chemotherapy. Surgery delayed due to hypercalcemia and n/v preventing patient from tolerating bowel prep. Plan to go to OR tomorrow. Consents ordered. CLD  today and bowel prep this afternoon.   6.Left trimalleolar ankle fracture/dislocation: s/p ORIF 2/26.   7. Upper extremity DVT: Venous US positive for DVT. On heparin gtt. Therapeutic x2. Continue at 1,600 units/h. Daily PTT.     8. Hypokalemia: K 3.1 on 2/28. Lasix discontinued. K 3.6 this AM. Anticipate loss with bowel prep this evening,  20 mEq KCl ordered for today.   9. Anemia: Secondary to chemotherapy. Hgb 7.4 on 2/27, likely secondary to loss in surgery and hemodilution. Hgb 8.2. Hemodynamically stable.   10. Elevated BP: No hx of HTN, Likely secondary to pain and IVF. On amlodipine 2.5 mg at home for Raynaud's. More hypertensive overnight. Increase amlodipine to 5 mg daily and reduced IVF.   11. ILIA: Cr up to 1.57. Likley secondary to dehydration. Cr now trending down. Monitor.   12. History of Lupus: continue home hydroxychloroquine  13. Hx of mood disorder: PTSD and anxiety. Continue home paroxetine and propranolol prn.   14. Hx Raynaud's disease: Continue amlodipine.   15. Hx of allergic rhinitis: Home Azelastine  16. Dispo: inpatient management for hypercalcemia, n/v, and pain. Plan for surgery tomorrow. Will require inpatient post op care.      Case discussed with Dr. Dyson.     Quality-Core Measures

## 2022-03-02 NOTE — PROGRESS NOTES
Pt alert and oriented x 4. Denies nausea but complains of pain of a 5/10. Goal is 4/10. PCA in use. Port to R chest flushing but no blood return. Pt states she can smell the saline. Per NOC shift they have attempted alteplase. Discussed with APN and requested order for dye study to evaluate patency and competence of the line. PIV to LUE flushing well with positive blood return. Heparin infusing. Elevated APTT. Redraw ordered. APN aware of results. LLE with dressing in place and edema. Elevated on pillows. Received phone call from IR that port not patent and has been de-accessed. APN messaged. Awaiting reply. Purewik in use. Frequent assessment in place.

## 2022-03-02 NOTE — PROGRESS NOTES
Pt presents to IR1. Pt was consented by MD at bedside, confirmed by this RN and consent at bedside. Pt transferred to IR table in supine position. Patient underwent a Venogram (port eval) by Dr. Troy. This is a non-sedation case. Pt comes with port already accessed.  Procedure site was marked by MD and verified using imaging guidance. Pt  prepped and draped in a sterile fashion.  Report called to Minnie HICKS. Pt transported by bed with RN to R313. Port is not functional and should not be used. Will deaccess as this has been in use on the floor and will need to be replaced.  Pt on dilaudid PCA and heparin gtt - checked in micromedex by this RN and they are ysite compatible.

## 2022-03-02 NOTE — CARE PLAN
The patient is Watcher - Medium risk of patient condition declining or worsening    Shift Goals  Clinical Goals: Control pain, maintain stable VS, wean O2  Patient Goals: Sleep and pain control    Progress made toward(s) clinical / shift goals:    Problem: Pain - Standard  Goal: Alleviation of pain or a reduction in pain to the patient’s comfort goal  Outcome: Progressing     Problem: Knowledge Deficit - Standard  Goal: Patient and family/care givers will demonstrate understanding of plan of care, disease process/condition, diagnostic tests and medications  Outcome: Progressing     Problem: Psychosocial  Goal: Patient's level of anxiety will decrease  Outcome: Progressing  Goal: Patient's ability to verbalize feelings about condition will improve  Outcome: Progressing  Goal: Patient's ability to re-evaluate and adapt role responsibilities will improve  Outcome: Progressing  Goal: Patient and family will demonstrate ability to cope with life altering diagnosis and/or procedure  Outcome: Progressing  Goal: Spiritual and cultural needs incorporated into hospitalization  Outcome: Progressing     Problem: Mobility  Goal: Patient's capacity to carry out activities will improve  Outcome: Progressing     Problem: Self Care  Goal: Patient will have the ability to perform ADLs independently or with assistance (bathe, groom, dress, toilet and feed)  Outcome: Progressing     Problem: Infection - Standard  Goal: Patient will remain free from infection  Outcome: Progressing     Problem: Fall Risk  Goal: Patient will remain free from falls  Outcome: Progressing     Problem: Skin Integrity  Goal: Skin integrity is maintained or improved  Outcome: Progressing       Patient is not progressing towards the following goals:

## 2022-03-02 NOTE — OR SURGEON
Immediate Post- Operative Note        Findings: Port won't aspirate.  SVC stenosis/occlusion.  Reccomend Port Replacement    Procedure(s): Right IJ Port Check    Estimated Blood Loss: Less than 5 ml    Complications: None      3/2/2022     3:08 PM     Avinash Troy M.D.

## 2022-03-02 NOTE — PROGRESS NOTES
"   Orthopaedic Progress Note    Interval changes: Doing well, awaiting pelvic surg from Dr. Kiel Orozco for disposition (home/SNF/IRF) from Orthopaedic team standpoint.  Follow-Up: needs appointment with Dr. Levy at [facility] at 10-14 days post-op for re-evaluation, staple removal and radiographs.    ROS - Patient denies any new issues.  Pain well controlled.    /81   Pulse (!) 104   Temp 37.1 °C (98.7 °F) (Temporal)   Resp 16   Ht 1.702 m (5' 7\")   Wt 99.1 kg (218 lb 7.6 oz)   SpO2 96%     Patient seen and examined  No acute distress  Breathing non labored  RRR  LLE splint CDI, DNVI moves all toes, cap refill <2 sec.     Recent Labs     02/28/22  0545 03/01/22  0449 03/02/22  0254   WBC 10.2 7.4 7.6   RBC 2.75* 2.37* 2.71*   HEMOGLOBIN 8.4* 7.0* 8.2*   HEMATOCRIT 27.0* 23.1* 26.9*   MCV 98.2* 97.5 99.3*   MCH 30.5 29.5 30.3   MCHC 31.1* 30.3* 30.5*   RDW 64.7* 62.1* 61.9*   PLATELETCT 418 350 363   MPV 9.7 9.2 9.3   .    Active Hospital Problems    Diagnosis     Hypercalcemia [E83.52]      Assessment/Plan:  Patient doing well  LLE splint CDI  POD#4 S/P   1.  Open treatment with internal fixation, left trimalleolar ankle fracture with fixation of posterior lip.  2.  Open treatment without fixation of anterolateral distal tibia fracture with fragment excision.  3.  Surgical fixation of left distal tibiofibular joint disruption.  4.  Physician applied stress examination of the left ankle using fluoroscopy  Wt bearing status - NWB LLE  Wound care/Drains - Dressings to be changed every other day by nursing  Future Procedures - none planned   Sutures/Staples out- 14 days post operatively  PT/OT-initiated  Antibiotics: Perioperative completed  DVT Prophylaxis- TEDS/SCDs/Foot pumps  Brenner-none planned   Case Coordination for Discharge Planning - Disposition pending       "

## 2022-03-02 NOTE — PROGRESS NOTES
PCA found to be almost empty. Unit is out of dilaudid bags. Message sent to pharmacy and narc tech to restock as soon as possible.

## 2022-03-03 ENCOUNTER — ANESTHESIA EVENT (OUTPATIENT)
Dept: SURGERY | Facility: MEDICAL CENTER | Age: 57
DRG: 740 | End: 2022-03-03
Payer: COMMERCIAL

## 2022-03-03 ENCOUNTER — APPOINTMENT (OUTPATIENT)
Dept: RADIOLOGY | Facility: MEDICAL CENTER | Age: 57
DRG: 740 | End: 2022-03-03
Attending: SPECIALIST
Payer: COMMERCIAL

## 2022-03-03 ENCOUNTER — ANESTHESIA (OUTPATIENT)
Dept: SURGERY | Facility: MEDICAL CENTER | Age: 57
DRG: 740 | End: 2022-03-03
Payer: COMMERCIAL

## 2022-03-03 LAB
ALBUMIN SERPL BCP-MCNC: 3 G/DL (ref 3.2–4.9)
ALBUMIN/GLOB SERPL: 1 G/DL
ALP SERPL-CCNC: 78 U/L (ref 30–99)
ALT SERPL-CCNC: 6 U/L (ref 2–50)
ANION GAP SERPL CALC-SCNC: 18 MMOL/L (ref 7–16)
APTT PPP: 60.5 SEC (ref 24.7–36)
AST SERPL-CCNC: 25 U/L (ref 12–45)
BARCODED ABORH UBTYP: 5100
BARCODED ABORH UBTYP: 5100
BARCODED PRD CODE UBPRD: NORMAL
BARCODED PRD CODE UBPRD: NORMAL
BARCODED UNIT NUM UBUNT: NORMAL
BARCODED UNIT NUM UBUNT: NORMAL
BASE EXCESS BLDA CALC-SCNC: -6 MMOL/L (ref -4–3)
BASE EXCESS BLDA CALC-SCNC: -8 MMOL/L (ref -4–3)
BASOPHILS # BLD AUTO: 0.6 % (ref 0–1.8)
BASOPHILS # BLD: 0.04 K/UL (ref 0–0.12)
BILIRUB SERPL-MCNC: 0.3 MG/DL (ref 0.1–1.5)
BODY TEMPERATURE: ABNORMAL DEGREES
BODY TEMPERATURE: ABNORMAL DEGREES
BUN SERPL-MCNC: 14 MG/DL (ref 8–22)
CA-I BLD ISE-SCNC: 1.13 MMOL/L (ref 1.1–1.3)
CALCIUM SERPL-MCNC: 9.1 MG/DL (ref 8.5–10.5)
CHLORIDE SERPL-SCNC: 100 MMOL/L (ref 96–112)
CO2 BLDA-SCNC: 19 MMOL/L (ref 20–33)
CO2 BLDA-SCNC: 22 MMOL/L (ref 20–33)
CO2 SERPL-SCNC: 17 MMOL/L (ref 20–33)
COMPONENT R 8504R: NORMAL
COMPONENT R 8504R: NORMAL
CREAT SERPL-MCNC: 1.02 MG/DL (ref 0.5–1.4)
EOSINOPHIL # BLD AUTO: 0.02 K/UL (ref 0–0.51)
EOSINOPHIL NFR BLD: 0.3 % (ref 0–6.9)
ERYTHROCYTE [DISTWIDTH] IN BLOOD BY AUTOMATED COUNT: 57.3 FL (ref 35.9–50)
ERYTHROCYTE [DISTWIDTH] IN BLOOD BY AUTOMATED COUNT: 61.9 FL (ref 35.9–50)
GLOBULIN SER CALC-MCNC: 2.9 G/DL (ref 1.9–3.5)
GLUCOSE SERPL-MCNC: 72 MG/DL (ref 65–99)
HCO3 BLDA-SCNC: 18.1 MMOL/L (ref 17–25)
HCO3 BLDA-SCNC: 20.6 MMOL/L (ref 17–25)
HCT VFR BLD AUTO: 24.6 % (ref 37–47)
HCT VFR BLD AUTO: 26 % (ref 37–47)
HCT VFR BLD CALC: 20 % (ref 37–47)
HGB BLD-MCNC: 6.8 G/DL (ref 12–16)
HGB BLD-MCNC: 7.5 G/DL (ref 12–16)
HGB BLD-MCNC: 8.4 G/DL (ref 12–16)
HOROWITZ INDEX BLDA+IHG-RTO: 309 MM[HG]
IMM GRANULOCYTES # BLD AUTO: 0.25 K/UL (ref 0–0.11)
IMM GRANULOCYTES NFR BLD AUTO: 3.5 % (ref 0–0.9)
INR PPP: 1.27 (ref 0.87–1.13)
LYMPHOCYTES # BLD AUTO: 0.81 K/UL (ref 1–4.8)
LYMPHOCYTES NFR BLD: 11.3 % (ref 22–41)
MCH RBC QN AUTO: 29.6 PG (ref 27–33)
MCH RBC QN AUTO: 30.1 PG (ref 27–33)
MCHC RBC AUTO-ENTMCNC: 30.5 G/DL (ref 33.6–35)
MCHC RBC AUTO-ENTMCNC: 32.3 G/DL (ref 33.6–35)
MCV RBC AUTO: 93.2 FL (ref 81.4–97.8)
MCV RBC AUTO: 97.2 FL (ref 81.4–97.8)
MONOCYTES # BLD AUTO: 0.67 K/UL (ref 0–0.85)
MONOCYTES NFR BLD AUTO: 9.3 % (ref 0–13.4)
NEUTROPHILS # BLD AUTO: 5.41 K/UL (ref 2–7.15)
NEUTROPHILS NFR BLD: 75 % (ref 44–72)
NRBC # BLD AUTO: 0 K/UL
NRBC BLD-RTO: 0 /100 WBC
O2/TOTAL GAS SETTING VFR VENT: 100 %
PCO2 BLDA: 39.8 MMHG (ref 26–37)
PCO2 BLDA: 48.6 MMHG (ref 26–37)
PCO2 TEMP ADJ BLDA: 48.2 MMHG (ref 26–37)
PH BLDA: 7.24 [PH] (ref 7.4–7.5)
PH BLDA: 7.26 [PH] (ref 7.4–7.5)
PH TEMP ADJ BLDA: 7.24 [PH] (ref 7.4–7.5)
PLATELET # BLD AUTO: 357 K/UL (ref 164–446)
PLATELET # BLD AUTO: 403 K/UL (ref 164–446)
PMV BLD AUTO: 9.1 FL (ref 9–12.9)
PMV BLD AUTO: 9.7 FL (ref 9–12.9)
PO2 BLDA: 232 MMHG (ref 64–87)
PO2 BLDA: 309 MMHG (ref 64–87)
PO2 TEMP ADJ BLDA: 308 MMHG (ref 64–87)
POTASSIUM BLD-SCNC: 4 MMOL/L (ref 3.6–5.5)
POTASSIUM BLD-SCNC: 4.1 MMOL/L (ref 3.6–5.5)
POTASSIUM SERPL-SCNC: 3.6 MMOL/L (ref 3.6–5.5)
PRODUCT TYPE UPROD: NORMAL
PRODUCT TYPE UPROD: NORMAL
PROT SERPL-MCNC: 5.9 G/DL (ref 6–8.2)
PROTHROMBIN TIME: 15.5 SEC (ref 12–14.6)
RBC # BLD AUTO: 2.53 M/UL (ref 4.2–5.4)
RBC # BLD AUTO: 2.79 M/UL (ref 4.2–5.4)
SAO2 % BLDA: 100 % (ref 93–99)
SAO2 % BLDA: 100 % (ref 93–99)
SODIUM BLD-SCNC: 137 MMOL/L (ref 135–145)
SODIUM BLD-SCNC: 138 MMOL/L (ref 135–145)
SODIUM SERPL-SCNC: 135 MMOL/L (ref 135–145)
SPECIMEN DRAWN FROM PATIENT: ABNORMAL
SPECIMEN DRAWN FROM PATIENT: ABNORMAL
UNIT STATUS USTAT: NORMAL
UNIT STATUS USTAT: NORMAL
WBC # BLD AUTO: 17 K/UL (ref 4.8–10.8)
WBC # BLD AUTO: 7.2 K/UL (ref 4.8–10.8)

## 2022-03-03 PROCEDURE — C1769 GUIDE WIRE: HCPCS | Performed by: SPECIALIST

## 2022-03-03 PROCEDURE — 302131 K PAD MOTOR: Performed by: SPECIALIST

## 2022-03-03 PROCEDURE — 501583 HCHG TROCAR, THRD CAN&SEAL 5X100: Performed by: SPECIALIST

## 2022-03-03 PROCEDURE — 82803 BLOOD GASES ANY COMBINATION: CPT | Mod: 91

## 2022-03-03 PROCEDURE — 82330 ASSAY OF CALCIUM: CPT | Mod: 91

## 2022-03-03 PROCEDURE — 36430 TRANSFUSION BLD/BLD COMPNT: CPT

## 2022-03-03 PROCEDURE — 700101 HCHG RX REV CODE 250: Performed by: STUDENT IN AN ORGANIZED HEALTH CARE EDUCATION/TRAINING PROGRAM

## 2022-03-03 PROCEDURE — 84132 ASSAY OF SERUM POTASSIUM: CPT

## 2022-03-03 PROCEDURE — 770004 HCHG ROOM/CARE - ONCOLOGY PRIVATE *

## 2022-03-03 PROCEDURE — 80053 COMPREHEN METABOLIC PANEL: CPT

## 2022-03-03 PROCEDURE — 0JPT0WZ REMOVAL OF TOTALLY IMPLANTABLE VASCULAR ACCESS DEVICE FROM TRUNK SUBCUTANEOUS TISSUE AND FASCIA, OPEN APPROACH: ICD-10-PCS | Performed by: SPECIALIST

## 2022-03-03 PROCEDURE — 500371 HCHG DRAIN, BLAKE 10MM: Performed by: SPECIALIST

## 2022-03-03 PROCEDURE — 85730 THROMBOPLASTIN TIME PARTIAL: CPT

## 2022-03-03 PROCEDURE — 160009 HCHG ANES TIME/MIN: Performed by: SPECIALIST

## 2022-03-03 PROCEDURE — A4340 INDWELLING CATHETER SPECIAL: HCPCS | Performed by: SPECIALIST

## 2022-03-03 PROCEDURE — 85027 COMPLETE CBC AUTOMATED: CPT

## 2022-03-03 PROCEDURE — 302152 K-PAD 12X17: Performed by: SPECIALIST

## 2022-03-03 PROCEDURE — 64488 TAP BLOCK BI INJECTION: CPT | Performed by: SPECIALIST

## 2022-03-03 PROCEDURE — 0UT70ZZ RESECTION OF BILATERAL FALLOPIAN TUBES, OPEN APPROACH: ICD-10-PCS | Performed by: SPECIALIST

## 2022-03-03 PROCEDURE — 3E0T3BZ INTRODUCTION OF ANESTHETIC AGENT INTO PERIPHERAL NERVES AND PLEXI, PERCUTANEOUS APPROACH: ICD-10-PCS | Performed by: STUDENT IN AN ORGANIZED HEALTH CARE EDUCATION/TRAINING PROGRAM

## 2022-03-03 PROCEDURE — 700111 HCHG RX REV CODE 636 W/ 250 OVERRIDE (IP): Performed by: STUDENT IN AN ORGANIZED HEALTH CARE EDUCATION/TRAINING PROGRAM

## 2022-03-03 PROCEDURE — 700102 HCHG RX REV CODE 250 W/ 637 OVERRIDE(OP): Performed by: STUDENT IN AN ORGANIZED HEALTH CARE EDUCATION/TRAINING PROGRAM

## 2022-03-03 PROCEDURE — 501838 HCHG SUTURE GENERAL: Performed by: SPECIALIST

## 2022-03-03 PROCEDURE — 160031 HCHG SURGERY MINUTES - 1ST 30 MINS LEVEL 5: Performed by: SPECIALIST

## 2022-03-03 PROCEDURE — 88309 TISSUE EXAM BY PATHOLOGIST: CPT

## 2022-03-03 PROCEDURE — 501572 HCHG TROCAR, SHIELD OBTU 5X100: Performed by: SPECIALIST

## 2022-03-03 PROCEDURE — 81288 MLH1 GENE: CPT

## 2022-03-03 PROCEDURE — 700111 HCHG RX REV CODE 636 W/ 250 OVERRIDE (IP)

## 2022-03-03 PROCEDURE — 88342 IMHCHEM/IMCYTCHM 1ST ANTB: CPT

## 2022-03-03 PROCEDURE — 85014 HEMATOCRIT: CPT

## 2022-03-03 PROCEDURE — 84295 ASSAY OF SERUM SODIUM: CPT

## 2022-03-03 PROCEDURE — P9016 RBC LEUKOCYTES REDUCED: HCPCS | Mod: 91

## 2022-03-03 PROCEDURE — 500854 HCHG NEEDLE, INSUFFLATION FOR STEP: Performed by: SPECIALIST

## 2022-03-03 PROCEDURE — A9270 NON-COVERED ITEM OR SERVICE: HCPCS | Performed by: STUDENT IN AN ORGANIZED HEALTH CARE EDUCATION/TRAINING PROGRAM

## 2022-03-03 PROCEDURE — 700101 HCHG RX REV CODE 250: Performed by: SPECIALIST

## 2022-03-03 PROCEDURE — 85610 PROTHROMBIN TIME: CPT

## 2022-03-03 PROCEDURE — 0UT90ZZ RESECTION OF UTERUS, OPEN APPROACH: ICD-10-PCS | Performed by: SPECIALIST

## 2022-03-03 PROCEDURE — 160002 HCHG RECOVERY MINUTES (STAT): Performed by: SPECIALIST

## 2022-03-03 PROCEDURE — 88300 SURGICAL PATH GROSS: CPT

## 2022-03-03 PROCEDURE — 160035 HCHG PACU - 1ST 60 MINS PHASE I: Performed by: SPECIALIST

## 2022-03-03 PROCEDURE — 85025 COMPLETE CBC W/AUTO DIFF WBC: CPT

## 2022-03-03 PROCEDURE — 86923 COMPATIBILITY TEST ELECTRIC: CPT | Mod: 91

## 2022-03-03 PROCEDURE — 0UT20ZZ RESECTION OF BILATERAL OVARIES, OPEN APPROACH: ICD-10-PCS | Performed by: SPECIALIST

## 2022-03-03 PROCEDURE — 160048 HCHG OR STATISTICAL LEVEL 1-5: Performed by: SPECIALIST

## 2022-03-03 PROCEDURE — 160042 HCHG SURGERY MINUTES - EA ADDL 1 MIN LEVEL 5: Performed by: SPECIALIST

## 2022-03-03 PROCEDURE — 36415 COLL VENOUS BLD VENIPUNCTURE: CPT

## 2022-03-03 PROCEDURE — 502704 HCHG DEVICE, LIGASURE IMPACT: Performed by: SPECIALIST

## 2022-03-03 PROCEDURE — 700105 HCHG RX REV CODE 258: Performed by: STUDENT IN AN ORGANIZED HEALTH CARE EDUCATION/TRAINING PROGRAM

## 2022-03-03 PROCEDURE — 160036 HCHG PACU - EA ADDL 30 MINS PHASE I: Performed by: SPECIALIST

## 2022-03-03 PROCEDURE — 700105 HCHG RX REV CODE 258

## 2022-03-03 PROCEDURE — 88341 IMHCHEM/IMCYTCHM EA ADD ANTB: CPT | Mod: 91

## 2022-03-03 PROCEDURE — 88307 TISSUE EXAM BY PATHOLOGIST: CPT | Mod: 59

## 2022-03-03 PROCEDURE — C1765 ADHESION BARRIER: HCPCS | Performed by: SPECIALIST

## 2022-03-03 PROCEDURE — 88360 TUMOR IMMUNOHISTOCHEM/MANUAL: CPT | Mod: 91

## 2022-03-03 PROCEDURE — 74018 RADEX ABDOMEN 1 VIEW: CPT

## 2022-03-03 RX ORDER — ROCURONIUM BROMIDE 10 MG/ML
INJECTION, SOLUTION INTRAVENOUS PRN
Status: DISCONTINUED | OUTPATIENT
Start: 2022-03-03 | End: 2022-03-03 | Stop reason: SURG

## 2022-03-03 RX ORDER — HYDROMORPHONE HYDROCHLORIDE 1 MG/ML
0.2 INJECTION, SOLUTION INTRAMUSCULAR; INTRAVENOUS; SUBCUTANEOUS
Status: DISCONTINUED | OUTPATIENT
Start: 2022-03-03 | End: 2022-03-03 | Stop reason: HOSPADM

## 2022-03-03 RX ORDER — ONDANSETRON 2 MG/ML
4 INJECTION INTRAMUSCULAR; INTRAVENOUS
Status: DISCONTINUED | OUTPATIENT
Start: 2022-03-03 | End: 2022-03-03 | Stop reason: HOSPADM

## 2022-03-03 RX ORDER — SODIUM CHLORIDE, SODIUM LACTATE, POTASSIUM CHLORIDE, CALCIUM CHLORIDE 600; 310; 30; 20 MG/100ML; MG/100ML; MG/100ML; MG/100ML
INJECTION, SOLUTION INTRAVENOUS
Status: DISCONTINUED | OUTPATIENT
Start: 2022-03-03 | End: 2022-03-03 | Stop reason: SURG

## 2022-03-03 RX ORDER — BUPIVACAINE HYDROCHLORIDE 2.5 MG/ML
INJECTION, SOLUTION EPIDURAL; INFILTRATION; INTRACAUDAL PRN
Status: DISCONTINUED | OUTPATIENT
Start: 2022-03-03 | End: 2022-03-03 | Stop reason: SURG

## 2022-03-03 RX ORDER — LIDOCAINE HYDROCHLORIDE 20 MG/ML
INJECTION, SOLUTION EPIDURAL; INFILTRATION; INTRACAUDAL; PERINEURAL PRN
Status: DISCONTINUED | OUTPATIENT
Start: 2022-03-03 | End: 2022-03-03 | Stop reason: SURG

## 2022-03-03 RX ORDER — OXYCODONE HCL 5 MG/5 ML
10 SOLUTION, ORAL ORAL
Status: DISCONTINUED | OUTPATIENT
Start: 2022-03-03 | End: 2022-03-03 | Stop reason: HOSPADM

## 2022-03-03 RX ORDER — HALOPERIDOL 5 MG/ML
1 INJECTION INTRAMUSCULAR
Status: DISCONTINUED | OUTPATIENT
Start: 2022-03-03 | End: 2022-03-03 | Stop reason: HOSPADM

## 2022-03-03 RX ORDER — OXYCODONE HCL 5 MG/5 ML
5 SOLUTION, ORAL ORAL
Status: DISCONTINUED | OUTPATIENT
Start: 2022-03-03 | End: 2022-03-03 | Stop reason: HOSPADM

## 2022-03-03 RX ORDER — HYDROMORPHONE HYDROCHLORIDE 1 MG/ML
0.1 INJECTION, SOLUTION INTRAMUSCULAR; INTRAVENOUS; SUBCUTANEOUS
Status: DISCONTINUED | OUTPATIENT
Start: 2022-03-03 | End: 2022-03-03 | Stop reason: HOSPADM

## 2022-03-03 RX ORDER — CEFOTETAN DISODIUM 2 G/20ML
INJECTION, POWDER, FOR SOLUTION INTRAMUSCULAR; INTRAVENOUS PRN
Status: DISCONTINUED | OUTPATIENT
Start: 2022-03-03 | End: 2022-03-03 | Stop reason: SURG

## 2022-03-03 RX ORDER — DIPHENHYDRAMINE HYDROCHLORIDE 50 MG/ML
12.5 INJECTION INTRAMUSCULAR; INTRAVENOUS
Status: DISCONTINUED | OUTPATIENT
Start: 2022-03-03 | End: 2022-03-03 | Stop reason: HOSPADM

## 2022-03-03 RX ORDER — SODIUM CHLORIDE, SODIUM LACTATE, POTASSIUM CHLORIDE, CALCIUM CHLORIDE 600; 310; 30; 20 MG/100ML; MG/100ML; MG/100ML; MG/100ML
INJECTION, SOLUTION INTRAVENOUS CONTINUOUS
Status: DISCONTINUED | OUTPATIENT
Start: 2022-03-03 | End: 2022-03-03 | Stop reason: HOSPADM

## 2022-03-03 RX ORDER — HYDROMORPHONE HYDROCHLORIDE 1 MG/ML
0.4 INJECTION, SOLUTION INTRAMUSCULAR; INTRAVENOUS; SUBCUTANEOUS
Status: DISCONTINUED | OUTPATIENT
Start: 2022-03-03 | End: 2022-03-03 | Stop reason: HOSPADM

## 2022-03-03 RX ORDER — ACETAMINOPHEN 500 MG
1000 TABLET ORAL ONCE
Status: COMPLETED | OUTPATIENT
Start: 2022-03-03 | End: 2022-03-03

## 2022-03-03 RX ORDER — HYDROMORPHONE HYDROCHLORIDE 2 MG/ML
INJECTION, SOLUTION INTRAMUSCULAR; INTRAVENOUS; SUBCUTANEOUS PRN
Status: DISCONTINUED | OUTPATIENT
Start: 2022-03-03 | End: 2022-03-03 | Stop reason: SURG

## 2022-03-03 RX ORDER — KETAMINE HYDROCHLORIDE 50 MG/ML
INJECTION, SOLUTION INTRAMUSCULAR; INTRAVENOUS PRN
Status: DISCONTINUED | OUTPATIENT
Start: 2022-03-03 | End: 2022-03-03 | Stop reason: SURG

## 2022-03-03 RX ORDER — PROPOFOL 10 MG/ML
INJECTION, EMULSION INTRAVENOUS PRN
Status: DISCONTINUED | OUTPATIENT
Start: 2022-03-03 | End: 2022-03-03 | Stop reason: SURG

## 2022-03-03 RX ORDER — MIDAZOLAM HYDROCHLORIDE 1 MG/ML
INJECTION INTRAMUSCULAR; INTRAVENOUS PRN
Status: DISCONTINUED | OUTPATIENT
Start: 2022-03-03 | End: 2022-03-03 | Stop reason: SURG

## 2022-03-03 RX ORDER — DEXMEDETOMIDINE HYDROCHLORIDE 100 UG/ML
INJECTION, SOLUTION INTRAVENOUS PRN
Status: DISCONTINUED | OUTPATIENT
Start: 2022-03-03 | End: 2022-03-03 | Stop reason: SURG

## 2022-03-03 RX ORDER — MEPERIDINE HYDROCHLORIDE 25 MG/ML
12.5 INJECTION INTRAMUSCULAR; INTRAVENOUS; SUBCUTANEOUS
Status: DISCONTINUED | OUTPATIENT
Start: 2022-03-03 | End: 2022-03-03 | Stop reason: HOSPADM

## 2022-03-03 RX ORDER — DEXAMETHASONE SODIUM PHOSPHATE 4 MG/ML
INJECTION, SOLUTION INTRA-ARTICULAR; INTRALESIONAL; INTRAMUSCULAR; INTRAVENOUS; SOFT TISSUE PRN
Status: DISCONTINUED | OUTPATIENT
Start: 2022-03-03 | End: 2022-03-03 | Stop reason: SURG

## 2022-03-03 RX ADMIN — KETAMINE HYDROCHLORIDE 10 MG: 50 INJECTION INTRAMUSCULAR; INTRAVENOUS at 18:48

## 2022-03-03 RX ADMIN — HYDROMORPHONE HYDROCHLORIDE 0.5 MG: 2 INJECTION INTRAMUSCULAR; INTRAVENOUS; SUBCUTANEOUS at 17:19

## 2022-03-03 RX ADMIN — ACETAMINOPHEN 1000 MG: 500 TABLET ORAL at 17:00

## 2022-03-03 RX ADMIN — HYDROMORPHONE HYDROCHLORIDE 0.2 MG: 1 INJECTION, SOLUTION INTRAMUSCULAR; INTRAVENOUS; SUBCUTANEOUS at 22:00

## 2022-03-03 RX ADMIN — SUGAMMADEX 200 MG: 100 INJECTION, SOLUTION INTRAVENOUS at 20:29

## 2022-03-03 RX ADMIN — SODIUM CHLORIDE: 9 INJECTION, SOLUTION INTRAVENOUS at 14:19

## 2022-03-03 RX ADMIN — FENTANYL CITRATE 100 MCG: 50 INJECTION, SOLUTION INTRAMUSCULAR; INTRAVENOUS at 18:28

## 2022-03-03 RX ADMIN — HYDROMORPHONE HYDROCHLORIDE 0.5 MG: 2 INJECTION INTRAMUSCULAR; INTRAVENOUS; SUBCUTANEOUS at 18:43

## 2022-03-03 RX ADMIN — Medication: at 00:17

## 2022-03-03 RX ADMIN — KETAMINE HYDROCHLORIDE 10 MG: 50 INJECTION INTRAMUSCULAR; INTRAVENOUS at 18:00

## 2022-03-03 RX ADMIN — LIDOCAINE HYDROCHLORIDE 100 MG: 20 INJECTION, SOLUTION EPIDURAL; INFILTRATION; INTRACAUDAL at 17:22

## 2022-03-03 RX ADMIN — SODIUM CHLORIDE: 9 INJECTION, SOLUTION INTRAVENOUS at 04:17

## 2022-03-03 RX ADMIN — DEXAMETHASONE SODIUM PHOSPHATE 8 MG: 4 INJECTION, SOLUTION INTRA-ARTICULAR; INTRALESIONAL; INTRAMUSCULAR; INTRAVENOUS; SOFT TISSUE at 17:33

## 2022-03-03 RX ADMIN — SODIUM CHLORIDE, POTASSIUM CHLORIDE, SODIUM LACTATE AND CALCIUM CHLORIDE: 600; 310; 30; 20 INJECTION, SOLUTION INTRAVENOUS at 17:16

## 2022-03-03 RX ADMIN — DEXMEDETOMIDINE 20 MCG: 200 INJECTION, SOLUTION INTRAVENOUS at 18:19

## 2022-03-03 RX ADMIN — HYDROMORPHONE HYDROCHLORIDE 0.5 MG: 2 INJECTION INTRAMUSCULAR; INTRAVENOUS; SUBCUTANEOUS at 18:08

## 2022-03-03 RX ADMIN — ROCURONIUM BROMIDE 20 MG: 10 INJECTION, SOLUTION INTRAVENOUS at 17:48

## 2022-03-03 RX ADMIN — BUPIVACAINE HYDROCHLORIDE 30 ML: 2.5 INJECTION, SOLUTION EPIDURAL; INFILTRATION; INTRACAUDAL; PERINEURAL at 17:31

## 2022-03-03 RX ADMIN — DEXMEDETOMIDINE 10 MCG: 200 INJECTION, SOLUTION INTRAVENOUS at 18:23

## 2022-03-03 RX ADMIN — BUPIVACAINE HYDROCHLORIDE 30 ML: 2.5 INJECTION, SOLUTION EPIDURAL; INFILTRATION; INTRACAUDAL; PERINEURAL at 17:27

## 2022-03-03 RX ADMIN — Medication: at 23:04

## 2022-03-03 RX ADMIN — KETAMINE HYDROCHLORIDE 5 MG: 50 INJECTION INTRAMUSCULAR; INTRAVENOUS at 18:43

## 2022-03-03 RX ADMIN — KETAMINE HYDROCHLORIDE 10 MG: 50 INJECTION INTRAMUSCULAR; INTRAVENOUS at 18:23

## 2022-03-03 RX ADMIN — ROCURONIUM BROMIDE 50 MG: 10 INJECTION, SOLUTION INTRAVENOUS at 17:22

## 2022-03-03 RX ADMIN — KETAMINE HYDROCHLORIDE 5 MG: 50 INJECTION INTRAMUSCULAR; INTRAVENOUS at 18:06

## 2022-03-03 RX ADMIN — PROPOFOL 200 MG: 10 INJECTION, EMULSION INTRAVENOUS at 17:22

## 2022-03-03 RX ADMIN — MIDAZOLAM HYDROCHLORIDE 2 MG: 1 INJECTION, SOLUTION INTRAMUSCULAR; INTRAVENOUS at 17:16

## 2022-03-03 RX ADMIN — HYDROMORPHONE HYDROCHLORIDE 0.4 MG: 1 INJECTION, SOLUTION INTRAMUSCULAR; INTRAVENOUS; SUBCUTANEOUS at 21:50

## 2022-03-03 RX ADMIN — HYDROMORPHONE HYDROCHLORIDE 0.4 MG: 1 INJECTION, SOLUTION INTRAMUSCULAR; INTRAVENOUS; SUBCUTANEOUS at 21:45

## 2022-03-03 RX ADMIN — HYDROMORPHONE HYDROCHLORIDE 0.5 MG: 2 INJECTION INTRAMUSCULAR; INTRAVENOUS; SUBCUTANEOUS at 17:36

## 2022-03-03 RX ADMIN — ROCURONIUM BROMIDE 30 MG: 10 INJECTION, SOLUTION INTRAVENOUS at 18:22

## 2022-03-03 RX ADMIN — KETAMINE HYDROCHLORIDE 10 MG: 50 INJECTION INTRAMUSCULAR; INTRAVENOUS at 18:08

## 2022-03-03 RX ADMIN — CEFOTETAN DISODIUM 2 G: 2 INJECTION, POWDER, FOR SOLUTION INTRAMUSCULAR; INTRAVENOUS at 17:34

## 2022-03-03 ASSESSMENT — ENCOUNTER SYMPTOMS
FEVER: 0
WHEEZING: 0
VOMITING: 0
BACK PAIN: 0
MYALGIAS: 0
WEAKNESS: 0
EYE REDNESS: 0
SHORTNESS OF BREATH: 0
ABDOMINAL PAIN: 0
NAUSEA: 0
PALPITATIONS: 0
DIZZINESS: 0
SENSORY CHANGE: 0
SPEECH CHANGE: 0
DIARRHEA: 0
EYE DISCHARGE: 0
CHILLS: 0
FOCAL WEAKNESS: 0

## 2022-03-03 ASSESSMENT — PAIN DESCRIPTION - PAIN TYPE
TYPE: SURGICAL PAIN
TYPE: ACUTE PAIN
TYPE: SURGICAL PAIN
TYPE: ACUTE PAIN

## 2022-03-03 NOTE — CARE PLAN
The patient is Watcher - Medium risk of patient condition declining or worsening    Shift Goals  Clinical Goals: Finish Bowel Prep  Patient Goals: Rest    Progress made toward(s) clinical / shift goals:    Problem: Knowledge Deficit - Standard  Goal: Patient and family/care givers will demonstrate understanding of plan of care, disease process/condition, diagnostic tests and medications  Outcome: Progressing  Note: Pt has been kept up to date on the plan of care. All needs have been addressed at this time.        Patient is not progressing towards the following goals:      Problem: Pain - Standard  Goal: Alleviation of pain or a reduction in pain to the patient’s comfort goal  Outcome: Not Progressing  Note: Pt had intermittenent spikes of pain throughout the night. Reinforced education about the use of the PCA pump to help aid in Pt pain relief

## 2022-03-03 NOTE — CARE PLAN
The patient is Watcher - Medium risk of patient condition declining or worsening    Shift Goals  Clinical Goals: Pt will verbalize maintainence of her pain.  Patient Goals: Pt will have a bowel movement.  Family Goals: n/a    Progress made toward(s) clinical / shift goals:  Pain controlled with use of dilaudid PCA. Pt hitting her button when ever able. Pain of a 5/10.      Problem: Pain - Standard  Goal: Alleviation of pain or a reduction in pain to the patient’s comfort goal  Outcome: Progressing     Problem: Knowledge Deficit - Standard  Goal: Patient and family/care givers will demonstrate understanding of plan of care, disease process/condition, diagnostic tests and medications  Outcome: Progressing     Problem: Psychosocial  Goal: Patient's level of anxiety will decrease  Outcome: Progressing       Patient is not progressing towards the following goals: Pt with no bowel movement for over a week. No bowel movement with small amount of bowel prep. Dr hall aware.

## 2022-03-03 NOTE — PROGRESS NOTES
Pt alert and oriented x 4. Denies nausea but complains of pain of a 5/10 in her abdomen. Dilaudid PCA in use. Q 2 turns in place. LLE elevated on pillows. Purewik in place. Pt NPO for scheduled surgery at 1530. Heparin gtt stopped. Both PIVs to LUE flushing with no pain or redness at the site. Pt resting at this time. Call light within reach. Hourly rounding in place.

## 2022-03-03 NOTE — PROGRESS NOTES
At 23:12 Spoke with Dr. Dyson and updated him that the Pt had vomited 3 times equalling about 150ml in total. Updated that only 1/3 of the bowel prep had been consumed and Dr. Dyson stated she could continue working on consuming the bowel prep until 0400 today (3/3).   Pt NPO strict at this time. Morning Oral medications held.

## 2022-03-03 NOTE — PROGRESS NOTES
"   Orthopaedic Progress Note    Interval changes: Doing well, awaiting pelvic surg from Dr. Kiel Orozco for disposition (home/SNF/IRF) from Orthopaedic team standpoint.  Follow-Up: needs appointment with Dr. Levy at [facility] at 10-14 days post-op for re-evaluation, staple removal and radiographs.    ROS - Patient denies any new issues.  Pain well controlled.    /61   Pulse (!) 112   Temp 36.9 °C (98.5 °F) (Temporal)   Resp 18   Ht 1.702 m (5' 7\")   Wt 99.1 kg (218 lb 7.6 oz)   SpO2 95%     Patient seen and examined  No acute distress  Breathing non labored  RRR  LLE splint CDI, DNVI moves all toes, cap refill <2 sec.     Recent Labs     03/01/22  0449 03/02/22  0254 03/03/22  0054   WBC 7.4 7.6 7.2   RBC 2.37* 2.71* 2.53*   HEMOGLOBIN 7.0* 8.2* 7.5*   HEMATOCRIT 23.1* 26.9* 24.6*   MCV 97.5 99.3* 97.2   MCH 29.5 30.3 29.6   MCHC 30.3* 30.5* 30.5*   RDW 62.1* 61.9* 61.9*   PLATELETCT 350 363 403   MPV 9.2 9.3 9.7   .    Active Hospital Problems    Diagnosis    • Hypercalcemia [E83.52]      Assessment/Plan:  Patient doing well  LLE splint CDI  POD#5 S/P   1.  Open treatment with internal fixation, left trimalleolar ankle fracture with fixation of posterior lip.  2.  Open treatment without fixation of anterolateral distal tibia fracture with fragment excision.  3.  Surgical fixation of left distal tibiofibular joint disruption.  4.  Physician applied stress examination of the left ankle using fluoroscopy  Wt bearing status - NWB LLE  Wound care/Drains - Dressings to be changed every other day by nursing  Future Procedures - none planned   Sutures/Staples out- 14 days post operatively  PT/OT-initiated  Antibiotics: Perioperative completed  DVT Prophylaxis- TEDS/SCDs/Foot pumps  Brenner-none planned   Case Coordination for Discharge Planning - Disposition pending       "

## 2022-03-03 NOTE — THERAPY
Physical Therapy   Daily Treatment     Patient Name: Magali Leal  Age:  56 y.o., Sex:  female  Medical Record #: [id number]  Today's Date: 3/2/2022     Precautions  Precautions: Fall Risk;Non Weight Bearing Left Lower Extremity    Assessment    Pt was pleasant and agreeable to therapy session. She was motivated reporting feeling better but still with pain and generalized fatigue. She was able to teach back proper WB status on LLE . She completed bed mobility with spv and extra effort. She was able to tolerate 3 STS with modA for lift off and therapist placing foot under LLE to reassure NWB. She required seated breaks in between due to fatigue. Further deferred due to transport arriving for procedure to check her port. Will continue to follow while in house     Plan    Continue current treatment plan.    DC Equipment Recommendations: Unable to determine at this time  Discharge Recommendations: Recommend post-acute placement for additional physical therapy services prior to discharge home         03/02/22 1405   Balance   Sitting Balance (Static) Fair   Sitting Balance (Dynamic) Fair   Standing Balance (Static) Fair -   Standing Balance (Dynamic) Fair -   Weight Shift Sitting Fair   Weight Shift Standing Absent   Comments with fww   Gait Analysis   Gait Level Of Assist Unable to Participate   Comments limited due to BUE strength due to NWB on LLE   Bed Mobility    Supine to Sit Supervised   Sit to Supine Supervised   Scooting Supervised   Comments extra effort but motivated to do on own   Functional Mobility   Sit to Stand Moderate Assist   Skilled Intervention Verbal Cuing;Compensatory Strategies   Comments modA for lift off with fww, therapist placing foot under LLE to reassure NWB   Short Term Goals    Short Term Goal # 1 Patient will move supine<>sitting EOB without bed features with supervision within 6tx in order to get in/out of bed   Goal Outcome # 1 Progressing as expected   Short Term Goal # 2 Patient will  move sitting<>standing with supervision within 6tx in order to initiate transfers and gait   Goal Outcome # 2 Goal not met   Short Term Goal # 3 Patient will ambulate 15ft with supervision within 6tx in order to access environment   Goal Outcome # 3 Goal not met   Short Term Goal # 4 Patient will self propel WC 50ft with supervision within 6tx in order to access community   Goal Outcome # 4 Goal not met

## 2022-03-04 LAB
ALBUMIN SERPL BCP-MCNC: 2.4 G/DL (ref 3.2–4.9)
ALBUMIN/GLOB SERPL: 0.9 G/DL
ALP SERPL-CCNC: 60 U/L (ref 30–99)
ALT SERPL-CCNC: 6 U/L (ref 2–50)
ANION GAP SERPL CALC-SCNC: 20 MMOL/L (ref 7–16)
AST SERPL-CCNC: 26 U/L (ref 12–45)
BASOPHILS # BLD AUTO: 0.5 % (ref 0–1.8)
BASOPHILS # BLD: 0.08 K/UL (ref 0–0.12)
BILIRUB SERPL-MCNC: 0.8 MG/DL (ref 0.1–1.5)
BUN SERPL-MCNC: 13 MG/DL (ref 8–22)
CA-I BLD ISE-SCNC: 1.08 MMOL/L (ref 1.1–1.3)
CALCIUM SERPL-MCNC: 7.8 MG/DL (ref 8.5–10.5)
CHLORIDE SERPL-SCNC: 104 MMOL/L (ref 96–112)
CO2 SERPL-SCNC: 13 MMOL/L (ref 20–33)
CREAT SERPL-MCNC: 1.03 MG/DL (ref 0.5–1.4)
EOSINOPHIL # BLD AUTO: 0 K/UL (ref 0–0.51)
EOSINOPHIL NFR BLD: 0 % (ref 0–6.9)
ERYTHROCYTE [DISTWIDTH] IN BLOOD BY AUTOMATED COUNT: 61 FL (ref 35.9–50)
GLOBULIN SER CALC-MCNC: 2.6 G/DL (ref 1.9–3.5)
GLUCOSE SERPL-MCNC: 133 MG/DL (ref 65–99)
HCT VFR BLD AUTO: 27.3 % (ref 37–47)
HCT VFR BLD CALC: 24 % (ref 37–47)
HGB BLD-MCNC: 8.2 G/DL (ref 12–16)
HGB BLD-MCNC: 8.6 G/DL (ref 12–16)
IMM GRANULOCYTES # BLD AUTO: 0.59 K/UL (ref 0–0.11)
IMM GRANULOCYTES NFR BLD AUTO: 3.6 % (ref 0–0.9)
LYMPHOCYTES # BLD AUTO: 0.74 K/UL (ref 1–4.8)
LYMPHOCYTES NFR BLD: 4.5 % (ref 22–41)
MCH RBC QN AUTO: 29.8 PG (ref 27–33)
MCHC RBC AUTO-ENTMCNC: 31.5 G/DL (ref 33.6–35)
MCV RBC AUTO: 94.5 FL (ref 81.4–97.8)
MONOCYTES # BLD AUTO: 0.88 K/UL (ref 0–0.85)
MONOCYTES NFR BLD AUTO: 5.4 % (ref 0–13.4)
NEUTROPHILS # BLD AUTO: 13.99 K/UL (ref 2–7.15)
NEUTROPHILS NFR BLD: 86 % (ref 44–72)
NRBC # BLD AUTO: 0 K/UL
NRBC BLD-RTO: 0 /100 WBC
PATHOLOGY CONSULT NOTE: NORMAL
PLATELET # BLD AUTO: 345 K/UL (ref 164–446)
PMV BLD AUTO: 9.3 FL (ref 9–12.9)
POTASSIUM SERPL-SCNC: 4.4 MMOL/L (ref 3.6–5.5)
PROT SERPL-MCNC: 5 G/DL (ref 6–8.2)
RBC # BLD AUTO: 2.89 M/UL (ref 4.2–5.4)
SODIUM SERPL-SCNC: 137 MMOL/L (ref 135–145)
WBC # BLD AUTO: 16.3 K/UL (ref 4.8–10.8)

## 2022-03-04 PROCEDURE — 97535 SELF CARE MNGMENT TRAINING: CPT

## 2022-03-04 PROCEDURE — 700102 HCHG RX REV CODE 250 W/ 637 OVERRIDE(OP)

## 2022-03-04 PROCEDURE — 700102 HCHG RX REV CODE 250 W/ 637 OVERRIDE(OP): Performed by: STUDENT IN AN ORGANIZED HEALTH CARE EDUCATION/TRAINING PROGRAM

## 2022-03-04 PROCEDURE — 700111 HCHG RX REV CODE 636 W/ 250 OVERRIDE (IP): Performed by: STUDENT IN AN ORGANIZED HEALTH CARE EDUCATION/TRAINING PROGRAM

## 2022-03-04 PROCEDURE — 700105 HCHG RX REV CODE 258: Performed by: STUDENT IN AN ORGANIZED HEALTH CARE EDUCATION/TRAINING PROGRAM

## 2022-03-04 PROCEDURE — 85025 COMPLETE CBC W/AUTO DIFF WBC: CPT

## 2022-03-04 PROCEDURE — 700105 HCHG RX REV CODE 258

## 2022-03-04 PROCEDURE — 80053 COMPREHEN METABOLIC PANEL: CPT

## 2022-03-04 PROCEDURE — 770004 HCHG ROOM/CARE - ONCOLOGY PRIVATE *

## 2022-03-04 PROCEDURE — A9270 NON-COVERED ITEM OR SERVICE: HCPCS | Performed by: STUDENT IN AN ORGANIZED HEALTH CARE EDUCATION/TRAINING PROGRAM

## 2022-03-04 PROCEDURE — A9270 NON-COVERED ITEM OR SERVICE: HCPCS

## 2022-03-04 PROCEDURE — 36415 COLL VENOUS BLD VENIPUNCTURE: CPT

## 2022-03-04 RX ADMIN — BACLOFEN 10 MG: 10 TABLET ORAL at 17:46

## 2022-03-04 RX ADMIN — RIVAROXABAN 15 MG: 15 TABLET, FILM COATED ORAL at 17:45

## 2022-03-04 RX ADMIN — BACLOFEN 10 MG: 10 TABLET ORAL at 05:19

## 2022-03-04 RX ADMIN — AMLODIPINE BESYLATE 5 MG: 5 TABLET ORAL at 17:45

## 2022-03-04 RX ADMIN — PAROXETINE 60 MG: 30 TABLET, FILM COATED ORAL at 20:42

## 2022-03-04 RX ADMIN — MIRTAZAPINE 22.5 MG: 15 TABLET, FILM COATED ORAL at 20:41

## 2022-03-04 RX ADMIN — ROPINIROLE HYDROCHLORIDE 1 MG: 1 TABLET, FILM COATED ORAL at 05:20

## 2022-03-04 RX ADMIN — BACLOFEN 10 MG: 10 TABLET ORAL at 11:30

## 2022-03-04 RX ADMIN — SODIUM CHLORIDE: 9 INJECTION, SOLUTION INTRAVENOUS at 09:40

## 2022-03-04 RX ADMIN — Medication: at 00:13

## 2022-03-04 RX ADMIN — HYDROXYCHLOROQUINE SULFATE 400 MG: 200 TABLET ORAL at 07:51

## 2022-03-04 RX ADMIN — Medication: at 12:51

## 2022-03-04 RX ADMIN — ROPINIROLE HYDROCHLORIDE 1 MG: 1 TABLET, FILM COATED ORAL at 17:46

## 2022-03-04 ASSESSMENT — ENCOUNTER SYMPTOMS
SENSORY CHANGE: 0
NAUSEA: 0
SHORTNESS OF BREATH: 0
FOCAL WEAKNESS: 0
MYALGIAS: 0
EYE REDNESS: 0
CHILLS: 0
PALPITATIONS: 0
ABDOMINAL PAIN: 1
EYE DISCHARGE: 0
DIZZINESS: 0
DIARRHEA: 0
WEAKNESS: 0
VOMITING: 0
SPEECH CHANGE: 0
FEVER: 0
BACK PAIN: 0
WHEEZING: 0

## 2022-03-04 ASSESSMENT — PAIN DESCRIPTION - PAIN TYPE
TYPE: ACUTE PAIN

## 2022-03-04 ASSESSMENT — COGNITIVE AND FUNCTIONAL STATUS - GENERAL
PERSONAL GROOMING: A LITTLE
DRESSING REGULAR LOWER BODY CLOTHING: A LOT
HELP NEEDED FOR BATHING: A LOT
EATING MEALS: A LITTLE
TOILETING: A LOT
DAILY ACTIVITIY SCORE: 14
DRESSING REGULAR UPPER BODY CLOTHING: A LOT
SUGGESTED CMS G CODE MODIFIER DAILY ACTIVITY: CK

## 2022-03-04 NOTE — OR NURSING
2042 Pt from OR, asleep, with O2 support of 6 L/min via mask, respirations regular and spontaneous, vital signs within normal limits. Pt with midline incision, dressing CDI, LLQ with ALEIDA drain - intact, dressing CDI, serosang output noted. Pt with suprapubic cath attached to urine bag, scant amount of output noted. Pt also has garza cath attached to urine bag, clear reddish output noted. SCDs ON, bed set to lowest position and locked in place.    2106 Updated Gosia thru telephone call regarding pt condition, all concerns addressed.    2109 Xray at bedside, abd x-ray done.    2124 Blood draw donw via art-line.    2126 Specimen sent to lab via tube system.    2130 Art-line dc'd per protocol.    2144 Report given to Tammy HICKS.    2145 Pt complains of pain, medicated per MAR.    2158 Updated Gosia that pt is awaiting transport back to [facility].    2200 Pt VSS, more comfortable, still medicated for pain per MAR. Awaiting transport.    2226 Pt to room with transport with O2 support of 2 L/mn via oxymask.

## 2022-03-04 NOTE — ANESTHESIA PROCEDURE NOTES
Peripheral Block    Date/Time: 3/3/2022 5:25 PM  Performed by: Adi Graham M.D.  Authorized by: Adi Graham M.D.     Patient Location:  OR  Start Time:  3/3/2022 5:25 PM  End Time:  3/3/2022 5:31 PM  Reason for Block: at surgeon's request and post-op pain management ONLY    patient identified, IV checked, site marked, risks and benefits discussed, surgical consent, monitors and equipment checked, pre-op evaluation and timeout performed    Patient Position:  Supine  Prep: ChloraPrep    Monitoring:  Heart rate, continuous pulse ox and cardiac monitor  Block Region:  Trunk  Trunk - Block Type:  Abdominal plane block - TAP block    Laterality:  Bilateral  Procedures: ultrasound guided  Image captured, interpreted and electronically stored.  Local Infiltration:  Lidocaine  Strength:  1 %  Dose:  3 ml  Block Type:  Single-shot  Needle Length:  100mm  Needle Gauge:  21 G  Needle Localization:  Ultrasound guidance  Injection Assessment:  Negative aspiration for heme, no paresthesia on injection, incremental injection and local visualized surrounding nerve on ultrasound  Evidence of intravascular injection: No     US Guided Transversus Abdominis Plane (TAP) Block   US probe placed at the anterior axillary line between the costal margin and iliac crest in an axial plane. External Oblique, Internal Oblique (IO) and Transversis Abdominis (TA) muscles identified.  Needle inserted anteromedial to probe in an in plane approach and advanced under direct visualization to TAP between IO and TA muscled.  After negative aspiration LA injected with ease and visualized spreading in TAP plane.

## 2022-03-04 NOTE — CARE PLAN
The patient is Watcher - Medium risk of patient condition declining or worsening    Shift Goals  Clinical Goals: Pt will maintain urine output of 30 ml/hr.  Patient Goals: Pt will verbalize improvement of her pain to a 4 or less.  Family Goals: Pt will rest comfortably.    Progress made toward(s) clinical / shift goals:  Pt with adequate output. Taking in large amounts of water PO. IV fluids infusing. Dilaudid PCA in use. Order received to increase demand from 0.2 mg to 0.3 mg. Slight improvement in pain level.         Problem: Pain - Standard  Goal: Alleviation of pain or a reduction in pain to the patient’s comfort goal  Outcome: Progressing     Problem: Knowledge Deficit - Standard  Goal: Patient and family/care givers will demonstrate understanding of plan of care, disease process/condition, diagnostic tests and medications  Outcome: Progressing     Problem: Fall Risk  Goal: Patient will remain free from falls  Outcome: Progressing       Patient is not progressing towards the following goals:      Problem: Psychosocial  Goal: Patient's level of anxiety will decrease  Outcome: Not Progressing

## 2022-03-04 NOTE — ANESTHESIA PROCEDURE NOTES
Airway    Date/Time: 3/3/2022 5:24 PM  Performed by: Adi Graham M.D.  Authorized by: Adi Graham M.D.     Location:  OR  Urgency:  Elective  Indications for Airway Management:  Anesthesia      Spontaneous Ventilation: absent    Sedation Level:  Deep  Preoxygenated: Yes    Patient Position:  Sniffing  Final Airway Type:  Endotracheal airway  Final Endotracheal Airway:  ETT  Cuffed: Yes    Technique Used for Successful ETT Placement:  Direct laryngoscopy    Insertion Site:  Oral  Blade Type:  Clint  Laryngoscope Blade/Videolaryngoscope Blade Size:  3  ETT Size (mm):  7.0  Measured from:  Teeth  ETT to Teeth (cm):  22  Placement Verified by: auscultation and capnometry    Cormack-Lehane Classification:  Grade IIa - partial view of glottis  Number of Attempts at Approach:  1

## 2022-03-04 NOTE — PROGRESS NOTES
Pt alert and oriented x 4. Butch nausea and states pain of a 5/10 in her abdomen specifically pressure on the R side. Dilaudid PCA in use and pt setting a timer to push her button every 10 minutes. Dressing to midline incision intact with old drainage. L ALEIDA to bulb suction. Leaking from the site. Stripped Q 4. Dressing changed. Dressing to suprapubic catheter saturated with serosanguinous drainage and what appears to be urine. Removed and new dressing applied. Sofia from Dr Dyson's office notified and to the bedside to assess. Brenner to DD. Moderate amouny of bloody putout to periarea. Unable to ascertain from urethra or vagina. Sofia also aware. Q 2 turns in place. Frequent assessment in place.

## 2022-03-04 NOTE — OP REPORT
PreOp Diagnosis: 20 week size uterine cancer  Abdominal pain        PostOp Diagnosis: same        Procedure(s):  HYSTERECTOMY, TOTAL, ABDOMINAL - Wound Class: Clean Contaminated  SALPINGO-OOPHORECTOMY - Wound Class: Clean Contaminated  LAPAROSCOPY - Wound Class: Clean Contaminated  LAPAROTOMY, EXPLORATORY - Wound Class: Clean Contaminated  INSERTION, SUPRAPUBIC CATHETER - Wound Class: Clean Contaminated  PORT REMOVAL - Wound Class: Clean     Surgeon(s):  Ray Dyson M.D.     Anesthesiologist/Type of Anesthesia:  Anesthesiologist: Adi Graham M.D./General     Surgical Staff:  Assistant: Sofia Jean-Baptiste P.A.-C.  Circulator: Gena Sanchez R.N.  Scrub Person: Carito Macias [place]: Jeanine Cullen R.N.     Specimens removed if any:  ID Type Source Tests Collected by Time Destination   A : uterus, cervix Tissue Uterus PATHOLOGY SPECIMEN Ray Dyson M.D. 3/3/2022  6:42 PM     B : left fallopian tube and ovary Tissue Ovary PATHOLOGY SPECIMEN Ray Dyson M.D. 3/3/2022  6:59 PM     C : right fallopian tube and ovary Tissue Ovary PATHOLOGY SPECIMEN aRy Dyson M.D. 3/3/2022  7:00 PM     D : mediport Other Other GROSS ONLY REQUEST Ray Dyson M.D. 3/3/2022  8:16 PM           Estimated Blood Loss: 1000 liter     Findings: There is some pelvic fluid is noted.  Normal right left diaphragm liver capsule was smooth stomach was palpated to be normal the gallbladder was quite distended however I was not able to appreciate any stones within the gallbladder.  Omentum appeared grossly normal.  The small bowel was unremarkable abdominal peritoneal surfaces were unremarkable.     In the pelvis uterus was approximately 20 weeks in size.  Clearly there was tumor that infiltrated the serosa and the entire uterus was completely encased with tumor approximately 20 weeks in size.  Tumor extended down to the uterosacral ligament and down to the cervix and densely intimately attached to the base of the bladder.  There was  enlarged lymph nodes both on the right and left pelvic lymph nodes.     Examination anesthesia again revealed a intact hymenal ring although inspection of the vaginal canal revealed purulent infiltration into the vagina.  The urethra had been completely displaced secondary to the enlarged uterus.  In fact it was exceedingly difficult to place a Brenner catheter due to the fact that it had been displaced.  There was no recognizable urethral opening.  I was concerned that given the extent of tumor in the vagina that the urethra would obstruct for this reason a suprapubic catheter was placed.     Should be noted that the tumor is exceedingly vascular the tumor had completely removed through and through the muscular musculature extending down to the lower uterine segment extended out to the uterosacral ligament and the base of the bladder.     Complications: Dissectional cystotomy at the base of the bladder     Indication: Mrs. Leal is an unfortunate 56-year-old female who has metastatic uterine cancer.  She has a 20-week size uterus which is causing her significant abdominal pain.  She has completed 9 cycles of neoadjuvant chemotherapy.  There is some response to the treatment however the size of the uterus has not decreased and it is causing her significant pain.  Thus patient was recommended to undergo exploratory laparotomy with removal of the uterus fallopian tubes and ovaries with the hope of palliating her symptoms.  Thus she was consented to undergo exploratory laparotomy with abdominal hysterectomy with bilateral salpingo-oophorectomy.    It should be noted at the time of examination under esthesia patient has a very small narrow introitus.  It was exceedingly difficult to place a Brenner catheter at the time of the surgery due to the distorted anatomy.  The urethra was not easily visible.  Thus under anesthesia I did put the Brenner catheter via guidance.  This for this reason I elected to place a suprapubic  catheter to help manage her urinary flow postoperatively.    Procedure: After achieving adequate anesthesia patient was prepped and draped and placed in modified dorsolithotomy position.  A midline incision was made from suprapubic bone extending well above the above umbilicus.  Abdominal cavity was entered in the usual standard fashion without injuring the bowel.  After detailed exploration and the findings are noted as above we proceeded on with the hysterectomy.  There was an exceedingly difficult hysterectomy due to the fact that the 20-week size uterus was exceedingly rockhard due to the tumor infiltration and vascularity.  I opened up the right and left posterior broad ligament.  The ureters were identified.  The ovarian vessels were skeletonized and isolated after identifying the ureters.  7 clamp was used to clamp the ovarian vessels and LigaSure was used to divide the ovarian vessels.  The ovarian vessels pedicle was then tied with 0 Vicryl suture.  The medial leaf of the peritoneum was incised down to the level of the uterosacral ligament.  The tumor had extended out to the parametria.  The ureters had to be dissected out to ensure that they would not compromise.  Bilateral ureterolysis was taken to level of the common iliac vessels into the ureteric tunnel.  We then controlled the uterine vessels medial to where the course of the ureters were with the LigaSure.  Great care was undertaken to not compromise the ureters.  There was very little mobility of the uterus as the tumor infiltrated down to the lower uterine segment and uterosacral ligament.  In the trial process of trying to mobilize the lower uterine segment out of the pelvis the tumor had fractured at the level of the isthmus.  Tumor bed was bleeding we had to control the bleeding from the tumor bed.  I was then able to further dissect the bladder away from the uterus in the process of this due to the tumor infiltration in the anterior cul-de-sac  a dissection all cystotomy was created at the level of the trigone.  This was noted.  I was able to use this and develop a plane and further dissected the way to take the remaining cervical stump out.  After completion of this the vaginal cuff was then closed with 0 Vicryl suture.  After completion of this the bladder injury dissection all cystotomy was repaired intravesically in 3 layer fashion with the mucosal layer with 2-0 Vicryl suture the muscularis layer with 2-0 Vicryl suture and a third layer was undertaken.  I then brought the omentum down to interposed between the vagina and the base of the bladder.  Suprapubic catheter was placed at the dome of the bladder prior to the closure of the bladder.  After completion of this we then copiously irrigated the entire abdomen pelvis water hemostasis established once established we counted for sponges needles and instrument counts once this was accounted for Seprafilm was placed in the anterior abdominal wall.  The fascia was subsequently closed with #2 Vicryl suture in a mass closure fashion subcutaneous fat was copiously irrigated water the skin was reapproximated with 3-0 Monocryl sutures.    At the completion of this I then turned my focus towards removal of the nonfunctional Mediport.  We then remove all of the gown and reprepped the upper chest.  Patient was then re prepped with sterile prep and count again.  Through the previous scar an incision was made.  The entire Mediport was then removed in its entirety intact.  Pressure was applied until hemostasis achieved at the completion of this the subcutaneous fat was irrigated with water and skin was reapproximated with 3 Monocryl sutures.    Patient tolerated procedure well without any difficulties was extubated and transferred to the PACU in stable condition.

## 2022-03-04 NOTE — ANESTHESIA POSTPROCEDURE EVALUATION
Patient: Magali Leal    Procedure Summary     Date: 03/03/22 Room / Location: [place] [id number] / SURGERY [facility]    Anesthesia Start: 1716 Anesthesia Stop: 2042    Procedures:       HYSTERECTOMY, TOTAL, ABDOMINAL (Pelvis)      SALPINGO-OOPHORECTOMY (Bilateral Pelvis)      LAPAROSCOPY (Abdomen)      LAPAROTOMY, EXPLORATORY (Abdomen)      INSERTION, SUPRAPUBIC CATHETER (Pelvis)      PORT REMOVAL (Chest) Diagnosis: (endometrial cancer)    Surgeons: Ray Dyson M.D. Responsible Provider: Adi Graham M.D.    Anesthesia Type: general, peripheral nerve block ASA Status: 3          Final Anesthesia Type: general, peripheral nerve block  Last vitals  BP   Blood Pressure: 108/66    Temp   36.9 °C (98.5 °F)    Pulse   (!) 125   Resp   18    SpO2   99 %      Anesthesia Post Evaluation    Patient location during evaluation: PACU  Patient participation: complete - patient participated  Level of consciousness: awake and alert    Airway patency: patent  Anesthetic complications: no  Cardiovascular status: hemodynamically stable  Respiratory status: acceptable  Hydration status: euvolemic    PONV: none          No complications documented.     Nurse Pain Score: 4 (NPRS)

## 2022-03-04 NOTE — THERAPY
"Occupational Therapy  Daily Treatment     Patient Name: Magali Leal  Age:  56 y.o., Sex:  female  Medical Record #: [id number]  Today's Date: 3/4/2022     Precautions  Precautions: Fall Risk,Non Weight Bearing Left Lower Extremity  Comments: s/p hysterectomy/oopherectomy/ suprapubic catheter placement, exlap, and port removal- PCA    Assessment    Pt now s/p hysterectomy, salpingo-oophorectomy, exlap, suprapubic catheter insertion, and port removal. Pt declined OOB activity this session d/t severe pain despite PCA. Spent time educating SO and pt on AROM and elevation for edema mgmt in supine d/t L thigh and RUE edema. Pt brought more pillows and demo'd/assisted with elevation of LLE/RUE. Encouraged EOB/OOB activity with nsg when able to tolerate. Continues to be limited by decreased functional mobility, activity tolerance, cognition, strength, AROM, balance, adherence to precautions, and pain which are currently affecting pt's ability to complete ADLs/IADLs at baseline. Will continue to follow.     Plan    Continue current treatment plan.    DC Equipment Recommendations: Unable to determine at this time  Discharge Recommendations: Recommend post-acute placement for additional occupational therapy services prior to discharge home    Subjective    \"I can't today. I just need to rest.\"     Objective     03/04/22 0853   Precautions   Precautions Fall Risk;Non Weight Bearing Left Lower Extremity   Comments s/p hysterectomy/oopherectomy/ suprapubic catheter placement, exlap, and port removal- PCA   Vitals   O2 Delivery Device None - Room Air   Pain 0 - 10 Group   Location Abdomen;Arm   Location Orientation Mid;Right   Therapist Pain Assessment Post Activity Pain Same as Prior to Activity;Nurse Notified  (not quantified)- intermittent flinching and grimacing d/t pain w/o movement   Cognition    Cognition / Consciousness X   Level of Consciousness Alert   New Learning Impaired   Comments pleasant but lethargic with " difficulty keeping eyes open. SO present and receptive to edu   Active ROM Upper Body   Active ROM Upper Body  X   Comments limited by pain   Strength Upper Body   Upper Body Strength  X   Comments limited by pain   Other Treatments   Other Treatments Provided Spent time educating SO and pt on AROM and elevation for edema mgmt d/t L thigh and RUE edema. Brought in more pillows and demo'd/assisted with elevation of LLE/RUE.   Balance   Comments declined OOB activity   Activities of Daily Living   Comments Refused ADLs or OOB activity; Edu only   Activity Tolerance   Comments limited by severe pain   Anticipated Discharge Equipment and Recommendations   DC Equipment Recommendations Unable to determine at this time   Discharge Recommendations Recommend post-acute placement for additional occupational therapy services prior to discharge home

## 2022-03-04 NOTE — OR SURGEON
Immediate Post OP Note    PreOp Diagnosis: 20 week size uterine cancer  Abdominal pain      PostOp Diagnosis: same      Procedure(s):  HYSTERECTOMY, TOTAL, ABDOMINAL - Wound Class: Clean Contaminated  SALPINGO-OOPHORECTOMY - Wound Class: Clean Contaminated  LAPAROSCOPY - Wound Class: Clean Contaminated  LAPAROTOMY, EXPLORATORY - Wound Class: Clean Contaminated  INSERTION, SUPRAPUBIC CATHETER - Wound Class: Clean Contaminated  PORT REMOVAL - Wound Class: Clean    Surgeon(s):  Ray Dyson M.D.    Anesthesiologist/Type of Anesthesia:  Anesthesiologist: Adi Graham M.D./General    Surgical Staff:  Assistant: Sofia Jean-Baptiste P.A.-C.  Circulator: Gena Sanchez R.N.  Scrub Person: Carito Macias [place]: Jeanine Cullen R.N.    Specimens removed if any:  ID Type Source Tests Collected by Time Destination   A : uterus, cervix Tissue Uterus PATHOLOGY SPECIMEN Ray Dyson M.D. 3/3/2022  6:42 PM    B : left fallopian tube and ovary Tissue Ovary PATHOLOGY SPECIMEN Ray Dyson M.D. 3/3/2022  6:59 PM    C : right fallopian tube and ovary Tissue Ovary PATHOLOGY SPECIMEN Ray Dyson M.D. 3/3/2022  7:00 PM    D : mediport Other Other GROSS ONLY REQUEST Ray Dyson M.D. 3/3/2022  8:16 PM        Estimated Blood Loss: 1000 liter    Findings: There is some pelvic fluid is noted.  Normal right left diaphragm liver capsule was smooth stomach was palpated to be normal the gallbladder was quite distended however I was not able to appreciate any stones within the gallbladder.  Omentum appeared grossly normal.  The small bowel was unremarkable abdominal peritoneal surfaces were unremarkable.    In the pelvis uterus was approximately 20 weeks in size.  Clearly there was tumor that infiltrated the serosa and the entire uterus was completely encased with tumor approximately 20 weeks in size.  Tumor extended down to the uterosacral ligament and down to the cervix and densely intimately attached to the base of the bladder.  There  was enlarged lymph nodes both on the right and left pelvic lymph nodes.    Examination anesthesia again revealed a intact hymenal ring although inspection of the vaginal canal revealed purulent infiltration into the vagina.  The urethra had been completely displaced secondary to the enlarged uterus.  In fact it was exceedingly difficult to place a Brenner catheter due to the fact that it had been displaced.  There was no recognizable urethral opening.  I was concerned that given the extent of tumor in the vagina that the urethra would obstruct for this reason a suprapubic catheter was placed.    Should be noted that the tumor is exceedingly vascular the tumor had completely removed through and through the muscular musculature extending down to the lower uterine segment extended out to the uterosacral ligament and the base of the bladder.    Complications: Dissectional cystotomy at the base of the bladder        3/3/2022 9:31 PM Ray Dsyon M.D.

## 2022-03-04 NOTE — PROGRESS NOTES
Gynecology Oncology Progress Note               Author: Sofia Jean-Baptiste PA-C Date & Time created: 3/4/2022  10:07 AM     Interval History:  Doing well this AM.  Pain moderately controlled. States she is having less cramping pelvic pain, but abdominal pain present from incision.  No nausea or vomiting, tolerating CLD.  +flatus.  No chest pain or shortness of breath.        Review of Systems:  Review of Systems   Constitutional: Negative for chills, fever and malaise/fatigue.   Eyes: Negative for discharge and redness.   Respiratory: Negative for shortness of breath and wheezing.    Cardiovascular: Negative for chest pain, palpitations and leg swelling.   Gastrointestinal: Positive for abdominal pain. Negative for diarrhea, nausea and vomiting.   Genitourinary: Negative for dysuria.   Musculoskeletal: Negative for back pain and myalgias.   Neurological: Negative for dizziness, sensory change, speech change, focal weakness and weakness.        Left foot        Physical Exam:  Physical Exam  Constitutional:       General: She is not in acute distress.  HENT:      Head: Normocephalic.      Mouth/Throat:      Mouth: Mucous membranes are moist.   Eyes:      Conjunctiva/sclera: Conjunctivae normal.   Cardiovascular:      Rate and Rhythm: Tachycardia present.      Pulses: Normal pulses.      Heart sounds: Normal heart sounds.   Pulmonary:      Effort: Pulmonary effort is normal. No respiratory distress.   Abdominal:      Palpations: Abdomen is soft.      Tenderness: There is abdominal tenderness. There is no guarding.      Comments: MLVI with dressing in place, c/d/i, min bleeding.  ALEIDA to LLQ with sero sang output.  Suprapubic catheter in place, dressing c/d/i, clear yellow blood tinged urine. Abd appropriately tender, soft.    Genitourinary:     Comments: Minimal blood noted from vagina, no apparent active bleeding.   Musculoskeletal:      Right lower leg: No edema.      Left lower leg: Tenderness present. Edema present.       Comments: Right arm swelling, mild erythema. Left ankle with surgical dressing.    Skin:     General: Skin is warm and dry.   Neurological:      Mental Status: She is alert and oriented to person, place, and time.   Psychiatric:         Mood and Affect: Mood normal.         Labs:  Recent Labs     03/03/22 1926 03/03/22  2030   ISTATAPH 7.235* 7.265*   ISTATAPCO2 48.6* 39.8*   ISTATAPO2 309* 232*   ISTATATCO2 22 19*   [id number] 100* 100*   ISTATARTHCO3 20.6 18.1   ISTATARTBE -6* -8*   ISTATTEMP 36.8 C see below   ISTATFIO2 100  --    ISTATSPEC Arterial Arterial   ISTATAPHTC 7.237*  --    [id number] 308*  --          Recent Labs     03/01/22  1810 03/02/22 0254 03/03/22 0054 03/04/22  0031   SODIUM 134* 134* 135 137   POTASSIUM 3.4* 3.6 3.6 4.4   CHLORIDE 97 99 100 104   CO2 21 18* 17* 13*   BUN 16 16 14 13   CREATININE 1.23 1.20 1.02 1.03   MAGNESIUM 1.0* 2.1  --   --    CALCIUM 10.1 10.2 9.1 7.8*     Recent Labs     03/02/22 0254 03/03/22 0054 03/04/22  0031   ALTSGPT 6 6 6   ASTSGOT 28 25 26   ALKPHOSPHAT 85 78 60   TBILIRUBIN 0.3 0.3 0.8   GLUCOSE 78 72 133*     Recent Labs     03/02/22  1244 03/03/22 0054 03/03/22 2124 03/04/22  0031   RBC  --  2.53* 2.79* 2.89*   HEMOGLOBIN  --  7.5* 8.4* 8.6*   HEMATOCRIT  --  24.6* 26.0* 27.3*   PLATELETCT  --  403 357 345   PROTHROMBTM  --  15.5*  --   --    APTT 83.1* 60.5*  --   --    INR  --  1.27*  --   --      Recent Labs     03/02/22  0254 03/03/22 0054 03/03/22 2124 03/04/22  0031   WBC 7.6 7.2 17.0* 16.3*   NEUTSPOLYS 75.60* 75.00*  --  86.00*   LYMPHOCYTES 9.40* 11.30*  --  4.50*   MONOCYTES 9.70 9.30  --  5.40   EOSINOPHILS 0.50 0.30  --  0.00   BASOPHILS 0.70 0.60  --  0.50   ASTSGOT 28 25  --  26   ALTSGPT 6 6  --  6   ALKPHOSPHAT 85 78  --  60   TBILIRUBIN 0.3 0.3  --  0.8     Recent Labs     03/02/22  0254 03/03/22  0054 03/04/22  0031   SODIUM 134* 135 137   POTASSIUM 3.6 3.6 4.4   CHLORIDE 99 100 104   CO2 18* 17* 13*   GLUCOSE 78 72 133*   BUN  16 14 13   CREATININE 1.20 1.02 1.03   CALCIUM 10.2 9.1 7.8*     Hemodynamics:  Temp (24hrs), Av.6 °C (97.9 °F), Min:36.1 °C (97 °F), Max:36.9 °C (98.5 °F)  Temperature: 36.9 °C (98.5 °F)  Pulse  Av.4  Min: 75  Max: 129   Blood Pressure: 122/71     Respiratory:    Respiration: 16, Pulse Oximetry: 94 %        RUL Breath Sounds: Clear, RML Breath Sounds: Diminished, RLL Breath Sounds: Diminished, MORENO Breath Sounds: Clear, LLL Breath Sounds: Diminished  Fluids:  No intake or output data in the 24 hours ending 22 1553     GI/Nutrition:  Orders Placed This Encounter   Procedures   • Diet Order Diet: Clear Liquid     Standing Status:   Standing     Number of Occurrences:   1     Order Specific Question:   Diet:     Answer:   Clear Liquid [10]     Medical Decision Making, by Problem:  Active Hospital Problems    Diagnosis    • *Hypercalcemia [E83.52]        Assessment and Plan:  This is a 56 y.o. female with stage III (minimum) Grade 2 endometrial adenocarcinoma s/p 6 cycles of neoadjuvant chemotherapy with Taxol/Carbo completed on 22 , who presents with hypercalcemia, nausea, and vomiting.     1. POD #1: s/p ex lap, hys, bso, cystectomy with repair and suprapubic catheter placement. Doing well, encourage mobilization and IS, CLD. Dr. Dyson discussed intraop findings with wife.   2. Post op pain: dilaudid PCA with 0.2 mg basal, and 0.2 mg demand, moderately controlled, will increase bolus for better pain control and continue to monitor   3. Tachycardia: post operative, possibly pain related, hemodynamically stable, will increase PCA and continue to monitor closely.   3. Hypercalcemia: resolved. Secondary to malignancy and dehydration, s/p IVF, lasix, and pamidronate on .     2. Nausea vomiting: Prn compazine and Zofran.   3. Dehydration: Secondary to above. IVF.   4. Pelvic and low back pain: Acute on chronic. Patient with history of fibromyalgia. Seen outpatient by pain management. Currently on  dilaudid PCA.   5. Stage III (minimum) Grade 2 endometrial adenocarcinoma:  s/p 6 cycles of neoadjuvant chemotherapy, now s/p ex lap, hys, bso, cysetectomy with suprapubic cath placement.   6.Left trimalleolar ankle fracture/dislocation: s/p ORIF 2/26.   7. Upper extremity DVT: Venous US positive for DVT. On heparin gtt. Therapeutic x2. Continue at 1,600 units/h. Hgb stable today, will transition to Xarelto today.      8. Hypokalemia: improved, on CLD, will continue to montior   9. Anemia: Secondary to chemotherapy and now secondary to acute blood loss. Hgb 7.5 pre operative on 3/3, s/p 2 unit intra op. Stable today, 8.6. Hemodynamically stable.   10. Elevated BP: No hx of HTN, Likely secondary to pain and IVF. On amlodipine 2.5 mg at home for Raynaud's > amlodipine to 5 mg daily, currently well controlled.   11. ILIA: improving, likley secondary to dehydration. Cr continues to downtrend. Monitor.   12. History of Lupus: continue home hydroxychloroquine  13. Hx of mood disorder: PTSD and anxiety. Continue home paroxetine and propranolol prn.   14. Hx Raynaud's disease: Continue amlodipine.   15. Hx of allergic rhinitis: Home Azelastine  16. Dispo: continue post operative inpatient management      Case discussed with Dr. Dyson

## 2022-03-04 NOTE — ANESTHESIA PREPROCEDURE EVALUATION
Case: [id number] Date/Time: 03/03/22 1515    Procedures:       HYSTERECTOMY, TOTAL, ABDOMINAL      MINILAPAROTOMY      SALPINGO-OOPHORECTOMY (Bilateral )      PELVISCOPY      LYMPHADENECTOMY    Location: [place] [address] / SURGERY [facility]    Surgeons: Ray Dyson M.D.      Chronic pain, fibromyalgia.    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    Relevant Problems   PULMONARY   (positive) Shortness of breath       Physical Exam    Airway   Mallampati: II  TM distance: >3 FB  Neck ROM: full       Cardiovascular - normal exam  Rhythm: regular  Rate: normal  (-) murmur     Dental - normal exam           Pulmonary - normal exam  Breath sounds clear to auscultation     Abdominal    Neurological - normal exam                 Anesthesia Plan    ASA 3   ASA physical status 3 criteria: alcohol and/or substance dependence or abuse    Plan - general and peripheral nerve block     Peripheral nerve block will be post-op pain control  Airway plan will be ETT          Induction: intravenous    Postoperative Plan: Postoperative administration of opioids is intended.    Pertinent diagnostic labs and testing reviewed    Informed Consent:    Anesthetic plan and risks discussed with patient.    Use of blood products discussed with: patient whom consented to blood products.

## 2022-03-04 NOTE — ANESTHESIA TIME REPORT
Anesthesia Start and Stop Event Times     Date Time Event    3/3/2022 1716 Ready for Procedure     1716 Anesthesia Start     2042 Anesthesia Stop        Responsible Staff  03/03/22    Name Role Begin End    Adi Graham M.D. Anesth 1716 2042        Preop Diagnosis (Free Text):  Pre-op Diagnosis     endometrial cancer        Preop Diagnosis (Codes):    Premium Reason  A. 3PM - 7AM    Comments:

## 2022-03-04 NOTE — PROGRESS NOTES
GYN Oncology Progress Note               Author: Sofia Jean-Baptiste PA-C Date & Time created: 3/3/2022  4:58 PM     Interval History:  Pt to surgery today, Dr. Dyson discussed procedure.       Review of Systems:  Review of Systems   Constitutional: Negative for chills, fever and malaise/fatigue.   Eyes: Negative for discharge and redness.   Respiratory: Negative for shortness of breath and wheezing.    Cardiovascular: Negative for chest pain, palpitations and leg swelling.   Gastrointestinal: Negative for abdominal pain, diarrhea, nausea and vomiting.   Genitourinary: Positive for frequency. Negative for dysuria.   Musculoskeletal: Negative for back pain and myalgias.   Neurological: Negative for dizziness, sensory change, speech change, focal weakness and weakness.        Left foot        Physical Exam:  Physical Exam  Constitutional:       General: She is not in acute distress.     Appearance: She is not ill-appearing.   HENT:      Head: Normocephalic.      Mouth/Throat:      Mouth: Mucous membranes are moist.   Eyes:      General: No scleral icterus.     Conjunctiva/sclera: Conjunctivae normal.   Cardiovascular:      Rate and Rhythm: Normal rate and regular rhythm.      Pulses: Normal pulses.      Heart sounds: Normal heart sounds.   Pulmonary:      Effort: Pulmonary effort is normal. No respiratory distress.   Abdominal:      General: There is no distension.      Palpations: Abdomen is soft.      Tenderness: There is no abdominal tenderness. There is no guarding.   Musculoskeletal:      Right lower leg: No edema.      Left lower leg: Tenderness present. Edema present.      Comments: Right arm swelling, mild erythema. Left ankle with surgical dressing.    Skin:     General: Skin is warm and dry.      Capillary Refill: Capillary refill takes less than 2 seconds.   Neurological:      General: No focal deficit present.      Mental Status: She is alert and oriented to person, place, and time.   Psychiatric:         Mood  and Affect: Mood normal.         Behavior: Behavior normal.         Labs:          Recent Labs     22  005   SODIUM 134* 134* 135   POTASSIUM 3.4* 3.6 3.6   CHLORIDE 97 99 100   CO2 21 18* 17*   BUN 16 16 14   CREATININE 1.23 1.20 1.02   MAGNESIUM 1.0* 2.1  --    CALCIUM 10.1 10.2 9.1     Recent Labs     22  005   ALTSGPT 7  --  6 6   ASTSGOT 36  --  28 25   ALKPHOSPHAT 79  --  85 78   TBILIRUBIN 0.3  --  0.3 0.3   GLUCOSE 86 74 78 72     Recent Labs     22  0950 22  1244 22  005   RBC 2.37*  --  2.71*  --  2.53*   HEMOGLOBIN 7.0*  --  8.2*  --  7.5*   HEMATOCRIT 23.1*  --  26.9*  --  24.6*   PLATELETCT 350  --  363  --  403   PROTHROMBTM  --   --   --   --  15.5*   APTT 59.5* 73.7*  --  83.1* 60.5*   INR  --   --   --   --  1.27*     Recent Labs     22   WBC 7.4 7.6 7.2   NEUTSPOLYS 83.10* 75.60* 75.00*   LYMPHOCYTES 7.70* 9.40* 11.30*   MONOCYTES 6.60 9.70 9.30   EOSINOPHILS 0.30 0.50 0.30   BASOPHILS 0.40 0.70 0.60   ASTSGOT 36 28 25   ALTSGPT 7 6 6   ALKPHOSPHAT 79 85 78   TBILIRUBIN 0.3 0.3 0.3     Recent Labs     22  005   SODIUM 134* 134* 135   POTASSIUM 3.4* 3.6 3.6   CHLORIDE 97 99 100   CO2 21 18* 17*   GLUCOSE 74 78 72   BUN 16 16 14   CREATININE 1.23 1.20 1.02   CALCIUM 10.1 10.2 9.1     Hemodynamics:  Temp (24hrs), Av °C (98.6 °F), Min:36.9 °C (98.4 °F), Max:37.2 °C (98.9 °F)  Temperature: 36.9 °C (98.4 °F)  Pulse  Av.4  Min: 75  Max: 125   Blood Pressure: (!) 190/52 (RN notified)     Respiratory:    Respiration: 18, Pulse Oximetry: 95 %        RUL Breath Sounds: Diminished, RML Breath Sounds: Diminished, RLL Breath Sounds: Diminished, MORENO Breath Sounds: Inspiratory Wheezes, LLL Breath Sounds: Diminished  Fluids:  No intake or output data in the 24 hours ending 22 1550      GI/Nutrition:  Orders Placed This Encounter   Procedures   • Diet NPO Restrict to: Strict     Standing Status:   Standing     Number of Occurrences:   1     Order Specific Question:   Diet NPO Restrict to:     Answer:   Strict [1]     Medical Decision Making, by Problem:  Active Hospital Problems    Diagnosis    • *Hypercalcemia [E83.52]        Assessment and Plan:  This is a 56 y.o. female with stage III (minimum) Grade 2 endometrial adenocarcinoma s/p 6 cycles of neoadjuvant chemotherapy with Taxol/Carbo completed on 1/13/22 , who presents with hypercalcemia, nausea, and vomiting.      1. Hypercalcemia: Likely secondary to malignancy and dehydration. Calcium 14.2 on 2/23. Received about 4 L of IVF (per MAR) on presentation to ER/early on admit to CNU had good oral intake of water. Intimated on scheduled lasix 20 mg q 6. Pamidronate 60 mg given on 2/27. Lasix DC'd on 3/1. Calcium trending down; now normalized at 10.2.   2. Nausea vomiting: Prn compazine and Zofran.   3. Dehydration: Secondary to above. IVF.   4. Pelvic and low back pain: Acute on chronic. Patient with history of fibromyalgia. Seen outpatient by pain management. Poor pain control with home oxycodone 15 mg q 4 prn. Necrotics stopped after surgery on 2/26 due to decreased LOC.Resumed prn PO dilaudid on 2/27 after mentation improved. Dilaudid PCA then restarted secondary to poor pain control on oral dilaudid. Basal rate was then discontinued secondary to sedation. Patient using demand doses effectively; better pain control today.   5. Stage III (minimum) Grade 2 endometrial adenocarcinoma:  s/p 6 cycles of neoadjuvant chemotherapy. Surgery delayed due to hypercalcemia and n/v preventing patient from tolerating bowel prep. OR today.   6.Left trimalleolar ankle fracture/dislocation: s/p ORIF 2/26.   7. Upper extremity DVT: Venous US positive for DVT. On heparin gtt. Therapeutic x2. Continue at 1,600 units/h. Daily PTT.     8. Hypokalemia: K 3.1 on  2/28. Lasix discontinued. K 3.6 this AM. Anticipate loss with bowel prep this evening,  20 mEq KCl ordered for today.   9. Anemia: Secondary to chemotherapy. Hgb 7.4 on 2/27, likely secondary to loss in surgery and hemodilution. Hgb 8.2. Hemodynamically stable.   10. Elevated BP: No hx of HTN, Likely secondary to pain and IVF. On amlodipine 2.5 mg at home for Raynaud's. More hypertensive overnight. Increase amlodipine to 5 mg daily and reduced IVF.   11. ILIA: Cr up to 1.57. Likley secondary to dehydration. Cr now trending down. Monitor.   12. History of Lupus: continue home hydroxychloroquine  13. Hx of mood disorder: PTSD and anxiety. Continue home paroxetine and propranolol prn.   14. Hx Raynaud's disease: Continue amlodipine.   15. Hx of allergic rhinitis: Home Azelastine  16. Dispo:to OR today      Case discussed with Dr. Dyson.     I have seen the patient and examined the patient and agree with the above assessment and plan. I have personally explained to the patient about the surgery, risks, benefits, and rationale of the procedure.     Quality-Core Measures

## 2022-03-04 NOTE — PROGRESS NOTES
"   Orthopaedic Progress Note    Interval changes: Doing well  Clear for disposition (home/SNF/IRF) from Orthopaedic team standpoint.  Follow-Up: needs appointment with Dr. Levy at [facility] at 10-14 days post-op for re-evaluation, staple removal and radiographs.    ROS - Patient denies any new issues.  Pain well controlled.    /54   Pulse (!) 125   Temp 36.6 °C (97.9 °F) (Temporal)   Resp 18   Ht 1.702 m (5' 7\")   Wt 99.1 kg (218 lb 7.6 oz)   SpO2 94%     Patient seen and examined  No acute distress  Breathing non labored  RRR  LLE splint CDI, DNVI moves all toes, cap refill <2 sec.     Recent Labs     03/03/22  0054 03/03/22 2124 03/04/22  0031   WBC 7.2 17.0* 16.3*   RBC 2.53* 2.79* 2.89*   HEMOGLOBIN 7.5* 8.4* 8.6*   HEMATOCRIT 24.6* 26.0* 27.3*   MCV 97.2 93.2 94.5   MCH 29.6 30.1 29.8   MCHC 30.5* 32.3* 31.5*   RDW 61.9* 57.3* 61.0*   PLATELETCT 403 357 345   MPV 9.7 9.1 9.3   .    Active Hospital Problems    Diagnosis    • Hypercalcemia [E83.52]      Assessment/Plan:  Patient doing well  LLE splint CDI  POD#6 S/P   1.  Open treatment with internal fixation, left trimalleolar ankle fracture with fixation of posterior lip.  2.  Open treatment without fixation of anterolateral distal tibia fracture with fragment excision.  3.  Surgical fixation of left distal tibiofibular joint disruption.  4.  Physician applied stress examination of the left ankle using fluoroscopy  Wt bearing status - NWB LLE  Wound care/Drains - Dressings to be changed every other day by nursing  Future Procedures - none planned   Sutures/Staples out- 14 days post operatively  PT/OT-initiated  Antibiotics: Perioperative completed  DVT Prophylaxis- TEDS/SCDs/Foot pumps  Brenner-none planned   Case Coordination for Discharge Planning - Disposition pending       "

## 2022-03-04 NOTE — CARE PLAN
Problem: Nutritional:  Goal: Achieve adequate nutritional intake  Description: Patient will tolerate regular diet and consume 50% of meals  Outcome: Not Met     Day 9 of admit. Patient s/p total hysterectomy yesterday.  Has been on and off NPO status for several days with poor oral intake noted while on a diet.  Currently diet advanced to clears.     Advance diet as medically feasible or consider alternative source of nutrition if appropriate given inadequate nutrition intake for the majority of patients admit.      RD monitoring.

## 2022-03-05 ENCOUNTER — APPOINTMENT (OUTPATIENT)
Dept: RADIOLOGY | Facility: MEDICAL CENTER | Age: 57
DRG: 740 | End: 2022-03-05
Attending: STUDENT IN AN ORGANIZED HEALTH CARE EDUCATION/TRAINING PROGRAM
Payer: COMMERCIAL

## 2022-03-05 LAB
ABO GROUP BLD: NORMAL
ALBUMIN SERPL BCP-MCNC: 2.5 G/DL (ref 3.2–4.9)
ALBUMIN/GLOB SERPL: 0.9 G/DL
ALP SERPL-CCNC: 59 U/L (ref 30–99)
ALT SERPL-CCNC: 7 U/L (ref 2–50)
ANION GAP SERPL CALC-SCNC: 16 MMOL/L (ref 7–16)
AST SERPL-CCNC: 33 U/L (ref 12–45)
BARCODED ABORH UBTYP: 5100
BARCODED ABORH UBTYP: 5100
BARCODED PRD CODE UBPRD: NORMAL
BARCODED PRD CODE UBPRD: NORMAL
BARCODED UNIT NUM UBUNT: NORMAL
BARCODED UNIT NUM UBUNT: NORMAL
BASOPHILS # BLD AUTO: 0.2 % (ref 0–1.8)
BASOPHILS # BLD: 0.03 K/UL (ref 0–0.12)
BILIRUB SERPL-MCNC: 0.2 MG/DL (ref 0.1–1.5)
BLD GP AB SCN SERPL QL: NORMAL
BUN SERPL-MCNC: 17 MG/DL (ref 8–22)
CALCIUM SERPL-MCNC: 7.1 MG/DL (ref 8.5–10.5)
CHLORIDE SERPL-SCNC: 102 MMOL/L (ref 96–112)
CO2 SERPL-SCNC: 16 MMOL/L (ref 20–33)
COMPONENT R 8504R: NORMAL
COMPONENT R 8504R: NORMAL
CREAT SERPL-MCNC: 1.25 MG/DL (ref 0.5–1.4)
EOSINOPHIL # BLD AUTO: 0 K/UL (ref 0–0.51)
EOSINOPHIL NFR BLD: 0 % (ref 0–6.9)
ERYTHROCYTE [DISTWIDTH] IN BLOOD BY AUTOMATED COUNT: 63.2 FL (ref 35.9–50)
GLOBULIN SER CALC-MCNC: 2.7 G/DL (ref 1.9–3.5)
GLUCOSE SERPL-MCNC: 125 MG/DL (ref 65–99)
HCT VFR BLD AUTO: 21.8 % (ref 37–47)
HGB BLD-MCNC: 7 G/DL (ref 12–16)
IMM GRANULOCYTES # BLD AUTO: 0.52 K/UL (ref 0–0.11)
IMM GRANULOCYTES NFR BLD AUTO: 3.7 % (ref 0–0.9)
LYMPHOCYTES # BLD AUTO: 1.55 K/UL (ref 1–4.8)
LYMPHOCYTES NFR BLD: 10.9 % (ref 22–41)
MCH RBC QN AUTO: 30.2 PG (ref 27–33)
MCHC RBC AUTO-ENTMCNC: 32.1 G/DL (ref 33.6–35)
MCV RBC AUTO: 94 FL (ref 81.4–97.8)
MONOCYTES # BLD AUTO: 1.54 K/UL (ref 0–0.85)
MONOCYTES NFR BLD AUTO: 10.9 % (ref 0–13.4)
NEUTROPHILS # BLD AUTO: 10.53 K/UL (ref 2–7.15)
NEUTROPHILS NFR BLD: 74.3 % (ref 44–72)
NRBC # BLD AUTO: 0.04 K/UL
NRBC BLD-RTO: 0.3 /100 WBC
PLATELET # BLD AUTO: 329 K/UL (ref 164–446)
PMV BLD AUTO: 9.3 FL (ref 9–12.9)
POTASSIUM SERPL-SCNC: 3.8 MMOL/L (ref 3.6–5.5)
PRODUCT TYPE UPROD: NORMAL
PRODUCT TYPE UPROD: NORMAL
PROT SERPL-MCNC: 5.2 G/DL (ref 6–8.2)
RBC # BLD AUTO: 2.32 M/UL (ref 4.2–5.4)
RH BLD: NORMAL
SODIUM SERPL-SCNC: 134 MMOL/L (ref 135–145)
UNIT STATUS USTAT: NORMAL
UNIT STATUS USTAT: NORMAL
WBC # BLD AUTO: 14.2 K/UL (ref 4.8–10.8)

## 2022-03-05 PROCEDURE — 700102 HCHG RX REV CODE 250 W/ 637 OVERRIDE(OP)

## 2022-03-05 PROCEDURE — 86923 COMPATIBILITY TEST ELECTRIC: CPT

## 2022-03-05 PROCEDURE — A9270 NON-COVERED ITEM OR SERVICE: HCPCS | Performed by: STUDENT IN AN ORGANIZED HEALTH CARE EDUCATION/TRAINING PROGRAM

## 2022-03-05 PROCEDURE — 36430 TRANSFUSION BLD/BLD COMPNT: CPT

## 2022-03-05 PROCEDURE — 36415 COLL VENOUS BLD VENIPUNCTURE: CPT

## 2022-03-05 PROCEDURE — 93931 UPPER EXTREMITY STUDY: CPT | Mod: LT

## 2022-03-05 PROCEDURE — 770004 HCHG ROOM/CARE - ONCOLOGY PRIVATE *

## 2022-03-05 PROCEDURE — 80053 COMPREHEN METABOLIC PANEL: CPT

## 2022-03-05 PROCEDURE — 85025 COMPLETE CBC W/AUTO DIFF WBC: CPT

## 2022-03-05 PROCEDURE — P9016 RBC LEUKOCYTES REDUCED: HCPCS

## 2022-03-05 PROCEDURE — 93971 EXTREMITY STUDY: CPT | Mod: LT

## 2022-03-05 PROCEDURE — 700111 HCHG RX REV CODE 636 W/ 250 OVERRIDE (IP)

## 2022-03-05 PROCEDURE — 700111 HCHG RX REV CODE 636 W/ 250 OVERRIDE (IP): Performed by: STUDENT IN AN ORGANIZED HEALTH CARE EDUCATION/TRAINING PROGRAM

## 2022-03-05 PROCEDURE — 86901 BLOOD TYPING SEROLOGIC RH(D): CPT

## 2022-03-05 PROCEDURE — 86900 BLOOD TYPING SEROLOGIC ABO: CPT

## 2022-03-05 PROCEDURE — A9270 NON-COVERED ITEM OR SERVICE: HCPCS

## 2022-03-05 PROCEDURE — 700105 HCHG RX REV CODE 258

## 2022-03-05 PROCEDURE — 700102 HCHG RX REV CODE 250 W/ 637 OVERRIDE(OP): Performed by: STUDENT IN AN ORGANIZED HEALTH CARE EDUCATION/TRAINING PROGRAM

## 2022-03-05 PROCEDURE — 86850 RBC ANTIBODY SCREEN: CPT

## 2022-03-05 RX ORDER — SIMETHICONE 125 MG
125 TABLET,CHEWABLE ORAL 3 TIMES DAILY PRN
Status: DISCONTINUED | OUTPATIENT
Start: 2022-03-05 | End: 2022-03-24 | Stop reason: HOSPADM

## 2022-03-05 RX ADMIN — PAROXETINE 60 MG: 30 TABLET, FILM COATED ORAL at 21:56

## 2022-03-05 RX ADMIN — BACLOFEN 10 MG: 10 TABLET ORAL at 17:28

## 2022-03-05 RX ADMIN — MIRTAZAPINE 22.5 MG: 15 TABLET, FILM COATED ORAL at 21:56

## 2022-03-05 RX ADMIN — RIVAROXABAN 15 MG: 15 TABLET, FILM COATED ORAL at 17:28

## 2022-03-05 RX ADMIN — BACLOFEN 10 MG: 10 TABLET ORAL at 06:33

## 2022-03-05 RX ADMIN — SIMETHICONE 125 MG: 125 TABLET, CHEWABLE ORAL at 21:57

## 2022-03-05 RX ADMIN — RIVAROXABAN 15 MG: 15 TABLET, FILM COATED ORAL at 06:33

## 2022-03-05 RX ADMIN — BACLOFEN 10 MG: 10 TABLET ORAL at 12:18

## 2022-03-05 RX ADMIN — Medication: at 12:16

## 2022-03-05 RX ADMIN — SODIUM CHLORIDE: 9 INJECTION, SOLUTION INTRAVENOUS at 08:04

## 2022-03-05 RX ADMIN — SIMETHICONE 125 MG: 125 TABLET, CHEWABLE ORAL at 08:30

## 2022-03-05 RX ADMIN — SIMETHICONE 125 MG: 125 TABLET, CHEWABLE ORAL at 13:24

## 2022-03-05 RX ADMIN — ONDANSETRON 4 MG: 2 INJECTION INTRAMUSCULAR; INTRAVENOUS at 20:20

## 2022-03-05 RX ADMIN — ROPINIROLE HYDROCHLORIDE 1 MG: 1 TABLET, FILM COATED ORAL at 06:34

## 2022-03-05 RX ADMIN — AMLODIPINE BESYLATE 5 MG: 5 TABLET ORAL at 17:28

## 2022-03-05 RX ADMIN — Medication: at 05:31

## 2022-03-05 RX ADMIN — Medication: at 22:02

## 2022-03-05 RX ADMIN — ROPINIROLE HYDROCHLORIDE 1 MG: 1 TABLET, FILM COATED ORAL at 17:28

## 2022-03-05 RX ADMIN — HYDROXYCHLOROQUINE SULFATE 400 MG: 200 TABLET ORAL at 08:30

## 2022-03-05 RX ADMIN — SIMETHICONE 125 MG: 125 TABLET, CHEWABLE ORAL at 17:28

## 2022-03-05 ASSESSMENT — ENCOUNTER SYMPTOMS
FEVER: 0
WEAKNESS: 0
EYE DISCHARGE: 0
EYE REDNESS: 0
SENSORY CHANGE: 0
SPEECH CHANGE: 0
CHILLS: 0
WHEEZING: 0
MYALGIAS: 0
PALPITATIONS: 0
ABDOMINAL PAIN: 1
NAUSEA: 0
VOMITING: 0
DIZZINESS: 0
FOCAL WEAKNESS: 0
DIARRHEA: 0
SHORTNESS OF BREATH: 0
BACK PAIN: 0

## 2022-03-05 ASSESSMENT — PAIN DESCRIPTION - PAIN TYPE
TYPE: ACUTE PAIN

## 2022-03-05 NOTE — CARE PLAN
The patient is Watcher - Medium risk of patient condition declining or worsening    Shift Goals  Clinical Goals: monitor UOP, blood transfusion  Patient Goals: mobilize, pain control  Family Goals: Pt will rest comfortably.    Progress made toward(s) clinical / shift goals:      Patient is not progressing towards the following goals:      Problem: Pain - Standard  Goal: Alleviation of pain or a reduction in pain to the patient’s comfort goal  Outcome: Not Progressing  Pt continues to have 8/10 abdominal pain. PCA continued however change dosing due to pt being very sleepy and falling asleep mid sentence.     Problem: Mobility  Goal: Patient's capacity to carry out activities will improve  Outcome: Not Progressing  Pt has not mobilized since ankle surgery. Plan to sit edge of bed today and see how patient tolerates.

## 2022-03-05 NOTE — CARE PLAN
The patient is Watcher - Medium risk of patient condition declining or worsening    Shift Goals  Clinical Goals: Monitor urine output. Skin preservation. Pain control. Monitor O2  Patient Goals: Mobilize. PRS goal 4/10  Family Goals: Pt will rest comfortably.    Progress made toward(s) clinical / shift goals:  Urine output monitoring in place. Goal 30 mls/hr. Q2 repositioning in place. Pain treated per PCA and nonpharmacologic intervention; ice applied. Patient demonstrates ROM exercises; tolerates well. Encouraged to sit EOB today.    A&Ox4. Bed bound this shift. Denies nausea. Pain reported 8/10; treated per MAR and nonpharmacologic interventions. Generalized edema. New complaints of cough; causing severe discomfort to abdomen. Educated to splint abdomen with cough. Brenner with bloody discharge; omar care provided. Leakage observed at suprapubic catheter site; dressings changed as needed; sutures in tact. ALEIDA with serosanguinous to bloody output; Q4 stripping. RUE swollen and firm at prior IV site. POC discussed. All questions answered; patient demonstrates understanding. Call light and belongings within reach, calls appropriately to make needs known; hourly rounding in place.     Patient is not progressing towards the following goals: Desaturation with removal of O2; 1L in place via NC.       Problem: Pain - Standard  Goal: Alleviation of pain or a reduction in pain to the patient’s comfort goal  Outcome: Not Progressing     Problem: Bowel Elimination  Goal: Establish and maintain regular bowel function  Outcome: Not Progressing

## 2022-03-05 NOTE — PROGRESS NOTES
Gynecology Oncology Progress Note               Author: Sofia Jean-Baptiste PA-C Date & Time created: 3/5/2022  12:44 PM     Interval History:  No acute overnight events. Patients pain more controlled today, however she is sleepy, but arousable.  Pain to LLE better with ice. No nausea or vomiting, states some foods have sickly sweet taste, which produces nasuea, but denies nausea after PO intake.  Swelling to LUE.       Review of Systems:  Review of Systems   Constitutional: Negative for chills, fever and malaise/fatigue.   Eyes: Negative for discharge and redness.   Respiratory: Negative for shortness of breath and wheezing.    Cardiovascular: Negative for chest pain, palpitations and leg swelling.   Gastrointestinal: Positive for abdominal pain. Negative for diarrhea, nausea and vomiting.   Genitourinary: Negative for dysuria.   Musculoskeletal: Negative for back pain and myalgias.   Neurological: Negative for dizziness, sensory change, speech change, focal weakness and weakness.        Left foot        Physical Exam:  Physical Exam  Constitutional:       General: She is not in acute distress.  HENT:      Head: Normocephalic.      Mouth/Throat:      Mouth: Mucous membranes are moist.   Eyes:      Conjunctiva/sclera: Conjunctivae normal.   Cardiovascular:      Rate and Rhythm: Tachycardia present.      Pulses: Normal pulses.      Heart sounds: Normal heart sounds.   Pulmonary:      Effort: Pulmonary effort is normal. No respiratory distress.   Abdominal:      Palpations: Abdomen is soft.      Tenderness: There is abdominal tenderness. There is no guarding.      Comments: MLVI well approximated, c/d/i, no signs of infection. ALEIDA to LLQ with sero sang output.  Suprapubic catheter in place, dressing c/d/i, clear yellow urine. Abd appropriately tender, soft.    Genitourinary:     Comments: Minimal blood noted from vagina, no active bleeding.    Musculoskeletal:      Right lower leg: No edema.      Left lower leg: Tenderness  present. Edema present.      Comments: Right arm swelling, mild erythema. Left ankle with surgical dressing.    Skin:     General: Skin is warm and dry.   Neurological:      Mental Status: She is alert and oriented to person, place, and time.   Psychiatric:         Mood and Affect: Mood normal.         Labs:  Recent Labs     03/03/22 1926 03/03/22 2030   ISTATAPH 7.235* 7.265*   ISTATAPCO2 48.6* 39.8*   ISTATAPO2 309* 232*   ISTATATCO2 22 19*   [id number] 100* 100*   ISTATARTHCO3 20.6 18.1   ISTATARTBE -6* -8*   ISTATTEMP 36.8 C see below   ISTATFIO2 100  --    ISTATSPEC Arterial Arterial   ISTATAPHTC 7.237*  --    [id number] 308*  --          Recent Labs     03/03/22 0054 03/04/22 0031 03/05/22  0104   SODIUM 135 137 134*   POTASSIUM 3.6 4.4 3.8   CHLORIDE 100 104 102   CO2 17* 13* 16*   BUN 14 13 17   CREATININE 1.02 1.03 1.25   CALCIUM 9.1 7.8* 7.1*     Recent Labs     03/03/22 0054 03/04/22 0031 03/05/22  0104   ALTSGPT 6 6 7   ASTSGOT 25 26 33   ALKPHOSPHAT 78 60 59   TBILIRUBIN 0.3 0.8 0.2   GLUCOSE 72 133* 125*     Recent Labs     03/03/22 0054 03/03/22 2124 03/04/22 0031 03/05/22  0104   RBC 2.53* 2.79* 2.89* 2.32*   HEMOGLOBIN 7.5* 8.4* 8.6* 7.0*   HEMATOCRIT 24.6* 26.0* 27.3* 21.8*   PLATELETCT 403 357 345 329   PROTHROMBTM 15.5*  --   --   --    APTT 60.5*  --   --   --    INR 1.27*  --   --   --      Recent Labs     03/03/22 0054 03/03/22 2124 03/04/22 0031 03/05/22  0104   WBC 7.2 17.0* 16.3* 14.2*   NEUTSPOLYS 75.00*  --  86.00* 74.30*   LYMPHOCYTES 11.30*  --  4.50* 10.90*   MONOCYTES 9.30  --  5.40 10.90   EOSINOPHILS 0.30  --  0.00 0.00   BASOPHILS 0.60  --  0.50 0.20   ASTSGOT 25  --  26 33   ALTSGPT 6  --  6 7   ALKPHOSPHAT 78  --  60 59   TBILIRUBIN 0.3  --  0.8 0.2     Recent Labs     03/03/22  0054 03/04/22  0031 03/05/22  0104   SODIUM 135 137 134*   POTASSIUM 3.6 4.4 3.8   CHLORIDE 100 104 102   CO2 17* 13* 16*   GLUCOSE 72 133* 125*   BUN 14 13 17   CREATININE 1.02 1.03 1.25    CALCIUM 9.1 7.8* 7.1*     Hemodynamics:  Temp (24hrs), Av.9 °C (98.5 °F), Min:36.8 °C (98.2 °F), Max:37.2 °C (98.9 °F)  Temperature: 36.9 °C (98.4 °F)  Pulse  Av.5  Min: 75  Max: 130   Blood Pressure: 123/57     Respiratory:    Respiration: 20, Pulse Oximetry: 100 %        RUL Breath Sounds: Clear, RML Breath Sounds: Clear, RLL Breath Sounds: Diminished, MORENO Breath Sounds: Clear, LLL Breath Sounds: Diminished  Fluids:  No intake or output data in the 24 hours ending 22 2743     GI/Nutrition:  Orders Placed This Encounter   Procedures   • Diet Order Diet: Clear Liquid     Standing Status:   Standing     Number of Occurrences:   1     Order Specific Question:   Diet:     Answer:   Clear Liquid [10]     Medical Decision Making, by Problem:  Active Hospital Problems    Diagnosis    • *Hypercalcemia [E83.52]        Assessment and Plan:  This is a 56 y.o. female with stage III (minimum) Grade 2 endometrial adenocarcinoma s/p 6 cycles of neoadjuvant chemotherapy with Taxol/Carbo completed on 22 , who presents with hypercalcemia, nausea, and vomiting.     1. POD #2: s/p ex lap, hys, bso, cystectomy with repair and suprapubic catheter placement. Doing well, encourage mobilization and IS. Tolerating CLD, + flatus, advance diet as tolerated. Dr. Dyson discussed intraop findings with wife.   2. Post op pain: dilaudid PCA with 0.2 mg basal, and 0.2 mg demand.   3. Tachycardia: improved, possibly pain related, hemodynamically stable, will continue to monitor.   3. Hypercalcemia: resolved. Secondary to malignancy and dehydration, s/p IVF, lasix, and pamidronate on .     2. Nausea vomiting: Prn compazine and Zofran.   3. Dehydration: resolved, continue IVF.   4. Pelvic and low back pain: Acute on chronic. Patient with history of fibromyalgia. Seen outpatient by pain management. Currently on dilaudid PCA.   5. Stage III (minimum) Grade 2 endometrial adenocarcinoma:  s/p 6 cycles of neoadjuvant chemotherapy,  now s/p ex lap, hys, bso, cysetectomy with suprapubic cath placement.   6.Left trimalleolar ankle fracture/dislocation: s/p ORIF 2/26, ortho following.    7. Upper extremity DVT: Venous US positive for DVT. On heparin gtt. Therapeutic x2. Continue at 1,600 units/h. Hgb stable today, will transition to Xarelto today.      8. Hypokalemia: improved, on CLD, will continue to montior   9. Anemia: Secondary to chemotherapy and now secondary to acute blood loss. Hgb 7.5 pre operative on 3/3, s/p 2 unit intra op. Stable today, 8.6 > 7 today, will give 1 U PRBC  10. RUQ swelling: with tenderness, after IV infiltrated. Low suspicion for DVT, but will order US to evaluate given recent dx of DVT and current endometrial cancer.   11. Elevated BP: No hx of HTN, Likely secondary to pain and IVF. On amlodipine 2.5 mg at home for Raynaud's > amlodipine to 5 mg daily, currently well controlled.   12. ILIA: improving, likley secondary to dehydration. Cr continues to downtrend. Monitor.   13. History of Lupus: continue home hydroxychloroquine  14. Hx of mood disorder: PTSD and anxiety. Continue home paroxetine and propranolol prn.   15. Hx Raynaud's disease: Continue amlodipine.   16. Hx of allergic rhinitis: Home Azelastine  17. Dispo: continue post operative inpatient management      Case discussed with Dr. Martinez

## 2022-03-06 LAB
ALBUMIN SERPL BCP-MCNC: 2.4 G/DL (ref 3.2–4.9)
ALBUMIN/GLOB SERPL: 0.9 G/DL
ALP SERPL-CCNC: 68 U/L (ref 30–99)
ALT SERPL-CCNC: 13 U/L (ref 2–50)
ANION GAP SERPL CALC-SCNC: 14 MMOL/L (ref 7–16)
ANISOCYTOSIS BLD QL SMEAR: ABNORMAL
AST SERPL-CCNC: 30 U/L (ref 12–45)
BASOPHILS # BLD AUTO: 0.9 % (ref 0–1.8)
BASOPHILS # BLD: 0.12 K/UL (ref 0–0.12)
BILIRUB SERPL-MCNC: 0.3 MG/DL (ref 0.1–1.5)
BUN SERPL-MCNC: 12 MG/DL (ref 8–22)
CALCIUM SERPL-MCNC: 6.5 MG/DL (ref 8.5–10.5)
CHLORIDE SERPL-SCNC: 104 MMOL/L (ref 96–112)
CO2 SERPL-SCNC: 17 MMOL/L (ref 20–33)
CREAT SERPL-MCNC: 0.86 MG/DL (ref 0.5–1.4)
EOSINOPHIL # BLD AUTO: 0 K/UL (ref 0–0.51)
EOSINOPHIL NFR BLD: 0 % (ref 0–6.9)
ERYTHROCYTE [DISTWIDTH] IN BLOOD BY AUTOMATED COUNT: 59.2 FL (ref 35.9–50)
GLOBULIN SER CALC-MCNC: 2.8 G/DL (ref 1.9–3.5)
GLUCOSE SERPL-MCNC: 99 MG/DL (ref 65–99)
HCT VFR BLD AUTO: 23.6 % (ref 37–47)
HGB BLD-MCNC: 7.4 G/DL (ref 12–16)
LYMPHOCYTES # BLD AUTO: 0.59 K/UL (ref 1–4.8)
LYMPHOCYTES NFR BLD: 4.5 % (ref 22–41)
MACROCYTES BLD QL SMEAR: ABNORMAL
MANUAL DIFF BLD: NORMAL
MCH RBC QN AUTO: 28.9 PG (ref 27–33)
MCHC RBC AUTO-ENTMCNC: 31.4 G/DL (ref 33.6–35)
MCV RBC AUTO: 92.2 FL (ref 81.4–97.8)
MICROCYTES BLD QL SMEAR: ABNORMAL
MONOCYTES # BLD AUTO: 0.72 K/UL (ref 0–0.85)
MONOCYTES NFR BLD AUTO: 5.5 % (ref 0–13.4)
MORPHOLOGY BLD-IMP: NORMAL
MYELOCYTES NFR BLD MANUAL: 0.9 %
NEUTROPHILS # BLD AUTO: 11.47 K/UL (ref 2–7.15)
NEUTROPHILS NFR BLD: 87.3 % (ref 44–72)
NEUTS BAND NFR BLD MANUAL: 0.9 % (ref 0–10)
NRBC # BLD AUTO: 0.04 K/UL
NRBC BLD-RTO: 0.3 /100 WBC
OVALOCYTES BLD QL SMEAR: NORMAL
PLATELET # BLD AUTO: 318 K/UL (ref 164–446)
PLATELET BLD QL SMEAR: NORMAL
PMV BLD AUTO: 9.1 FL (ref 9–12.9)
POIKILOCYTOSIS BLD QL SMEAR: NORMAL
POTASSIUM SERPL-SCNC: 3.4 MMOL/L (ref 3.6–5.5)
PROT SERPL-MCNC: 5.2 G/DL (ref 6–8.2)
RBC # BLD AUTO: 2.56 M/UL (ref 4.2–5.4)
RBC BLD AUTO: PRESENT
SODIUM SERPL-SCNC: 135 MMOL/L (ref 135–145)
WBC # BLD AUTO: 13 K/UL (ref 4.8–10.8)

## 2022-03-06 PROCEDURE — 93971 EXTREMITY STUDY: CPT | Mod: 26,LT | Performed by: INTERNAL MEDICINE

## 2022-03-06 PROCEDURE — 700102 HCHG RX REV CODE 250 W/ 637 OVERRIDE(OP): Performed by: STUDENT IN AN ORGANIZED HEALTH CARE EDUCATION/TRAINING PROGRAM

## 2022-03-06 PROCEDURE — A9270 NON-COVERED ITEM OR SERVICE: HCPCS | Performed by: STUDENT IN AN ORGANIZED HEALTH CARE EDUCATION/TRAINING PROGRAM

## 2022-03-06 PROCEDURE — A9270 NON-COVERED ITEM OR SERVICE: HCPCS

## 2022-03-06 PROCEDURE — 93931 UPPER EXTREMITY STUDY: CPT | Mod: 26,LT | Performed by: INTERNAL MEDICINE

## 2022-03-06 PROCEDURE — 80053 COMPREHEN METABOLIC PANEL: CPT

## 2022-03-06 PROCEDURE — 36430 TRANSFUSION BLD/BLD COMPNT: CPT

## 2022-03-06 PROCEDURE — 700105 HCHG RX REV CODE 258: Performed by: STUDENT IN AN ORGANIZED HEALTH CARE EDUCATION/TRAINING PROGRAM

## 2022-03-06 PROCEDURE — 36415 COLL VENOUS BLD VENIPUNCTURE: CPT

## 2022-03-06 PROCEDURE — 700111 HCHG RX REV CODE 636 W/ 250 OVERRIDE (IP)

## 2022-03-06 PROCEDURE — 85007 BL SMEAR W/DIFF WBC COUNT: CPT

## 2022-03-06 PROCEDURE — P9016 RBC LEUKOCYTES REDUCED: HCPCS

## 2022-03-06 PROCEDURE — 700105 HCHG RX REV CODE 258

## 2022-03-06 PROCEDURE — 85027 COMPLETE CBC AUTOMATED: CPT

## 2022-03-06 PROCEDURE — 770004 HCHG ROOM/CARE - ONCOLOGY PRIVATE *

## 2022-03-06 PROCEDURE — 700111 HCHG RX REV CODE 636 W/ 250 OVERRIDE (IP): Performed by: EMERGENCY MEDICINE

## 2022-03-06 PROCEDURE — 86923 COMPATIBILITY TEST ELECTRIC: CPT

## 2022-03-06 PROCEDURE — 700102 HCHG RX REV CODE 250 W/ 637 OVERRIDE(OP)

## 2022-03-06 PROCEDURE — 700101 HCHG RX REV CODE 250: Performed by: STUDENT IN AN ORGANIZED HEALTH CARE EDUCATION/TRAINING PROGRAM

## 2022-03-06 PROCEDURE — 700111 HCHG RX REV CODE 636 W/ 250 OVERRIDE (IP): Performed by: STUDENT IN AN ORGANIZED HEALTH CARE EDUCATION/TRAINING PROGRAM

## 2022-03-06 RX ORDER — KETOROLAC TROMETHAMINE 30 MG/ML
30 INJECTION, SOLUTION INTRAMUSCULAR; INTRAVENOUS EVERY 6 HOURS PRN
Status: DISPENSED | OUTPATIENT
Start: 2022-03-06 | End: 2022-03-11

## 2022-03-06 RX ORDER — DEXTROSE MONOHYDRATE, SODIUM CHLORIDE, AND POTASSIUM CHLORIDE 50; 1.49; 4.5 G/1000ML; G/1000ML; G/1000ML
INJECTION, SOLUTION INTRAVENOUS CONTINUOUS
Status: DISCONTINUED | OUTPATIENT
Start: 2022-03-06 | End: 2022-03-24 | Stop reason: HOSPADM

## 2022-03-06 RX ORDER — POTASSIUM CHLORIDE 7.45 MG/ML
10 INJECTION INTRAVENOUS
Status: COMPLETED | OUTPATIENT
Start: 2022-03-06 | End: 2022-03-06

## 2022-03-06 RX ORDER — POTASSIUM CHLORIDE 7.45 MG/ML
10 INJECTION INTRAVENOUS
Status: DISCONTINUED | OUTPATIENT
Start: 2022-03-06 | End: 2022-03-06

## 2022-03-06 RX ADMIN — SODIUM CHLORIDE: 9 INJECTION, SOLUTION INTRAVENOUS at 07:41

## 2022-03-06 RX ADMIN — RIVAROXABAN 15 MG: 15 TABLET, FILM COATED ORAL at 17:35

## 2022-03-06 RX ADMIN — Medication: at 23:32

## 2022-03-06 RX ADMIN — Medication: at 11:19

## 2022-03-06 RX ADMIN — POTASSIUM CHLORIDE, DEXTROSE MONOHYDRATE AND SODIUM CHLORIDE: 150; 5; 450 INJECTION, SOLUTION INTRAVENOUS at 23:32

## 2022-03-06 RX ADMIN — ONDANSETRON 4 MG: 2 INJECTION INTRAMUSCULAR; INTRAVENOUS at 08:35

## 2022-03-06 RX ADMIN — ROPINIROLE HYDROCHLORIDE 1 MG: 1 TABLET, FILM COATED ORAL at 17:37

## 2022-03-06 RX ADMIN — PROCHLORPERAZINE EDISYLATE 10 MG: 5 INJECTION INTRAMUSCULAR; INTRAVENOUS at 05:07

## 2022-03-06 RX ADMIN — SIMETHICONE 125 MG: 125 TABLET, CHEWABLE ORAL at 04:55

## 2022-03-06 RX ADMIN — POTASSIUM CHLORIDE 10 MEQ: 7.46 INJECTION, SOLUTION INTRAVENOUS at 18:00

## 2022-03-06 RX ADMIN — AMLODIPINE BESYLATE 5 MG: 5 TABLET ORAL at 17:38

## 2022-03-06 RX ADMIN — ROPINIROLE HYDROCHLORIDE 1 MG: 1 TABLET, FILM COATED ORAL at 04:55

## 2022-03-06 RX ADMIN — KETOROLAC TROMETHAMINE 30 MG: 30 INJECTION, SOLUTION INTRAMUSCULAR; INTRAVENOUS at 12:37

## 2022-03-06 RX ADMIN — ONDANSETRON 4 MG: 2 INJECTION INTRAMUSCULAR; INTRAVENOUS at 03:11

## 2022-03-06 RX ADMIN — BACLOFEN 10 MG: 10 TABLET ORAL at 04:55

## 2022-03-06 RX ADMIN — BACLOFEN 10 MG: 10 TABLET ORAL at 17:35

## 2022-03-06 RX ADMIN — POTASSIUM CHLORIDE, DEXTROSE MONOHYDRATE AND SODIUM CHLORIDE: 150; 5; 450 INJECTION, SOLUTION INTRAVENOUS at 15:23

## 2022-03-06 RX ADMIN — POTASSIUM CHLORIDE 10 MEQ: 7.46 INJECTION, SOLUTION INTRAVENOUS at 13:11

## 2022-03-06 RX ADMIN — BACLOFEN 10 MG: 10 TABLET ORAL at 11:23

## 2022-03-06 RX ADMIN — RIVAROXABAN 15 MG: 15 TABLET, FILM COATED ORAL at 08:35

## 2022-03-06 RX ADMIN — PAROXETINE 60 MG: 30 TABLET, FILM COATED ORAL at 20:54

## 2022-03-06 RX ADMIN — MIRTAZAPINE 22.5 MG: 15 TABLET, FILM COATED ORAL at 20:53

## 2022-03-06 ASSESSMENT — ENCOUNTER SYMPTOMS
WHEEZING: 0
DIZZINESS: 0
DIARRHEA: 0
SPEECH CHANGE: 0
FOCAL WEAKNESS: 0
EYE DISCHARGE: 0
SENSORY CHANGE: 0
ABDOMINAL PAIN: 1
EYE REDNESS: 0
NAUSEA: 1
MYALGIAS: 0
WEAKNESS: 0
VOMITING: 1
FEVER: 0
PALPITATIONS: 0
BACK PAIN: 0
SHORTNESS OF BREATH: 0
CHILLS: 0

## 2022-03-06 ASSESSMENT — PAIN DESCRIPTION - PAIN TYPE
TYPE: ACUTE PAIN
TYPE: ACUTE PAIN

## 2022-03-06 NOTE — CARE PLAN
Problem: Pain - Standard  Goal: Alleviation of pain or a reduction in pain to the patient’s comfort goal  Outcome: Progressing     Problem: Knowledge Deficit - Standard  Goal: Patient and family/care givers will demonstrate understanding of plan of care, disease process/condition, diagnostic tests and medications  Outcome: Progressing     Problem: Psychosocial  Goal: Patient's level of anxiety will decrease  Outcome: Progressing   The patient is Watcher - Medium risk of patient condition declining or worsening    Shift Goals  Clinical Goals: monitor o2, pain control  Patient Goals: pain control  Family Goals: rest, pain control    Progress made toward(s) clinical / shift goals: Pt rates pain at manageable level for her.     Patient is not progressing towards the following goals:

## 2022-03-06 NOTE — PROGRESS NOTES
Pt's potassium is burning her arm, PIV is patent with no infiltration. Sofia Jean-Baptiste notified. Orders received to run potassium slower until it doesn't burn. It is currently running at 25 ml/hour. Sofia Jean-Baptiste aware pt's unit of RBC's will hang after bag of potassium later this afternoon around 1800.

## 2022-03-06 NOTE — CARE PLAN
"The patient is Watcher - Medium risk of patient condition declining or worsening    Shift Goals  Clinical Goals: Monitor O2, UOP, and pain control.  Patient Goals: Mobilize. BM. Pain control  Family Goals: Rest. Pain control    Progress made toward(s) clinical / shift goals:   and O2 titration in place. UOP monitoring from garza and suprapubic catheter. Pain treated per PCA. ROM exercises performed.    A&Ox4. Bed bound at this time. Nausea intermittently this shift; one emesis, treated per MAR x 2. Patient reports severe PO intake intolerance, \"the thought of food makes her want to throw up\". Pain reported 8/10 in abdomen; PCA and nonpharmacologic interventions in place. LDAs CDI. Blood infused this shift and tolerated well. PIV x2 RUE. IVF infusing. ROM exercises and repositioning as tolerated. Cap refill LLE less than 3 seconds. Patient reports flatus, no BM. POC discussed; wife at the bedside. All questions answered; patient demonstrates understanding. Call light and belongings within reach, calls appropriately to make needs known; hourly rounding in place.     Patient is not progressing towards the following goals:      Problem: Pain - Standard  Goal: Alleviation of pain or a reduction in pain to the patient’s comfort goal  Outcome: Not Progressing     Problem: Self Care  Goal: Patient will have the ability to perform ADLs independently or with assistance (bathe, groom, dress, toilet and feed)  Outcome: Not Progressing     Problem: Bowel Elimination  Goal: Establish and maintain regular bowel function  Outcome: Not Progressing     "

## 2022-03-06 NOTE — PROGRESS NOTES
Gynecology Oncology Progress Note               Author: Sofia Jean-Baptiste PA-C Date & Time created: 3/6/2022  11:19 AM     Interval History:  Doing well this AM, more alert and conversant.  Patient had nausea and vomiting last night.  Her nausea is currently controlled with antiemetics, although she states she is still getting waves of nausea.  Pain controlled on PCA.  Denies vaginal discharge.  +flatus.  Didn't get up to edge of bed today.       Review of Systems:  Review of Systems   Constitutional: Negative for chills, fever and malaise/fatigue.   Eyes: Negative for discharge and redness.   Respiratory: Negative for shortness of breath and wheezing.    Cardiovascular: Negative for chest pain, palpitations and leg swelling.   Gastrointestinal: Positive for abdominal pain, nausea and vomiting. Negative for diarrhea.   Genitourinary: Negative for dysuria.   Musculoskeletal: Negative for back pain and myalgias.   Neurological: Negative for dizziness, sensory change, speech change, focal weakness and weakness.        Left foot        Physical Exam:  Physical Exam  Constitutional:       General: She is not in acute distress.  HENT:      Head: Normocephalic.      Mouth/Throat:      Mouth: Mucous membranes are moist.   Eyes:      Conjunctiva/sclera: Conjunctivae normal.   Cardiovascular:      Rate and Rhythm: Tachycardia present.      Pulses: Normal pulses.      Heart sounds: Normal heart sounds.   Pulmonary:      Effort: Pulmonary effort is normal. No respiratory distress.   Abdominal:      Palpations: Abdomen is soft.      Tenderness: There is abdominal tenderness. There is no guarding.      Comments: MLVI well approximated, c/d/i, no signs of infection. ALEIDA to LLQ with sero sang output.  Suprapubic catheter in place, dressing c/d/i, clear yellow urine. Abd appropriately tender, soft.    Genitourinary:     Comments: Minimal blood noted from vagina, no active bleeding.    Musculoskeletal:      Right lower leg: No edema.       Left lower leg: Tenderness present. Edema present.      Comments: Right arm swelling, mild erythema. Left ankle with surgical dressing.    Skin:     General: Skin is warm and dry.   Neurological:      Mental Status: She is alert and oriented to person, place, and time.   Psychiatric:         Mood and Affect: Mood normal.         Labs:  Recent Labs     22  1926 22  2030   ISTATAPH 7.235* 7.265*   ISTATAPCO2 48.6* 39.8*   ISTATAPO2 309* 232*   ISTATATCO2 22 19*   [id number] 100* 100*   ISTATARTHCO3 20.6 18.1   ISTATARTBE -6* -8*   ISTATTEMP 36.8 C see below   ISTATFIO2 100  --    ISTATSPEC Arterial Arterial   ISTATAPHTC 7.237*  --    [id number] 308*  --          Recent Labs     22  0031 22  0104 22  0542   SODIUM 137 134* 135   POTASSIUM 4.4 3.8 3.4*   CHLORIDE 104 102 104   CO2 13* 16* 17*   BUN 13 17 12   CREATININE 1.03 1.25 0.86   CALCIUM 7.8* 7.1* 6.5*     Recent Labs     22  0031 22  0104 22  0542   ALTSGPT 6 7 13   ASTSGOT 26 33 30   ALKPHOSPHAT 60 59 68   TBILIRUBIN 0.8 0.2 0.3   GLUCOSE 133* 125* 99     Recent Labs     22  0031 22  0104 22  0542   RBC 2.89* 2.32* 2.56*   HEMOGLOBIN 8.6* 7.0* 7.4*   HEMATOCRIT 27.3* 21.8* 23.6*   PLATELETCT 345 329 318     Recent Labs     22  0031 22  0104 22  0542   WBC 16.3* 14.2* 13.0*   NEUTSPOLYS 86.00* 74.30* 87.30*   LYMPHOCYTES 4.50* 10.90* 4.50*   MONOCYTES 5.40 10.90 5.50   EOSINOPHILS 0.00 0.00 0.00   BASOPHILS 0.50 0.20 0.90   ASTSGOT 26 33 30   ALTSGPT 6 7 13   ALKPHOSPHAT 60 59 68   TBILIRUBIN 0.8 0.2 0.3     Recent Labs     22  0031 22  0104 22  0542   SODIUM 137 134* 135   POTASSIUM 4.4 3.8 3.4*   CHLORIDE 104 102 104   CO2 13* 16* 17*   GLUCOSE 133* 125* 99   BUN 13 17 12   CREATININE 1.03 1.25 0.86   CALCIUM 7.8* 7.1* 6.5*     Hemodynamics:  Temp (24hrs), Av °C (98.6 °F), Min:36.7 °C (98.1 °F), Max:37.2 °C (99 °F)  Temperature: 36.7 °C (98.1  °F)  Pulse  Av.1  Min: 75  Max: 130   Blood Pressure: 120/67     Respiratory:    Respiration: 18, Pulse Oximetry: 99 %        RUL Breath Sounds: Clear, RML Breath Sounds: Clear, RLL Breath Sounds: Diminished, MORENO Breath Sounds: Clear, LLL Breath Sounds: Diminished  Fluids:  No intake or output data in the 24 hours ending 22 1553     GI/Nutrition:  Orders Placed This Encounter   Procedures   • Diet NPO Restrict to: Strict     Standing Status:   Standing     Number of Occurrences:   1     Order Specific Question:   Diet NPO Restrict to:     Answer:   Strict [1]     Medical Decision Making, by Problem:  Active Hospital Problems    Diagnosis    • *Hypercalcemia [E83.52]        Assessment and Plan:  This is a 56 y.o. female with stage III (minimum) Grade 2 endometrial adenocarcinoma s/p 6 cycles of neoadjuvant chemotherapy with Taxol/Carbo completed on 22 , who presents with hypercalcemia, nausea, and vomiting.     1. POD #3: s/p ex lap, hys, bso, cystectomy with repair and suprapubic catheter placement. Doing well, hasn't mobilized yet, encourage mobilization and IS. Will keep NPO due to nausea and vomiting, if vomiting continues will assess with Abx. Dr. Dyson discussed intraop findings with wife.   2. Post op pain: dilaudid PCA with 0.2 mg basal, and 0.2 mg demand. Will add Toradol.    3. Tachycardia: improving, possibly pain related, hemodynamically stable, will continue to monitor.   3. Hypercalcemia: now with mild hypocalcemia, will follow AM labs.  Secondary to malignancy and dehydration, s/p IVF, lasix, and pamidronate on .     4. Hypokalemia: 3.4 today, due to poor oral intake, will replete with 40 meq.    2. Nausea vomiting: Prn compazine and Zofran.   3. Dehydration: secondary to n/v, resolved, continue IVF.   4. Pelvic and low back pain: Acute on chronic. Patient with history of fibromyalgia. Seen outpatient by pain management. Currently on dilaudid PCA with good control.   5. Stage III  (minimum) Grade 2 endometrial adenocarcinoma:  s/p 6 cycles of neoadjuvant chemotherapy, now s/p ex lap, hys, bso, cysetectomy with suprapubic cath placement.   6.Left trimalleolar ankle fracture/dislocation: s/p ORIF 2/26, ortho following.    7. Upper extremity DVT: Venous US positive for DVT. On heparin gtt. Therapeutic x2. Continue at 1,600 units/h. Hgb stable today, on Xarelto today.      8. Hypokalemia: improved, on CLD, will continue to montior   9. Anemia: Secondary to chemotherapy and now secondary to acute blood loss. Hgb 7.5 pre operative on 3/3, s/p 2 unit intra op. S/p 1 unit on 3/5, Hgb 7.4 today, will transfuse 1 unit today.     10. RUQ swelling: improving, negative for DVT, secondary to IV infiltrating.   11. Elevated BP: No hx of HTN, Likely secondary to pain and IVF. On amlodipine 2.5 mg at home for Raynaud's > amlodipine to 5 mg daily, currently well controlled.   12. ILIA: improved, likley secondary to dehydration. Cr continues to downtrend. Monitor.   13. History of Lupus: continue home hydroxychloroquine  14. Hx of mood disorder: PTSD and anxiety. Continue home paroxetine and propranolol prn.   15. Hx Raynaud's disease: Continue amlodipine.   16. Hx of allergic rhinitis: Home Azelastine  17. Dispo: continue post operative inpatient management      Case discussed with Dr. Martinez

## 2022-03-06 NOTE — PROGRESS NOTES
Contacted MD this AM to relay increased nausea and vomiting. Zofran ineffective; administered compazine x1. Order obtained to make NPO. Per MD (Sofia Jean-Baptiste), update if patient continues to have emesis and obtain abdominal xray order.

## 2022-03-06 NOTE — PROGRESS NOTES
"   Orthopaedic Progress Note    Interval changes: Doing well  Clear for disposition (home/SNF/IRF) from Orthopaedic team standpoint.  Follow-Up: needs appointment with Dr. Levy at [facility] at 10-14 days post-op for re-evaluation, staple removal and radiographs.    ROS - Patient denies any new issues.  Pain well controlled.    /57   Pulse (!) 113   Temp 36.9 °C (98.4 °F) (Temporal)   Resp 20   Ht 1.702 m (5' 7\")   Wt 99.1 kg (218 lb 7.6 oz)   SpO2 100%     Patient seen and examined  No acute distress  Breathing non labored  RRR  LLE splint CDI, DNVI moves all toes, cap refill <2 sec.     Recent Labs     03/03/22  2124 03/04/22  0031 03/05/22  0104   WBC 17.0* 16.3* 14.2*   RBC 2.79* 2.89* 2.32*   HEMOGLOBIN 8.4* 8.6* 7.0*   HEMATOCRIT 26.0* 27.3* 21.8*   MCV 93.2 94.5 94.0   MCH 30.1 29.8 30.2   MCHC 32.3* 31.5* 32.1*   RDW 57.3* 61.0* 63.2*   PLATELETCT 357 345 329   MPV 9.1 9.3 9.3   .    Active Hospital Problems    Diagnosis    • Hypercalcemia [E83.52]      Assessment/Plan:  Patient doing well  LLE splint CDI  POD#7 S/P   1.  Open treatment with internal fixation, left trimalleolar ankle fracture with fixation of posterior lip.  2.  Open treatment without fixation of anterolateral distal tibia fracture with fragment excision.  3.  Surgical fixation of left distal tibiofibular joint disruption.  4.  Physician applied stress examination of the left ankle using fluoroscopy  Wt bearing status - NWB LLE  Wound care/Drains - Dressings to be changed every other day by nursing  Future Procedures - none planned   Sutures/Staples out- 14 days post operatively  PT/OT-initiated  Antibiotics: Perioperative completed  DVT Prophylaxis- TEDS/SCDs/Foot pumps  Brenner-none planned   Case Coordination for Discharge Planning - Disposition pending       "

## 2022-03-07 ENCOUNTER — APPOINTMENT (OUTPATIENT)
Dept: RADIOLOGY | Facility: MEDICAL CENTER | Age: 57
DRG: 740 | End: 2022-03-07
Attending: STUDENT IN AN ORGANIZED HEALTH CARE EDUCATION/TRAINING PROGRAM
Payer: COMMERCIAL

## 2022-03-07 LAB
ALBUMIN SERPL BCP-MCNC: 2.2 G/DL (ref 3.2–4.9)
ALBUMIN/GLOB SERPL: 0.8 G/DL
ALP SERPL-CCNC: 79 U/L (ref 30–99)
ALT SERPL-CCNC: 11 U/L (ref 2–50)
ANION GAP SERPL CALC-SCNC: 13 MMOL/L (ref 7–16)
ANISOCYTOSIS BLD QL SMEAR: ABNORMAL
AST SERPL-CCNC: 11 U/L (ref 12–45)
BASOPHILS # BLD AUTO: 0.9 % (ref 0–1.8)
BASOPHILS # BLD: 0.11 K/UL (ref 0–0.12)
BILIRUB SERPL-MCNC: 0.3 MG/DL (ref 0.1–1.5)
BUN SERPL-MCNC: 12 MG/DL (ref 8–22)
CALCIUM SERPL-MCNC: 6.4 MG/DL (ref 8.5–10.5)
CHLORIDE SERPL-SCNC: 103 MMOL/L (ref 96–112)
CO2 SERPL-SCNC: 19 MMOL/L (ref 20–33)
CREAT SERPL-MCNC: 1.04 MG/DL (ref 0.5–1.4)
EOSINOPHIL # BLD AUTO: 0 K/UL (ref 0–0.51)
EOSINOPHIL NFR BLD: 0 % (ref 0–6.9)
ERYTHROCYTE [DISTWIDTH] IN BLOOD BY AUTOMATED COUNT: 58.3 FL (ref 35.9–50)
GLOBULIN SER CALC-MCNC: 2.8 G/DL (ref 1.9–3.5)
GLUCOSE BLD STRIP.AUTO-MCNC: 119 MG/DL (ref 65–99)
GLUCOSE BLD STRIP.AUTO-MCNC: 128 MG/DL (ref 65–99)
GLUCOSE SERPL-MCNC: 108 MG/DL (ref 65–99)
HCT VFR BLD AUTO: 26.9 % (ref 37–47)
HGB BLD-MCNC: 8.8 G/DL (ref 12–16)
LYMPHOCYTES # BLD AUTO: 0.44 K/UL (ref 1–4.8)
LYMPHOCYTES NFR BLD: 3.5 % (ref 22–41)
MACROCYTES BLD QL SMEAR: ABNORMAL
MAGNESIUM SERPL-MCNC: 1.1 MG/DL (ref 1.5–2.5)
MANUAL DIFF BLD: NORMAL
MCH RBC QN AUTO: 29.9 PG (ref 27–33)
MCHC RBC AUTO-ENTMCNC: 32.7 G/DL (ref 33.6–35)
MCV RBC AUTO: 91.5 FL (ref 81.4–97.8)
MICROCYTES BLD QL SMEAR: ABNORMAL
MONOCYTES # BLD AUTO: 0.77 K/UL (ref 0–0.85)
MONOCYTES NFR BLD AUTO: 6.1 % (ref 0–13.4)
MORPHOLOGY BLD-IMP: NORMAL
NEUTROPHILS # BLD AUTO: 11.37 K/UL (ref 2–7.15)
NEUTROPHILS NFR BLD: 88.6 % (ref 44–72)
NEUTS BAND NFR BLD MANUAL: 0.9 % (ref 0–10)
NRBC # BLD AUTO: 0.04 K/UL
NRBC BLD-RTO: 0.3 /100 WBC
OVALOCYTES BLD QL SMEAR: NORMAL
PLATELET # BLD AUTO: 311 K/UL (ref 164–446)
PLATELET BLD QL SMEAR: NORMAL
PMV BLD AUTO: 9.3 FL (ref 9–12.9)
POIKILOCYTOSIS BLD QL SMEAR: NORMAL
POLYCHROMASIA BLD QL SMEAR: NORMAL
POTASSIUM SERPL-SCNC: 3.5 MMOL/L (ref 3.6–5.5)
PROT SERPL-MCNC: 5 G/DL (ref 6–8.2)
RBC # BLD AUTO: 2.94 M/UL (ref 4.2–5.4)
RBC BLD AUTO: PRESENT
SODIUM SERPL-SCNC: 135 MMOL/L (ref 135–145)
WBC # BLD AUTO: 12.7 K/UL (ref 4.8–10.8)

## 2022-03-07 PROCEDURE — 97530 THERAPEUTIC ACTIVITIES: CPT

## 2022-03-07 PROCEDURE — 85027 COMPLETE CBC AUTOMATED: CPT

## 2022-03-07 PROCEDURE — 83735 ASSAY OF MAGNESIUM: CPT

## 2022-03-07 PROCEDURE — 700111 HCHG RX REV CODE 636 W/ 250 OVERRIDE (IP): Performed by: STUDENT IN AN ORGANIZED HEALTH CARE EDUCATION/TRAINING PROGRAM

## 2022-03-07 PROCEDURE — A9270 NON-COVERED ITEM OR SERVICE: HCPCS

## 2022-03-07 PROCEDURE — 700111 HCHG RX REV CODE 636 W/ 250 OVERRIDE (IP)

## 2022-03-07 PROCEDURE — 700102 HCHG RX REV CODE 250 W/ 637 OVERRIDE(OP): Performed by: STUDENT IN AN ORGANIZED HEALTH CARE EDUCATION/TRAINING PROGRAM

## 2022-03-07 PROCEDURE — A9270 NON-COVERED ITEM OR SERVICE: HCPCS | Performed by: STUDENT IN AN ORGANIZED HEALTH CARE EDUCATION/TRAINING PROGRAM

## 2022-03-07 PROCEDURE — 36415 COLL VENOUS BLD VENIPUNCTURE: CPT

## 2022-03-07 PROCEDURE — 700101 HCHG RX REV CODE 250: Performed by: STUDENT IN AN ORGANIZED HEALTH CARE EDUCATION/TRAINING PROGRAM

## 2022-03-07 PROCEDURE — 80053 COMPREHEN METABOLIC PANEL: CPT

## 2022-03-07 PROCEDURE — 700102 HCHG RX REV CODE 250 W/ 637 OVERRIDE(OP)

## 2022-03-07 PROCEDURE — 05HC33Z INSERTION OF INFUSION DEVICE INTO LEFT BASILIC VEIN, PERCUTANEOUS APPROACH: ICD-10-PCS | Performed by: STUDENT IN AN ORGANIZED HEALTH CARE EDUCATION/TRAINING PROGRAM

## 2022-03-07 PROCEDURE — 82962 GLUCOSE BLOOD TEST: CPT

## 2022-03-07 PROCEDURE — 770004 HCHG ROOM/CARE - ONCOLOGY PRIVATE *

## 2022-03-07 PROCEDURE — 85007 BL SMEAR W/DIFF WBC COUNT: CPT

## 2022-03-07 PROCEDURE — 97535 SELF CARE MNGMENT TRAINING: CPT

## 2022-03-07 PROCEDURE — 76937 US GUIDE VASCULAR ACCESS: CPT

## 2022-03-07 RX ORDER — POTASSIUM CHLORIDE 7.45 MG/ML
10 INJECTION INTRAVENOUS
Status: COMPLETED | OUTPATIENT
Start: 2022-03-07 | End: 2022-03-07

## 2022-03-07 RX ORDER — MAGNESIUM SULFATE HEPTAHYDRATE 40 MG/ML
4 INJECTION, SOLUTION INTRAVENOUS ONCE
Status: COMPLETED | OUTPATIENT
Start: 2022-03-07 | End: 2022-03-07

## 2022-03-07 RX ADMIN — MAGNESIUM SULFATE HEPTAHYDRATE 4 G: 40 INJECTION, SOLUTION INTRAVENOUS at 09:35

## 2022-03-07 RX ADMIN — RIVAROXABAN 15 MG: 15 TABLET, FILM COATED ORAL at 17:53

## 2022-03-07 RX ADMIN — Medication: at 17:46

## 2022-03-07 RX ADMIN — AMLODIPINE BESYLATE 5 MG: 5 TABLET ORAL at 17:53

## 2022-03-07 RX ADMIN — POTASSIUM CHLORIDE 10 MEQ: 7.46 INJECTION, SOLUTION INTRAVENOUS at 17:40

## 2022-03-07 RX ADMIN — ROPINIROLE HYDROCHLORIDE 1 MG: 1 TABLET, FILM COATED ORAL at 06:08

## 2022-03-07 RX ADMIN — BACLOFEN 10 MG: 10 TABLET ORAL at 17:52

## 2022-03-07 RX ADMIN — ONDANSETRON 4 MG: 2 INJECTION INTRAMUSCULAR; INTRAVENOUS at 23:11

## 2022-03-07 RX ADMIN — POTASSIUM CHLORIDE, DEXTROSE MONOHYDRATE AND SODIUM CHLORIDE: 150; 5; 450 INJECTION, SOLUTION INTRAVENOUS at 17:40

## 2022-03-07 RX ADMIN — BACLOFEN 10 MG: 10 TABLET ORAL at 13:03

## 2022-03-07 RX ADMIN — POTASSIUM CHLORIDE 10 MEQ: 7.46 INJECTION, SOLUTION INTRAVENOUS at 21:38

## 2022-03-07 RX ADMIN — DIPHENHYDRAMINE HYDROCHLORIDE 25 MG: 25 TABLET ORAL at 20:27

## 2022-03-07 RX ADMIN — POTASSIUM CHLORIDE 10 MEQ: 7.46 INJECTION, SOLUTION INTRAVENOUS at 18:34

## 2022-03-07 RX ADMIN — Medication: at 10:30

## 2022-03-07 RX ADMIN — POTASSIUM CHLORIDE, DEXTROSE MONOHYDRATE AND SODIUM CHLORIDE: 150; 5; 450 INJECTION, SOLUTION INTRAVENOUS at 07:39

## 2022-03-07 RX ADMIN — RIVAROXABAN 15 MG: 15 TABLET, FILM COATED ORAL at 07:52

## 2022-03-07 RX ADMIN — MIRTAZAPINE 22.5 MG: 15 TABLET, FILM COATED ORAL at 21:39

## 2022-03-07 RX ADMIN — BACLOFEN 10 MG: 10 TABLET ORAL at 06:07

## 2022-03-07 RX ADMIN — ROPINIROLE HYDROCHLORIDE 1 MG: 1 TABLET, FILM COATED ORAL at 17:52

## 2022-03-07 ASSESSMENT — ENCOUNTER SYMPTOMS
MYALGIAS: 0
DIARRHEA: 0
CHILLS: 0
BACK PAIN: 0
SHORTNESS OF BREATH: 0
PALPITATIONS: 0
WHEEZING: 0
VOMITING: 0
SENSORY CHANGE: 0
WEAKNESS: 0
EYE REDNESS: 0
FOCAL WEAKNESS: 0
EYE DISCHARGE: 0
NAUSEA: 1
FEVER: 0
ABDOMINAL PAIN: 1
DIZZINESS: 0
SPEECH CHANGE: 0

## 2022-03-07 ASSESSMENT — COGNITIVE AND FUNCTIONAL STATUS - GENERAL
DAILY ACTIVITIY SCORE: 16
DRESSING REGULAR UPPER BODY CLOTHING: A LITTLE
SUGGESTED CMS G CODE MODIFIER DAILY ACTIVITY: CK
CLIMB 3 TO 5 STEPS WITH RAILING: TOTAL
WALKING IN HOSPITAL ROOM: TOTAL
TOILETING: A LOT
DRESSING REGULAR LOWER BODY CLOTHING: A LOT
SUGGESTED CMS G CODE MODIFIER MOBILITY: CM
MOBILITY SCORE: 7
MOVING FROM LYING ON BACK TO SITTING ON SIDE OF FLAT BED: UNABLE
TURNING FROM BACK TO SIDE WHILE IN FLAT BAD: UNABLE
STANDING UP FROM CHAIR USING ARMS: A LOT
MOVING TO AND FROM BED TO CHAIR: UNABLE
HELP NEEDED FOR BATHING: A LOT
PERSONAL GROOMING: A LITTLE

## 2022-03-07 ASSESSMENT — PAIN DESCRIPTION - PAIN TYPE
TYPE: ACUTE PAIN

## 2022-03-07 NOTE — PROGRESS NOTES
"   Orthopaedic Progress Note    Interval changes: Doing well  Clear for disposition (home/SNF/IRF) from Orthopaedic team standpoint.  Follow-Up: needs appointment with Dr. Levy at [facility] at 10-14 days post-op for re-evaluation, staple removal and radiographs.    ROS - Patient denies any new issues.  Pain well controlled.    /69   Pulse (!) 108   Temp 36.9 °C (98.4 °F) (Oral)   Resp 18   Ht 1.702 m (5' 7\")   Wt 99.1 kg (218 lb 7.6 oz)   SpO2 93%     Patient seen and examined  No acute distress  Breathing non labored  RRR  LLE splint CDI, DNVI moves all toes, cap refill <2 sec.     Recent Labs     03/05/22  0104 03/06/22  0542 03/07/22  0040   WBC 14.2* 13.0* 12.7*   RBC 2.32* 2.56* 2.94*   HEMOGLOBIN 7.0* 7.4* 8.8*   HEMATOCRIT 21.8* 23.6* 26.9*   MCV 94.0 92.2 91.5   MCH 30.2 28.9 29.9   MCHC 32.1* 31.4* 32.7*   RDW 63.2* 59.2* 58.3*   PLATELETCT 329 318 311   MPV 9.3 9.1 9.3   .    Active Hospital Problems    Diagnosis    • Hypercalcemia [E83.52]      Assessment/Plan:  Patient doing well  LLE splint CDI  POD#9 S/P   1.  Open treatment with internal fixation, left trimalleolar ankle fracture with fixation of posterior lip.  2.  Open treatment without fixation of anterolateral distal tibia fracture with fragment excision.  3.  Surgical fixation of left distal tibiofibular joint disruption.  4.  Physician applied stress examination of the left ankle using fluoroscopy  Wt bearing status - NWB LLE  Wound care/Drains - Dressings to be changed every other day by nursing  Future Procedures - none planned   Sutures/Staples out- 14 days post operatively  PT/OT-initiated  Antibiotics: Perioperative completed  DVT Prophylaxis- TEDS/SCDs/Foot pumps  Brenner-none planned   Case Coordination for Discharge Planning - Disposition pending       "

## 2022-03-07 NOTE — PROGRESS NOTES
Gynecology Oncology Progress Note               Author: Sofia Jean-Baptiste PA-C Date & Time created: 3/7/2022  10:26 AM     Interval History:  No acute overnight events. Pain controlled. Still with occasional nausea, no vomiting. + flatus.  Hasn't mobilized yet.       Review of Systems:  Review of Systems   Constitutional: Negative for chills, fever and malaise/fatigue.   Eyes: Negative for discharge and redness.   Respiratory: Negative for shortness of breath and wheezing.    Cardiovascular: Negative for chest pain, palpitations and leg swelling.   Gastrointestinal: Positive for abdominal pain and nausea. Negative for diarrhea and vomiting.   Genitourinary: Negative for dysuria.   Musculoskeletal: Negative for back pain and myalgias.   Neurological: Negative for dizziness, sensory change, speech change, focal weakness and weakness.        Left foot        Physical Exam:  Physical Exam  Constitutional:       General: She is not in acute distress.  HENT:      Head: Normocephalic.      Mouth/Throat:      Mouth: Mucous membranes are moist.   Eyes:      Conjunctiva/sclera: Conjunctivae normal.   Cardiovascular:      Rate and Rhythm: Tachycardia present.      Pulses: Normal pulses.      Heart sounds: Normal heart sounds.   Pulmonary:      Effort: Pulmonary effort is normal. No respiratory distress.   Abdominal:      Palpations: Abdomen is soft.      Tenderness: There is abdominal tenderness. There is no guarding.      Comments: MLVI well approximated, c/d/i, no signs of infection. ALEIDA to LLQ with sero sang output.  Suprapubic catheter in place, dressing c/d/i, clear yellow urine with some sediment. Abd appropriately tender, soft.    Genitourinary:     Comments: Minimal blood noted from vagina, no active bleeding.    Musculoskeletal:      Right lower leg: No edema.      Left lower leg: Tenderness present. Edema present.      Comments: Right arm swelling, mild erythema. Left ankle with surgical dressing.    Skin:     General:  Skin is warm and dry.   Neurological:      Mental Status: She is alert and oriented to person, place, and time.   Psychiatric:         Mood and Affect: Mood normal.         Labs:          Recent Labs     22  0040   SODIUM 134* 135 135   POTASSIUM 3.8 3.4* 3.5*   CHLORIDE 102 104 103   CO2 16* 17* 19*   BUN 17 12 12   CREATININE 1.25 0.86 1.04   MAGNESIUM  --   --  1.1*   CALCIUM 7.1* 6.5* 6.4*     Recent Labs     22  0040   ALTSGPT 7 13 11   ASTSGOT 33 30 11*   ALKPHOSPHAT 59 68 79   TBILIRUBIN 0.2 0.3 0.3   GLUCOSE 125* 99 108*     Recent Labs     22  0040   RBC 2.32* 2.56* 2.94*   HEMOGLOBIN 7.0* 7.4* 8.8*   HEMATOCRIT 21.8* 23.6* 26.9*   PLATELETCT 329 318 311     Recent Labs     22  0040   WBC 14.2* 13.0* 12.7*   NEUTSPOLYS 74.30* 87.30* 88.60*   LYMPHOCYTES 10.90* 4.50* 3.50*   MONOCYTES 10.90 5.50 6.10   EOSINOPHILS 0.00 0.00 0.00   BASOPHILS 0.20 0.90 0.90   ASTSGOT 33 30 11*   ALTSGPT 7 13 11   ALKPHOSPHAT 59 68 79   TBILIRUBIN 0.2 0.3 0.3     Recent Labs     2242 22  0040   SODIUM 134* 135 135   POTASSIUM 3.8 3.4* 3.5*   CHLORIDE 102 104 103   CO2 16* 17* 19*   GLUCOSE 125* 99 108*   BUN 17 12 12   CREATININE 1.25 0.86 1.04   CALCIUM 7.1* 6.5* 6.4*     Hemodynamics:  Temp (24hrs), Av.8 °C (98.2 °F), Min:36.4 °C (97.6 °F), Max:37.2 °C (99 °F)  Temperature: 36.9 °C (98.4 °F)  Pulse  Av.8  Min: 75  Max: 130   Blood Pressure: 125/69     Respiratory:    Respiration: 18, Pulse Oximetry: 93 %        RUL Breath Sounds: Clear, RML Breath Sounds: Clear, RLL Breath Sounds: Diminished, MORENO Breath Sounds: Clear, LLL Breath Sounds: Diminished  Fluids:  No intake or output data in the 24 hours ending 22 1553     GI/Nutrition:  Orders Placed This Encounter   Procedures   • Diet NPO Restrict to: Ice Chips     Standing Status:   Standing      Number of Occurrences:   1     Order Specific Question:   Diet NPO Restrict to:     Answer:   Ice Chips [2]     Medical Decision Making, by Problem:  Active Hospital Problems    Diagnosis    • *Hypercalcemia [E83.52]        Assessment and Plan:  This is a 56 y.o. female with stage III (minimum) Grade 2 endometrial adenocarcinoma s/p 6 cycles of neoadjuvant chemotherapy with Taxol/Carbo completed on 1/13/22 , who presents with hypercalcemia, nausea, and vomiting.     1. POD #4: s/p ex lap, hys, bso, cystectomy with repair and suprapubic catheter placement. Doing well, hasn't mobilized yet, discussed with patient she must mobilize today. No vomiting, still with some nausea, will do trial of clears. Supra pubic with good ouput, will dc garza today.   2. Post op pain: well controlled. dilaudid PCA with 0.2 mg basal, and 0.2 mg demand and Toradol.    3. Tachycardia: improving, possibly pain related, hemodynamically stable, will continue to monitor.   4. Hypercalcemia: now with mild hypocalcemia, will follow AM labs.  Secondary to malignancy and dehydration, s/p IVF, lasix, and pamidronate on 2/27.     5. Hypokalemia: 3.5 today, due to poor oral intake, will replete  6. Nausea/vomiting: Prn compazine and Zofran.   7. Dehydration: secondary to n/v, resolved, continue IVF.   8.Stage III (minimum) Grade 2 endometrial adenocarcinoma:  s/p 6 cycles of neoadjuvant chemotherapy, now s/p ex lap, hys, bso, cysetectomy with suprapubic cath placement.   9Left trimalleolar ankle fracture/dislocation: s/p ORIF 2/26, ortho following.    10. Upper extremity DVT: Venous US positive for DVT. On heparin gtt. Therapeutic x2. Continue at 1,600 units/h. Hgb stable today, on Xarelto.    11. Anemia: Secondary to chemotherapy and now secondary to acute blood loss. Hgb 7.5 pre operative on 3/3, s/p 2 unit intra op. S/p 1 unit on 3/5, Hgb 7.4 today, will transfuse 1 unit today.     12RUQ swelling: improving, negative for DVT, secondary to IV  infiltrating.   13. Elevated BP: No hx of HTN, Likely secondary to pain and IVF. On amlodipine 2.5 mg at home for Raynaud's > amlodipine to 5 mg daily, currently well controlled.   14. ILIA: improved, likley secondary to dehydration. Cr continues to downtrend. Monitor.   15. History of Lupus: continue home hydroxychloroquine  16. Hx of mood disorder: PTSD and anxiety. Continue home paroxetine and propranolol prn.   17. Hx Raynaud's disease: Continue amlodipine.   18. Hx of allergic rhinitis: Home Azelastine  19. Dispo: continue post operative inpatient management      Case discussed with Dr. Martinez

## 2022-03-07 NOTE — PROGRESS NOTES
"   Orthopaedic Progress Note    Interval changes: Doing well  Clear for disposition (home/SNF/IRF) from Orthopaedic team standpoint.  Follow-Up: needs appointment with Dr. Levy at [facility] at 10-14 days post-op for re-evaluation, staple removal and radiographs.    ROS - Patient denies any new issues.  Pain well controlled.    /55   Pulse 100   Temp 36.7 °C (98.1 °F) (Temporal)   Resp 18   Ht 1.702 m (5' 7\")   Wt 99.1 kg (218 lb 7.6 oz)   SpO2 95%     Patient seen and examined  No acute distress  Breathing non labored  RRR  LLE splint CDI, DNVI moves all toes, cap refill <2 sec.     Recent Labs     03/04/22  0031 03/05/22  0104 03/06/22  0542   WBC 16.3* 14.2* 13.0*   RBC 2.89* 2.32* 2.56*   HEMOGLOBIN 8.6* 7.0* 7.4*   HEMATOCRIT 27.3* 21.8* 23.6*   MCV 94.5 94.0 92.2   MCH 29.8 30.2 28.9   MCHC 31.5* 32.1* 31.4*   RDW 61.0* 63.2* 59.2*   PLATELETCT 345 329 318   MPV 9.3 9.3 9.1   .    Active Hospital Problems    Diagnosis    • Hypercalcemia [E83.52]      Assessment/Plan:  Patient doing well  LLE splint CDI  POD#8 S/P   1.  Open treatment with internal fixation, left trimalleolar ankle fracture with fixation of posterior lip.  2.  Open treatment without fixation of anterolateral distal tibia fracture with fragment excision.  3.  Surgical fixation of left distal tibiofibular joint disruption.  4.  Physician applied stress examination of the left ankle using fluoroscopy  Wt bearing status - NWB LLE  Wound care/Drains - Dressings to be changed every other day by nursing  Future Procedures - none planned   Sutures/Staples out- 14 days post operatively  PT/OT-initiated  Antibiotics: Perioperative completed  DVT Prophylaxis- TEDS/SCDs/Foot pumps  Brenner-none planned   Case Coordination for Discharge Planning - Disposition pending       "

## 2022-03-07 NOTE — PROCEDURES
Order received for midline placement. Per chart review pt has extensive clots in BUE. Discussed at length with KAMINI Black prior to placement. Due to lack of access/inablilty to maintain access, midline deemed appropriate. Pt currently taking xarelto since 3/4/22.     Vascular Access Team    Date of Insertion: 3/7/22  Arm Circumference: n/a  Line Length: 10  Line Size: 20  Vein Occupancy %: 23  Reason for Midline: access  Labs: WBC 12.7, , BUN 12, Cr 1.04, GFR 55, INR na    Orders confirmed, vessel patency confirmed with ultrasound. Risks and benefits of procedure explained to patient and education regarding line associated bloodstream infections provided. Questions answered.     PowerGlide Midline placed in LUE per licensed provider order with ultrasound guidance. 20g, 10 cm line placed in basilic vein after 1 attempt(s).  Catheter inserted with brisk blood return. Secured with 0cm external from insertion site.  Line flushed without resistance with 10 mL 0.9% normal saline.  Midline secured with Biopatch and Tegaderm.     Midline placement is confirmed by nurse using ultrasound and ability to flush and draw blood. Midline is appropriate for use at this time.  No X-ray is needed for placement confirmation. Pt tolerated procedure well.  Patient condition relayed to unit RN or ordering physician via this post procedure note in the EMR.    Ultrasound images uploaded to PACS and viewable in the EMR - yes  Ultrasound imaged printed and placed in paper chart - no      BARD PowerGlide Midline ref # [id number], Lot # [id number], Expiration Date 06/30/2022

## 2022-03-07 NOTE — CARE PLAN
The patient is Watcher - Medium risk of patient condition declining or worsening    Shift Goals  Clinical Goals: Complete blood transfusion, admisnter K rider, Monitor o2 stats  Patient Goals: Pain Control, Rest, Elevating ankle  Family Goals: rest, pain control    Progress made toward(s) clinical / shift goals:    Problem: Pain - Standard  Goal: Alleviation of pain or a reduction in pain to the patient’s comfort goal  Outcome: Progressing     Problem: Knowledge Deficit - Standard  Goal: Patient and family/care givers will demonstrate understanding of plan of care, disease process/condition, diagnostic tests and medications  Outcome: Progressing  Note: Pt received RBCs, and 1 K rider during this shift. Tolerated well       Patient is not progressing towards the following goals:      Problem: Bowel Elimination  Goal: Establish and maintain regular bowel function  Outcome: Not Progressing  Note: Pt has not had a BM

## 2022-03-07 NOTE — CARE PLAN
The patient is Watcher - Medium risk of patient condition declining or worsening    Shift Goals  Problem: Pain - Standard  Goal: Alleviation of pain or a reduction in pain to the patient’s comfort goal  Outcome: Progressing     Problem: Knowledge Deficit - Standard  Goal: Patient and family/care givers will demonstrate understanding of plan of care, disease process/condition, diagnostic tests and medications  Outcome: Progressing     Problem: Infection - Standard  Goal: Patient will remain free from infection  Outcome: Progressing     Problem: Fall Risk  Goal: Patient will remain free from falls  Outcome: Progressing     Problem: Skin Integrity  Goal: Skin integrity is maintained or improved  Outcome: Progressing     Clinical Goals: pain control, infection prevention  Patient Goals: pain control  Family Goals: rest, pain control    Progress made toward(s) clinical / shift goals:    Pt is Aox4, uses dilaudid PCA to manage their pain effectively. Pt's incisions and drains do not show signs of infection. Pt requires 2+ people to reposition     Patient is not progressing towards the following goals:

## 2022-03-08 ENCOUNTER — APPOINTMENT (OUTPATIENT)
Dept: RADIOLOGY | Facility: MEDICAL CENTER | Age: 57
DRG: 740 | End: 2022-03-08
Attending: STUDENT IN AN ORGANIZED HEALTH CARE EDUCATION/TRAINING PROGRAM
Payer: COMMERCIAL

## 2022-03-08 LAB
ALBUMIN SERPL BCP-MCNC: 2.5 G/DL (ref 3.2–4.9)
ALBUMIN/GLOB SERPL: 0.9 G/DL
ALP SERPL-CCNC: 90 U/L (ref 30–99)
ALT SERPL-CCNC: 9 U/L (ref 2–50)
ANION GAP SERPL CALC-SCNC: 10 MMOL/L (ref 7–16)
ANISOCYTOSIS BLD QL SMEAR: ABNORMAL
AST SERPL-CCNC: 25 U/L (ref 12–45)
BASOPHILS # BLD AUTO: 0 % (ref 0–1.8)
BASOPHILS # BLD: 0 K/UL (ref 0–0.12)
BILIRUB SERPL-MCNC: 0.3 MG/DL (ref 0.1–1.5)
BUN SERPL-MCNC: 8 MG/DL (ref 8–22)
CALCIUM SERPL-MCNC: 6.1 MG/DL (ref 8.5–10.5)
CHLORIDE SERPL-SCNC: 105 MMOL/L (ref 96–112)
CO2 SERPL-SCNC: 19 MMOL/L (ref 20–33)
CREAT SERPL-MCNC: 1.1 MG/DL (ref 0.5–1.4)
DACRYOCYTES BLD QL SMEAR: NORMAL
EOSINOPHIL # BLD AUTO: 0 K/UL (ref 0–0.51)
EOSINOPHIL NFR BLD: 0 % (ref 0–6.9)
ERYTHROCYTE [DISTWIDTH] IN BLOOD BY AUTOMATED COUNT: 58.4 FL (ref 35.9–50)
GLOBULIN SER CALC-MCNC: 2.8 G/DL (ref 1.9–3.5)
GLUCOSE SERPL-MCNC: 135 MG/DL (ref 65–99)
HCT VFR BLD AUTO: 22.9 % (ref 37–47)
HGB BLD-MCNC: 7.5 G/DL (ref 12–16)
LYMPHOCYTES # BLD AUTO: 0.64 K/UL (ref 1–4.8)
LYMPHOCYTES NFR BLD: 4.5 % (ref 22–41)
MACROCYTES BLD QL SMEAR: ABNORMAL
MAGNESIUM SERPL-MCNC: 1.5 MG/DL (ref 1.5–2.5)
MANUAL DIFF BLD: NORMAL
MCH RBC QN AUTO: 29.5 PG (ref 27–33)
MCHC RBC AUTO-ENTMCNC: 32.8 G/DL (ref 33.6–35)
MCV RBC AUTO: 90.2 FL (ref 81.4–97.8)
MICROCYTES BLD QL SMEAR: ABNORMAL
MONOCYTES # BLD AUTO: 0.77 K/UL (ref 0–0.85)
MONOCYTES NFR BLD AUTO: 5.4 % (ref 0–13.4)
MORPHOLOGY BLD-IMP: NORMAL
MYELOCYTES NFR BLD MANUAL: 2.7 %
NEUTROPHILS # BLD AUTO: 12.5 K/UL (ref 2–7.15)
NEUTROPHILS NFR BLD: 87.4 % (ref 44–72)
NRBC # BLD AUTO: 0.02 K/UL
NRBC BLD-RTO: 0.1 /100 WBC
OVALOCYTES BLD QL SMEAR: NORMAL
PLATELET # BLD AUTO: 371 K/UL (ref 164–446)
PLATELET BLD QL SMEAR: NORMAL
PMV BLD AUTO: 9.3 FL (ref 9–12.9)
POIKILOCYTOSIS BLD QL SMEAR: NORMAL
POTASSIUM SERPL-SCNC: 4.1 MMOL/L (ref 3.6–5.5)
PROT SERPL-MCNC: 5.3 G/DL (ref 6–8.2)
RBC # BLD AUTO: 2.54 M/UL (ref 4.2–5.4)
RBC BLD AUTO: PRESENT
SODIUM SERPL-SCNC: 134 MMOL/L (ref 135–145)
WBC # BLD AUTO: 14.3 K/UL (ref 4.8–10.8)

## 2022-03-08 PROCEDURE — 700102 HCHG RX REV CODE 250 W/ 637 OVERRIDE(OP): Performed by: STUDENT IN AN ORGANIZED HEALTH CARE EDUCATION/TRAINING PROGRAM

## 2022-03-08 PROCEDURE — 700111 HCHG RX REV CODE 636 W/ 250 OVERRIDE (IP): Performed by: EMERGENCY MEDICINE

## 2022-03-08 PROCEDURE — 770004 HCHG ROOM/CARE - ONCOLOGY PRIVATE *

## 2022-03-08 PROCEDURE — 83735 ASSAY OF MAGNESIUM: CPT

## 2022-03-08 PROCEDURE — 85027 COMPLETE CBC AUTOMATED: CPT

## 2022-03-08 PROCEDURE — 85007 BL SMEAR W/DIFF WBC COUNT: CPT

## 2022-03-08 PROCEDURE — 700101 HCHG RX REV CODE 250: Performed by: STUDENT IN AN ORGANIZED HEALTH CARE EDUCATION/TRAINING PROGRAM

## 2022-03-08 PROCEDURE — A9270 NON-COVERED ITEM OR SERVICE: HCPCS

## 2022-03-08 PROCEDURE — 80053 COMPREHEN METABOLIC PANEL: CPT

## 2022-03-08 PROCEDURE — 700111 HCHG RX REV CODE 636 W/ 250 OVERRIDE (IP): Performed by: STUDENT IN AN ORGANIZED HEALTH CARE EDUCATION/TRAINING PROGRAM

## 2022-03-08 PROCEDURE — 36415 COLL VENOUS BLD VENIPUNCTURE: CPT

## 2022-03-08 PROCEDURE — 700102 HCHG RX REV CODE 250 W/ 637 OVERRIDE(OP)

## 2022-03-08 PROCEDURE — A9270 NON-COVERED ITEM OR SERVICE: HCPCS | Performed by: STUDENT IN AN ORGANIZED HEALTH CARE EDUCATION/TRAINING PROGRAM

## 2022-03-08 PROCEDURE — 700111 HCHG RX REV CODE 636 W/ 250 OVERRIDE (IP)

## 2022-03-08 RX ADMIN — BACLOFEN 10 MG: 10 TABLET ORAL at 04:12

## 2022-03-08 RX ADMIN — PROCHLORPERAZINE EDISYLATE 10 MG: 5 INJECTION INTRAMUSCULAR; INTRAVENOUS at 00:37

## 2022-03-08 RX ADMIN — POTASSIUM CHLORIDE, DEXTROSE MONOHYDRATE AND SODIUM CHLORIDE: 150; 5; 450 INJECTION, SOLUTION INTRAVENOUS at 09:32

## 2022-03-08 RX ADMIN — BACLOFEN 10 MG: 10 TABLET ORAL at 18:07

## 2022-03-08 RX ADMIN — KETOROLAC TROMETHAMINE 30 MG: 30 INJECTION, SOLUTION INTRAMUSCULAR; INTRAVENOUS at 13:32

## 2022-03-08 RX ADMIN — ONDANSETRON 4 MG: 2 INJECTION INTRAMUSCULAR; INTRAVENOUS at 11:33

## 2022-03-08 RX ADMIN — BACLOFEN 10 MG: 10 TABLET ORAL at 11:33

## 2022-03-08 RX ADMIN — ONDANSETRON 4 MG: 2 INJECTION INTRAMUSCULAR; INTRAVENOUS at 05:36

## 2022-03-08 RX ADMIN — AMLODIPINE BESYLATE 5 MG: 5 TABLET ORAL at 18:07

## 2022-03-08 RX ADMIN — RIVAROXABAN 15 MG: 15 TABLET, FILM COATED ORAL at 18:07

## 2022-03-08 RX ADMIN — ROPINIROLE HYDROCHLORIDE 1 MG: 1 TABLET, FILM COATED ORAL at 04:12

## 2022-03-08 RX ADMIN — POTASSIUM CHLORIDE, DEXTROSE MONOHYDRATE AND SODIUM CHLORIDE: 150; 5; 450 INJECTION, SOLUTION INTRAVENOUS at 19:15

## 2022-03-08 RX ADMIN — POTASSIUM CHLORIDE, DEXTROSE MONOHYDRATE AND SODIUM CHLORIDE: 150; 5; 450 INJECTION, SOLUTION INTRAVENOUS at 00:53

## 2022-03-08 RX ADMIN — Medication: at 12:42

## 2022-03-08 RX ADMIN — ONDANSETRON 4 MG: 2 INJECTION INTRAMUSCULAR; INTRAVENOUS at 17:58

## 2022-03-08 ASSESSMENT — ENCOUNTER SYMPTOMS
BACK PAIN: 0
SPEECH CHANGE: 0
DIZZINESS: 0
NAUSEA: 1
DIARRHEA: 0
FOCAL WEAKNESS: 0
EYE DISCHARGE: 0
EYE REDNESS: 0
WEAKNESS: 0
SENSORY CHANGE: 0
FEVER: 0
WHEEZING: 0
PALPITATIONS: 0
VOMITING: 0
SHORTNESS OF BREATH: 0
CHILLS: 0
ABDOMINAL PAIN: 1
MYALGIAS: 0

## 2022-03-08 ASSESSMENT — PAIN DESCRIPTION - PAIN TYPE
TYPE: ACUTE PAIN

## 2022-03-08 NOTE — THERAPY
Occupational Therapy  Daily Treatment     Patient Name: Magali Leal  Age:  56 y.o., Sex:  female  Medical Record #: [id number]  Today's Date: 3/7/2022     Precautions  Precautions: Fall Risk,Non Weight Bearing Left Lower Extremity  Comments: abdominal precautions, abdominal ALEIDA drain, and PCA    Assessment    Pt seen for OT session. C/o dizziness at EOB (unclear if d/t meds or limited EOB activity); encouraged to get to EOB for all meals/g/h (w/nsg staff) and attempt to complete light ADLs for self before requesting assistance. Encouraged continued elevation/AROM for edema mgmt in BUEs. Continues to be limited by decreased functional mobility, activity tolerance, cognition, strength, coordination, balance, adherence to precautions, and pain which are currently affecting pt's ability to complete ADLs/IADLs at baseline. Will continue to follow.     Plan    Continue current treatment plan.    DC Equipment Recommendations: Unable to determine at this time  Discharge Recommendations: Recommend post-acute placement for additional occupational therapy services prior to discharge home     Objective     03/07/22 1307   Precautions   Precautions Fall Risk;Non Weight Bearing Left Lower Extremity   Comments abdominal precautions, abdominal ALEIDA drain, and PCA   Vitals   O2 Delivery Device Room air w/o2 available   Vitals Comments no desaturation with activity.   Pain 0 - 10 Group   Location Abdomen;Ankle   Therapist Pain Assessment Post Activity;During Activity;Nurse Notified  (not quantified)   Cognition    Cognition / Consciousness X   Level of Consciousness Alert   Safety Awareness Impaired   New Learning Impaired   Attention Impaired   Comments pleasant and cooperative; but reports dizziness, possibly related to PCA   Passive ROM Upper Body   Passive ROM Upper Body WDL   Active ROM Upper Body   Active ROM Upper Body  WDL   Comments FM coordination limited by B hand edema   Strength Upper Body   Upper Body Strength  X    Comments limited by pain especially on RUE   Supine Upper Body Exercises   Comments encouraged continued EOB activity for all meals   Balance   Sitting Balance (Static) Fair   Sitting Balance (Dynamic) Fair -   Weight Shift Sitting Fair   Skilled Intervention Verbal Cuing;Tactile Cuing;Compensatory Strategies   Comments refused standing this session   Bed Mobility    Sit to Supine Moderate Assist  (BLEs)   Scooting Moderate Assist   Skilled Intervention Verbal Cuing;Tactile Cuing;Facilitation;Compensatory Strategies;Sequencing;Postural Facilitation   Comments found at EOB with PT   Activities of Daily Living   Eating Supervision  (drinking water)   Grooming Minimal Assist  (seated sponge bathing, wiping face. supine oral care)   Lower Body Dressing Maximal Assist  (sock)   Toileting Moderate Assist  (garza)   Skilled Intervention Verbal Cuing;Tactile Cuing;Sequencing;Facilitation;Compensatory Strategies   Comments limited by dizziness and fatigue   Functional Mobility   Mobility EOB only; refused standing d/t dizziness   Activity Tolerance   Comments limited by dizziness and fatigue   Edema Management   Other Edema Mgmt Treatments encouraged continued elevation/AROM   Patient / Family Goals   Patient / Family Goal #1 to go home   Short Term Goals   Short Term Goal # 1 Pt will complete ADL transfers with supervision   Goal Outcome # 1 Progressing slower than expected   Short Term Goal # 2 Pt will complete LB dressing with supervision   Goal Outcome # 2 Progressing slower than expected   Short Term Goal # 3 Pt will complete toileting with supervision   Goal Outcome # 3 Progressing slower than expected   Short Term Goal # 4 will eat sitting EOB 3x/day w/SPV   Education Group   Education Provided Upper Extremity Range of Motion;Activities of Daily Living;Pathology of bedrest   Upper Ext ROM Patient Response Patient;Family;Acceptance;Explanation;Verbal Demonstration;Reinforcement Needed;Demonstration   ADL Patient  Response Patient;Acceptance;Family;Explanation;Verbal Demonstration;Reinforcement Needed   Pathology of Bedrest Patient Response Patient;Acceptance;Family;Explanation;Reinforcement Needed;Verbal Demonstration

## 2022-03-08 NOTE — CARE PLAN
Problem: Nutritional:  Goal: Achieve adequate nutritional intake  Description: Patient will tolerate regular diet and consume 50% of meals  Outcome: Not Met, see dietary progress note.

## 2022-03-08 NOTE — PROGRESS NOTES
"   Orthopaedic Progress Note    Interval changes: C/O 'burning sensation top of feet' D/W Dr Levy, nursing, and patient.  Plan to exchange splint for CAM boot when available, and perform skin check this afternoon. Otherwise she is clear for disposition (home/SNF/IRF) from Orthopaedic team standpoint. Follow-Up: needs appointment with Dr. Levy at [facility] at 10-14 days post-op for re-evaluation, staple removal and radiographs.    ROS - Patient denies any new issues.  Pain well controlled.    /84   Pulse (!) 103   Temp 36.7 °C (98 °F) (Oral)   Resp 18   Ht 1.702 m (5' 7\")   Wt 99.1 kg (218 lb 7.6 oz)   SpO2 96%     Patient seen and examined  No acute distress  Breathing non labored  RRR  LLE splint CDI, DNVI moves all toes, cap refill <2 sec.     Recent Labs     03/06/22  0542 03/07/22  0040 03/08/22  0050   WBC 13.0* 12.7* 14.3*   RBC 2.56* 2.94* 2.54*   HEMOGLOBIN 7.4* 8.8* 7.5*   HEMATOCRIT 23.6* 26.9* 22.9*   MCV 92.2 91.5 90.2   MCH 28.9 29.9 29.5   MCHC 31.4* 32.7* 32.8*   RDW 59.2* 58.3* 58.4*   PLATELETCT 318 311 371   MPV 9.1 9.3 9.3   .    Active Hospital Problems    Diagnosis    • Hypercalcemia [E83.52]      Assessment/Plan:  Patient doing well  LLE splint CDI  POD#10 S/P   1.  Open treatment with internal fixation, left trimalleolar ankle fracture with fixation of posterior lip.  2.  Open treatment without fixation of anterolateral distal tibia fracture with fragment excision.  3.  Surgical fixation of left distal tibiofibular joint disruption.  4.  Physician applied stress examination of the left ankle using fluoroscopy  Wt bearing status - NWB LLE  Wound care/Drains - Dressings to be changed every other day by nursing  Boot on at all times  Future Procedures - none planned   Sutures/Staples out- 14 days post operatively  PT/OT-initiated  Antibiotics: Perioperative completed  DVT Prophylaxis- TEDS/SCDs/Foot pumps  Brenner-none planned   Case Coordination for Discharge " "Planning - Disposition pending     Addend 1400: incisions clean dry and well -approximated. Had some relied \"13 to 8\" with splint removal. Boot placed. NWB LLE    "

## 2022-03-08 NOTE — DIETARY
Nutrition Services:   · Pt has been off/on NPO/Clear liquids since admit, ~ 13 days with inadequate nutrition.   · POD #5: s/p ex lap, hys, bso, cystectomy with repair and suprapubic catheter placement.   · RN reports pt with emesis last night and so went back on NPO status this am, pt with possible ileus.   · Reviewed pt's poor nutrition status and need for nutrition support with APRN this am, she is hopeful ileus will resolve on its so pt may need a few days but plans to discuss possible nutrition support with Dr Dyson.    · No recent wts to assess.       Plan/rec:   1. Pt would benefit from nutrition support as medically feasible until able to take adequate PO intake. If enteral nutrition not feasible, pt would benefit from TPN.   2. Once nutrition begins, monitor for refeeding syndrome.   3. Provide weekly wts, stand up scale preferred if possible.     RD to follow for plan of care.

## 2022-03-08 NOTE — THERAPY
"Physical Therapy   Daily Treatment     Patient Name: Magali Leal  Age:  56 y.o., Sex:  female  Medical Record #: [id number]  Today's Date: 3/7/2022     Precautions  Precautions: Fall Risk;Non Weight Bearing Left Lower Extremity  Comments: abdominal precautions, ALEIDA drain    Assessment    Patient limited by abdominal pain and complaint of lightheadedness. Note patient utilizing timer to dose PCA as soon as available, question if this may be contributing to complaint. Patient required mod A for bed mobility but once sitting EOB was able to maintain balance. She declined standing but agreeable to staying up EOB to work with OT. Will continue to follow.    Plan    Continue current treatment plan.    DC Equipment Recommendations: Unable to determine at this time  Discharge Recommendations: Recommend post-acute placement for additional physical therapy services prior to discharge home      Subjective    \"I don't think I can stand this time.\"     Objective       03/07/22 1510   Total Time Spent   Total Time Spent (Mins) 27   Charge Group   Charges  Yes   PT Therapeutic Activities 2   Precautions   Precautions Fall Risk;Non Weight Bearing Left Lower Extremity   Comments abdominal precautions, ALEIDA drain   Vitals   O2 (LPM) 0   O2 Delivery Device None - Room Air   Pain 0 - 10 Group   Location Abdomen   Therapist Pain Assessment During Activity;Post Activity Pain Same as Prior to Activity;Nurse Notified   Cognition    Cognition / Consciousness X   Level of Consciousness Alert   Safety Awareness Impaired   New Learning Impaired   Attention Impaired   Comments pleasant, cooperative. likes to self direct care   Balance   Sitting Balance (Static) Fair   Sitting Balance (Dynamic) Fair   Weight Shift Sitting Fair   Skilled Intervention Compensatory Strategies;Verbal Cuing   Comments refused standing   Gait Analysis   Weight Bearing Status NWB LLE   Bed Mobility    Supine to Sit Moderate Assist   Scooting Supervised  (seated) "   Skilled Intervention Compensatory Strategies;Facilitation;Sequencing;Tactile Cuing;Verbal Cuing   Comments spouse also provided assist   Functional Mobility   Sit to Stand Refused   How much difficulty does the patient currently have...   Turning over in bed (including adjusting bedclothes, sheets and blankets)? 1   Sitting down on and standing up from a chair with arms (e.g., wheelchair, bedside commode, etc.) 1   Moving from lying on back to sitting on the side of the bed? 1   How much help from another person does the patient currently need...   Moving to and from a bed to a chair (including a wheelchair)? 2   Need to walk in a hospital room? 1   Climbing 3-5 steps with a railing? 1   6 clicks Mobility Score 7   Activity Tolerance   Sitting in Chair refused   Sitting Edge of Bed 15 min   Standing refused   Comments limited by dizziness   Short Term Goals    Short Term Goal # 1 Patient will move supine<>sitting EOB without bed features with supervision within 6tx in order to get in/out of bed   Goal Outcome # 1 goal not met   Short Term Goal # 2 Patient will move sitting<>standing with supervision within 6tx in order to initiate transfers and gait   Goal Outcome # 2 Goal not met   Short Term Goal # 3 Patient will ambulate 15ft with supervision within 6tx in order to access environment   Goal Outcome # 3 Goal not met   Short Term Goal # 4 Patient will self propel WC 50ft with supervision within 6tx in order to access community   Goal Outcome # 4 Goal not met   Anticipated Discharge Equipment and Recommendations   DC Equipment Recommendations Unable to determine at this time   Discharge Recommendations Recommend post-acute placement for additional physical therapy services prior to discharge home   Interdisciplinary Plan of Care Collaboration   IDT Collaboration with  Nursing;Occupational Therapist;Family / Caregiver   Patient Position at End of Therapy Seated;Edge of Bed;Other (Comments)  (OT and spouse at  bedside)   Collaboration Comments RN aware of visit, response   Session Information   Date / Session Number  3/7-4 (1/4, 3/12)

## 2022-03-08 NOTE — THERAPY
Physical Therapy Contact Note    PT treatment attempted. Patient politely declined, reported intractable pain earlier in L foot in hard splint, now with post op boot but remains NWB LLE. Agreeable to attempting EOB with RN staff later but asked PT to return tomorrow. Will re attempt as able and appropriate.    Dori Sanchez, PT, DPT  [phone]

## 2022-03-08 NOTE — CARE PLAN
The patient is Watcher - Medium risk of patient condition declining or worsening    Shift Goals  Clinical Goals: Pain Control / Monitor hemodynamics / Drains  Patient Goals: Pain Control / Rest  Family Goals: rest, pain control    Progress made toward(s) clinical / shift goals:    Problem: Pain - Standard  Goal: Alleviation of pain or a reduction in pain to the patient’s comfort goal  Outcome: Progressing     Problem: Fall Risk  Goal: Patient will remain free from falls  Outcome: Progressing     Problem: Skin Integrity  Goal: Skin integrity is maintained or improved  Outcome: Progressing       Patient is not progressing towards the following goals:

## 2022-03-08 NOTE — PROGRESS NOTES
Gynecology Oncology Progress Note               Author: Sofia Jean-Baptiste PA-C Date & Time created: 3/8/2022  10:18 AM     Interval History:  Up to edge of bed yesterday. + flatus, no BM.  Nausea yesterday and vomiting last night. Pain well controlled. Wife at bedside.       Review of Systems:  Review of Systems   Constitutional: Negative for chills, fever and malaise/fatigue.   Eyes: Negative for discharge and redness.   Respiratory: Negative for shortness of breath and wheezing.    Cardiovascular: Negative for chest pain, palpitations and leg swelling.   Gastrointestinal: Positive for abdominal pain and nausea. Negative for diarrhea and vomiting.   Genitourinary: Negative for dysuria.   Musculoskeletal: Negative for back pain and myalgias.        Left foot pain    Neurological: Negative for dizziness, sensory change, speech change, focal weakness and weakness.       Physical Exam:  Physical Exam  Constitutional:       General: She is not in acute distress.  HENT:      Head: Normocephalic.      Mouth/Throat:      Mouth: Mucous membranes are moist.   Eyes:      Conjunctiva/sclera: Conjunctivae normal.   Cardiovascular:      Rate and Rhythm: Tachycardia present.      Pulses: Normal pulses.      Heart sounds: Normal heart sounds.   Pulmonary:      Effort: Pulmonary effort is normal. No respiratory distress.   Abdominal:      General: Bowel sounds are normal. There is no distension.      Palpations: Abdomen is soft.      Tenderness: There is abdominal tenderness. There is no guarding.      Comments: MLVI well approximated, c/d/i, no signs of infection. ALEIDA to LLQ with sero sang output.  Suprapubic catheter in place, dressing c/d/i, clear yellow urine with some sediment. Abd appropriately tender, soft.    Musculoskeletal:      Right lower leg: No edema.      Left lower leg: Tenderness present. Edema present.      Comments: Left ankle with surgical dressing.    Skin:     General: Skin is warm and dry.   Neurological:       Mental Status: She is alert and oriented to person, place, and time.   Psychiatric:         Mood and Affect: Mood normal.         Labs:          Recent Labs     22  0542 22  0040 22  0050   SODIUM 135 135 134*   POTASSIUM 3.4* 3.5* 4.1   CHLORIDE 104 103 105   CO2 17* 19* 19*   BUN 12 12 8   CREATININE 0.86 1.04 1.10   MAGNESIUM  --  1.1* 1.5   CALCIUM 6.5* 6.4* 6.1*     Recent Labs     22  0542 22  0040 22  0050   ALTSGPT 13 11 9   ASTSGOT 30 11* 25   ALKPHOSPHAT 68 79 90   TBILIRUBIN 0.3 0.3 0.3   GLUCOSE 99 108* 135*     Recent Labs     2242 22  0040 22  0050   RBC 2.56* 2.94* 2.54*   HEMOGLOBIN 7.4* 8.8* 7.5*   HEMATOCRIT 23.6* 26.9* 22.9*   PLATELETCT 318 311 371     Recent Labs     2242 22  0040 22  0050   WBC 13.0* 12.7* 14.3*   NEUTSPOLYS 87.30* 88.60* 87.40*   LYMPHOCYTES 4.50* 3.50* 4.50*   MONOCYTES 5.50 6.10 5.40   EOSINOPHILS 0.00 0.00 0.00   BASOPHILS 0.90 0.90 0.00   ASTSGOT 30 11* 25   ALTSGPT 13 11 9   ALKPHOSPHAT 68 79 90   TBILIRUBIN 0.3 0.3 0.3     Recent Labs     2242 22  0040 22  0050   SODIUM 135 135 134*   POTASSIUM 3.4* 3.5* 4.1   CHLORIDE 104 103 105   CO2 17* 19* 19*   GLUCOSE 99 108* 135*   BUN 12 12 8   CREATININE 0.86 1.04 1.10   CALCIUM 6.5* 6.4* 6.1*     Hemodynamics:  Temp (24hrs), Av.7 °C (98.1 °F), Min:36.4 °C (97.5 °F), Max:36.9 °C (98.5 °F)  Temperature: 36.7 °C (98 °F)  Pulse  Av.7  Min: 75  Max: 130   Blood Pressure: 141/84     Respiratory:    Respiration: 18, Pulse Oximetry: 96 %        RUL Breath Sounds: Diminished, RML Breath Sounds: Diminished, RLL Breath Sounds: Diminished, MORENO Breath Sounds: Diminished, LLL Breath Sounds: Diminished  Fluids:  No intake or output data in the 24 hours ending 22 1553     GI/Nutrition:  Orders Placed This Encounter   Procedures   • Diet Order Diet: Clear Liquid     Standing Status:   Standing     Number of Occurrences:   1      Order Specific Question:   Diet:     Answer:   Clear Liquid [10]     Medical Decision Making, by Problem:  Active Hospital Problems    Diagnosis    • *Hypercalcemia [E83.52]        Assessment and Plan:  This is a 56 y.o. female with stage III (minimum) Grade 2 endometrial adenocarcinoma s/p 6 cycles of neoadjuvant chemotherapy with Taxol/Carbo completed on 1/13/22 , who presents with hypercalcemia, nausea, and vomiting.     1. POD #5: s/p ex lap, hys, bso, cystectomy with repair and suprapubic catheter placement. Doing well, up to edge of bed yesterday > encouraged continued mobilization, with at minimum up to EOB again today.  Nausea and vomiting yesterday on clears, will make NPO again and get abd xray to evaluate.   2. Post op pain: well controlled. Dilaudid PCA with 0.2 mg basal, and 0.2 mg demand and Toradol.    3. Tachycardia: improving, possibly pain related, hemodynamically stable, will continue to monitor.   4. Hypocalcemia: patient with hx of hypercalemia, secondary to malignancy and dehydration, s/p IVF, lasix, and pamidronate.  Now with hypocalcemia, will continue to monitor.       5. Hypokalemia: improved, 4.1 today, will continue to monitor   6. Dehydration: secondary to n/v, resolved, continue IVF.   7.Stage III (minimum) Grade 2 endometrial adenocarcinoma:  s/p 6 cycles of neoadjuvant chemotherapy, now s/p ex lap, hys, bso, cysetectomy with suprapubic cath placement.   8. Left trimalleolar ankle fracture/dislocation: s/p ORIF 2/26, ortho following.    9. Upper extremity DVT: Venous US positive for DVT, s/p heparin gtt prior to surgery, Hgb stable, now on Xarelto.    10. Anemia: Secondary to chemotherapy and now secondary to acute blood loss. Hgb 7.5 pre operative on 3/3, s/p 2 unit intra op. S/p 1 unit on 3/5, Hgb 7.5 today, will CTM.     11. RUQ swelling: improving, negative for DVT, secondary to IV infiltrating.   12. Elevated BP: No hx of HTN, Likely secondary to pain and IVF. On amlodipine 2.5 mg  at home for Raynaud's > amlodipine to 5 mg daily, currently well controlled.   13. ILIA: improved, likley secondary to dehydration. Monitor.   15. History of Lupus: continue home hydroxychloroquine  16. Hx of mood disorder: PTSD and anxiety. Continue home paroxetine and propranolol prn.   17. Hx Raynaud's disease: Continue amlodipine.   18. Hx of allergic rhinitis: Home Azelastine  19. Dispo: continue post operative inpatient management      Case discussed with RN, Physician, patient and family.

## 2022-03-09 ENCOUNTER — APPOINTMENT (OUTPATIENT)
Dept: RADIOLOGY | Facility: MEDICAL CENTER | Age: 57
DRG: 740 | End: 2022-03-09
Attending: STUDENT IN AN ORGANIZED HEALTH CARE EDUCATION/TRAINING PROGRAM
Payer: COMMERCIAL

## 2022-03-09 LAB
ALBUMIN SERPL BCP-MCNC: 2.1 G/DL (ref 3.2–4.9)
ALBUMIN/GLOB SERPL: 0.8 G/DL
ALP SERPL-CCNC: 86 U/L (ref 30–99)
ALT SERPL-CCNC: 8 U/L (ref 2–50)
ANION GAP SERPL CALC-SCNC: 11 MMOL/L (ref 7–16)
ANISOCYTOSIS BLD QL SMEAR: ABNORMAL
AST SERPL-CCNC: 16 U/L (ref 12–45)
BASOPHILS # BLD AUTO: 0.9 % (ref 0–1.8)
BASOPHILS # BLD: 0.09 K/UL (ref 0–0.12)
BILIRUB SERPL-MCNC: 0.3 MG/DL (ref 0.1–1.5)
BUN SERPL-MCNC: 5 MG/DL (ref 8–22)
CALCIUM SERPL-MCNC: 5.9 MG/DL (ref 8.5–10.5)
CHLORIDE SERPL-SCNC: 107 MMOL/L (ref 96–112)
CO2 SERPL-SCNC: 20 MMOL/L (ref 20–33)
CREAT SERPL-MCNC: 0.87 MG/DL (ref 0.5–1.4)
EOSINOPHIL # BLD AUTO: 0.09 K/UL (ref 0–0.51)
EOSINOPHIL NFR BLD: 0.9 % (ref 0–6.9)
ERYTHROCYTE [DISTWIDTH] IN BLOOD BY AUTOMATED COUNT: 59.7 FL (ref 35.9–50)
GLOBULIN SER CALC-MCNC: 2.7 G/DL (ref 1.9–3.5)
GLUCOSE SERPL-MCNC: 114 MG/DL (ref 65–99)
HCT VFR BLD AUTO: 24.6 % (ref 37–47)
HGB BLD-MCNC: 7.8 G/DL (ref 12–16)
LYMPHOCYTES # BLD AUTO: 0.18 K/UL (ref 1–4.8)
LYMPHOCYTES NFR BLD: 1.8 % (ref 22–41)
MACROCYTES BLD QL SMEAR: ABNORMAL
MANUAL DIFF BLD: NORMAL
MCH RBC QN AUTO: 29.1 PG (ref 27–33)
MCHC RBC AUTO-ENTMCNC: 31.7 G/DL (ref 33.6–35)
MCV RBC AUTO: 91.8 FL (ref 81.4–97.8)
MICROCYTES BLD QL SMEAR: ABNORMAL
MONOCYTES # BLD AUTO: 0.26 K/UL (ref 0–0.85)
MONOCYTES NFR BLD AUTO: 2.6 % (ref 0–13.4)
MORPHOLOGY BLD-IMP: NORMAL
MYELOCYTES NFR BLD MANUAL: 2.6 %
NEUTROPHILS # BLD AUTO: 8.94 K/UL (ref 2–7.15)
NEUTROPHILS NFR BLD: 90.3 % (ref 44–72)
NRBC # BLD AUTO: 0 K/UL
NRBC BLD-RTO: 0 /100 WBC
PLATELET # BLD AUTO: 351 K/UL (ref 164–446)
PLATELET BLD QL SMEAR: NORMAL
PMV BLD AUTO: 8.9 FL (ref 9–12.9)
POLYCHROMASIA BLD QL SMEAR: NORMAL
POTASSIUM SERPL-SCNC: 3.8 MMOL/L (ref 3.6–5.5)
PROMYELOCYTES NFR BLD MANUAL: 0.9 %
PROT SERPL-MCNC: 4.8 G/DL (ref 6–8.2)
RBC # BLD AUTO: 2.68 M/UL (ref 4.2–5.4)
RBC BLD AUTO: PRESENT
SODIUM SERPL-SCNC: 138 MMOL/L (ref 135–145)
WBC # BLD AUTO: 9.9 K/UL (ref 4.8–10.8)

## 2022-03-09 PROCEDURE — 700102 HCHG RX REV CODE 250 W/ 637 OVERRIDE(OP): Performed by: STUDENT IN AN ORGANIZED HEALTH CARE EDUCATION/TRAINING PROGRAM

## 2022-03-09 PROCEDURE — 770004 HCHG ROOM/CARE - ONCOLOGY PRIVATE *

## 2022-03-09 PROCEDURE — A9270 NON-COVERED ITEM OR SERVICE: HCPCS

## 2022-03-09 PROCEDURE — 74018 RADEX ABDOMEN 1 VIEW: CPT

## 2022-03-09 PROCEDURE — 97110 THERAPEUTIC EXERCISES: CPT

## 2022-03-09 PROCEDURE — 97530 THERAPEUTIC ACTIVITIES: CPT

## 2022-03-09 PROCEDURE — 700101 HCHG RX REV CODE 250: Performed by: STUDENT IN AN ORGANIZED HEALTH CARE EDUCATION/TRAINING PROGRAM

## 2022-03-09 PROCEDURE — A9270 NON-COVERED ITEM OR SERVICE: HCPCS | Performed by: STUDENT IN AN ORGANIZED HEALTH CARE EDUCATION/TRAINING PROGRAM

## 2022-03-09 PROCEDURE — 85007 BL SMEAR W/DIFF WBC COUNT: CPT

## 2022-03-09 PROCEDURE — 700102 HCHG RX REV CODE 250 W/ 637 OVERRIDE(OP)

## 2022-03-09 PROCEDURE — 36415 COLL VENOUS BLD VENIPUNCTURE: CPT

## 2022-03-09 PROCEDURE — 80053 COMPREHEN METABOLIC PANEL: CPT

## 2022-03-09 PROCEDURE — 85027 COMPLETE CBC AUTOMATED: CPT

## 2022-03-09 PROCEDURE — 700111 HCHG RX REV CODE 636 W/ 250 OVERRIDE (IP): Performed by: STUDENT IN AN ORGANIZED HEALTH CARE EDUCATION/TRAINING PROGRAM

## 2022-03-09 RX ORDER — METOCLOPRAMIDE HYDROCHLORIDE 5 MG/ML
10 INJECTION INTRAMUSCULAR; INTRAVENOUS 3 TIMES DAILY
Status: DISCONTINUED | OUTPATIENT
Start: 2022-03-09 | End: 2022-03-12

## 2022-03-09 RX ADMIN — POTASSIUM CHLORIDE, DEXTROSE MONOHYDRATE AND SODIUM CHLORIDE: 150; 5; 450 INJECTION, SOLUTION INTRAVENOUS at 13:58

## 2022-03-09 RX ADMIN — KETOROLAC TROMETHAMINE 30 MG: 30 INJECTION, SOLUTION INTRAMUSCULAR; INTRAVENOUS at 09:29

## 2022-03-09 RX ADMIN — Medication: at 01:59

## 2022-03-09 RX ADMIN — ROPINIROLE HYDROCHLORIDE 1 MG: 1 TABLET, FILM COATED ORAL at 18:03

## 2022-03-09 RX ADMIN — RIVAROXABAN 15 MG: 15 TABLET, FILM COATED ORAL at 08:58

## 2022-03-09 RX ADMIN — POTASSIUM CHLORIDE, DEXTROSE MONOHYDRATE AND SODIUM CHLORIDE: 150; 5; 450 INJECTION, SOLUTION INTRAVENOUS at 01:58

## 2022-03-09 RX ADMIN — KETOROLAC TROMETHAMINE 30 MG: 30 INJECTION, SOLUTION INTRAMUSCULAR; INTRAVENOUS at 01:06

## 2022-03-09 RX ADMIN — MIRTAZAPINE 22.5 MG: 15 TABLET, FILM COATED ORAL at 21:04

## 2022-03-09 RX ADMIN — RIVAROXABAN 15 MG: 15 TABLET, FILM COATED ORAL at 18:02

## 2022-03-09 RX ADMIN — KETOROLAC TROMETHAMINE 30 MG: 30 INJECTION, SOLUTION INTRAMUSCULAR; INTRAVENOUS at 21:06

## 2022-03-09 RX ADMIN — METOCLOPRAMIDE 10 MG: 5 INJECTION, SOLUTION INTRAMUSCULAR; INTRAVENOUS at 17:04

## 2022-03-09 RX ADMIN — ROPINIROLE HYDROCHLORIDE 1 MG: 1 TABLET, FILM COATED ORAL at 05:45

## 2022-03-09 RX ADMIN — BACLOFEN 10 MG: 10 TABLET ORAL at 12:04

## 2022-03-09 RX ADMIN — AMLODIPINE BESYLATE 5 MG: 5 TABLET ORAL at 18:02

## 2022-03-09 RX ADMIN — POTASSIUM CHLORIDE, DEXTROSE MONOHYDRATE AND SODIUM CHLORIDE: 150; 5; 450 INJECTION, SOLUTION INTRAVENOUS at 22:12

## 2022-03-09 RX ADMIN — BACLOFEN 10 MG: 10 TABLET ORAL at 05:45

## 2022-03-09 RX ADMIN — BACLOFEN 10 MG: 10 TABLET ORAL at 18:01

## 2022-03-09 ASSESSMENT — ENCOUNTER SYMPTOMS
EYE DISCHARGE: 0
NAUSEA: 1
WHEEZING: 0
SHORTNESS OF BREATH: 0
PALPITATIONS: 0
FEVER: 0
ABDOMINAL PAIN: 1
MYALGIAS: 0
SENSORY CHANGE: 0
SPEECH CHANGE: 0
VOMITING: 0
DIARRHEA: 0
EYE REDNESS: 0
FOCAL WEAKNESS: 0
CHILLS: 0
WEAKNESS: 0
BACK PAIN: 0
DIZZINESS: 0

## 2022-03-09 ASSESSMENT — COGNITIVE AND FUNCTIONAL STATUS - GENERAL
WALKING IN HOSPITAL ROOM: TOTAL
STANDING UP FROM CHAIR USING ARMS: A LOT
SUGGESTED CMS G CODE MODIFIER MOBILITY: CM
MOBILITY SCORE: 9
CLIMB 3 TO 5 STEPS WITH RAILING: TOTAL
MOVING TO AND FROM BED TO CHAIR: A LOT
MOVING FROM LYING ON BACK TO SITTING ON SIDE OF FLAT BED: UNABLE
TURNING FROM BACK TO SIDE WHILE IN FLAT BAD: A LOT

## 2022-03-09 ASSESSMENT — GAIT ASSESSMENTS: GAIT LEVEL OF ASSIST: UNABLE TO PARTICIPATE

## 2022-03-09 ASSESSMENT — PAIN DESCRIPTION - PAIN TYPE
TYPE: ACUTE PAIN
TYPE: ACUTE PAIN

## 2022-03-09 NOTE — CARE PLAN
The patient is Stable - Low risk of patient condition declining or worsening    Shift Goals  Clinical Goals: Pain Control, Rest, Monitor output, Monitor H&H  Patient Goals: Pain Control, Rest  Family Goals: Rest    Progress made toward(s) clinical / shift goals:    Problem: Knowledge Deficit - Standard  Goal: Patient and family/care givers will demonstrate understanding of plan of care, disease process/condition, diagnostic tests and medications  Outcome: Progressing  Note: The Pt and the Pt wife have been kept up to date on the plan of care. All questions and concerns have been addressed at this time       Patient is not progressing towards the following goals:      Problem: Bowel Elimination  Goal: Establish and maintain regular bowel function  Outcome: Not Progressing  Note: Pt sat on the bed pan once throughout the shift. Pt was able to pass some gas but still unable to have a bowel movement during this shift.

## 2022-03-09 NOTE — PROGRESS NOTES
Gynecology Oncology Progress Note               Author: Sofia Jean-Baptiste PA-C Date & Time created: 3/9/2022  1:53 PM     Interval History:  Doing well, had pain to left foot yesterday, now with boot in place and well controlled.  Still with nausea and mild vomiting, + flatus.  Pain well controlled. No fever or chills. No cp or sob. Plan to mobilize today.       Review of Systems:  Review of Systems   Constitutional: Negative for chills, fever and malaise/fatigue.   Eyes: Negative for discharge and redness.   Respiratory: Negative for shortness of breath and wheezing.    Cardiovascular: Negative for chest pain, palpitations and leg swelling.   Gastrointestinal: Positive for abdominal pain and nausea. Negative for diarrhea and vomiting.   Genitourinary: Negative for dysuria.   Musculoskeletal: Negative for back pain and myalgias.        Left foot pain    Neurological: Negative for dizziness, sensory change, speech change, focal weakness and weakness.       Physical Exam:  Physical Exam  Constitutional:       General: She is not in acute distress.  HENT:      Head: Normocephalic.      Mouth/Throat:      Mouth: Mucous membranes are moist.   Eyes:      Conjunctiva/sclera: Conjunctivae normal.   Cardiovascular:      Rate and Rhythm: Tachycardia present.      Pulses: Normal pulses.      Heart sounds: Normal heart sounds.   Pulmonary:      Effort: Pulmonary effort is normal. No respiratory distress.   Abdominal:      General: Bowel sounds are normal. There is no distension.      Palpations: Abdomen is soft.      Tenderness: There is abdominal tenderness. There is no guarding.      Comments: MLVI well approximated, c/d/i, no signs of infection. ALEIDA to LLQ with sero sang output.  Suprapubic catheter in place, dressing c/d/i, clear yellow urine with some sediment. Abd appropriately tender, soft.    Musculoskeletal:      Right lower leg: No edema.      Left lower leg: Tenderness present. Edema present.      Comments: Left ankle  with surgical dressing.    Skin:     General: Skin is warm and dry.   Neurological:      Mental Status: She is alert and oriented to person, place, and time.   Psychiatric:         Mood and Affect: Mood normal.         Labs:          Recent Labs     22  0040 22  0050 22  0011   SODIUM 135 134* 138   POTASSIUM 3.5* 4.1 3.8   CHLORIDE 103 105 107   CO2 19* 19* 20   BUN 12 8 5*   CREATININE 1.04 1.10 0.87   MAGNESIUM 1.1* 1.5  --    CALCIUM 6.4* 6.1* 5.9*     Recent Labs     22  0040 22  0050 22  0011   ALTSGPT 11 9 8   ASTSGOT 11* 25 16   ALKPHOSPHAT 79 90 86   TBILIRUBIN 0.3 0.3 0.3   GLUCOSE 108* 135* 114*     Recent Labs     22  0040 22  0050 22  0011   RBC 2.94* 2.54* 2.68*   HEMOGLOBIN 8.8* 7.5* 7.8*   HEMATOCRIT 26.9* 22.9* 24.6*   PLATELETCT 311 371 351     Recent Labs     22  0040 22  0050 22  0011   WBC 12.7* 14.3* 9.9   NEUTSPOLYS 88.60* 87.40* 90.30*   LYMPHOCYTES 3.50* 4.50* 1.80*   MONOCYTES 6.10 5.40 2.60   EOSINOPHILS 0.00 0.00 0.90   BASOPHILS 0.90 0.00 0.90   ASTSGOT 11* 25 16   ALTSGPT 11 9 8   ALKPHOSPHAT 79 90 86   TBILIRUBIN 0.3 0.3 0.3     Recent Labs     22  0040 22  0050 22  0011   SODIUM 135 134* 138   POTASSIUM 3.5* 4.1 3.8   CHLORIDE 103 105 107   CO2 19* 19* 20   GLUCOSE 108* 135* 114*   BUN 12 8 5*   CREATININE 1.04 1.10 0.87   CALCIUM 6.4* 6.1* 5.9*     Hemodynamics:  Temp (24hrs), Av.6 °C (97.8 °F), Min:36.4 °C (97.5 °F), Max:36.7 °C (98.1 °F)  Temperature: 36.4 °C (97.5 °F)  Pulse  Av.6  Min: 75  Max: 130   Blood Pressure: 115/68     Respiratory:    Respiration: 18, Pulse Oximetry: 93 %        RUL Breath Sounds: Clear, RML Breath Sounds: Clear, RLL Breath Sounds: Diminished, MORENO Breath Sounds: Clear, LLL Breath Sounds: Diminished  Fluids:  No intake or output data in the 24 hours ending 22 1553     GI/Nutrition:  Orders Placed This Encounter   Procedures   • Diet NPO Restrict to:  Sips with Medications (And ice chips okay)     Standing Status:   Standing     Number of Occurrences:   1     Order Specific Question:   Diet NPO Restrict to:     Answer:   Sips with Medications [3]     Comments:   And ice chips okay     Medical Decision Making, by Problem:  Active Hospital Problems    Diagnosis    • *Hypercalcemia [E83.52]        Assessment and Plan:  This is a 56 y.o. female with stage III (minimum) Grade 2 endometrial adenocarcinoma s/p 6 cycles of neoadjuvant chemotherapy with Taxol/Carbo completed on 1/13/22 , who presents with hypercalcemia, nausea, and vomiting.     1. POD #6: s/p ex lap, hys, bso, cystectomy with repair and suprapubic catheter placement. Doing well, up to edge of bed only > encouraged continued mobilization, with at minimum up to EOB x 3 a day.  Nausea and vomiting yesterday on clears, NPO.  Xray with ileus, will keep NPO and add Reglan.   2. Post op pain: well controlled, increased yesterday, now with Dilaudid PCA with 0.3 mg basal, and 0.2 mg demand and Toradol.    3. Tachycardia: improving, possibly pain related, hemodynamically stable, will continue to monitor.   4. Hypocalcemia: patient with hx of hypercalemia, secondary to malignancy and dehydration, s/p IVF, lasix, and pamidronate.  Now with hypocalcemia, discussed with Dr. Dyson, we will continue to monitor.        5. Hypokalemia: improved, 4.1 today, will continue to monitor   6. Dehydration: secondary to n/v prior to admission, resolved, continue IVF.   7.Stage III (minimum) Grade 2 endometrial adenocarcinoma:  s/p 6 cycles of neoadjuvant chemotherapy, now s/p ex lap, hys, bso, cysetectomy with suprapubic cath placement.  Final pathology pending.   8. Left trimalleolar ankle fracture/dislocation: s/p ORIF 2/26, ortho following.    9. Upper extremity DVT: Venous US positive for DVT, s/p heparin gtt prior to surgery, Hgb stable, now on Xarelto.    10. Anemia: Secondary to chemotherapy and now secondary to acute blood  loss. Hgb 7.5 pre operative on 3/3, s/p 2 unit intra op. S/p 1 unit on 3/5, Hgb 7.8 today, will CTM.     11. RUQ swelling: improving, negative for DVT, secondary to IV infiltrating.   12. Elevated BP: No hx of HTN, Likely secondary to pain and IVF. On amlodipine 2.5 mg at home for Raynaud's > amlodipine to 5 mg daily, currently well controlled.   13. ILIA: improved, likley secondary to dehydration. Monitor.   15. History of Lupus: continue home hydroxychloroquine  16. Hx of mood disorder: PTSD and anxiety. Continue home paroxetine and propranolol prn.   17. Hx Raynaud's disease: Continue amlodipine.   18. Hx of allergic rhinitis: Home Azelastine  19. Dispo: continue post operative inpatient management     Case discussed with RN, Physician, patient and family.

## 2022-03-09 NOTE — PROGRESS NOTES
Lab called with critical result of Ca 5.9 at 0105. Critical lab result read back to Lab.   Dr. Dyson notified of critical lab result at 0116.  Critical lab result read back by Dr. Dyson. No new orders have been placed at this time.

## 2022-03-09 NOTE — FACE TO FACE
Face to Face Supporting Documentation -  Post acute care rehab    The encounter with this patient was in whole or in part the primary reason for home health admission.    Date of encounter:   Patient:                    MRN:                       YOB: 2022  Magali Leal  [id number]  1965     Rehab to see patient for:  Physical Therapy evaluation and treatment, Occupational therapy evaluation and treatment and Comment: suprapubic catheter care     Rehab need for:  Exacerbation of Chronic Disease State Endometrial cancer  and Surgical Aftercare s/p exploratory laparatomy, and left ankle ORIF    Rehab  interventions to include:  Line/Drain/Airway education and care    Homebound status evidenced by:  Need the aid of supportive devices such as crutches, canes, wheelchairs or walkers. Leaving home requires a considerable and taxing effort. There is a normal inability to leave the home.    Community Physician to provide follow up care: Ty Bethea M.D.     Optional Interventions? No      I certify the face to face encounter for this home health care referral meets the CMS requirements and the encounter/clinical assessment with the patient was, in whole, or in part, for the medical condition(s) listed above, which is the primary reason for home health care. Based on my clinical findings: the service(s) are medically necessary, support the need for home health care, and the homebound criteria are met.  I certify that this patient has had a face to face encounter by myself.  Sofia Jean-Baptiste P.A.-C. - NPI: [id number]

## 2022-03-09 NOTE — CARE PLAN
"The patient is Watcher - Medium risk of patient condition declining or worsening    Shift Goals  Clinical Goals: Pain control. Mobilize. IS  Patient Goals: Pain control. Mobilize  Family Goals: rest, pain control    Progress made toward(s) clinical / shift goals:  Pain controlled by end of shift.     A&Ox4. Bed bound at this time, ROM performed and tolerated bed turns well. Nausea/vomiting observed, patient unable to tolerate PO intake, MD notified and made patient NPO for possible ileus. Pain reported \"13/10\" in LLE under ORIF splint; MD notified and pain treated per MAR and nonpharmacologic interventions. Patient very tearful and crying out. Resolved by end of shift. POC discussed. All questions answered; patient demonstrates understanding. Call light and belongings within reach, calls appropriately to make needs known; hourly rounding in place.     Patient is not progressing towards the following goals:      Problem: Mobility  Goal: Patient's capacity to carry out activities will improve  Outcome: Not Progressing     Problem: Self Care  Goal: Patient will have the ability to perform ADLs independently or with assistance (bathe, groom, dress, toilet and feed)  Outcome: Not Progressing     "

## 2022-03-10 PROCEDURE — 700105 HCHG RX REV CODE 258: Performed by: STUDENT IN AN ORGANIZED HEALTH CARE EDUCATION/TRAINING PROGRAM

## 2022-03-10 PROCEDURE — A9270 NON-COVERED ITEM OR SERVICE: HCPCS | Performed by: STUDENT IN AN ORGANIZED HEALTH CARE EDUCATION/TRAINING PROGRAM

## 2022-03-10 PROCEDURE — 700101 HCHG RX REV CODE 250: Performed by: STUDENT IN AN ORGANIZED HEALTH CARE EDUCATION/TRAINING PROGRAM

## 2022-03-10 PROCEDURE — 770004 HCHG ROOM/CARE - ONCOLOGY PRIVATE *

## 2022-03-10 PROCEDURE — 700111 HCHG RX REV CODE 636 W/ 250 OVERRIDE (IP)

## 2022-03-10 PROCEDURE — A9270 NON-COVERED ITEM OR SERVICE: HCPCS

## 2022-03-10 PROCEDURE — 700102 HCHG RX REV CODE 250 W/ 637 OVERRIDE(OP): Performed by: STUDENT IN AN ORGANIZED HEALTH CARE EDUCATION/TRAINING PROGRAM

## 2022-03-10 PROCEDURE — 700111 HCHG RX REV CODE 636 W/ 250 OVERRIDE (IP): Performed by: STUDENT IN AN ORGANIZED HEALTH CARE EDUCATION/TRAINING PROGRAM

## 2022-03-10 PROCEDURE — 700102 HCHG RX REV CODE 250 W/ 637 OVERRIDE(OP)

## 2022-03-10 RX ADMIN — Medication: at 01:19

## 2022-03-10 RX ADMIN — RIVAROXABAN 15 MG: 15 TABLET, FILM COATED ORAL at 07:16

## 2022-03-10 RX ADMIN — METOCLOPRAMIDE 10 MG: 5 INJECTION, SOLUTION INTRAMUSCULAR; INTRAVENOUS at 11:38

## 2022-03-10 RX ADMIN — KETOROLAC TROMETHAMINE 30 MG: 30 INJECTION, SOLUTION INTRAMUSCULAR; INTRAVENOUS at 11:38

## 2022-03-10 RX ADMIN — ROPINIROLE HYDROCHLORIDE 1 MG: 1 TABLET, FILM COATED ORAL at 17:56

## 2022-03-10 RX ADMIN — BACLOFEN 10 MG: 10 TABLET ORAL at 06:12

## 2022-03-10 RX ADMIN — KETOROLAC TROMETHAMINE 30 MG: 30 INJECTION, SOLUTION INTRAMUSCULAR; INTRAVENOUS at 04:24

## 2022-03-10 RX ADMIN — ROPINIROLE HYDROCHLORIDE 1 MG: 1 TABLET, FILM COATED ORAL at 06:12

## 2022-03-10 RX ADMIN — METOCLOPRAMIDE 10 MG: 5 INJECTION, SOLUTION INTRAMUSCULAR; INTRAVENOUS at 17:56

## 2022-03-10 RX ADMIN — ONDANSETRON 4 MG: 2 INJECTION INTRAMUSCULAR; INTRAVENOUS at 04:24

## 2022-03-10 RX ADMIN — MIRTAZAPINE 22.5 MG: 15 TABLET, FILM COATED ORAL at 21:10

## 2022-03-10 RX ADMIN — ONDANSETRON 4 MG: 2 INJECTION INTRAMUSCULAR; INTRAVENOUS at 22:08

## 2022-03-10 RX ADMIN — AMLODIPINE BESYLATE 5 MG: 5 TABLET ORAL at 17:56

## 2022-03-10 RX ADMIN — METOCLOPRAMIDE 10 MG: 5 INJECTION, SOLUTION INTRAMUSCULAR; INTRAVENOUS at 06:12

## 2022-03-10 RX ADMIN — POTASSIUM CHLORIDE, DEXTROSE MONOHYDRATE AND SODIUM CHLORIDE: 150; 5; 450 INJECTION, SOLUTION INTRAVENOUS at 07:07

## 2022-03-10 RX ADMIN — BACLOFEN 10 MG: 10 TABLET ORAL at 17:56

## 2022-03-10 RX ADMIN — BACLOFEN 10 MG: 10 TABLET ORAL at 11:38

## 2022-03-10 RX ADMIN — KETOROLAC TROMETHAMINE 30 MG: 30 INJECTION, SOLUTION INTRAMUSCULAR; INTRAVENOUS at 17:56

## 2022-03-10 RX ADMIN — RIVAROXABAN 15 MG: 15 TABLET, FILM COATED ORAL at 17:56

## 2022-03-10 RX ADMIN — POTASSIUM CHLORIDE, DEXTROSE MONOHYDRATE AND SODIUM CHLORIDE: 150; 5; 450 INJECTION, SOLUTION INTRAVENOUS at 16:06

## 2022-03-10 ASSESSMENT — ENCOUNTER SYMPTOMS
FEVER: 0
SHORTNESS OF BREATH: 0
COUGH: 0
DIARRHEA: 0
NAUSEA: 0
CHILLS: 0
DIZZINESS: 0
VOMITING: 0
ABDOMINAL PAIN: 1

## 2022-03-10 ASSESSMENT — PAIN DESCRIPTION - PAIN TYPE
TYPE: ACUTE PAIN
TYPE: ACUTE PAIN

## 2022-03-10 NOTE — THERAPY
Physical Therapy   Daily Treatment     Patient Name: Magali Leal  Age:  56 y.o., Sex:  female  Medical Record #: [id number]  Today's Date: 3/9/2022     Precautions  Precautions: No Weight Bearing Restrictions Left Lower Extremity    Assessment      Pt seen for PT treatment with focus on BUE and BLE exercises for circulation, edema reduction and strength. Pt tolerating exercise well, pt's overall mobility limited by abdominal pain and RLE NWB. Pt will benefit from continued inpatient as well as post acute PT to progress.   Plan    Continue current treatment plan.    DC Equipment Recommendations: Unable to determine at this time  Discharge Recommendations: Recommend post-acute placement for additional physical therapy services prior to discharge home         Objective     03/09/22 1553   Supine Lower Body Exercise   Bicep Curl 2 sets of 10   Chest Fly 2 sets of 10   Chest Press 2 sets of 10   Knee to Chest 1 set of 10   Heel Slide 1 set of 10;Right   Ankle Pumps 2 sets of 10;Right   Gluteal Isometrics 1 set of 10   Quadriceps Isometrics 1 set of 10   Sitting Lower Body Exercises   Scapular Depression 1 set of 10;Bilateral   Bicep Curls 2 sets of 10;Right   (supported on bedside table)   Bilateral Row 1 set of 10;Bilateral   Ankle Pumps 2 sets of 10;Right    Long Arc Quad 2 sets of 10;Bilateral   Balance   Sitting Balance (Static) Fair +   Sitting Balance (Dynamic) Fair   Weight Shift Sitting Fair   Gait Analysis   Gait Level Of Assist Unable to Participate   Bed Mobility    Supine to Sit Moderate Assist   Sit to Supine Moderate Assist   Scooting Minimal Assist   Rolling Moderate Assist to Lt.   Functional Mobility   Sit to Stand Refused   Short Term Goals    Short Term Goal # 1 Patient will move supine<>sitting EOB without bed features with supervision within 6tx in order to get in/out of bed   Goal Outcome # 1 Progressing slower than expected   Short Term Goal # 2 Patient will move sitting<>standing with supervision  within 6tx in order to initiate transfers and gait   Goal Outcome # 2 Goal not met   Short Term Goal # 3 Patient will ambulate 15ft with supervision within 6tx in order to access environment   Goal Outcome # 3 Goal not met   Education Group   Role of Physical Therapist Patient Response Patient;Acceptance;Explanation;Verbal Demonstration   Exercises - Supine Patient Response Patient;Acceptance;Explanation;Action Demonstration;Reinforcement Needed   Exercises - Seated Patient Response Patient;Acceptance;Explanation;Action Demonstration;Reinforcement Needed

## 2022-03-10 NOTE — DIETARY
Nutrition Services:     · Pt has been ~15 days with inadequate nutrition.   · She was on a regular diet but having emesis; had surgery on 3/3 (s/p ex lap, hys, bso, cystectomy with repair).  She also had ORIF of ankle on 2/26  · Patient with increased needs s/p surgeries and is at risk for malnutrition  · Currently with ileus per MD notes - on Reglan.  IVF of D5 and 0.45 NaCl with KCl running at 125 ml/hr  · No measured weight since admit  · Per nursing notes, patient with emesis x 4 overnight  · Bun 5 (3/9) likely related to inadequate protein  · Low Calcium 5.9 (corrected to 7.4 with albumin) from 3/9 labs      Plan/rec:   1. Nutrition support indicated given prolonged lack of adequate nutrition s/p two surgeries and ongoing emesis.  2. Pt would benefit from TPN.   3. Once nutrition begins, monitor for refeeding syndrome.   4. Provide weekly wts, stand up scale preferred if possible.     RD to follow for plan of care.

## 2022-03-10 NOTE — CARE PLAN
Problem: Pain - Standard  Goal: Alleviation of pain or a reduction in pain to the patient’s comfort goal  Outcome: Progressing     Problem: Knowledge Deficit - Standard  Goal: Patient and family/care givers will demonstrate understanding of plan of care, disease process/condition, diagnostic tests and medications  Outcome: Progressing     Problem: Psychosocial  Goal: Patient's level of anxiety will decrease  Outcome: Progressing     Problem: Mobility  Goal: Patient's capacity to carry out activities will improve  Outcome: Progressing     Problem: Self Care  Goal: Patient will have the ability to perform ADLs independently or with assistance (bathe, groom, dress, toilet and feed)  Outcome: Progressing   The patient is stable    Shift Goals  Clinical Goals: pain control  Patient Goals: pain control  Family Goals: rest    Progress made toward(s) clinical / shift goals: up to the commode today    Patient is not progressing towards the following goals:none

## 2022-03-10 NOTE — DISCHARGE PLANNING
[facility] Transitional Care Coordination    Referral from:  Dr Jean-Baptiste  Insurance Provider on Facesheet: Jolynn  Potential Rehab Diagnosis: Ortho    Chart review indicates patient may need on going medical management and may need therapy needs to possibly meet inpatient rehab facility criteria with the goal of returning to community.    D/C support: Spouse     Physiatry consultation forwarded per protocol.     Last Covid test:  3/2 negative    H/o Stage III (minimum) Grade 2 endometrial adenocarcinoma, s/p ex lap and hysterectomy 3/3. Patient also had a fall during this hospitalization and sustained an ankle fx, s/p internal fixation 2/26.  Physiatry to consult, TCC will follow.     Thank you for the referral.

## 2022-03-10 NOTE — PROGRESS NOTES
"   Orthopaedic Progress Note    Interval changes:  Patient doing well  Reviewed boot placement with patients wife   LLE in cam boot, dressings without issue    ROS - Patient denies any new issues.  Pain well controlled.    /77   Pulse 100   Temp 36.7 °C (98 °F) (Oral)   Resp 18   Ht 1.702 m (5' 7\")   Wt 99.1 kg (218 lb 7.6 oz)   SpO2 94%       Patient seen and examined  No acute distress  Breathing non labored  RRR  LLE in cam boot dressings CDI, DNVI moves all toes, cap refill <2 sec.     Recent Labs     03/08/22  0050 03/09/22  0011   WBC 14.3* 9.9   RBC 2.54* 2.68*   HEMOGLOBIN 7.5* 7.8*   HEMATOCRIT 22.9* 24.6*   MCV 90.2 91.8   MCH 29.5 29.1   MCHC 32.8* 31.7*   RDW 58.4* 59.7*   PLATELETCT 371 351   MPV 9.3 8.9*       Active Hospital Problems    Diagnosis    • Hypercalcemia [E83.52]        Assessment/Plan:  Patient doing well  POD#12 S/P   1.  Open treatment with internal fixation, left trimalleolar ankle fracture with fixation of posterior lip.  2.  Open treatment without fixation of anterolateral distal tibia fracture with fragment excision.  3.  Surgical fixation of left distal tibiofibular joint disruption.  4.  Physician applied stress examination of the left ankle using fluoroscopy  Wt bearing status - NWB LLE  Wound care/Drains - Dressings to be changed every other day by nursing  Future Procedures - none planned   Sutures/Staples out- 14 days post operatively  PT/OT-initiated  Antibiotics: Perioperative completed  DVT Prophylaxis- TEDS/SCDs/Foot pumps  Brenner-none planned   Case Coordination for Discharge Planning - Disposition pending     I have performed a physical exam and reviewed and updated ROS and Plan today (3/10). In review of yesterday's note (3/9), there are no changes except as documented above.      "

## 2022-03-10 NOTE — PROGRESS NOTES
"   Orthopaedic Progress Note    Interval changes:  Patient doing well  LLE in cam boot, dressings without issue    ROS - Patient denies any new issues.  Pain well controlled.    /84   Pulse 93   Temp 36.6 °C (97.9 °F) (Oral)   Resp 18   Ht 1.702 m (5' 7\")   Wt 99.1 kg (218 lb 7.6 oz)   SpO2 98%       Patient seen and examined  No acute distress  Breathing non labored  RRR  LLE in cam boot dressings CDI, DNVI moves all toes, cap refill <2 sec.     Recent Labs     03/07/22  0040 03/08/22  0050 03/09/22  0011   WBC 12.7* 14.3* 9.9   RBC 2.94* 2.54* 2.68*   HEMOGLOBIN 8.8* 7.5* 7.8*   HEMATOCRIT 26.9* 22.9* 24.6*   MCV 91.5 90.2 91.8   MCH 29.9 29.5 29.1   MCHC 32.7* 32.8* 31.7*   RDW 58.3* 58.4* 59.7*   PLATELETCT 311 371 351   MPV 9.3 9.3 8.9*       Active Hospital Problems    Diagnosis    • Hypercalcemia [E83.52]        Assessment/Plan:  Patient doing well  POD#11 S/P   1.  Open treatment with internal fixation, left trimalleolar ankle fracture with fixation of posterior lip.  2.  Open treatment without fixation of anterolateral distal tibia fracture with fragment excision.  3.  Surgical fixation of left distal tibiofibular joint disruption.  4.  Physician applied stress examination of the left ankle using fluoroscopy  Wt bearing status - NWB LLE  Wound care/Drains - Dressings to be changed every other day by nursing  Future Procedures - none planned   Sutures/Staples out- 14 days post operatively  PT/OT-initiated  Antibiotics: Perioperative completed  DVT Prophylaxis- TEDS/SCDs/Foot pumps  Brenner-none planned   Case Coordination for Discharge Planning - Disposition pending      "

## 2022-03-10 NOTE — PROGRESS NOTES
GYN/Oncology Progress Note               Author: EILEEN Dumont Date & Time created: 3/10/2022  11:55 AM     Interval History:  Patient doing well, nausea has resolved. +flatus but no BM.     Review of Systems:  Review of Systems   Constitutional: Negative for chills and fever.   Respiratory: Negative for cough and shortness of breath.    Cardiovascular: Negative for chest pain and leg swelling.   Gastrointestinal: Positive for abdominal pain. Negative for diarrhea, nausea and vomiting.   Genitourinary:        Suprapubic   Neurological: Negative for dizziness.       Physical Exam:  Physical Exam  Constitutional:       Appearance: Normal appearance.   Cardiovascular:      Pulses: Normal pulses.   Pulmonary:      Effort: Pulmonary effort is normal.   Abdominal:      Palpations: Abdomen is soft.      Comments: Midline dressing CDI, ALEIDA serosang, suprapubic catheter   Musculoskeletal:      Comments: Left boot   Skin:     General: Skin is warm and dry.      Capillary Refill: Capillary refill takes 2 to 3 seconds.   Neurological:      Mental Status: She is alert and oriented to person, place, and time.         Labs:          Recent Labs     03/08/22 0050 03/09/22  0011   SODIUM 134* 138   POTASSIUM 4.1 3.8   CHLORIDE 105 107   CO2 19* 20   BUN 8 5*   CREATININE 1.10 0.87   MAGNESIUM 1.5  --    CALCIUM 6.1* 5.9*     Recent Labs     03/08/22 0050 03/09/22  0011   ALTSGPT 9 8   ASTSGOT 25 16   ALKPHOSPHAT 90 86   TBILIRUBIN 0.3 0.3   GLUCOSE 135* 114*     Recent Labs     03/08/22 0050 03/09/22  0011   RBC 2.54* 2.68*   HEMOGLOBIN 7.5* 7.8*   HEMATOCRIT 22.9* 24.6*   PLATELETCT 371 351     Recent Labs     03/08/22 0050 03/09/22  0011   WBC 14.3* 9.9   NEUTSPOLYS 87.40* 90.30*   LYMPHOCYTES 4.50* 1.80*   MONOCYTES 5.40 2.60   EOSINOPHILS 0.00 0.90   BASOPHILS 0.00 0.90   ASTSGOT 25 16   ALTSGPT 9 8   ALKPHOSPHAT 90 86   TBILIRUBIN 0.3 0.3     Recent Labs     03/08/22 0050 03/09/22  0011   SODIUM 134* 138    POTASSIUM 4.1 3.8   CHLORIDE 105 107   CO2 19* 20   GLUCOSE 135* 114*   BUN 8 5*   CREATININE 1.10 0.87   CALCIUM 6.1* 5.9*     Hemodynamics:  Temp (24hrs), Av.6 °C (97.9 °F), Min:36.4 °C (97.6 °F), Max:36.8 °C (98.2 °F)  Temperature: 36.7 °C (98 °F)  Pulse  Av.3  Min: 75  Max: 130   Blood Pressure: 131/77     Respiratory:    Respiration: 18, Pulse Oximetry: 94 %        RUL Breath Sounds: Clear, RML Breath Sounds: Clear, RLL Breath Sounds: Diminished, MORENO Breath Sounds: Clear, LLL Breath Sounds: Diminished  Fluids:    Intake/Output Summary (Last 24 hours) at 3/10/2022 1155  Last data filed at 3/10/2022 0430  Gross per 24 hour   Intake --   Output 1620 ml   Net -1620 ml        GI/Nutrition:  Orders Placed This Encounter   Procedures   • Diet NPO Restrict to: Sips with Medications (And ice chips okay)     Standing Status:   Standing     Number of Occurrences:   1     Order Specific Question:   Diet NPO Restrict to:     Answer:   Sips with Medications [3]     Comments:   And ice chips okay     Medical Decision Making, by Problem:  Active Hospital Problems    Diagnosis    • *Hypercalcemia [E83.52]        Plan:  This is a 56 y.o. female with stage III (minimum) Grade 2 endometrial adenocarcinoma s/p 6 cycles of neoadjuvant chemotherapy with Taxol/Carbo completed on 22 , who presents with hypercalcemia, nausea, and vomiting.      1. POD #7: s/p ex lap, hys, bso, cystectomy with repair and suprapubic catheter placement. Doing well, up to edge of bed only > encouraged continued mobilization, with at minimum up to EOB x 3 a day.    2. Post op ileus: continue reglan, improved, re-trial CLD  2. Post op pain: well controlled, increased yesterday, now with Dilaudid PCA with 0.3 mg basal, and 0.2 mg demand and Toradol.    3. Tachycardia: improving, possibly pain related, hemodynamically stable, will continue to monitor.   4. Hypocalcemia: patient with hx of hypercalemia, secondary to malignancy and dehydration,  s/p IVF, lasix, and pamidronate.  Now with hypocalcemia, discussed with Dr. Dyson, we will continue to monitor.        5. Hypokalemia: improved, 4.1 today, will continue to monitor   6. Dehydration: secondary to n/v prior to admission, resolved, continue IVF.   7.Stage III (minimum) Grade 2 endometrial adenocarcinoma:  s/p 6 cycles of neoadjuvant chemotherapy, now s/p ex lap, hys, bso, cysetectomy with suprapubic cath placement.  Final pathology pending.   8. Left trimalleolar ankle fracture/dislocation: s/p ORIF 2/26, ortho following.    9. Upper extremity DVT: Venous US positive for DVT, s/p heparin gtt prior to surgery, Hgb stable, now on Xarelto.    10. Anemia: Secondary to chemotherapy and now secondary to acute blood loss. Hgb 7.5 pre operative on 3/3, s/p 2 unit intra op. S/p 1 unit on 3/5, Hgb 7.8, will CTM.     11. RUQ swelling: improving, negative for DVT, secondary to IV infiltrating.   12. Elevated BP: No hx of HTN, Likely secondary to pain and IVF. On amlodipine 2.5 mg at home for Raynaud's > amlodipine to 5 mg daily, currently well controlled.   13. ILIA: improved, likley secondary to dehydration. Monitor.   15. History of Lupus: continue home hydroxychloroquine  16. Hx of mood disorder: PTSD and anxiety. Continue home paroxetine and propranolol prn.   17. Hx Raynaud's disease: Continue amlodipine.   18. Hx of allergic rhinitis: Home Azelastine  19. Dispo: continue post operative inpatient management     Case discussed with RN, Physician, patient and family.      Patient seen by Dr. Dyson, discussed operative findings with patient.     Quality-Core Measures

## 2022-03-10 NOTE — CARE PLAN
"The patient is Watcher - Medium risk of patient condition declining or worsening    Shift Goals  Clinical Goals: Mobilize. IS. Pain control.  Patient Goals: Pain control  Family Goals: Rest    Progress made toward(s) clinical / shift goals:  Patient sat EOB for about 3 minutes with 2 person assist. Demonstrated use of IS up to 2000. Pain reduced from previous shifts; PCA in place and PRNS administered as appropriate.     A&Ox4. Patient bed bound. Denies nausea, emesis x4 this shift. Reports she does not feel nauseous or the emesis coming on and that \"it comes out of nowhere\". Emesis observed, thin and bile colored. Pain reported 5-6/10; treated per MAR and nonpharmacologic interventions. Serosanguinous drainage from vaginal region observed. Redness and odor to groin. ALEIDA LLQ CDI and serosanguinous to bloody output. Suprapubic catheter in tact with leaking at the site and dressing changes. LLE elevated with traction boot in place. POC discussed, wife at the bedside. All questions answered; patient demonstrates understanding. Call light and belongings within reach, calls appropriately to make needs known; hourly rounding in place.     Patient is not progressing towards the following goals:      Problem: Bowel Elimination  Goal: Establish and maintain regular bowel function  Outcome: Not Progressing     "

## 2022-03-10 NOTE — CARE PLAN
Problem: Pain - Standard  Goal: Alleviation of pain or a reduction in pain to the patient’s comfort goal  Outcome: Progressing     Problem: Knowledge Deficit - Standard  Goal: Patient and family/care givers will demonstrate understanding of plan of care, disease process/condition, diagnostic tests and medications  Outcome: Progressing     Problem: Psychosocial  Goal: Patient's level of anxiety will decrease  Outcome: Progressing  Goal: Patient's ability to verbalize feelings about condition will improve  Outcome: Progressing  Goal: Patient's ability to re-evaluate and adapt role responsibilities will improve  Outcome: Progressing  Goal: Patient and family will demonstrate ability to cope with life altering diagnosis and/or procedure  Outcome: Progressing     Problem: Infection - Standard  Goal: Patient will remain free from infection  Outcome: Progressing   The patient is stable    Shift Goals  Clinical Goals: pain control  Patient Goals: pain control  Family Goals: Rest    Progress made toward(s) clinical / shift goals: PT today patient did well    Patient is not progressing towards the following goals:none

## 2022-03-11 LAB
ALBUMIN SERPL BCP-MCNC: 2.2 G/DL (ref 3.2–4.9)
ALBUMIN/GLOB SERPL: 0.7 G/DL
ALP SERPL-CCNC: 95 U/L (ref 30–99)
ALT SERPL-CCNC: 9 U/L (ref 2–50)
ANION GAP SERPL CALC-SCNC: 10 MMOL/L (ref 7–16)
AST SERPL-CCNC: 25 U/L (ref 12–45)
BASOPHILS # BLD AUTO: 0.6 % (ref 0–1.8)
BASOPHILS # BLD: 0.07 K/UL (ref 0–0.12)
BILIRUB SERPL-MCNC: 0.3 MG/DL (ref 0.1–1.5)
BUN SERPL-MCNC: 2 MG/DL (ref 8–22)
CALCIUM SERPL-MCNC: 5.8 MG/DL (ref 8.5–10.5)
CHLORIDE SERPL-SCNC: 107 MMOL/L (ref 96–112)
CO2 SERPL-SCNC: 20 MMOL/L (ref 20–33)
CREAT SERPL-MCNC: 0.61 MG/DL (ref 0.5–1.4)
EOSINOPHIL # BLD AUTO: 0.07 K/UL (ref 0–0.51)
EOSINOPHIL NFR BLD: 0.6 % (ref 0–6.9)
ERYTHROCYTE [DISTWIDTH] IN BLOOD BY AUTOMATED COUNT: 60.5 FL (ref 35.9–50)
GLOBULIN SER CALC-MCNC: 3.2 G/DL (ref 1.9–3.5)
GLUCOSE BLD STRIP.AUTO-MCNC: 115 MG/DL (ref 65–99)
GLUCOSE SERPL-MCNC: 109 MG/DL (ref 65–99)
HCT VFR BLD AUTO: 28.7 % (ref 37–47)
HGB BLD-MCNC: 8.6 G/DL (ref 12–16)
IMM GRANULOCYTES # BLD AUTO: 0.52 K/UL (ref 0–0.11)
IMM GRANULOCYTES NFR BLD AUTO: 4.3 % (ref 0–0.9)
LYMPHOCYTES # BLD AUTO: 1.12 K/UL (ref 1–4.8)
LYMPHOCYTES NFR BLD: 9.2 % (ref 22–41)
MCH RBC QN AUTO: 28.5 PG (ref 27–33)
MCHC RBC AUTO-ENTMCNC: 30 G/DL (ref 33.6–35)
MCV RBC AUTO: 95 FL (ref 81.4–97.8)
MONOCYTES # BLD AUTO: 1.09 K/UL (ref 0–0.85)
MONOCYTES NFR BLD AUTO: 8.9 % (ref 0–13.4)
NEUTROPHILS # BLD AUTO: 9.35 K/UL (ref 2–7.15)
NEUTROPHILS NFR BLD: 76.4 % (ref 44–72)
NRBC # BLD AUTO: 0 K/UL
NRBC BLD-RTO: 0 /100 WBC
PLATELET # BLD AUTO: 478 K/UL (ref 164–446)
PMV BLD AUTO: 9 FL (ref 9–12.9)
POTASSIUM SERPL-SCNC: 4 MMOL/L (ref 3.6–5.5)
PROT SERPL-MCNC: 5.4 G/DL (ref 6–8.2)
RBC # BLD AUTO: 3.02 M/UL (ref 4.2–5.4)
SODIUM SERPL-SCNC: 137 MMOL/L (ref 135–145)
WBC # BLD AUTO: 12.2 K/UL (ref 4.8–10.8)

## 2022-03-11 PROCEDURE — A9270 NON-COVERED ITEM OR SERVICE: HCPCS | Performed by: NURSE PRACTITIONER

## 2022-03-11 PROCEDURE — 99223 1ST HOSP IP/OBS HIGH 75: CPT | Performed by: PHYSICAL MEDICINE & REHABILITATION

## 2022-03-11 PROCEDURE — 700102 HCHG RX REV CODE 250 W/ 637 OVERRIDE(OP): Performed by: NURSE PRACTITIONER

## 2022-03-11 PROCEDURE — 82962 GLUCOSE BLOOD TEST: CPT

## 2022-03-11 PROCEDURE — 700102 HCHG RX REV CODE 250 W/ 637 OVERRIDE(OP)

## 2022-03-11 PROCEDURE — 700101 HCHG RX REV CODE 250: Performed by: NURSE PRACTITIONER

## 2022-03-11 PROCEDURE — 97530 THERAPEUTIC ACTIVITIES: CPT

## 2022-03-11 PROCEDURE — 770004 HCHG ROOM/CARE - ONCOLOGY PRIVATE *

## 2022-03-11 PROCEDURE — 85025 COMPLETE CBC W/AUTO DIFF WBC: CPT

## 2022-03-11 PROCEDURE — A9270 NON-COVERED ITEM OR SERVICE: HCPCS

## 2022-03-11 PROCEDURE — A9270 NON-COVERED ITEM OR SERVICE: HCPCS | Performed by: STUDENT IN AN ORGANIZED HEALTH CARE EDUCATION/TRAINING PROGRAM

## 2022-03-11 PROCEDURE — 700102 HCHG RX REV CODE 250 W/ 637 OVERRIDE(OP): Performed by: STUDENT IN AN ORGANIZED HEALTH CARE EDUCATION/TRAINING PROGRAM

## 2022-03-11 PROCEDURE — 700111 HCHG RX REV CODE 636 W/ 250 OVERRIDE (IP): Performed by: SPECIALIST

## 2022-03-11 PROCEDURE — 700111 HCHG RX REV CODE 636 W/ 250 OVERRIDE (IP): Performed by: STUDENT IN AN ORGANIZED HEALTH CARE EDUCATION/TRAINING PROGRAM

## 2022-03-11 PROCEDURE — 80053 COMPREHEN METABOLIC PANEL: CPT

## 2022-03-11 PROCEDURE — 700101 HCHG RX REV CODE 250: Performed by: STUDENT IN AN ORGANIZED HEALTH CARE EDUCATION/TRAINING PROGRAM

## 2022-03-11 PROCEDURE — 36415 COLL VENOUS BLD VENIPUNCTURE: CPT

## 2022-03-11 PROCEDURE — 700111 HCHG RX REV CODE 636 W/ 250 OVERRIDE (IP): Performed by: NURSE PRACTITIONER

## 2022-03-11 RX ORDER — KETOROLAC TROMETHAMINE 30 MG/ML
30 INJECTION, SOLUTION INTRAMUSCULAR; INTRAVENOUS EVERY 6 HOURS PRN
Status: DISPENSED | OUTPATIENT
Start: 2022-03-11 | End: 2022-03-16

## 2022-03-11 RX ORDER — LORAZEPAM 2 MG/ML
1 INJECTION INTRAMUSCULAR ONCE
Status: COMPLETED | OUTPATIENT
Start: 2022-03-11 | End: 2022-03-11

## 2022-03-11 RX ORDER — HYDROMORPHONE HYDROCHLORIDE 2 MG/1
2 TABLET ORAL EVERY 4 HOURS PRN
Status: DISCONTINUED | OUTPATIENT
Start: 2022-03-11 | End: 2022-03-21

## 2022-03-11 RX ORDER — HYDROMORPHONE HYDROCHLORIDE 1 MG/ML
0.5 INJECTION, SOLUTION INTRAMUSCULAR; INTRAVENOUS; SUBCUTANEOUS EVERY 4 HOURS PRN
Status: DISCONTINUED | OUTPATIENT
Start: 2022-03-11 | End: 2022-03-21

## 2022-03-11 RX ADMIN — KETOROLAC TROMETHAMINE 30 MG: 30 INJECTION, SOLUTION INTRAMUSCULAR; INTRAVENOUS at 18:25

## 2022-03-11 RX ADMIN — AMLODIPINE BESYLATE 5 MG: 5 TABLET ORAL at 18:25

## 2022-03-11 RX ADMIN — ROPINIROLE HYDROCHLORIDE 1 MG: 1 TABLET, FILM COATED ORAL at 05:42

## 2022-03-11 RX ADMIN — BACLOFEN 10 MG: 10 TABLET ORAL at 12:34

## 2022-03-11 RX ADMIN — BACLOFEN 10 MG: 10 TABLET ORAL at 18:24

## 2022-03-11 RX ADMIN — POTASSIUM CHLORIDE, DEXTROSE MONOHYDRATE AND SODIUM CHLORIDE: 150; 5; 450 INJECTION, SOLUTION INTRAVENOUS at 09:28

## 2022-03-11 RX ADMIN — RIVAROXABAN 15 MG: 15 TABLET, FILM COATED ORAL at 09:27

## 2022-03-11 RX ADMIN — METOCLOPRAMIDE 10 MG: 5 INJECTION, SOLUTION INTRAMUSCULAR; INTRAVENOUS at 12:34

## 2022-03-11 RX ADMIN — PAROXETINE 60 MG: 30 TABLET, FILM COATED ORAL at 20:44

## 2022-03-11 RX ADMIN — HYDROMORPHONE HYDROCHLORIDE 2 MG: 2 TABLET ORAL at 14:29

## 2022-03-11 RX ADMIN — KETOROLAC TROMETHAMINE 30 MG: 30 INJECTION, SOLUTION INTRAMUSCULAR; INTRAVENOUS at 12:34

## 2022-03-11 RX ADMIN — DIPHENHYDRAMINE HYDROCHLORIDE 25 MG: 25 TABLET ORAL at 20:43

## 2022-03-11 RX ADMIN — MIRTAZAPINE 22.5 MG: 15 TABLET, FILM COATED ORAL at 20:44

## 2022-03-11 RX ADMIN — POTASSIUM CHLORIDE, DEXTROSE MONOHYDRATE AND SODIUM CHLORIDE: 150; 5; 450 INJECTION, SOLUTION INTRAVENOUS at 00:14

## 2022-03-11 RX ADMIN — BACLOFEN 10 MG: 10 TABLET ORAL at 05:42

## 2022-03-11 RX ADMIN — METOCLOPRAMIDE 10 MG: 5 INJECTION, SOLUTION INTRAMUSCULAR; INTRAVENOUS at 05:42

## 2022-03-11 RX ADMIN — RIVAROXABAN 15 MG: 15 TABLET, FILM COATED ORAL at 18:24

## 2022-03-11 RX ADMIN — POTASSIUM CHLORIDE, DEXTROSE MONOHYDRATE AND SODIUM CHLORIDE: 150; 5; 450 INJECTION, SOLUTION INTRAVENOUS at 22:54

## 2022-03-11 RX ADMIN — KETOROLAC TROMETHAMINE 30 MG: 30 INJECTION, SOLUTION INTRAMUSCULAR; INTRAVENOUS at 00:14

## 2022-03-11 RX ADMIN — ROPINIROLE HYDROCHLORIDE 1 MG: 1 TABLET, FILM COATED ORAL at 18:24

## 2022-03-11 RX ADMIN — LORAZEPAM 1 MG: 2 INJECTION INTRAMUSCULAR; INTRAVENOUS at 22:53

## 2022-03-11 RX ADMIN — KETOROLAC TROMETHAMINE 30 MG: 30 INJECTION, SOLUTION INTRAMUSCULAR; INTRAVENOUS at 06:04

## 2022-03-11 ASSESSMENT — ENCOUNTER SYMPTOMS
ABDOMINAL PAIN: 1
DIZZINESS: 0
COUGH: 0
FEVER: 0
DIARRHEA: 0
SHORTNESS OF BREATH: 0
VOMITING: 0
CHILLS: 0
NAUSEA: 0

## 2022-03-11 ASSESSMENT — COGNITIVE AND FUNCTIONAL STATUS - GENERAL
SUGGESTED CMS G CODE MODIFIER DAILY ACTIVITY: CK
HELP NEEDED FOR BATHING: A LOT
DAILY ACTIVITIY SCORE: 16
DRESSING REGULAR UPPER BODY CLOTHING: A LITTLE
DRESSING REGULAR LOWER BODY CLOTHING: A LOT
PERSONAL GROOMING: A LITTLE
TOILETING: A LOT

## 2022-03-11 ASSESSMENT — PAIN DESCRIPTION - PAIN TYPE
TYPE: ACUTE PAIN

## 2022-03-11 NOTE — PROGRESS NOTES
Pt alert and oriented x 4. Denies nausea and tolerating clears. Pain of a 3/10 in her abdomen and LLE which she states is well controlled on Dilaudid PCA. VSS. Midline incision covered with island dressing. Suprapubic catheter to DD. L ALEIDA drain CDI and to bulb suction. Stripped Q 4 hours. Q 2 turns in place. Pt resting comfortably at this time. BA on and sounding. Call light within reach. Hourly rounding in place.

## 2022-03-11 NOTE — THERAPY
Occupational Therapy  Daily Treatment     Patient Name: Magali Leal  Age:  56 y.o., Sex:  female  Medical Record #: [id number]  Today's Date: 3/11/2022     Precautions  Precautions: No Weight Bearing Restrictions Left Lower Extremity  Comments: ALEIDA drain, confusion, abdominal precautions    Assessment    PT seen for OT tx with focus on UE strengthening, xfer training, activity tolerance and NWB precautions. Pt making slow progress but motivated for therapy. Pt can continue to benefit from acute OT prior to home. Pt will likely need post acute placement prior to home to increase independence in ADLS.     Plan    Continue current treatment plan.    DC Equipment Recommendations: Unable to determine at this time  Discharge Recommendations: Recommend post-acute placement for additional occupational therapy services prior to discharge home     03/11/22 1529   Precautions   Precautions No Weight Bearing Restrictions Left Lower Extremity   Comments ALEIDA drain, confusion, abdominal precautions   Cognition    Cognition / Consciousness X   Speech/ Communication   (confused at times)   Level of Consciousness Alert   Ability To Follow Commands 2 Step   Safety Awareness Impaired   New Learning Impaired   Attention Impaired   Balance   Sitting Balance (Static) Fair +   Sitting Balance (Dynamic) Fair   Standing Balance (Static) Fair -   Standing Balance (Dynamic) Fair -   Weight Shift Sitting Fair   Weight Shift Standing Absent   Skilled Intervention Compensatory Strategies;Verbal Cuing;Tactile Cuing   Comments 4 sit to stands; standing x 30 seconds each   Bed Mobility    Supine to Sit Minimal Assist   Sit to Supine Minimal Assist   Scooting Minimal Assist   Skilled Intervention Verbal Cuing;Tactile Cuing;Compensatory Strategies   Activities of Daily Living   Grooming Minimal Assist   Lower Body Dressing Maximal Assist   Skilled Intervention Verbal Cuing;Tactile Cuing;Compensatory Strategies   Functional Mobility   Sit to Stand  Minimal Assist   Bed, Chair, Wheelchair Transfer Unable to Participate  (attempt; unable to maintain NWB)   Skilled Intervention Verbal Cuing;Tactile Cuing   Comments Able to lift own hips but constant cues for NWB   Activity Tolerance   Sitting Edge of Bed 20 min   Standing Total 2 min   Patient / Family Goals   Patient / Family Goal #1 to go home   Goal #1 Outcome Progressing as expected   Short Term Goals   Short Term Goal # 1 Pt will complete ADL transfers with supervision   Goal Outcome # 1 Progressing slower than expected   Short Term Goal # 2 Pt will complete LB dressing with supervision   Goal Outcome # 2 Progressing slower than expected   Short Term Goal # 3 Pt will complete toileting with supervision   Goal Outcome # 3 Progressing slower than expected   Short Term Goal # 4 will eat sitting EOB 3x/day w/SPV   Anticipated Discharge Equipment and Recommendations   DC Equipment Recommendations Unable to determine at this time   Discharge Recommendations Recommend post-acute placement for additional occupational therapy services prior to discharge home

## 2022-03-11 NOTE — DISCHARGE PLANNING
H/o Stage III (minimum) Grade 2 endometrial adenocarcinoma, s/p ex lap and hysterectomy 3/3. Patient also had a fall during this hospitalization and sustained an ankle fx, s/p internal fixation 2/26.  Physiatry to consult, TCC will follow.

## 2022-03-11 NOTE — DIETARY
Nutrition Services:     · Pt has been ~22 days with inadequate nutrition. Her diet advanced to GI soft, but per patient she is tolerating only 1-2 bites of each meal.  · Emesis today, but appeared to be mucus per patient.  · +BM noted 3/15  · Boost Breeze added to trays to provide some protein and micronutrients; also added other items patient is willing to try.  · No measured weight since admit  · Bun 6 today slightly improved.  · Ionized calcium 0.9 - low, K+ 3.4 low.  Both being replaced    Plan/rec:   1. Would still benefit from nutrition support due to minimal PO intake and given prolonged lack of adequate nutrition s/p two surgeries   2. Need current weight  3. Provide weekly wts, stand up scale preferred if possible.     RD following.

## 2022-03-11 NOTE — PROGRESS NOTES
GYN/Oncology Progress Note               Author: EILEEN Dumont Date & Time created: 3/11/2022  11:34 AM     Interval History:  Patient doing well, nausea has resolved. +BM, tolerating CLD.     Review of Systems:  Review of Systems   Constitutional: Negative for chills and fever.   Respiratory: Negative for cough and shortness of breath.    Cardiovascular: Negative for chest pain and leg swelling.   Gastrointestinal: Positive for abdominal pain. Negative for diarrhea, nausea and vomiting.   Genitourinary:        Suprapubic   Neurological: Negative for dizziness.       Physical Exam:  Physical Exam  Constitutional:       Appearance: Normal appearance.   Cardiovascular:      Pulses: Normal pulses.   Pulmonary:      Effort: Pulmonary effort is normal.   Abdominal:      Palpations: Abdomen is soft.      Comments: Midline dressing CDI, ALEIDA serosang, suprapubic catheter   Musculoskeletal:      Comments: Left boot   Skin:     General: Skin is warm and dry.      Capillary Refill: Capillary refill takes 2 to 3 seconds.   Neurological:      Mental Status: She is alert and oriented to person, place, and time.         Labs:          Recent Labs     03/09/22 0011 03/11/22  0030   SODIUM 138 137   POTASSIUM 3.8 4.0   CHLORIDE 107 107   CO2 20 20   BUN 5* 2*   CREATININE 0.87 0.61   CALCIUM 5.9* 5.8*     Recent Labs     03/09/22 0011 03/11/22  0030   ALTSGPT 8 9   ASTSGOT 16 25   ALKPHOSPHAT 86 95   TBILIRUBIN 0.3 0.3   GLUCOSE 114* 109*     Recent Labs     03/09/22 0011 03/11/22  0030   RBC 2.68* 3.02*   HEMOGLOBIN 7.8* 8.6*   HEMATOCRIT 24.6* 28.7*   PLATELETCT 351 478*     Recent Labs     03/09/22 0011 03/11/22  0030   WBC 9.9 12.2*   NEUTSPOLYS 90.30* 76.40*   LYMPHOCYTES 1.80* 9.20*   MONOCYTES 2.60 8.90   EOSINOPHILS 0.90 0.60   BASOPHILS 0.90 0.60   ASTSGOT 16 25   ALTSGPT 8 9   ALKPHOSPHAT 86 95   TBILIRUBIN 0.3 0.3     Recent Labs     03/09/22 0011 03/11/22  0030   SODIUM 138 137   POTASSIUM 3.8 4.0    CHLORIDE 107 107   CO2 20 20   GLUCOSE 114* 109*   BUN 5* 2*   CREATININE 0.87 0.61   CALCIUM 5.9* 5.8*     Hemodynamics:  Temp (24hrs), Av.8 °C (98.2 °F), Min:36.6 °C (97.9 °F), Max:36.9 °C (98.5 °F)  Temperature: 36.7 °C (98.1 °F)  Pulse  Av.1  Min: 75  Max: 130   Blood Pressure: 133/70     Respiratory:    Respiration: 17, Pulse Oximetry: 95 %        RUL Breath Sounds: Clear, RML Breath Sounds: Diminished, RLL Breath Sounds: Diminished, MORENO Breath Sounds: Clear, LLL Breath Sounds: Diminished  Fluids:    Intake/Output Summary (Last 24 hours) at 3/10/2022 1155  Last data filed at 3/10/2022 0430  Gross per 24 hour   Intake --   Output 1620 ml   Net -1620 ml        GI/Nutrition:  Orders Placed This Encounter   Procedures   • Diet Order Diet: Clear Liquid     Standing Status:   Standing     Number of Occurrences:   1     Order Specific Question:   Diet:     Answer:   Clear Liquid [10]     Medical Decision Making, by Problem:  Active Hospital Problems    Diagnosis    • *Hypercalcemia [E83.52]        Plan:  This is a 56 y.o. female with stage III (minimum) Grade 2 endometrial adenocarcinoma s/p 6 cycles of neoadjuvant chemotherapy with Taxol/Carbo completed on 22 , who presented with hypercalcemia, nausea, and vomiting     1. POD #8: s/p ex lap, hys, bso, cystectomy with repair and suprapubic catheter placement. Doing well, up to edge of bed only > encouraged continued mobilization, with at minimum up to EOB x 3 a day.    2. Post op ileus: stop reglan, resolved, advance diet to FLD then as tolerated  3. Post op pain: well controlled, stop dilaudid PCA, start dilaudid PO PRN, continue Toradol.    4. Hypocalcemia: patient with hx of hypercalemia, secondary to malignancy and dehydration, s/p IVF, lasix, and pamidronate.  Now with hypocalcemia, discussed with Dr. Dyson, we will continue to monitor.        5.Stage III (minimum) Grade 2 endometrial adenocarcinoma:  s/p 6 cycles of neoadjuvant chemotherapy, now  s/p ex lap, hys, bso, cysetectomy with suprapubic cath placement on 3/3/22.  Final pathology pending.   6. Left trimalleolar ankle fracture/dislocation: s/p ORIF 2/26, ortho following.    7. Upper extremity DVT: Venous US positive for DVT, s/p heparin gtt prior to surgery, Hgb stable, now on Xarelto.    8. Anemia: Secondary to chemotherapy and now secondary to acute blood loss. Hgb 7.5 pre operative on 3/3, s/p 2 unit intra op. S/p 1 unit on 3/5, Hgb 8.6 today      9. Elevated BP: No hx of HTN. On amlodipine 2.5 mg at home for Raynaud's > amlodipine to 5 mg daily, currently well controlled.   10. History of Lupus: continue home hydroxychloroquine  11. Hx of mood disorder: PTSD and anxiety. Continue home paroxetine and propranolol prn.   12. Hx Raynaud's disease: Continue amlodipine.   13. Hx of allergic rhinitis: Home Azelastine  14. Dispo: continue to mobilize, pending acute rehab evaluation    Patient seen by Dr. Dyson    Quality-Core Measures

## 2022-03-11 NOTE — PROGRESS NOTES
56yoF with fall in hospital and left trimalleolar ankle fracture/dislocation s/p ORIF 2/26.     S: Doing okay, a couple of days ago was having some pain in left foot/ankle under splint.  Splint removed and now in boot and says that feels better.    O:  Vitals:    03/10/22 1635   BP: 116/58   Pulse: 93   Resp: 18   Temp: 36.6 °C (97.9 °F)   SpO2: 95%     Exam:  General-NAD, alert and following commands  LLE-boot c/d/i    A:  56yoF with fall in hospital and left trimalleolar ankle fracture/dislocation s/p ORIF 2/26.     Recs:  --NWB LLE in boot  --PT/OT for mobilization  --staples out 2 weeks postop if sufficient wound healing  --likely will need inpatient rehab/SNF  --can fu 4-6 weeks postop for xrays as outpatient

## 2022-03-11 NOTE — CARE PLAN
Problem: Nutritional:  Goal: Achieve adequate nutritional intake  Description: Patient will tolerate regular diet and consume 50% of meals  Outcome: Not Met

## 2022-03-11 NOTE — CARE PLAN
The patient is Watcher - Medium risk of patient condition declining or worsening    Shift Goals  Clinical Goals: IS. Mobilize/ROM. BM. Pain control  Patient Goals: Rest. pain control  Family Goals: rest    Progress made toward(s) clinical / shift goals:  IS at bedside, patient demonstrates use correctly up to 2000. ROM performed in bed. Positive for BM x 2 this shift. Pain treated per PCA and PRNs.     A&Ox4. Bed/chair bound at this time. Nausea/vomiting observed; patient reports source is her ears/narrowed eustachian tubes. Attempts small amounts of PO intake at a time. Encouraged Q2 turns. LLE NWB and elevated with boot in place. Redness and odor from groin, omar care provided. Some blood tinged discharge oberved from vagina. Suprapubic catheter pink at the site with leakage; sutures in tact. ALEIDA to suction, stripping Q4. POC discussed. All questions answered; patient demonstrates understanding. Call light and belongings within reach, calls appropriately to make needs known; hourly rounding in place.     Patient is not progressing towards the following goals: Unable to sit EOB this shift due to nausea/vomiting.       Problem: Self Care  Goal: Patient will have the ability to perform ADLs independently or with assistance (bathe, groom, dress, toilet and feed)  Outcome: Not Progressing

## 2022-03-11 NOTE — PROGRESS NOTES
James from Lab called with critical result of Calcium 5.8 at 0116. Critical lab result read back to James.   CORRECTED CALCIUM 8.04  This critical lab result is within parameters established by Dr. Dyson for this patient
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
## 2022-03-12 LAB
ALBUMIN SERPL BCP-MCNC: 2.2 G/DL (ref 3.2–4.9)
ALBUMIN/GLOB SERPL: 0.6 G/DL
ALP SERPL-CCNC: 106 U/L (ref 30–99)
ALT SERPL-CCNC: 10 U/L (ref 2–50)
ANION GAP SERPL CALC-SCNC: 15 MMOL/L (ref 7–16)
ANISOCYTOSIS BLD QL SMEAR: ABNORMAL
APPEARANCE UR: CLEAR
AST SERPL-CCNC: 31 U/L (ref 12–45)
BACTERIA #/AREA URNS HPF: NEGATIVE /HPF
BASOPHILS # BLD AUTO: 0 % (ref 0–1.8)
BASOPHILS # BLD: 0 K/UL (ref 0–0.12)
BILIRUB SERPL-MCNC: 0.4 MG/DL (ref 0.1–1.5)
BILIRUB UR QL STRIP.AUTO: NEGATIVE
BUN SERPL-MCNC: 3 MG/DL (ref 8–22)
CALCIUM SERPL-MCNC: 6.1 MG/DL (ref 8.5–10.5)
CHLORIDE SERPL-SCNC: 104 MMOL/L (ref 96–112)
CO2 SERPL-SCNC: 19 MMOL/L (ref 20–33)
COLOR UR: YELLOW
CREAT SERPL-MCNC: 0.64 MG/DL (ref 0.5–1.4)
EKG IMPRESSION: NORMAL
EOSINOPHIL # BLD AUTO: 0.15 K/UL (ref 0–0.51)
EOSINOPHIL NFR BLD: 0.9 % (ref 0–6.9)
EPI CELLS #/AREA URNS HPF: NEGATIVE /HPF
ERYTHROCYTE [DISTWIDTH] IN BLOOD BY AUTOMATED COUNT: 59.9 FL (ref 35.9–50)
GLOBULIN SER CALC-MCNC: 3.5 G/DL (ref 1.9–3.5)
GLUCOSE SERPL-MCNC: 119 MG/DL (ref 65–99)
GLUCOSE UR STRIP.AUTO-MCNC: NEGATIVE MG/DL
HCT VFR BLD AUTO: 29.1 % (ref 37–47)
HGB BLD-MCNC: 8.9 G/DL (ref 12–16)
HYALINE CASTS #/AREA URNS LPF: ABNORMAL /LPF
HYPOCHROMIA BLD QL SMEAR: ABNORMAL
KETONES UR STRIP.AUTO-MCNC: NEGATIVE MG/DL
LEUKOCYTE ESTERASE UR QL STRIP.AUTO: ABNORMAL
LYMPHOCYTES # BLD AUTO: 0.71 K/UL (ref 1–4.8)
LYMPHOCYTES NFR BLD: 4.3 % (ref 22–41)
MACROCYTES BLD QL SMEAR: ABNORMAL
MANUAL DIFF BLD: NORMAL
MCH RBC QN AUTO: 29.1 PG (ref 27–33)
MCHC RBC AUTO-ENTMCNC: 30.6 G/DL (ref 33.6–35)
MCV RBC AUTO: 95.1 FL (ref 81.4–97.8)
METAMYELOCYTES NFR BLD MANUAL: 0.9 %
MICRO URNS: ABNORMAL
MICROCYTES BLD QL SMEAR: ABNORMAL
MONOCYTES # BLD AUTO: 0.43 K/UL (ref 0–0.85)
MONOCYTES NFR BLD AUTO: 2.6 % (ref 0–13.4)
MORPHOLOGY BLD-IMP: NORMAL
MYELOCYTES NFR BLD MANUAL: 1.7 %
NEUTROPHILS # BLD AUTO: 14.69 K/UL (ref 2–7.15)
NEUTROPHILS NFR BLD: 89.6 % (ref 44–72)
NITRITE UR QL STRIP.AUTO: NEGATIVE
NRBC # BLD AUTO: 0.04 K/UL
NRBC BLD-RTO: 0.2 /100 WBC
OVALOCYTES BLD QL SMEAR: NORMAL
PH UR STRIP.AUTO: 6.5 [PH] (ref 5–8)
PLATELET # BLD AUTO: 567 K/UL (ref 164–446)
PLATELET BLD QL SMEAR: NORMAL
PMV BLD AUTO: 9.1 FL (ref 9–12.9)
POIKILOCYTOSIS BLD QL SMEAR: NORMAL
POLYCHROMASIA BLD QL SMEAR: NORMAL
POTASSIUM SERPL-SCNC: 3.8 MMOL/L (ref 3.6–5.5)
PROT SERPL-MCNC: 5.7 G/DL (ref 6–8.2)
PROT UR QL STRIP: NEGATIVE MG/DL
RBC # BLD AUTO: 3.06 M/UL (ref 4.2–5.4)
RBC # URNS HPF: >150 /HPF
RBC BLD AUTO: PRESENT
RBC UR QL AUTO: ABNORMAL
SODIUM SERPL-SCNC: 138 MMOL/L (ref 135–145)
SP GR UR STRIP.AUTO: 1.01
UROBILINOGEN UR STRIP.AUTO-MCNC: 0.2 MG/DL
WBC # BLD AUTO: 16.4 K/UL (ref 4.8–10.8)
WBC #/AREA URNS HPF: ABNORMAL /HPF

## 2022-03-12 PROCEDURE — 81001 URINALYSIS AUTO W/SCOPE: CPT

## 2022-03-12 PROCEDURE — 700111 HCHG RX REV CODE 636 W/ 250 OVERRIDE (IP): Performed by: NURSE PRACTITIONER

## 2022-03-12 PROCEDURE — 80053 COMPREHEN METABOLIC PANEL: CPT

## 2022-03-12 PROCEDURE — 700102 HCHG RX REV CODE 250 W/ 637 OVERRIDE(OP)

## 2022-03-12 PROCEDURE — A9270 NON-COVERED ITEM OR SERVICE: HCPCS

## 2022-03-12 PROCEDURE — 36415 COLL VENOUS BLD VENIPUNCTURE: CPT

## 2022-03-12 PROCEDURE — 700102 HCHG RX REV CODE 250 W/ 637 OVERRIDE(OP): Performed by: STUDENT IN AN ORGANIZED HEALTH CARE EDUCATION/TRAINING PROGRAM

## 2022-03-12 PROCEDURE — 93010 ELECTROCARDIOGRAM REPORT: CPT | Performed by: INTERNAL MEDICINE

## 2022-03-12 PROCEDURE — 770004 HCHG ROOM/CARE - ONCOLOGY PRIVATE *

## 2022-03-12 PROCEDURE — 700101 HCHG RX REV CODE 250: Performed by: NURSE PRACTITIONER

## 2022-03-12 PROCEDURE — 85027 COMPLETE CBC AUTOMATED: CPT

## 2022-03-12 PROCEDURE — 700111 HCHG RX REV CODE 636 W/ 250 OVERRIDE (IP)

## 2022-03-12 PROCEDURE — 85007 BL SMEAR W/DIFF WBC COUNT: CPT

## 2022-03-12 PROCEDURE — 93005 ELECTROCARDIOGRAM TRACING: CPT | Performed by: ORTHOPAEDIC SURGERY

## 2022-03-12 PROCEDURE — A9270 NON-COVERED ITEM OR SERVICE: HCPCS | Performed by: STUDENT IN AN ORGANIZED HEALTH CARE EDUCATION/TRAINING PROGRAM

## 2022-03-12 RX ORDER — HYDROMORPHONE HYDROCHLORIDE 1 MG/ML
0.5 INJECTION, SOLUTION INTRAMUSCULAR; INTRAVENOUS; SUBCUTANEOUS ONCE
Status: COMPLETED | OUTPATIENT
Start: 2022-03-12 | End: 2022-03-12

## 2022-03-12 RX ORDER — CALCIUM GLUCONATE 20 MG/ML
1 INJECTION, SOLUTION INTRAVENOUS ONCE
Status: COMPLETED | OUTPATIENT
Start: 2022-03-12 | End: 2022-03-12

## 2022-03-12 RX ADMIN — HYDROMORPHONE HYDROCHLORIDE 0.5 MG: 1 INJECTION, SOLUTION INTRAMUSCULAR; INTRAVENOUS; SUBCUTANEOUS at 00:06

## 2022-03-12 RX ADMIN — ROPINIROLE HYDROCHLORIDE 1 MG: 1 TABLET, FILM COATED ORAL at 17:05

## 2022-03-12 RX ADMIN — CALCIUM GLUCONATE 1 G: 20 INJECTION, SOLUTION INTRAVENOUS at 11:36

## 2022-03-12 RX ADMIN — KETOROLAC TROMETHAMINE 30 MG: 30 INJECTION, SOLUTION INTRAMUSCULAR; INTRAVENOUS at 01:46

## 2022-03-12 RX ADMIN — ONDANSETRON 4 MG: 2 INJECTION INTRAMUSCULAR; INTRAVENOUS at 11:08

## 2022-03-12 RX ADMIN — BACLOFEN 10 MG: 10 TABLET ORAL at 11:08

## 2022-03-12 RX ADMIN — BACLOFEN 10 MG: 10 TABLET ORAL at 17:05

## 2022-03-12 RX ADMIN — ROPINIROLE HYDROCHLORIDE 1 MG: 1 TABLET, FILM COATED ORAL at 04:39

## 2022-03-12 RX ADMIN — KETOROLAC TROMETHAMINE 30 MG: 30 INJECTION, SOLUTION INTRAMUSCULAR; INTRAVENOUS at 14:32

## 2022-03-12 RX ADMIN — BACLOFEN 10 MG: 10 TABLET ORAL at 04:39

## 2022-03-12 RX ADMIN — HYDROMORPHONE HYDROCHLORIDE 0.5 MG: 1 INJECTION, SOLUTION INTRAMUSCULAR; INTRAVENOUS; SUBCUTANEOUS at 20:27

## 2022-03-12 RX ADMIN — RIVAROXABAN 15 MG: 15 TABLET, FILM COATED ORAL at 09:03

## 2022-03-12 RX ADMIN — HYDROMORPHONE HYDROCHLORIDE 0.5 MG: 1 INJECTION, SOLUTION INTRAMUSCULAR; INTRAVENOUS; SUBCUTANEOUS at 11:01

## 2022-03-12 RX ADMIN — AMLODIPINE BESYLATE 5 MG: 5 TABLET ORAL at 17:05

## 2022-03-12 RX ADMIN — RIVAROXABAN 15 MG: 15 TABLET, FILM COATED ORAL at 17:05

## 2022-03-12 RX ADMIN — POTASSIUM CHLORIDE, DEXTROSE MONOHYDRATE AND SODIUM CHLORIDE: 150; 5; 450 INJECTION, SOLUTION INTRAVENOUS at 11:08

## 2022-03-12 ASSESSMENT — ENCOUNTER SYMPTOMS
VOMITING: 0
DIARRHEA: 0
FEVER: 0
COUGH: 0
SHORTNESS OF BREATH: 0
ABDOMINAL PAIN: 1
CHILLS: 0
NAUSEA: 0
DIZZINESS: 0

## 2022-03-12 ASSESSMENT — PAIN DESCRIPTION - PAIN TYPE
TYPE: ACUTE PAIN

## 2022-03-12 NOTE — CARE PLAN
The patient is Watcher - Medium risk of patient condition declining or worsening    Shift Goals  Clinical Goals: Monitor Pt tremors and restlessness  Patient Goals: Rest  Family Goals: n/a    Progress made toward(s) clinical / shift goals:    Problem: Pain - Standard  Goal: Alleviation of pain or a reduction in pain to the patient’s comfort goal  Outcome: Progressing     Problem: Knowledge Deficit - Standard  Goal: Patient and family/care givers will demonstrate understanding of plan of care, disease process/condition, diagnostic tests and medications  Outcome: Progressing  Note: Pt has been kept up to date on the plan of care. Pt having a restless night, Tremors and spatic movements. MD team aware and following. Will continue to monitor Pt and keep up to date on the plan of care as orders are ordered.        Patient is not progressing towards the following goals:      Problem: Infection - Standard  Goal: Patient will remain free from infection  Outcome: Not Progressing  Note: Pt WBC trending upward. Tests ordered to find the source.      Problem: Bowel Elimination  Goal: Establish and maintain regular bowel function  Outcome: Not Progressing  Note: Pt had over 4 Bm throughout this shift.

## 2022-03-12 NOTE — PROGRESS NOTES
"   Orthopaedic Progress Note    Interval changes:  Patient doing well  Incisions checked for possible suture/  staple removal- not yet ready   Discussed plans with patient and wife for suture removal options later this week.  LLE in cam boot, dressings without issue    ROS - Patient denies any new issues.  Pain well controlled.    /61   Pulse 99   Temp 36.6 °C (97.9 °F) (Temporal)   Resp 18   Ht 1.702 m (5' 7\")   Wt 99.1 kg (218 lb 7.6 oz)   SpO2 99%       Patient seen and examined  No acute distress  Breathing non labored  RRR  LLE in cam boot dressings changed, incisions without issue, only small anterior incision ready for suture removal, DNVI moves all toes, cap refill <2 sec.     Recent Labs     03/09/22  0011 03/11/22  0030   WBC 9.9 12.2*   RBC 2.68* 3.02*   HEMOGLOBIN 7.8* 8.6*   HEMATOCRIT 24.6* 28.7*   MCV 91.8 95.0   MCH 29.1 28.5   MCHC 31.7* 30.0*   RDW 59.7* 60.5*   PLATELETCT 351 478*   MPV 8.9* 9.0       Active Hospital Problems    Diagnosis    • Hypercalcemia [E83.52]        Assessment/Plan:  Patient doing well  POD#13 S/P   1.  Open treatment with internal fixation, left trimalleolar ankle fracture with fixation of posterior lip.  2.  Open treatment without fixation of anterolateral distal tibia fracture with fragment excision.  3.  Surgical fixation of left distal tibiofibular joint disruption.  4.  Physician applied stress examination of the left ankle using fluoroscopy  Wt bearing status - NWB LLE  Wound care/Drains - Dressings to be changed every other day by nursing  Future Procedures - none planned   Sutures/Staples out- 21 days post operatively  PT/OT-initiated  Antibiotics: Perioperative completed  DVT Prophylaxis- TEDS/SCDs/Foot pumps  [place]-none planned   Case Coordination for Discharge Planning - Disposition pending     I have performed a physical exam and reviewed and updated ROS and Plan today (3/11). In review of yesterday's note (3/10), there are no changes except as " documented above.

## 2022-03-12 NOTE — DISCHARGE PLANNING
Following for post acute services Dr. Victoria consult recommending monitoring tolerance to therapy intervention. Will follow up Monday.

## 2022-03-12 NOTE — PROGRESS NOTES
Late chart: Pt increasingly delirius. Oriented x 4 but with hallucinations. Lethargic. Incontinent of stool x 5 this morning. Low grade temporal temp. Tachycardic. Noted new onset drainage from superior portion of midline incision. Thick serosanguinous output. Dressing changed. Pt with emesis x 2 with total of 250 mls of bilious output. Pt with muscle twitching and spastic movements as well as agitation. Monica DUARTE notified. New orders received for one time dose of dilaudid and calcium infusion.

## 2022-03-12 NOTE — PROGRESS NOTES
GYN/Oncology Progress Note               Author: EILEEN Dumont Date & Time created: 3/12/2022  2:05 PM     Interval History:  Patient doing well, emesis this am, denies any chest pain or shortness of breath. Multiple bowel movements. Spastic movements    Review of Systems:  Review of Systems   Constitutional: Negative for chills and fever.   Respiratory: Negative for cough and shortness of breath.    Cardiovascular: Negative for chest pain and leg swelling.   Gastrointestinal: Positive for abdominal pain. Negative for diarrhea, nausea and vomiting.   Genitourinary:        Suprapubic   Neurological: Negative for dizziness.       Physical Exam:  Physical Exam  Constitutional:       Appearance: Normal appearance.   Cardiovascular:      Pulses: Normal pulses.   Pulmonary:      Effort: Pulmonary effort is normal.   Abdominal:      Palpations: Abdomen is soft.      Comments: Midline wound well approximated, no s/s of infection, ALEIDA serosang, suprapubic catheter   Musculoskeletal:      Comments: Left boot   Skin:     General: Skin is warm and dry.      Capillary Refill: Capillary refill takes 2 to 3 seconds.   Neurological:      Mental Status: She is alert and oriented to person, place, and time.         Labs:          Recent Labs     03/11/22 0030 03/12/22  0012   SODIUM 137 138   POTASSIUM 4.0 3.8   CHLORIDE 107 104   CO2 20 19*   BUN 2* 3*   CREATININE 0.61 0.64   CALCIUM 5.8* 6.1*     Recent Labs     03/11/22  0030 03/12/22  0012   ALTSGPT 9 10   ASTSGOT 25 31   ALKPHOSPHAT 95 106*   TBILIRUBIN 0.3 0.4   GLUCOSE 109* 119*     Recent Labs     03/11/22 0030 03/12/22  0012   RBC 3.02* 3.06*   HEMOGLOBIN 8.6* 8.9*   HEMATOCRIT 28.7* 29.1*   PLATELETCT 478* 567*     Recent Labs     03/11/22 0030 03/12/22  0012   WBC 12.2* 16.4*   NEUTSPOLYS 76.40* 89.60*   LYMPHOCYTES 9.20* 4.30*   MONOCYTES 8.90 2.60   EOSINOPHILS 0.60 0.90   BASOPHILS 0.60 0.00   ASTSGOT 25 31   ALTSGPT 9 10   ALKPHOSPHAT 95 106*    TBILIRUBIN 0.3 0.4     Recent Labs     22  0030 22  0012   SODIUM 137 138   POTASSIUM 4.0 3.8   CHLORIDE 107 104   CO2 20 19*   GLUCOSE 109* 119*   BUN 2* 3*   CREATININE 0.61 0.64   CALCIUM 5.8* 6.1*     Hemodynamics:  Temp (24hrs), Av.9 °C (98.4 °F), Min:36.4 °C (97.6 °F), Max:37.6 °C (99.7 °F)  Temperature: 36.8 °C (98.2 °F)  Pulse  Av.3  Min: 75  Max: 142   Blood Pressure: 129/59     Respiratory:    Respiration: 20, Pulse Oximetry: 96 %        RUL Breath Sounds: Diminished, RML Breath Sounds: Diminished, RLL Breath Sounds: Diminished, MORENO Breath Sounds: Diminished, LLL Breath Sounds: Diminished  Fluids:    Intake/Output Summary (Last 24 hours) at 3/10/2022 1155  Last data filed at 3/10/2022 0430  Gross per 24 hour   Intake --   Output 1620 ml   Net -1620 ml        GI/Nutrition:  Orders Placed This Encounter   Procedures   • Diet Order Diet: Low Fiber(GI Soft)     Standing Status:   Standing     Number of Occurrences:   1     Order Specific Question:   Diet:     Answer:   Low Fiber(GI Soft) [2]     Medical Decision Making, by Problem:  Active Hospital Problems    Diagnosis    • *Hypercalcemia [E83.52]        Plan:  This is a 56 y.o. female with stage III (minimum) Grade 2 endometrial adenocarcinoma s/p 6 cycles of neoadjuvant chemotherapy with Taxol/Carbo completed on 22 , who presented with hypercalcemia, nausea, and vomiting     1. POD #9: s/p ex lap, hys, bso, cystectomy with repair and suprapubic catheter placement. Doing well, up to edge of bed only > encouraged continued mobilization, with at minimum up to EOB x 3 a day. Leukocytosis unclear source of infection, UA negative, culture pending    2. Post op ileus: stop reglan, resolved, advance diet to FLD then as tolerated, emesis this am  3. Post op pain: well controlled, stop dilaudid PCA, start dilaudid PO PRN, continue Toradol.    4. Hypocalcemia: patient with hx of hypercalemia, secondary to malignancy and dehydration, s/p  IVF, lasix, and pamidronate.  Now with hypocalcemia, replaced with 1g calcium gluconate.   5. Tachycardia: HR into 140's, improved now to 106 with IV dilaudid and calcium replacement       6.Stage III (minimum) Grade 2 endometrial adenocarcinoma:  s/p 6 cycles of neoadjuvant chemotherapy, now s/p ex lap, hys, bso, cysetectomy with suprapubic cath placement on 3/3/22.  Final pathology pending.   7. Left trimalleolar ankle fracture/dislocation: s/p ORIF 2/26, ortho following.    8. Upper extremity DVT: Venous US positive for DVT, s/p heparin gtt prior to surgery, Hgb stable, now on Xarelto.    9. Anemia: Secondary to chemotherapy and now secondary to acute blood loss. Hgb 7.5 pre operative on 3/3, s/p 2 unit intra op. S/p 1 unit on 3/5, Hgb 8.6 today      10. Elevated BP: No hx of HTN. On amlodipine 2.5 mg at home for Raynaud's > amlodipine to 5 mg daily, currently well controlled.   11. History of Lupus: continue home hydroxychloroquine  12. Hx of mood disorder: PTSD and anxiety. Continue home paroxetine and propranolol prn.   13. Hx Raynaud's disease: Continue amlodipine.   14. Hx of allergic rhinitis: Home Azelastine  15. Dispo: continue to mobilize, pending acute rehab evaluation    Patient seen by Dr. Dyson    Quality-Core Measures

## 2022-03-12 NOTE — CARE PLAN
The patient is Watcher - Medium risk of patient condition declining or worsening    Shift Goals  Clinical Goals: Pt will remain free from injury  Patient Goals: Pt will be able to rest comfortably  Family Goals: n/a    Progress made toward(s) clinical / shift goals:  Pt with frequent needs. BA on and sounding. Family at the bedside and assisting with care.       Problem: Pain - Standard  Goal: Alleviation of pain or a reduction in pain to the patient’s comfort goal  Outcome: Progressing     Problem: Knowledge Deficit - Standard  Goal: Patient and family/care givers will demonstrate understanding of plan of care, disease process/condition, diagnostic tests and medications  Outcome: Progressing     Problem: Psychosocial  Goal: Patient's level of anxiety will decrease  Outcome: Progressing  Goal: Patient's ability to verbalize feelings about condition will improve  Outcome: Progressing  Goal: Patient's ability to re-evaluate and adapt role responsibilities will improve  Outcome: Progressing       Patient is not progressing towards the following goals:  Pt with little rest due to fecal incontinence and frequency as well as agitation and muscle spasticity.

## 2022-03-12 NOTE — PROGRESS NOTES
The Pt has been very restless throughout the night. Tremors, spatic and intermittent confusion. MD team notified at 1021, 1147, 0116 and aware of Pt condition. Due to the Pt heart rate being tachycardic, Pt was placed on Tele monitoring.   EKG done, labs drawn, UA pending, will continue to monitor Pt.   Significant other was called to be given updates and a voicemail was left at 0155 on 3/12.

## 2022-03-12 NOTE — PROGRESS NOTES
Pt much more awake this afternoon. Continues with muscular twitching but markedly improved. Pain well controlled on current regimen. Noted rash to bilateral underarms and groin. Orange cap cream in use. MD notified. New orders received.

## 2022-03-13 LAB
ANION GAP SERPL CALC-SCNC: 12 MMOL/L (ref 7–16)
BASOPHILS # BLD AUTO: 0.5 % (ref 0–1.8)
BASOPHILS # BLD: 0.06 K/UL (ref 0–0.12)
BUN SERPL-MCNC: 2 MG/DL (ref 8–22)
CALCIUM SERPL-MCNC: 5.8 MG/DL (ref 8.5–10.5)
CHLORIDE SERPL-SCNC: 103 MMOL/L (ref 96–112)
CO2 SERPL-SCNC: 22 MMOL/L (ref 20–33)
CREAT SERPL-MCNC: 0.5 MG/DL (ref 0.5–1.4)
EOSINOPHIL # BLD AUTO: 0.11 K/UL (ref 0–0.51)
EOSINOPHIL NFR BLD: 0.9 % (ref 0–6.9)
ERYTHROCYTE [DISTWIDTH] IN BLOOD BY AUTOMATED COUNT: 54.6 FL (ref 35.9–50)
GLUCOSE SERPL-MCNC: 107 MG/DL (ref 65–99)
HCT VFR BLD AUTO: 24.7 % (ref 37–47)
HGB BLD-MCNC: 8 G/DL (ref 12–16)
IMM GRANULOCYTES # BLD AUTO: 0.55 K/UL (ref 0–0.11)
IMM GRANULOCYTES NFR BLD AUTO: 4.7 % (ref 0–0.9)
LYMPHOCYTES # BLD AUTO: 1.3 K/UL (ref 1–4.8)
LYMPHOCYTES NFR BLD: 11.1 % (ref 22–41)
MAGNESIUM SERPL-MCNC: 0.9 MG/DL (ref 1.5–2.5)
MCH RBC QN AUTO: 28.9 PG (ref 27–33)
MCHC RBC AUTO-ENTMCNC: 32.4 G/DL (ref 33.6–35)
MCV RBC AUTO: 89.2 FL (ref 81.4–97.8)
MONOCYTES # BLD AUTO: 1.05 K/UL (ref 0–0.85)
MONOCYTES NFR BLD AUTO: 9 % (ref 0–13.4)
NEUTROPHILS # BLD AUTO: 8.63 K/UL (ref 2–7.15)
NEUTROPHILS NFR BLD: 73.8 % (ref 44–72)
NRBC # BLD AUTO: 0.02 K/UL
NRBC BLD-RTO: 0.2 /100 WBC
PLATELET # BLD AUTO: 538 K/UL (ref 164–446)
PMV BLD AUTO: 8.9 FL (ref 9–12.9)
POTASSIUM SERPL-SCNC: 2.8 MMOL/L (ref 3.6–5.5)
RBC # BLD AUTO: 2.77 M/UL (ref 4.2–5.4)
SODIUM SERPL-SCNC: 137 MMOL/L (ref 135–145)
WBC # BLD AUTO: 11.7 K/UL (ref 4.8–10.8)

## 2022-03-13 PROCEDURE — A9270 NON-COVERED ITEM OR SERVICE: HCPCS

## 2022-03-13 PROCEDURE — 36415 COLL VENOUS BLD VENIPUNCTURE: CPT

## 2022-03-13 PROCEDURE — 700102 HCHG RX REV CODE 250 W/ 637 OVERRIDE(OP): Performed by: NURSE PRACTITIONER

## 2022-03-13 PROCEDURE — A9270 NON-COVERED ITEM OR SERVICE: HCPCS | Performed by: STUDENT IN AN ORGANIZED HEALTH CARE EDUCATION/TRAINING PROGRAM

## 2022-03-13 PROCEDURE — 700111 HCHG RX REV CODE 636 W/ 250 OVERRIDE (IP): Performed by: NURSE PRACTITIONER

## 2022-03-13 PROCEDURE — 770004 HCHG ROOM/CARE - ONCOLOGY PRIVATE *

## 2022-03-13 PROCEDURE — 85025 COMPLETE CBC W/AUTO DIFF WBC: CPT

## 2022-03-13 PROCEDURE — 83735 ASSAY OF MAGNESIUM: CPT

## 2022-03-13 PROCEDURE — 700102 HCHG RX REV CODE 250 W/ 637 OVERRIDE(OP): Performed by: STUDENT IN AN ORGANIZED HEALTH CARE EDUCATION/TRAINING PROGRAM

## 2022-03-13 PROCEDURE — 700102 HCHG RX REV CODE 250 W/ 637 OVERRIDE(OP)

## 2022-03-13 PROCEDURE — A9270 NON-COVERED ITEM OR SERVICE: HCPCS | Performed by: NURSE PRACTITIONER

## 2022-03-13 PROCEDURE — 700111 HCHG RX REV CODE 636 W/ 250 OVERRIDE (IP): Performed by: SPECIALIST

## 2022-03-13 PROCEDURE — 700101 HCHG RX REV CODE 250: Performed by: NURSE PRACTITIONER

## 2022-03-13 PROCEDURE — 80048 BASIC METABOLIC PNL TOTAL CA: CPT

## 2022-03-13 RX ORDER — CALCIUM GLUCONATE 20 MG/ML
1 INJECTION, SOLUTION INTRAVENOUS ONCE
Status: COMPLETED | OUTPATIENT
Start: 2022-03-13 | End: 2022-03-13

## 2022-03-13 RX ORDER — MAGNESIUM SULFATE HEPTAHYDRATE 40 MG/ML
4 INJECTION, SOLUTION INTRAVENOUS ONCE
Status: COMPLETED | OUTPATIENT
Start: 2022-03-13 | End: 2022-03-13

## 2022-03-13 RX ORDER — POTASSIUM CHLORIDE 7.45 MG/ML
10 INJECTION INTRAVENOUS
Status: COMPLETED | OUTPATIENT
Start: 2022-03-13 | End: 2022-03-13

## 2022-03-13 RX ADMIN — MIRTAZAPINE 22.5 MG: 15 TABLET, FILM COATED ORAL at 20:29

## 2022-03-13 RX ADMIN — HYDROMORPHONE HYDROCHLORIDE 2 MG: 2 TABLET ORAL at 23:11

## 2022-03-13 RX ADMIN — POTASSIUM CHLORIDE 10 MEQ: 7.46 INJECTION, SOLUTION INTRAVENOUS at 13:39

## 2022-03-13 RX ADMIN — POTASSIUM CHLORIDE 10 MEQ: 7.46 INJECTION, SOLUTION INTRAVENOUS at 11:14

## 2022-03-13 RX ADMIN — KETOROLAC TROMETHAMINE 30 MG: 30 INJECTION, SOLUTION INTRAMUSCULAR; INTRAVENOUS at 13:39

## 2022-03-13 RX ADMIN — POTASSIUM CHLORIDE 10 MEQ: 7.46 INJECTION, SOLUTION INTRAVENOUS at 12:32

## 2022-03-13 RX ADMIN — BACLOFEN 10 MG: 10 TABLET ORAL at 05:10

## 2022-03-13 RX ADMIN — MAGNESIUM SULFATE HEPTAHYDRATE 4 G: 40 INJECTION, SOLUTION INTRAVENOUS at 05:46

## 2022-03-13 RX ADMIN — BACLOFEN 10 MG: 10 TABLET ORAL at 17:13

## 2022-03-13 RX ADMIN — ROPINIROLE HYDROCHLORIDE 1 MG: 1 TABLET, FILM COATED ORAL at 17:12

## 2022-03-13 RX ADMIN — RIVAROXABAN 15 MG: 15 TABLET, FILM COATED ORAL at 10:14

## 2022-03-13 RX ADMIN — KETOROLAC TROMETHAMINE 30 MG: 30 INJECTION, SOLUTION INTRAMUSCULAR; INTRAVENOUS at 20:27

## 2022-03-13 RX ADMIN — DIPHENHYDRAMINE HYDROCHLORIDE 25 MG: 25 TABLET ORAL at 23:11

## 2022-03-13 RX ADMIN — AMLODIPINE BESYLATE 5 MG: 5 TABLET ORAL at 17:12

## 2022-03-13 RX ADMIN — POTASSIUM CHLORIDE 10 MEQ: 7.46 INJECTION, SOLUTION INTRAVENOUS at 10:15

## 2022-03-13 RX ADMIN — POTASSIUM CHLORIDE, DEXTROSE MONOHYDRATE AND SODIUM CHLORIDE: 150; 5; 450 INJECTION, SOLUTION INTRAVENOUS at 15:31

## 2022-03-13 RX ADMIN — BACLOFEN 10 MG: 10 TABLET ORAL at 11:13

## 2022-03-13 RX ADMIN — HYDROMORPHONE HYDROCHLORIDE 2 MG: 2 TABLET ORAL at 00:22

## 2022-03-13 RX ADMIN — CALCIUM GLUCONATE 1 G: 20 INJECTION, SOLUTION INTRAVENOUS at 06:14

## 2022-03-13 RX ADMIN — RIVAROXABAN 15 MG: 15 TABLET, FILM COATED ORAL at 17:12

## 2022-03-13 RX ADMIN — POTASSIUM CHLORIDE, DEXTROSE MONOHYDRATE AND SODIUM CHLORIDE: 150; 5; 450 INJECTION, SOLUTION INTRAVENOUS at 00:22

## 2022-03-13 RX ADMIN — KETOROLAC TROMETHAMINE 30 MG: 30 INJECTION, SOLUTION INTRAMUSCULAR; INTRAVENOUS at 05:09

## 2022-03-13 RX ADMIN — HYDROMORPHONE HYDROCHLORIDE 2 MG: 2 TABLET ORAL at 17:12

## 2022-03-13 RX ADMIN — ROPINIROLE HYDROCHLORIDE 1 MG: 1 TABLET, FILM COATED ORAL at 05:10

## 2022-03-13 ASSESSMENT — PAIN DESCRIPTION - PAIN TYPE: TYPE: ACUTE PAIN

## 2022-03-13 ASSESSMENT — ENCOUNTER SYMPTOMS
SHORTNESS OF BREATH: 0
ABDOMINAL PAIN: 1
VOMITING: 0
FEVER: 0
NAUSEA: 0
CHILLS: 0
DIARRHEA: 0
DIZZINESS: 0
COUGH: 0

## 2022-03-13 NOTE — CARE PLAN
The patient is Watcher - Medium risk of patient condition declining or worsening    Shift Goals  Clinical Goals: Pt will have controlled pain and remain free from injury  Patient Goals: Get up on the commode  Family Goals: NA    Progress made toward(s) clinical / shift goals:    Problem: Pain - Standard  Goal: Alleviation of pain or a reduction in pain to the patient’s comfort goal  Outcome: Progressing     Problem: Knowledge Deficit - Standard  Goal: Patient and family/care givers will demonstrate understanding of plan of care, disease process/condition, diagnostic tests and medications  Outcome: Progressing     Problem: Psychosocial  Goal: Patient's level of anxiety will decrease  Outcome: Progressing  Goal: Patient's ability to verbalize feelings about condition will improve  Outcome: Progressing  Goal: Patient's ability to re-evaluate and adapt role responsibilities will improve  Outcome: Progressing  Goal: Patient and family will demonstrate ability to cope with life altering diagnosis and/or procedure  Outcome: Progressing  Goal: Spiritual and cultural needs incorporated into hospitalization  Outcome: Progressing     Problem: Mobility  Goal: Patient's capacity to carry out activities will improve  Outcome: Progressing     Problem: Self Care  Goal: Patient will have the ability to perform ADLs independently or with assistance (bathe, groom, dress, toilet and feed)  Outcome: Progressing     Problem: Infection - Standard  Goal: Patient will remain free from infection  Outcome: Progressing     Problem: Fall Risk  Goal: Patient will remain free from falls  Outcome: Progressing     Problem: Skin Integrity  Goal: Skin integrity is maintained or improved  Outcome: Progressing     Problem: Respiratory  Goal: Patient will achieve/maintain optimum respiratory ventilation and gas exchange  Outcome: Progressing     Problem: Bowel Elimination  Goal: Establish and maintain regular bowel function  Outcome: Progressing        Patient is not progressing towards the following goals:

## 2022-03-13 NOTE — PROGRESS NOTES
GYN/Oncology Progress Note               Author: EILEEN Dumont Date & Time created: 3/13/2022  2:38 PM     Interval History:  Patient doing well, no further N/V, denies any chest pain or shortness of breath. Bowel movements has decreased, spastic movements has improved.     Review of Systems:  Review of Systems   Constitutional: Negative for chills and fever.   Respiratory: Negative for cough and shortness of breath.    Cardiovascular: Negative for chest pain and leg swelling.   Gastrointestinal: Positive for abdominal pain. Negative for diarrhea, nausea and vomiting.   Genitourinary:        Suprapubic   Neurological: Negative for dizziness.       Physical Exam:  Physical Exam  Constitutional:       Appearance: Normal appearance.   Cardiovascular:      Pulses: Normal pulses.   Pulmonary:      Effort: Pulmonary effort is normal.   Abdominal:      Palpations: Abdomen is soft.      Comments: Midline wound well approximated, no s/s of infection, ALEIDA serosang, suprapubic catheter   Musculoskeletal:      Comments: Left boot   Skin:     General: Skin is warm and dry.      Capillary Refill: Capillary refill takes 2 to 3 seconds.   Neurological:      Mental Status: She is alert and oriented to person, place, and time.         Labs:          Recent Labs     03/11/22 0030 03/12/22 0012 03/13/22  0340   SODIUM 137 138 137   POTASSIUM 4.0 3.8 2.8*   CHLORIDE 107 104 103   CO2 20 19* 22   BUN 2* 3* 2*   CREATININE 0.61 0.64 0.50   MAGNESIUM  --   --  0.9*   CALCIUM 5.8* 6.1* 5.8*     Recent Labs     03/11/22  0030 03/12/22  0012 03/13/22  0340   ALTSGPT 9 10  --    ASTSGOT 25 31  --    ALKPHOSPHAT 95 106*  --    TBILIRUBIN 0.3 0.4  --    GLUCOSE 109* 119* 107*     Recent Labs     03/11/22  0030 03/12/22  0012 03/13/22  0340   RBC 3.02* 3.06* 2.77*   HEMOGLOBIN 8.6* 8.9* 8.0*   HEMATOCRIT 28.7* 29.1* 24.7*   PLATELETCT 478* 567* 538*     Recent Labs     03/11/22  0030 03/12/22  0012 03/13/22  0340   WBC 12.2* 16.4*  11.7*   NEUTSPOLYS 76.40* 89.60* 73.80*   LYMPHOCYTES 9.20* 4.30* 11.10*   MONOCYTES 8.90 2.60 9.00   EOSINOPHILS 0.60 0.90 0.90   BASOPHILS 0.60 0.00 0.50   ASTSGOT 25 31  --    ALTSGPT 9 10  --    ALKPHOSPHAT 95 106*  --    TBILIRUBIN 0.3 0.4  --      Recent Labs     22  0030 22  0012 22  0340   SODIUM 137 138 137   POTASSIUM 4.0 3.8 2.8*   CHLORIDE 107 104 103   CO2 20 19* 22   GLUCOSE 109* 119* 107*   BUN 2* 3* 2*   CREATININE 0.61 0.64 0.50   CALCIUM 5.8* 6.1* 5.8*     Hemodynamics:  Temp (24hrs), Av.9 °C (98.4 °F), Min:36.6 °C (97.8 °F), Max:37.1 °C (98.8 °F)  Temperature: 37.1 °C (98.8 °F)  Pulse  Av  Min: 75  Max: 142   Blood Pressure: 154/43     Respiratory:    Respiration: 18, Pulse Oximetry: 98 %        RUL Breath Sounds: Clear, RML Breath Sounds: Clear, RLL Breath Sounds: Diminished, MORENO Breath Sounds: Clear, LLL Breath Sounds: Diminished  Fluids:    Intake/Output Summary (Last 24 hours) at 3/10/2022 1155  Last data filed at 3/10/2022 0430  Gross per 24 hour   Intake --   Output 1620 ml   Net -1620 ml        GI/Nutrition:  Orders Placed This Encounter   Procedures   • Diet Order Diet: Low Fiber(GI Soft)     Standing Status:   Standing     Number of Occurrences:   1     Order Specific Question:   Diet:     Answer:   Low Fiber(GI Soft) [2]     Medical Decision Making, by Problem:  Active Hospital Problems    Diagnosis    • *Hypercalcemia [E83.52]        Plan:  This is a 56 y.o. female with stage III (minimum) Grade 2 endometrial adenocarcinoma s/p 6 cycles of neoadjuvant chemotherapy with Taxol/Carbo completed on 22 , who presented with hypercalcemia, nausea, and vomiting     1. POD #10: s/p ex lap, hys, bso, cystectomy with repair and suprapubic catheter placement. Doing well, up to edge of bed only > encouraged continued mobilization, with at minimum up to EOB x 3 a day.   2. Leukocytosis:  unclear source of infection, UA negative, culture pending, improved    3. Post op  ileus: stop reglan, resolved, advance diet to FLD then as tolerated, emesis this am, now with diarrhea that has improved.  4. Post op pain: well controlled, stop dilaudid PCA, start dilaudid PO PRN, continue Toradol.    5. Hypocalcemia: patient with hx of hypercalemia, secondary to malignancy and dehydration, s/p IVF, lasix, and pamidronate.  Now with hypocalcemia, replaced with 1g calcium gluconate again today, continue cardiac monitor until improved  6. Tachycardia: HR into 140's, improved now to 106 with IV dilaudid and calcium replacement, improved back to baseline  7. Hypokalemia: K 2.8 today, replaced, recheck in am  8. Hypomagnesemia: replaced today, recheck in am  9.Stage III (minimum) Grade 2 endometrial adenocarcinoma:  s/p 6 cycles of neoadjuvant chemotherapy, now s/p ex lap, hys, bso, cysetectomy with suprapubic cath placement on 3/3/22.  Final pathology pending.   10. Left trimalleolar ankle fracture/dislocation: s/p ORIF 2/26, ortho following.    11. Upper extremity DVT: Venous US positive for DVT, s/p heparin gtt prior to surgery, Hgb stable, now on Xarelto.    12. Anemia: Secondary to chemotherapy and now secondary to acute blood loss. Hgb 7.5 pre operative on 3/3, s/p 2 unit intra op. S/p 1 unit on 3/5, stable  13. Elevated BP: No hx of HTN. On amlodipine 2.5 mg at home for Raynaud's > amlodipine to 5 mg daily, currently well controlled.   14. History of Lupus: continue home hydroxychloroquine  15. Hx of mood disorder: PTSD and anxiety. Continue home paroxetine and propranolol prn.   16. Hx Raynaud's disease: Continue amlodipine.   17. Hx of allergic rhinitis: Home Azelastine  18. Dispo: continue to mobilize, pending acute rehab evaluation    Patient discussed with MD    Quality-Core Measures

## 2022-03-13 NOTE — PROGRESS NOTES
Lab called with critical result of Mag 0.9 and Calcium 5.6 at 0447. Critical lab result read back to technician.   Dr. Dyson notified of critical lab result at 0501.  Critical lab result read back by Dr. Dyson. New orders received.

## 2022-03-13 NOTE — PROGRESS NOTES
Pt alert and oriented x 4. Denies nausea and states pain is a 3/10 in her L leg. Declines intervention at this time. Delirium resolved. Persistent tremors and spasticity but improved from yesterday. Suprapubic cath to DD with large amount of pale yellow urine out. Oz wth noderate amount of serous output. Diarrhea has decreased. Potassium of 2.8 this morning. Dr Dyson notified. New orders received. Pt resting comfortably at this time. BA on and sounding. Call light within reach. Hourly rounding in place.

## 2022-03-14 LAB
ANION GAP SERPL CALC-SCNC: 12 MMOL/L (ref 7–16)
BASOPHILS # BLD AUTO: 0.6 % (ref 0–1.8)
BASOPHILS # BLD: 0.07 K/UL (ref 0–0.12)
BUN SERPL-MCNC: 3 MG/DL (ref 8–22)
CALCIUM SERPL-MCNC: 6.2 MG/DL (ref 8.5–10.5)
CHLORIDE SERPL-SCNC: 102 MMOL/L (ref 96–112)
CO2 SERPL-SCNC: 23 MMOL/L (ref 20–33)
CREAT SERPL-MCNC: 0.57 MG/DL (ref 0.5–1.4)
EOSINOPHIL # BLD AUTO: 0.03 K/UL (ref 0–0.51)
EOSINOPHIL NFR BLD: 0.2 % (ref 0–6.9)
ERYTHROCYTE [DISTWIDTH] IN BLOOD BY AUTOMATED COUNT: 56.1 FL (ref 35.9–50)
GLUCOSE SERPL-MCNC: 108 MG/DL (ref 65–99)
HCT VFR BLD AUTO: 28.5 % (ref 37–47)
HGB BLD-MCNC: 8.9 G/DL (ref 12–16)
IMM GRANULOCYTES # BLD AUTO: 0.59 K/UL (ref 0–0.11)
IMM GRANULOCYTES NFR BLD AUTO: 4.9 % (ref 0–0.9)
LYMPHOCYTES # BLD AUTO: 1.27 K/UL (ref 1–4.8)
LYMPHOCYTES NFR BLD: 10.5 % (ref 22–41)
MAGNESIUM SERPL-MCNC: 1.5 MG/DL (ref 1.5–2.5)
MCH RBC QN AUTO: 28.5 PG (ref 27–33)
MCHC RBC AUTO-ENTMCNC: 31.2 G/DL (ref 33.6–35)
MCV RBC AUTO: 91.3 FL (ref 81.4–97.8)
MONOCYTES # BLD AUTO: 0.99 K/UL (ref 0–0.85)
MONOCYTES NFR BLD AUTO: 8.2 % (ref 0–13.4)
NEUTROPHILS # BLD AUTO: 9.15 K/UL (ref 2–7.15)
NEUTROPHILS NFR BLD: 75.6 % (ref 44–72)
NRBC # BLD AUTO: 0.02 K/UL
NRBC BLD-RTO: 0.2 /100 WBC
PLATELET # BLD AUTO: 581 K/UL (ref 164–446)
PMV BLD AUTO: 8.9 FL (ref 9–12.9)
POTASSIUM SERPL-SCNC: 2.9 MMOL/L (ref 3.6–5.5)
RBC # BLD AUTO: 3.12 M/UL (ref 4.2–5.4)
SODIUM SERPL-SCNC: 137 MMOL/L (ref 135–145)
WBC # BLD AUTO: 12.1 K/UL (ref 4.8–10.8)

## 2022-03-14 PROCEDURE — 700102 HCHG RX REV CODE 250 W/ 637 OVERRIDE(OP)

## 2022-03-14 PROCEDURE — A9270 NON-COVERED ITEM OR SERVICE: HCPCS | Performed by: OBSTETRICS & GYNECOLOGY

## 2022-03-14 PROCEDURE — 700102 HCHG RX REV CODE 250 W/ 637 OVERRIDE(OP): Performed by: NURSE PRACTITIONER

## 2022-03-14 PROCEDURE — 87086 URINE CULTURE/COLONY COUNT: CPT

## 2022-03-14 PROCEDURE — 700111 HCHG RX REV CODE 636 W/ 250 OVERRIDE (IP): Performed by: NURSE PRACTITIONER

## 2022-03-14 PROCEDURE — 83735 ASSAY OF MAGNESIUM: CPT

## 2022-03-14 PROCEDURE — A9270 NON-COVERED ITEM OR SERVICE: HCPCS | Performed by: NURSE PRACTITIONER

## 2022-03-14 PROCEDURE — 700111 HCHG RX REV CODE 636 W/ 250 OVERRIDE (IP)

## 2022-03-14 PROCEDURE — 700101 HCHG RX REV CODE 250: Performed by: NURSE PRACTITIONER

## 2022-03-14 PROCEDURE — 700102 HCHG RX REV CODE 250 W/ 637 OVERRIDE(OP): Performed by: STUDENT IN AN ORGANIZED HEALTH CARE EDUCATION/TRAINING PROGRAM

## 2022-03-14 PROCEDURE — 97530 THERAPEUTIC ACTIVITIES: CPT

## 2022-03-14 PROCEDURE — 80048 BASIC METABOLIC PNL TOTAL CA: CPT

## 2022-03-14 PROCEDURE — 700111 HCHG RX REV CODE 636 W/ 250 OVERRIDE (IP): Performed by: OBSTETRICS & GYNECOLOGY

## 2022-03-14 PROCEDURE — 700102 HCHG RX REV CODE 250 W/ 637 OVERRIDE(OP): Performed by: OBSTETRICS & GYNECOLOGY

## 2022-03-14 PROCEDURE — A9270 NON-COVERED ITEM OR SERVICE: HCPCS

## 2022-03-14 PROCEDURE — 85025 COMPLETE CBC W/AUTO DIFF WBC: CPT

## 2022-03-14 PROCEDURE — 87077 CULTURE AEROBIC IDENTIFY: CPT

## 2022-03-14 PROCEDURE — 36415 COLL VENOUS BLD VENIPUNCTURE: CPT

## 2022-03-14 PROCEDURE — A9270 NON-COVERED ITEM OR SERVICE: HCPCS | Performed by: STUDENT IN AN ORGANIZED HEALTH CARE EDUCATION/TRAINING PROGRAM

## 2022-03-14 PROCEDURE — 87186 SC STD MICRODIL/AGAR DIL: CPT

## 2022-03-14 PROCEDURE — 770004 HCHG ROOM/CARE - ONCOLOGY PRIVATE *

## 2022-03-14 RX ORDER — NYSTATIN 100000 [USP'U]/G
POWDER TOPICAL 2 TIMES DAILY
Status: DISCONTINUED | OUTPATIENT
Start: 2022-03-14 | End: 2022-03-24 | Stop reason: HOSPADM

## 2022-03-14 RX ORDER — CALCIUM GLUCONATE 20 MG/ML
1 INJECTION, SOLUTION INTRAVENOUS ONCE
Status: COMPLETED | OUTPATIENT
Start: 2022-03-14 | End: 2022-03-14

## 2022-03-14 RX ORDER — POTASSIUM CHLORIDE 7.45 MG/ML
10 INJECTION INTRAVENOUS
Status: COMPLETED | OUTPATIENT
Start: 2022-03-14 | End: 2022-03-14

## 2022-03-14 RX ORDER — MAGNESIUM SULFATE HEPTAHYDRATE 40 MG/ML
2 INJECTION, SOLUTION INTRAVENOUS ONCE
Status: COMPLETED | OUTPATIENT
Start: 2022-03-14 | End: 2022-03-14

## 2022-03-14 RX ADMIN — POTASSIUM CHLORIDE, DEXTROSE MONOHYDRATE AND SODIUM CHLORIDE: 150; 5; 450 INJECTION, SOLUTION INTRAVENOUS at 04:39

## 2022-03-14 RX ADMIN — BACLOFEN 10 MG: 10 TABLET ORAL at 14:09

## 2022-03-14 RX ADMIN — ONDANSETRON 4 MG: 2 INJECTION INTRAMUSCULAR; INTRAVENOUS at 02:48

## 2022-03-14 RX ADMIN — POTASSIUM CHLORIDE 10 MEQ: 7.46 INJECTION, SOLUTION INTRAVENOUS at 09:00

## 2022-03-14 RX ADMIN — POTASSIUM CHLORIDE 10 MEQ: 7.46 INJECTION, SOLUTION INTRAVENOUS at 14:12

## 2022-03-14 RX ADMIN — POTASSIUM CHLORIDE 10 MEQ: 7.46 INJECTION, SOLUTION INTRAVENOUS at 10:00

## 2022-03-14 RX ADMIN — KETOROLAC TROMETHAMINE 30 MG: 30 INJECTION, SOLUTION INTRAMUSCULAR; INTRAVENOUS at 21:03

## 2022-03-14 RX ADMIN — CALCIUM GLUCONATE 1 G: 20 INJECTION, SOLUTION INTRAVENOUS at 09:08

## 2022-03-14 RX ADMIN — POTASSIUM CHLORIDE 10 MEQ: 7.46 INJECTION, SOLUTION INTRAVENOUS at 15:36

## 2022-03-14 RX ADMIN — POTASSIUM CHLORIDE 10 MEQ: 7.46 INJECTION, SOLUTION INTRAVENOUS at 17:46

## 2022-03-14 RX ADMIN — BACLOFEN 10 MG: 10 TABLET ORAL at 04:37

## 2022-03-14 RX ADMIN — HYDROMORPHONE HYDROCHLORIDE 2 MG: 2 TABLET ORAL at 17:13

## 2022-03-14 RX ADMIN — ROPINIROLE HYDROCHLORIDE 1 MG: 1 TABLET, FILM COATED ORAL at 17:44

## 2022-03-14 RX ADMIN — HYDROMORPHONE HYDROCHLORIDE 2 MG: 2 TABLET ORAL at 09:22

## 2022-03-14 RX ADMIN — NYSTATIN: 100000 POWDER TOPICAL at 22:00

## 2022-03-14 RX ADMIN — RIVAROXABAN 15 MG: 15 TABLET, FILM COATED ORAL at 09:09

## 2022-03-14 RX ADMIN — ROPINIROLE HYDROCHLORIDE 1 MG: 1 TABLET, FILM COATED ORAL at 04:37

## 2022-03-14 RX ADMIN — PAROXETINE 60 MG: 30 TABLET, FILM COATED ORAL at 21:02

## 2022-03-14 RX ADMIN — POTASSIUM CHLORIDE 10 MEQ: 7.46 INJECTION, SOLUTION INTRAVENOUS at 11:00

## 2022-03-14 RX ADMIN — HYDROXYCHLOROQUINE SULFATE 400 MG: 200 TABLET ORAL at 09:08

## 2022-03-14 RX ADMIN — MAGNESIUM SULFATE HEPTAHYDRATE 2 G: 40 INJECTION, SOLUTION INTRAVENOUS at 10:49

## 2022-03-14 RX ADMIN — DIPHENHYDRAMINE HYDROCHLORIDE 25 MG: 25 TABLET ORAL at 21:21

## 2022-03-14 RX ADMIN — KETOROLAC TROMETHAMINE 30 MG: 30 INJECTION, SOLUTION INTRAMUSCULAR; INTRAVENOUS at 02:48

## 2022-03-14 RX ADMIN — RIVAROXABAN 15 MG: 15 TABLET, FILM COATED ORAL at 17:14

## 2022-03-14 RX ADMIN — BACLOFEN 10 MG: 10 TABLET ORAL at 17:13

## 2022-03-14 RX ADMIN — MIRTAZAPINE 22.5 MG: 15 TABLET, FILM COATED ORAL at 21:02

## 2022-03-14 RX ADMIN — HYDROMORPHONE HYDROCHLORIDE 2 MG: 2 TABLET ORAL at 04:37

## 2022-03-14 RX ADMIN — AMLODIPINE BESYLATE 5 MG: 5 TABLET ORAL at 17:14

## 2022-03-14 RX ADMIN — KETOROLAC TROMETHAMINE 30 MG: 30 INJECTION, SOLUTION INTRAMUSCULAR; INTRAVENOUS at 14:06

## 2022-03-14 ASSESSMENT — COGNITIVE AND FUNCTIONAL STATUS - GENERAL
SUGGESTED CMS G CODE MODIFIER MOBILITY: CM
STANDING UP FROM CHAIR USING ARMS: A LOT
TURNING FROM BACK TO SIDE WHILE IN FLAT BAD: A LOT
WALKING IN HOSPITAL ROOM: TOTAL
CLIMB 3 TO 5 STEPS WITH RAILING: TOTAL
MOVING TO AND FROM BED TO CHAIR: A LOT
MOVING FROM LYING ON BACK TO SITTING ON SIDE OF FLAT BED: UNABLE
MOBILITY SCORE: 9

## 2022-03-14 ASSESSMENT — PAIN DESCRIPTION - PAIN TYPE
TYPE: ACUTE PAIN

## 2022-03-14 ASSESSMENT — GAIT ASSESSMENTS: GAIT LEVEL OF ASSIST: UNABLE TO PARTICIPATE

## 2022-03-14 NOTE — CARE PLAN
The patient is Stable - Low risk of patient condition declining or worsening    Shift Goals  Clinical Goals: Pain and nausea control. Mobilize. Skin preservation  Patient Goals: Mobilize  Family Goals: NA    Progress made toward(s) clinical / shift goals: Mobilized, EOB and up to cardiac chair. Q2 turns encouraged and skin preservation interventions in place.     A&Ox4. Max assist for stand and pivot transfer. Sat EOB for about 10 minutes; up to cardiac chair. N/V observed and treated per MAR x1. Pain reported 6-7/10; treated per MAR and nonpharmacologic interventions. Patient reports desire to resume home regimen for pan management. ROM performed. Purulent drainage observed at suprapubic catheter site; site care and dressing provided. POC discussed; wife at the bedside. All questions answered; patient demonstrates understanding. Call light and belongings within reach, calls appropriately to make needs known; hourly rounding in place.     Patient is not progressing towards the following goals: Increased vomiting this shift.       Problem: Psychosocial  Goal: Patient's level of anxiety will decrease  Outcome: Not Progressing

## 2022-03-14 NOTE — CARE PLAN
The patient is Watcher - Medium risk of patient condition declining or worsening    Shift Goals  Clinical Goals: Pt will have controlled pain and remain free from injury  Patient Goals: Get up on the commode  Family Goals: NA    Progress made toward(s) clinical / shift goals:  Pain well controlled on current regimen with toradol and oral dilaudid. Unable to get pt up to the bedside commode but pt did sit up in the cardiac chair for 30 minutes.       Problem: Pain - Standard  Goal: Alleviation of pain or a reduction in pain to the patient’s comfort goal  Outcome: Progressing     Problem: Knowledge Deficit - Standard  Goal: Patient and family/care givers will demonstrate understanding of plan of care, disease process/condition, diagnostic tests and medications  Outcome: Progressing     Problem: Psychosocial  Goal: Patient's level of anxiety will decrease  Outcome: Progressing     Problem: Mobility  Goal: Patient's capacity to carry out activities will improve  Outcome: Progressing     Problem: Infection - Standard  Goal: Patient will remain free from infection  Outcome: Progressing     Problem: Fall Risk  Goal: Patient will remain free from falls  Outcome: Progressing       Patient is not progressing towards the following goals:

## 2022-03-14 NOTE — CARE PLAN
Problem: Nutritional:  Goal: Achieve adequate nutritional intake  Description: Patient will tolerate regular diet and consume 50% of meals  Outcome: Progressing     See RD note.

## 2022-03-14 NOTE — CARE PLAN
The patient is Stable - Low risk of patient condition declining or worsening    Shift Goals  Clinical Goals: Pain and nausea control. Mobilize. Skin preservation  Patient Goals: Mobilize  Family Goals: NA    Progress made toward(s) clinical / shift goals: Mobilized, EOB and up to cardiac chair. Q2 turns encouraged and skin preservation interventions in place.     A&Ox4. Max assist for stand and pivot transfer. Sat EOB for about 10 minutes; up to cardiac chair. N/V observed and treated per MAR x1. Pain reported 6-7/10; treated per MAR and nonpharmacologic interventions. Patient reports desire to resume home regimen for pan management. ROM performed. Purulent drainage observed at suprapubic catheter site; site care and dressing provided. POC discussed; wife at the bedside. All questions answered; patient demonstrates understanding. Call light and belongings within reach, calls appropriately to make needs known; hourly rounding in place.     Patient is not progressing towards the following goals: Increased vomiting this shift.       Problem: Psychosocial  Goal: Patient's level of anxiety will decrease  Outcome: Not Progressing   The patient is Watcher - Medium risk of patient condition declining or worsening    Shift Goals  Clinical Goals: Pain and nausea control. Mobilize. Skin preservation  Patient Goals: Mobilize  Family Goals: NA    Progress made toward(s) clinical / shift goals:      Patient is not progressing towards the following goals:

## 2022-03-14 NOTE — DISCHARGE PLANNING
Monitoring patient's progress and tolerance for IPR level of therapy. Would appreciate updated therapy notes for physiatry and insurance to review TCC will continue to follow.

## 2022-03-14 NOTE — PROGRESS NOTES
Monitor Summary: Sinus rhythm 88-99. Rare PVCs and PACs. PSVT up to 160 for 2 seconds at 1440. .13/.07/.39
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
## 2022-03-15 LAB
ALBUMIN SERPL BCP-MCNC: 2.5 G/DL (ref 3.2–4.9)
ALBUMIN/GLOB SERPL: 0.8 G/DL
ALP SERPL-CCNC: 119 U/L (ref 30–99)
ALT SERPL-CCNC: 9 U/L (ref 2–50)
ANION GAP SERPL CALC-SCNC: 14 MMOL/L (ref 7–16)
AST SERPL-CCNC: 21 U/L (ref 12–45)
BASOPHILS # BLD AUTO: 0.3 % (ref 0–1.8)
BASOPHILS # BLD: 0.05 K/UL (ref 0–0.12)
BILIRUB SERPL-MCNC: 0.4 MG/DL (ref 0.1–1.5)
BUN SERPL-MCNC: 4 MG/DL (ref 8–22)
CALCIUM SERPL-MCNC: 6.5 MG/DL (ref 8.5–10.5)
CHLORIDE SERPL-SCNC: 102 MMOL/L (ref 96–112)
CO2 SERPL-SCNC: 23 MMOL/L (ref 20–33)
CREAT SERPL-MCNC: 0.67 MG/DL (ref 0.5–1.4)
EOSINOPHIL # BLD AUTO: 0 K/UL (ref 0–0.51)
EOSINOPHIL NFR BLD: 0 % (ref 0–6.9)
ERYTHROCYTE [DISTWIDTH] IN BLOOD BY AUTOMATED COUNT: 56.7 FL (ref 35.9–50)
GFR SERPLBLD CREATININE-BSD FMLA CKD-EPI: 102 ML/MIN/1.73 M 2
GLOBULIN SER CALC-MCNC: 3 G/DL (ref 1.9–3.5)
GLUCOSE SERPL-MCNC: 114 MG/DL (ref 65–99)
HCT VFR BLD AUTO: 27.5 % (ref 37–47)
HGB BLD-MCNC: 8.7 G/DL (ref 12–16)
IMM GRANULOCYTES # BLD AUTO: 0.51 K/UL (ref 0–0.11)
IMM GRANULOCYTES NFR BLD AUTO: 2.8 % (ref 0–0.9)
LYMPHOCYTES # BLD AUTO: 1.17 K/UL (ref 1–4.8)
LYMPHOCYTES NFR BLD: 6.4 % (ref 22–41)
MAGNESIUM SERPL-MCNC: 1.7 MG/DL (ref 1.5–2.5)
MCH RBC QN AUTO: 28.8 PG (ref 27–33)
MCHC RBC AUTO-ENTMCNC: 31.6 G/DL (ref 33.6–35)
MCV RBC AUTO: 91.1 FL (ref 81.4–97.8)
MONOCYTES # BLD AUTO: 1.13 K/UL (ref 0–0.85)
MONOCYTES NFR BLD AUTO: 6.2 % (ref 0–13.4)
NEUTROPHILS # BLD AUTO: 15.5 K/UL (ref 2–7.15)
NEUTROPHILS NFR BLD: 84.3 % (ref 44–72)
NRBC # BLD AUTO: 0.02 K/UL
NRBC BLD-RTO: 0.1 /100 WBC
PLATELET # BLD AUTO: 558 K/UL (ref 164–446)
PMV BLD AUTO: 8.7 FL (ref 9–12.9)
POTASSIUM SERPL-SCNC: 3.4 MMOL/L (ref 3.6–5.5)
PROT SERPL-MCNC: 5.5 G/DL (ref 6–8.2)
RBC # BLD AUTO: 3.02 M/UL (ref 4.2–5.4)
SODIUM SERPL-SCNC: 139 MMOL/L (ref 135–145)
WBC # BLD AUTO: 18.4 K/UL (ref 4.8–10.8)

## 2022-03-15 PROCEDURE — 700111 HCHG RX REV CODE 636 W/ 250 OVERRIDE (IP)

## 2022-03-15 PROCEDURE — A9270 NON-COVERED ITEM OR SERVICE: HCPCS

## 2022-03-15 PROCEDURE — A9270 NON-COVERED ITEM OR SERVICE: HCPCS | Performed by: NURSE PRACTITIONER

## 2022-03-15 PROCEDURE — 700101 HCHG RX REV CODE 250: Performed by: NURSE PRACTITIONER

## 2022-03-15 PROCEDURE — 80053 COMPREHEN METABOLIC PANEL: CPT

## 2022-03-15 PROCEDURE — 770004 HCHG ROOM/CARE - ONCOLOGY PRIVATE *

## 2022-03-15 PROCEDURE — 700102 HCHG RX REV CODE 250 W/ 637 OVERRIDE(OP)

## 2022-03-15 PROCEDURE — 83735 ASSAY OF MAGNESIUM: CPT

## 2022-03-15 PROCEDURE — 85025 COMPLETE CBC W/AUTO DIFF WBC: CPT

## 2022-03-15 PROCEDURE — 700105 HCHG RX REV CODE 258: Performed by: NURSE PRACTITIONER

## 2022-03-15 PROCEDURE — 700102 HCHG RX REV CODE 250 W/ 637 OVERRIDE(OP): Performed by: NURSE PRACTITIONER

## 2022-03-15 PROCEDURE — 36415 COLL VENOUS BLD VENIPUNCTURE: CPT

## 2022-03-15 PROCEDURE — 700111 HCHG RX REV CODE 636 W/ 250 OVERRIDE (IP): Performed by: NURSE PRACTITIONER

## 2022-03-15 PROCEDURE — 87040 BLOOD CULTURE FOR BACTERIA: CPT | Mod: 91

## 2022-03-15 RX ORDER — CALCIUM CARBONATE 500 MG/1
500 TABLET, CHEWABLE ORAL 3 TIMES DAILY
Status: DISCONTINUED | OUTPATIENT
Start: 2022-03-15 | End: 2022-03-24 | Stop reason: HOSPADM

## 2022-03-15 RX ADMIN — KETOROLAC TROMETHAMINE 30 MG: 30 INJECTION, SOLUTION INTRAMUSCULAR; INTRAVENOUS at 05:51

## 2022-03-15 RX ADMIN — BACLOFEN 10 MG: 10 TABLET ORAL at 14:09

## 2022-03-15 RX ADMIN — MIRTAZAPINE 22.5 MG: 15 TABLET, FILM COATED ORAL at 20:54

## 2022-03-15 RX ADMIN — DIPHENHYDRAMINE HYDROCHLORIDE 25 MG: 25 TABLET ORAL at 21:55

## 2022-03-15 RX ADMIN — HYDROMORPHONE HYDROCHLORIDE 2 MG: 2 TABLET ORAL at 14:18

## 2022-03-15 RX ADMIN — NYSTATIN: 100000 POWDER TOPICAL at 17:48

## 2022-03-15 RX ADMIN — PAROXETINE 60 MG: 30 TABLET, FILM COATED ORAL at 20:53

## 2022-03-15 RX ADMIN — HYDROMORPHONE HYDROCHLORIDE 2 MG: 2 TABLET ORAL at 02:58

## 2022-03-15 RX ADMIN — PIPERACILLIN SODIUM AND TAZOBACTAM SODIUM 3.38 G: 3; .375 INJECTION, POWDER, LYOPHILIZED, FOR SOLUTION INTRAVENOUS at 17:48

## 2022-03-15 RX ADMIN — POTASSIUM CHLORIDE, DEXTROSE MONOHYDRATE AND SODIUM CHLORIDE: 150; 5; 450 INJECTION, SOLUTION INTRAVENOUS at 00:36

## 2022-03-15 RX ADMIN — BACLOFEN 10 MG: 10 TABLET ORAL at 17:46

## 2022-03-15 RX ADMIN — ONDANSETRON 4 MG: 2 INJECTION INTRAMUSCULAR; INTRAVENOUS at 05:52

## 2022-03-15 RX ADMIN — NYSTATIN: 100000 POWDER TOPICAL at 09:05

## 2022-03-15 RX ADMIN — KETOROLAC TROMETHAMINE 30 MG: 30 INJECTION, SOLUTION INTRAMUSCULAR; INTRAVENOUS at 16:19

## 2022-03-15 RX ADMIN — ROPINIROLE HYDROCHLORIDE 1 MG: 1 TABLET, FILM COATED ORAL at 17:49

## 2022-03-15 RX ADMIN — ROPINIROLE HYDROCHLORIDE 1 MG: 1 TABLET, FILM COATED ORAL at 05:51

## 2022-03-15 RX ADMIN — HYDROMORPHONE HYDROCHLORIDE 2 MG: 2 TABLET ORAL at 10:00

## 2022-03-15 RX ADMIN — CALCIUM CARBONATE 500 MG: 500 TABLET, CHEWABLE ORAL at 17:46

## 2022-03-15 RX ADMIN — HYDROXYCHLOROQUINE SULFATE 400 MG: 200 TABLET ORAL at 09:04

## 2022-03-15 RX ADMIN — HYDROMORPHONE HYDROCHLORIDE 2 MG: 2 TABLET ORAL at 21:54

## 2022-03-15 RX ADMIN — BACLOFEN 10 MG: 10 TABLET ORAL at 05:51

## 2022-03-15 RX ADMIN — AMLODIPINE BESYLATE 5 MG: 5 TABLET ORAL at 17:46

## 2022-03-15 RX ADMIN — CALCIUM CARBONATE 500 MG: 500 TABLET, CHEWABLE ORAL at 14:10

## 2022-03-15 RX ADMIN — HYDROMORPHONE HYDROCHLORIDE 2 MG: 2 TABLET ORAL at 17:43

## 2022-03-15 RX ADMIN — POTASSIUM CHLORIDE, DEXTROSE MONOHYDRATE AND SODIUM CHLORIDE: 150; 5; 450 INJECTION, SOLUTION INTRAVENOUS at 14:10

## 2022-03-15 RX ADMIN — PIPERACILLIN SODIUM AND TAZOBACTAM SODIUM 3.38 G: 3; .375 INJECTION, POWDER, LYOPHILIZED, FOR SOLUTION INTRAVENOUS at 14:10

## 2022-03-15 ASSESSMENT — ENCOUNTER SYMPTOMS
COUGH: 0
VOMITING: 0
NAUSEA: 0
FEVER: 0
DIZZINESS: 0
SHORTNESS OF BREATH: 0
DIARRHEA: 0
ABDOMINAL PAIN: 0
CHILLS: 0

## 2022-03-15 NOTE — PROGRESS NOTES
Pt used the bedside commode around 0300. Pt voided 100cc of lavonne blood.  There is also blood present in the suprapubic cath. Paged Dr. Dyson to give an update. New order of holding Pt blood thinner this AM.   At 0409 Spouse Gosia was left a voicemail that I have an update on the status of the Pt.     At 0620 assessed Pt urine color and urine is now yellow and clear without any presence of blood.

## 2022-03-15 NOTE — PROGRESS NOTES
Surgical Progress Note    Author: Jessica Martinez M.D. Date & Time created: 3/14/2022   8:03 PM     Interval Events:  Doing well. Working with PT. Notes pain adequately controlled. Has output from SPC but notes that she is leaking from below as well. Undergoing correction of multiple lyte deficiencies. Anxious for d/c plan.     Hemodynamics:  Temp (24hrs), Av °C (98.6 °F), Min:36.7 °C (98 °F), Max:37.4 °C (99.3 °F)  Temperature: 36.7 °C (98 °F)  Pulse  Av.5  Min: 75  Max: 142   Blood Pressure: 137/95     Respiratory:    Respiration: 18, Pulse Oximetry: 97 %        RUL Breath Sounds: Clear, RML Breath Sounds: Clear, RLL Breath Sounds: Diminished, MORENO Breath Sounds: Clear, LLL Breath Sounds: Diminished  Neuro:  GCS       Fluids:    Intake/Output Summary (Last 24 hours) at 3/14/2022 2003  Last data filed at 3/14/2022 1730  Gross per 24 hour   Intake --   Output 2660 ml   Net -2660 ml        Current Diet Order   Procedures   • Diet Order Diet: Low Fiber(GI Soft)     Physical Exam  Cardiovascular:      Rate and Rhythm: Normal rate.   Pulmonary:      Effort: Pulmonary effort is normal.   Abdominal:      General: There is no distension.      Palpations: Abdomen is soft.      Comments: MLV incision c/d/i w/ SQ suture, SPC in place draining clear urine   Musculoskeletal:      Comments: Ortho boot to LLE   Skin:     General: Skin is warm and dry.   Neurological:      Mental Status: She is alert.       Labs:  Recent Results (from the past 24 hour(s))   CBC WITH DIFFERENTIAL    Collection Time: 22 12:29 AM   Result Value Ref Range    WBC 12.1 (H) 4.8 - 10.8 K/uL    RBC 3.12 (L) 4.20 - 5.40 M/uL    Hemoglobin 8.9 (L) 12.0 - 16.0 g/dL    Hematocrit 28.5 (L) 37.0 - 47.0 %    MCV 91.3 81.4 - 97.8 fL    MCH 28.5 27.0 - 33.0 pg    MCHC 31.2 (L) 33.6 - 35.0 g/dL    RDW 56.1 (H) 35.9 - 50.0 fL    Platelet Count 581 (H) 164 - 446 K/uL    MPV 8.9 (L) 9.0 - 12.9 fL    Neutrophils-Polys 75.60 (H) 44.00 - 72.00 %     Lymphocytes 10.50 (L) 22.00 - 41.00 %    Monocytes 8.20 0.00 - 13.40 %    Eosinophils 0.20 0.00 - 6.90 %    Basophils 0.60 0.00 - 1.80 %    Immature Granulocytes 4.90 (H) 0.00 - 0.90 %    Nucleated RBC 0.20 /100 WBC    Neutrophils (Absolute) 9.15 (H) 2.00 - 7.15 K/uL    Lymphs (Absolute) 1.27 1.00 - 4.80 K/uL    Monos (Absolute) 0.99 (H) 0.00 - 0.85 K/uL    Eos (Absolute) 0.03 0.00 - 0.51 K/uL    Baso (Absolute) 0.07 0.00 - 0.12 K/uL    Immature Granulocytes (abs) 0.59 (H) 0.00 - 0.11 K/uL    NRBC (Absolute) 0.02 K/uL   Basic Metabolic Panel    Collection Time: 03/14/22 12:29 AM   Result Value Ref Range    Sodium 137 135 - 145 mmol/L    Potassium 2.9 (L) 3.6 - 5.5 mmol/L    Chloride 102 96 - 112 mmol/L    Co2 23 20 - 33 mmol/L    Glucose 108 (H) 65 - 99 mg/dL    Bun 3 (L) 8 - 22 mg/dL    Creatinine 0.57 0.50 - 1.40 mg/dL    Calcium 6.2 (LL) 8.5 - 10.5 mg/dL    Anion Gap 12.0 7.0 - 16.0   MAGNESIUM    Collection Time: 03/14/22 12:29 AM   Result Value Ref Range    Magnesium 1.5 1.5 - 2.5 mg/dL   ESTIMATED GFR    Collection Time: 03/14/22 12:29 AM   Result Value Ref Range    GFR If African American >60 >60 mL/min/1.73 m 2    GFR If Non African American >60 >60 mL/min/1.73 m 2     Medical Decision Making, by Problem:  Active Hospital Problems    Diagnosis    • Hypercalcemia [E83.52]      Plan:  This is a 56 y.o. female with stage III (minimum) Grade 2 endometrial adenocarcinoma s/p 6 cycles of neoadjuvant chemotherapy with completed on 1/13/22 , who presented with hypercalcemia, nausea, and vomiting     1. POD #11: s/p ex lap, hys, bso, cystectomy with repair and suprapubic catheter placement. Doing well, up to edge of bed only > encouraged continued mobilization, with at minimum up to EOB x 3 a day, working with PT  2. Leukocytosis:  unclear source of infection, UA negative, culture ordered today, stable  3. Post op ileus: stop reglan, resolved, tolerating soft diet  4. Post op pain: well controlled on dilaudid PO PRN,  continue Toradol.    5. Hypocalcemia: patient with hx of hypercalemia, secondary to malignancy and dehydration, s/p IVF, lasix, and pamidronate.  Now with hypocalcemia, replaced with 1g calcium gluconate today, continue cardiac monitor until improved  6. Tachycardia: HR into 140's, now improved back to baseline  7. Hypokalemia: K 2.9 today, replaced, recheck in am  8. Hypomagnesemia: replaced today, recheck in am  9.Stage III (minimum) Grade 2 endometrial adenocarcinoma:  s/p 6 cycles of neoadjuvant chemotherapy, now s/p ex lap, hys, bso, cysetectomy with suprapubic cath placement on 3/3/22.  Final pathology pending.   10. Left trimalleolar ankle fracture/dislocation: s/p ORIF 2/26, ortho following.    11. Upper extremity DVT: Venous US positive for DVT, s/p heparin gtt prior to surgery, Hgb stable, now on Xarelto.    12. Anemia: Secondary to chemotherapy and now secondary to acute blood loss. Hgb 7.5 pre operative on 3/3, s/p 2 unit intra op. S/p 1 unit on 3/5, stable  13. Elevated BP: No hx of HTN. On amlodipine 2.5 mg at home for Raynaud's > amlodipine to 5 mg daily, currently well controlled.   14. History of Lupus: continue home hydroxychloroquine  15. Hx of mood disorder: PTSD and anxiety. Continue home paroxetine and propranolol prn.   16. Hx Raynaud's disease: Continue amlodipine.   17. Hx of allergic rhinitis: Home Azelastine  18. Dispo: continue to mobilize, pending acute rehab evaluation

## 2022-03-15 NOTE — THERAPY
"Physical Therapy   Daily Treatment     Patient Name: Magali Leal  Age:  56 y.o., Sex:  female  Medical Record #: [id number]  Today's Date: 3/14/2022     Precautions  Precautions: Fall Risk;Non Weight Bearing Left Lower Extremity  Comments: ALEIDA drain, abdominal precautions    Assessment    Patient limited by report of feeling shaky this session. She was able to move supine to sitting EOB unassisted but was tremulous once sitting EOB and declined attempts at standing; agreeable to attempt mobility later with RN staff. Will continue to follow. Recommend patient mobilize to EOB/chair 3x/day to progress tolerance and strength.    Plan    Continue current treatment plan.    DC Equipment Recommendations: Unable to determine at this time  Discharge Recommendations: Recommend post-acute placement for additional physical therapy services prior to discharge home      Subjective    \"I feel really shaky. I wanted to get to the chair but I don't think I can right now.\"     Objective       03/14/22 1541   Total Time Spent   Total Time Spent (Mins) 28   Charge Group   Charges  Yes   PT Therapeutic Activities 2   Precautions   Precautions Fall Risk;Non Weight Bearing Left Lower Extremity   Comments ALEIDA drain, abdominal precautions   Vitals   O2 (LPM) 0   O2 Delivery Device None - Room Air   Pain 0 - 10 Group   Therapist Pain Assessment   (no pain complaint during session)   Cognition    Cognition / Consciousness X   Level of Consciousness Alert   Ability To Follow Commands 3 Step   Attention Impaired   Comments cognition appeared improved as compared to prior but flat affect at times (may be due to just waking up)   Passive ROM Lower Body   Comments L ankle now in boot   Balance   Sitting Balance (Static) Fair   Sitting Balance (Dynamic) Fair   Skilled Intervention Verbal Cuing;Compensatory Strategies   Comments declined standing due to feeling shaky, no LOB sitting EOB   Gait Analysis   Gait Level Of Assist Unable to Participate "   Bed Mobility    Supine to Sit Supervised  (with bed features)   Sit to Supine Minimal Assist  (for LLE into bed)   Scooting Supervised  (seated)   Skilled Intervention Verbal Cuing;Compensatory Strategies   Functional Mobility   Sit to Stand Unable to Participate   How much difficulty does the patient currently have...   Turning over in bed (including adjusting bedclothes, sheets and blankets)? 2   Sitting down on and standing up from a chair with arms (e.g., wheelchair, bedside commode, etc.) 1   Moving from lying on back to sitting on the side of the bed? 2   How much help from another person does the patient currently need...   Moving to and from a bed to a chair (including a wheelchair)? 2   Need to walk in a hospital room? 1   Climbing 3-5 steps with a railing? 1   6 clicks Mobility Score 9   Activity Tolerance   Sitting in Chair unable   Sitting Edge of Bed 20 min   Standing unable   Comments limited by feeling shaky   Patient / Family Goals    Patient / Family Goal #1 go to rehab   Short Term Goals    Short Term Goal # 1 Patient will move supine<>sitting EOB without bed features with supervision within 6tx in order to get in/out of bed   Goal Outcome # 1 Progressing as expected   Short Term Goal # 2 Patient will move sitting<>standing with supervision within 6tx in order to initiate transfers and gait   Goal Outcome # 2 Goal not met   Short Term Goal # 3 Patient will ambulate 15ft with supervision within 6tx in order to access environment   Goal Outcome # 3 Goal not met   Short Term Goal # 4 Patient will self propel WC 50ft with supervision within 6tx in order to access community   Goal Outcome # 4 Goal not met   Anticipated Discharge Equipment and Recommendations   DC Equipment Recommendations Unable to determine at this time   Discharge Recommendations Recommend post-acute placement for additional physical therapy services prior to discharge home   Interdisciplinary Plan of Care Collaboration   IDT  Collaboration with  Nursing;Family / Caregiver   Patient Position at End of Therapy In Bed;Call Light within Reach;Tray Table within Reach;Phone within Reach;Family / Friend in Room   Collaboration Comments RN aware of visit, response   Session Information   Date / Session Number  3/14-6 (1/4, 3/19)

## 2022-03-15 NOTE — PROGRESS NOTES
During my assessment noticed blood tinged urine coming from the suprapubic cath and urine coming from the urethra. Notified Dr. Dyson of my assessment findings at 2206.

## 2022-03-15 NOTE — THERAPY
Physical Therapy Contact Note    PT treatment attempted. Patient motivated to mobilize however reported spasms in R foot and spouse assisting with donning shoe to alleviate spasms. Requested PT return later; will re attempt as able and appropriate.    Dori Sanchez, PT, DPT  [phone]

## 2022-03-15 NOTE — PROGRESS NOTES
GYN/Oncology Progress Note               Author: EILEEN Dumont Date & Time created: 3/15/2022  9:56 AM     Interval History:  Patient doing well, no further N/V, denies any chest pain or shortness of breath. Previous diarrhea has resolved. spastic movements has improved but still present. She spits up sputum but no emesis.     Review of Systems:  Review of Systems   Constitutional: Negative for chills and fever.   Respiratory: Negative for cough and shortness of breath.    Cardiovascular: Negative for chest pain and leg swelling.   Gastrointestinal: Negative for abdominal pain, diarrhea, nausea and vomiting.   Genitourinary:        Suprapubic   Neurological: Negative for dizziness.       Physical Exam:  Physical Exam  Constitutional:       Appearance: Normal appearance.   Cardiovascular:      Pulses: Normal pulses.   Pulmonary:      Effort: Pulmonary effort is normal.   Abdominal:      Palpations: Abdomen is soft.      Comments: Midline wound well approximated, no s/s of infection, ALEIDA serosang, suprapubic catheter   Musculoskeletal:      Comments: Left boot   Skin:     General: Skin is warm and dry.      Capillary Refill: Capillary refill takes 2 to 3 seconds.   Neurological:      Mental Status: She is alert and oriented to person, place, and time.         Labs:          Recent Labs     03/13/22  0340 03/14/22  0029 03/15/22  0029   SODIUM 137 137 139   POTASSIUM 2.8* 2.9* 3.4*   CHLORIDE 103 102 102   CO2 22 23 23   BUN 2* 3* 4*   CREATININE 0.50 0.57 0.67   MAGNESIUM 0.9* 1.5 1.7   CALCIUM 5.8* 6.2* 6.5*     Recent Labs     03/13/22  0340 03/14/22  0029 03/15/22  0029   ALTSGPT  --   --  9   ASTSGOT  --   --  21   ALKPHOSPHAT  --   --  119*   TBILIRUBIN  --   --  0.4   GLUCOSE 107* 108* 114*     Recent Labs     03/13/22  0340 03/14/22  0029 03/15/22  0029   RBC 2.77* 3.12* 3.02*   HEMOGLOBIN 8.0* 8.9* 8.7*   HEMATOCRIT 24.7* 28.5* 27.5*   PLATELETCT 538* 581* 558*     Recent Labs     03/13/22 0340  22  0029 03/15/22  0029   WBC 11.7* 12.1* 18.4*   NEUTSPOLYS 73.80* 75.60* 84.30*   LYMPHOCYTES 11.10* 10.50* 6.40*   MONOCYTES 9.00 8.20 6.20   EOSINOPHILS 0.90 0.20 0.00   BASOPHILS 0.50 0.60 0.30   ASTSGOT  --   --  21   ALTSGPT  --   --  9   ALKPHOSPHAT  --   --  119*   TBILIRUBIN  --   --  0.4     Recent Labs     22  0340 22  0029 03/15/22  0029   SODIUM 137 137 139   POTASSIUM 2.8* 2.9* 3.4*   CHLORIDE 103 102 102   CO2 22 23 23   GLUCOSE 107* 108* 114*   BUN 2* 3* 4*   CREATININE 0.50 0.57 0.67   CALCIUM 5.8* 6.2* 6.5*     Hemodynamics:  Temp (24hrs), Av °C (98.6 °F), Min:36.7 °C (98 °F), Max:37.4 °C (99.3 °F)  Temperature: 37.1 °C (98.8 °F)  Pulse  Av.4  Min: 75  Max: 142   Blood Pressure: 135/71     Respiratory:    Respiration: 18, Pulse Oximetry: 96 %        RUL Breath Sounds: Clear, RML Breath Sounds: Clear, RLL Breath Sounds: Diminished, MORENO Breath Sounds: Clear, LLL Breath Sounds: Diminished  Fluids:    Intake/Output Summary (Last 24 hours) at 3/10/2022 1155  Last data filed at 3/10/2022 0430  Gross per 24 hour   Intake --   Output 1620 ml   Net -1620 ml        GI/Nutrition:  Orders Placed This Encounter   Procedures   • Diet Order Diet: Low Fiber(GI Soft)     Standing Status:   Standing     Number of Occurrences:   1     Order Specific Question:   Diet:     Answer:   Low Fiber(GI Soft) [2]     Medical Decision Making, by Problem:  Active Hospital Problems    Diagnosis    • *Hypercalcemia [E83.52]        Plan:  This is a 56 y.o. female with stage III (minimum) Grade 2 endometrial adenocarcinoma s/p 6 cycles of neoadjuvant chemotherapy with completed on 22 , who presented with hypercalcemia, nausea, and vomiting     1. POD #12: s/p ex lap, hys, bso, cystectomy with repair and suprapubic catheter placement. Doing well, up to edge of bed only > encouraged continued mobilization, with at minimum up to EOB x 3 a day, working with PT  2. Urinary incontinence: unclear if coming  "from urethra or vagina, has SP in place, discussed with Dr. Dyson, plan to place urethral catheter to assess and monitor urine output.   3. Leukocytosis:  unclear source of infection, UA negative, culture ordered today, increased to 18 today, blood cultures ordered, start empiric Zosyn  4. Post op ileus: stop reglan, resolved, tolerating soft diet  5. Post op pain: well controlled on dilaudid PO PRN, continue Toradol.    6. Hypocalcemia: patient with hx of hypercalemia, secondary to malignancy and dehydration, s/p IVF, lasix, and pamidronate.  Now with hypocalcemia, started tums today, continue cardiac monitor until improved  7. Tachycardia: HR into 140's on 3/12, now improved back to baseline  8. Hypokalemia: K 3.4 today, oral replacement, recheck in am  9. Hypomagnesemia: resolved, Mg 1.7  10.Stage III (minimum) Grade 2 endometrial adenocarcinoma:  s/p 6 cycles of neoadjuvant chemotherapy, now s/p ex lap, hys, bso, cysetectomy with suprapubic cath placement on 3/3/22.  Final pathology pending from [facility]  11. Left trimalleolar ankle fracture/dislocation: s/p ORIF 2/26, ortho following.    12. Upper extremity DVT: Venous US positive for DVT, s/p heparin gtt prior to surgery, Hgb stable, now on Xarelto, held this am due to hematuria, now resolved, ok to give tonights dose.    13. Anemia: Secondary to chemotherapy and now secondary to acute blood loss. Hgb 7.5 pre operative on 3/3, s/p 2 unit intra op. S/p 1 unit on 3/5, stable  14. Elevated BP: No hx of HTN. On amlodipine 2.5 mg at home for Raynaud's > amlodipine to 5 mg daily, currently well controlled.   15. History of Lupus: continue home hydroxychloroquine  16. Hx of mood disorder: PTSD and anxiety. Continue home paroxetine and propranolol prn.   17. Hx Raynaud's disease: Continue amlodipine.   18. Hx of allergic rhinitis: Home Azelastine  19. Dispo: continue to mobilize, Acute rehab signed off stating \"poor tolerance for IPR level of therapy.\" SNF and home " health orders placed. Discussed with wife and she would like to try and take patient home with home health.     Patient discussed with MD    Quality-Core Measures

## 2022-03-15 NOTE — CARE PLAN
The patient is Watcher - Medium risk of patient condition declining or worsening    Shift Goals  Clinical Goals: Monitor R-foot pain  Patient Goals: Rest  Family Goals: NA    Progress made toward(s) clinical / shift goals:  The patient is Watcher - Medium risk of patient condition declining or worsening    Shift Goals  Clinical Goals: Pt will have controlled pain and remain free from injury  Patient Goals: Get up on the commode  Family Goals: NA    Progress made toward(s) clinical / shift goals:  Pain well controlled on current regimen with toradol and oral dilaudid. Unable to get pt up to the bedside commode but pt did sit up in the cardiac chair for 30 minutes.       Problem: Pain - Standard  Goal: Alleviation of pain or a reduction in pain to the patient’s comfort goal  Outcome: Progressing     Problem: Knowledge Deficit - Standard  Goal: Patient and family/care givers will demonstrate understanding of plan of care, disease process/condition, diagnostic tests and medications  Outcome: Progressing     Problem: Psychosocial  Goal: Patient's level of anxiety will decrease  Outcome: Progressing     Problem: Mobility  Goal: Patient's capacity to carry out activities will improve  Outcome: Progressing     Problem: Infection - Standard  Goal: Patient will remain free from infection  Outcome: Progressing     Problem: Fall Risk  Goal: Patient will remain free from falls  Outcome: Progressing       Patient is not progressing towards the following goals:    The patient is Watcher - Medium risk of patient condition declining or worsening    Shift Goals  Clinical Goals: Pt will have controlled pain and remain free from injury  Patient Goals: Get up on the commode  Family Goals: NA    Progress made toward(s) clinical / shift goals:  Pain well controlled on current regimen with toradol and oral dilaudid. Unable to get pt up to the bedside commode but pt did sit up in the cardiac chair for 30 minutes.       Problem: Pain -  Standard  Goal: Alleviation of pain or a reduction in pain to the patient’s comfort goal  Outcome: Progressing     Problem: Knowledge Deficit - Standard  Goal: Patient and family/care givers will demonstrate understanding of plan of care, disease process/condition, diagnostic tests and medications  Outcome: Progressing     Problem: Psychosocial  Goal: Patient's level of anxiety will decrease  Outcome: Progressing     Problem: Mobility  Goal: Patient's capacity to carry out activities will improve  Outcome: Progressing     Problem: Infection - Standard  Goal: Patient will remain free from infection  Outcome: Progressing     Problem: Fall Risk  Goal: Patient will remain free from falls  Outcome: Progressing       Patient is not progressing towards the following goals:        Patient is not progressing towards the following goals:

## 2022-03-15 NOTE — CARE PLAN
The patient is Watcher - Medium risk of patient condition declining or worsening    Shift Goals  Clinical Goals: Monitor R-foot pain  Patient Goals: Rest  Family Goals: NA    Progress made toward(s) clinical / shift goals:    Problem: Mobility  Goal: Patient's capacity to carry out activities will improve  Outcome: Progressing  Note: Pt with the help of one nurse transferred to the bedside commode and with the help of 2 nurses transferred back to bed from the bedside commode.      Problem: Bowel Elimination  Goal: Establish and maintain regular bowel function  Outcome: Progressing  Note: Pt transferred to the bedside commode and was able to have a small bowel movement during this shift.        Patient is not progressing towards the following goals:      Problem: Urinary Elimination  Goal: Establish and maintain regular urinary output  Outcome: Not Progressing  Note: The Pt has blood present in the suprapubic cath, a purwick has been placed and has output and the Pt voided 100cc of lavonne blood during this shift. MD notified

## 2022-03-15 NOTE — FACE TO FACE
Face to Face Supporting Documentation - Home Health    The encounter with this patient was in whole or in part the primary reason for home health admission.    Date of encounter:   Patient:                    MRN:                       YOB: 2022  Magali Leal  [id number]  1965     Home health to see patient for:  Skilled Nursing care for assessment, interventions & education   Physical therapy  Occupational therapy    Skilled need for:  Surgical Aftercare suprapubic catheter, s/p ex lap    Skilled nursing interventions to include:  Wound Care    Homebound status evidenced by:  Needs the assistance of another person in order to leave the home. Leaving home requires a considerable and taxing effort. There is a normal inability to leave the home.    Community Physician to provide follow up care: Ty Bethea M.D.     Optional Interventions? No      I certify the face to face encounter for this home health care referral meets the CMS requirements and the encounter/clinical assessment with the patient was, in whole, or in part, for the medical condition(s) listed above, which is the primary reason for home health care. Based on my clinical findings: the service(s) are medically necessary, support the need for home health care, and the homebound criteria are met.  I certify that this patient has had a face to face encounter by myself.  GEREMIAS Dumont. - NPI: [id number]

## 2022-03-15 NOTE — DISCHARGE PLANNING
Patient demonstrating poor tolerance for IPR level of therapy, [facility] will no longer follow at this time. Please reach out if patient's tolerance and endurance improves, [id number].

## 2022-03-16 LAB
ANION GAP SERPL CALC-SCNC: 10 MMOL/L (ref 7–16)
BACTERIA UR CULT: ABNORMAL
BACTERIA UR CULT: ABNORMAL
BASOPHILS # BLD AUTO: 0.3 % (ref 0–1.8)
BASOPHILS # BLD: 0.05 K/UL (ref 0–0.12)
BUN SERPL-MCNC: 6 MG/DL (ref 8–22)
CALCIUM SERPL-MCNC: 6.4 MG/DL (ref 8.5–10.5)
CHLORIDE SERPL-SCNC: 103 MMOL/L (ref 96–112)
CO2 SERPL-SCNC: 24 MMOL/L (ref 20–33)
CREAT SERPL-MCNC: 0.77 MG/DL (ref 0.5–1.4)
EOSINOPHIL # BLD AUTO: 0.02 K/UL (ref 0–0.51)
EOSINOPHIL NFR BLD: 0.1 % (ref 0–6.9)
ERYTHROCYTE [DISTWIDTH] IN BLOOD BY AUTOMATED COUNT: 55.2 FL (ref 35.9–50)
GFR SERPLBLD CREATININE-BSD FMLA CKD-EPI: 90 ML/MIN/1.73 M 2
GLUCOSE SERPL-MCNC: 96 MG/DL (ref 65–99)
HCT VFR BLD AUTO: 25.2 % (ref 37–47)
HGB BLD-MCNC: 8 G/DL (ref 12–16)
IMM GRANULOCYTES # BLD AUTO: 0.34 K/UL (ref 0–0.11)
IMM GRANULOCYTES NFR BLD AUTO: 2 % (ref 0–0.9)
LYMPHOCYTES # BLD AUTO: 0.94 K/UL (ref 1–4.8)
LYMPHOCYTES NFR BLD: 5.6 % (ref 22–41)
MCH RBC QN AUTO: 28.7 PG (ref 27–33)
MCHC RBC AUTO-ENTMCNC: 31.7 G/DL (ref 33.6–35)
MCV RBC AUTO: 90.3 FL (ref 81.4–97.8)
MONOCYTES # BLD AUTO: 0.82 K/UL (ref 0–0.85)
MONOCYTES NFR BLD AUTO: 4.9 % (ref 0–13.4)
NEUTROPHILS # BLD AUTO: 14.53 K/UL (ref 2–7.15)
NEUTROPHILS NFR BLD: 87.1 % (ref 44–72)
NRBC # BLD AUTO: 0 K/UL
NRBC BLD-RTO: 0 /100 WBC
PLATELET # BLD AUTO: 467 K/UL (ref 164–446)
PMV BLD AUTO: 8.9 FL (ref 9–12.9)
POTASSIUM SERPL-SCNC: 3 MMOL/L (ref 3.6–5.5)
RBC # BLD AUTO: 2.79 M/UL (ref 4.2–5.4)
SIGNIFICANT IND 70042: ABNORMAL
SITE SITE: ABNORMAL
SODIUM SERPL-SCNC: 137 MMOL/L (ref 135–145)
SOURCE SOURCE: ABNORMAL
WBC # BLD AUTO: 16.7 K/UL (ref 4.8–10.8)

## 2022-03-16 PROCEDURE — 80048 BASIC METABOLIC PNL TOTAL CA: CPT

## 2022-03-16 PROCEDURE — 700101 HCHG RX REV CODE 250: Performed by: NURSE PRACTITIONER

## 2022-03-16 PROCEDURE — 770004 HCHG ROOM/CARE - ONCOLOGY PRIVATE *

## 2022-03-16 PROCEDURE — A9270 NON-COVERED ITEM OR SERVICE: HCPCS | Performed by: NURSE PRACTITIONER

## 2022-03-16 PROCEDURE — 85025 COMPLETE CBC W/AUTO DIFF WBC: CPT

## 2022-03-16 PROCEDURE — 700111 HCHG RX REV CODE 636 W/ 250 OVERRIDE (IP): Performed by: NURSE PRACTITIONER

## 2022-03-16 PROCEDURE — 700102 HCHG RX REV CODE 250 W/ 637 OVERRIDE(OP)

## 2022-03-16 PROCEDURE — A9270 NON-COVERED ITEM OR SERVICE: HCPCS

## 2022-03-16 PROCEDURE — 700102 HCHG RX REV CODE 250 W/ 637 OVERRIDE(OP): Performed by: NURSE PRACTITIONER

## 2022-03-16 PROCEDURE — 700105 HCHG RX REV CODE 258: Performed by: NURSE PRACTITIONER

## 2022-03-16 PROCEDURE — 97530 THERAPEUTIC ACTIVITIES: CPT

## 2022-03-16 PROCEDURE — 97116 GAIT TRAINING THERAPY: CPT

## 2022-03-16 PROCEDURE — A9270 NON-COVERED ITEM OR SERVICE: HCPCS | Performed by: STUDENT IN AN ORGANIZED HEALTH CARE EDUCATION/TRAINING PROGRAM

## 2022-03-16 PROCEDURE — 36415 COLL VENOUS BLD VENIPUNCTURE: CPT

## 2022-03-16 PROCEDURE — 700102 HCHG RX REV CODE 250 W/ 637 OVERRIDE(OP): Performed by: STUDENT IN AN ORGANIZED HEALTH CARE EDUCATION/TRAINING PROGRAM

## 2022-03-16 RX ORDER — OMEPRAZOLE 20 MG/1
20 CAPSULE, DELAYED RELEASE ORAL DAILY
Status: DISCONTINUED | OUTPATIENT
Start: 2022-03-16 | End: 2022-03-24 | Stop reason: HOSPADM

## 2022-03-16 RX ORDER — LORAZEPAM 2 MG/ML
1 INJECTION INTRAMUSCULAR ONCE
Status: COMPLETED | OUTPATIENT
Start: 2022-03-16 | End: 2022-03-16

## 2022-03-16 RX ORDER — POTASSIUM CHLORIDE 7.45 MG/ML
10 INJECTION INTRAVENOUS
Status: COMPLETED | OUTPATIENT
Start: 2022-03-16 | End: 2022-03-16

## 2022-03-16 RX ORDER — IBUPROFEN 400 MG/1
600 TABLET ORAL EVERY 6 HOURS PRN
Status: DISCONTINUED | OUTPATIENT
Start: 2022-03-16 | End: 2022-03-24 | Stop reason: HOSPADM

## 2022-03-16 RX ADMIN — POTASSIUM CHLORIDE, DEXTROSE MONOHYDRATE AND SODIUM CHLORIDE: 150; 5; 450 INJECTION, SOLUTION INTRAVENOUS at 20:01

## 2022-03-16 RX ADMIN — LORAZEPAM 1 MG: 2 INJECTION INTRAMUSCULAR; INTRAVENOUS at 12:34

## 2022-03-16 RX ADMIN — BACLOFEN 10 MG: 10 TABLET ORAL at 04:34

## 2022-03-16 RX ADMIN — CALCIUM CARBONATE 500 MG: 500 TABLET, CHEWABLE ORAL at 11:44

## 2022-03-16 RX ADMIN — NYSTATIN: 100000 POWDER TOPICAL at 18:01

## 2022-03-16 RX ADMIN — KETOROLAC TROMETHAMINE 30 MG: 30 INJECTION, SOLUTION INTRAMUSCULAR; INTRAVENOUS at 11:44

## 2022-03-16 RX ADMIN — CALCIUM CARBONATE 500 MG: 500 TABLET, CHEWABLE ORAL at 18:01

## 2022-03-16 RX ADMIN — CALCIUM CARBONATE 500 MG: 500 TABLET, CHEWABLE ORAL at 04:34

## 2022-03-16 RX ADMIN — PAROXETINE 60 MG: 30 TABLET, FILM COATED ORAL at 20:01

## 2022-03-16 RX ADMIN — BACLOFEN 10 MG: 10 TABLET ORAL at 18:01

## 2022-03-16 RX ADMIN — POTASSIUM CHLORIDE 10 MEQ: 7.46 INJECTION, SOLUTION INTRAVENOUS at 14:42

## 2022-03-16 RX ADMIN — IBUPROFEN 600 MG: 400 TABLET, FILM COATED ORAL at 18:00

## 2022-03-16 RX ADMIN — POTASSIUM CHLORIDE 10 MEQ: 7.46 INJECTION, SOLUTION INTRAVENOUS at 16:00

## 2022-03-16 RX ADMIN — AMLODIPINE BESYLATE 5 MG: 5 TABLET ORAL at 18:01

## 2022-03-16 RX ADMIN — POTASSIUM CHLORIDE 10 MEQ: 7.46 INJECTION, SOLUTION INTRAVENOUS at 12:14

## 2022-03-16 RX ADMIN — ROPINIROLE HYDROCHLORIDE 1 MG: 1 TABLET, FILM COATED ORAL at 18:01

## 2022-03-16 RX ADMIN — BACLOFEN 10 MG: 10 TABLET ORAL at 11:44

## 2022-03-16 RX ADMIN — KETOROLAC TROMETHAMINE 30 MG: 30 INJECTION, SOLUTION INTRAMUSCULAR; INTRAVENOUS at 04:11

## 2022-03-16 RX ADMIN — PIPERACILLIN SODIUM AND TAZOBACTAM SODIUM 3.38 G: 3; .375 INJECTION, POWDER, LYOPHILIZED, FOR SOLUTION INTRAVENOUS at 04:12

## 2022-03-16 RX ADMIN — PIPERACILLIN SODIUM AND TAZOBACTAM SODIUM 3.38 G: 3; .375 INJECTION, POWDER, LYOPHILIZED, FOR SOLUTION INTRAVENOUS at 13:22

## 2022-03-16 RX ADMIN — POTASSIUM CHLORIDE, DEXTROSE MONOHYDRATE AND SODIUM CHLORIDE: 150; 5; 450 INJECTION, SOLUTION INTRAVENOUS at 08:59

## 2022-03-16 RX ADMIN — ROPINIROLE HYDROCHLORIDE 1 MG: 1 TABLET, FILM COATED ORAL at 04:34

## 2022-03-16 RX ADMIN — POTASSIUM CHLORIDE 10 MEQ: 7.46 INJECTION, SOLUTION INTRAVENOUS at 13:22

## 2022-03-16 RX ADMIN — OMEPRAZOLE 20 MG: 20 CAPSULE, DELAYED RELEASE ORAL at 16:00

## 2022-03-16 RX ADMIN — HYDROXYCHLOROQUINE SULFATE 400 MG: 200 TABLET ORAL at 08:58

## 2022-03-16 RX ADMIN — RIVAROXABAN 15 MG: 15 TABLET, FILM COATED ORAL at 18:00

## 2022-03-16 RX ADMIN — RIVAROXABAN 15 MG: 15 TABLET, FILM COATED ORAL at 08:58

## 2022-03-16 RX ADMIN — HYDROMORPHONE HYDROCHLORIDE 2 MG: 2 TABLET ORAL at 08:58

## 2022-03-16 RX ADMIN — HYDROMORPHONE HYDROCHLORIDE 2 MG: 2 TABLET ORAL at 02:22

## 2022-03-16 RX ADMIN — MIRTAZAPINE 22.5 MG: 15 TABLET, FILM COATED ORAL at 20:01

## 2022-03-16 RX ADMIN — PIPERACILLIN SODIUM AND TAZOBACTAM SODIUM 3.38 G: 3; .375 INJECTION, POWDER, LYOPHILIZED, FOR SOLUTION INTRAVENOUS at 20:00

## 2022-03-16 RX ADMIN — HYDROMORPHONE HYDROCHLORIDE 2 MG: 2 TABLET ORAL at 20:21

## 2022-03-16 ASSESSMENT — ENCOUNTER SYMPTOMS
CHILLS: 0
ABDOMINAL PAIN: 0
SHORTNESS OF BREATH: 0
DIARRHEA: 0
DIZZINESS: 0
VOMITING: 0
NAUSEA: 0
COUGH: 0
FEVER: 0

## 2022-03-16 ASSESSMENT — COGNITIVE AND FUNCTIONAL STATUS - GENERAL
SUGGESTED CMS G CODE MODIFIER MOBILITY: CM
STANDING UP FROM CHAIR USING ARMS: A LOT
WALKING IN HOSPITAL ROOM: TOTAL
MOVING TO AND FROM BED TO CHAIR: A LOT
MOVING FROM LYING ON BACK TO SITTING ON SIDE OF FLAT BED: UNABLE
CLIMB 3 TO 5 STEPS WITH RAILING: TOTAL
TURNING FROM BACK TO SIDE WHILE IN FLAT BAD: A LOT
MOBILITY SCORE: 9

## 2022-03-16 ASSESSMENT — GAIT ASSESSMENTS: GAIT LEVEL OF ASSIST: UNABLE TO PARTICIPATE

## 2022-03-16 ASSESSMENT — PAIN DESCRIPTION - PAIN TYPE
TYPE: ACUTE PAIN
TYPE: ACUTE PAIN

## 2022-03-16 NOTE — PROGRESS NOTES
GYN/Oncology Progress Note               Author: EILEEN Dumont Date & Time created: 3/16/2022  9:32 AM     Interval History:  Patient doing well, no further N/V, denies any chest pain or shortness of breath. Previous diarrhea has resolved. spastic movements has improved but still present. She spits up sputum but no emesis. She is still having perineal drainage, unclear if coming from urethra or vagina.     Review of Systems:  Review of Systems   Constitutional: Negative for chills and fever.   Respiratory: Negative for cough and shortness of breath.    Cardiovascular: Negative for chest pain and leg swelling.   Gastrointestinal: Negative for abdominal pain, diarrhea, nausea and vomiting.   Genitourinary:        Suprapubic   Neurological: Negative for dizziness.       Physical Exam:  Physical Exam  Constitutional:       Appearance: Normal appearance.   Cardiovascular:      Pulses: Normal pulses.   Pulmonary:      Effort: Pulmonary effort is normal.   Abdominal:      Palpations: Abdomen is soft.      Comments: Midline wound well approximated, minimal drainage to superior portion of incision, no s/s of infection, ALEIDA serosang, suprapubic catheter   Musculoskeletal:      Comments: Left boot   Skin:     General: Skin is warm and dry.      Capillary Refill: Capillary refill takes 2 to 3 seconds.   Neurological:      Mental Status: She is alert and oriented to person, place, and time.         Labs:          Recent Labs     03/14/22  0029 03/15/22  0029   SODIUM 137 139   POTASSIUM 2.9* 3.4*   CHLORIDE 102 102   CO2 23 23   BUN 3* 4*   CREATININE 0.57 0.67   MAGNESIUM 1.5 1.7   CALCIUM 6.2* 6.5*     Recent Labs     03/14/22  0029 03/15/22  0029   ALTSGPT  --  9   ASTSGOT  --  21   ALKPHOSPHAT  --  119*   TBILIRUBIN  --  0.4   GLUCOSE 108* 114*     Recent Labs     03/14/22  0029 03/15/22  0029 03/16/22  0859   RBC 3.12* 3.02* 2.79*   HEMOGLOBIN 8.9* 8.7* 8.0*   HEMATOCRIT 28.5* 27.5* 25.2*   PLATELETCT 581* 558*  467*     Recent Labs     22  0029 03/15/22  0029 22  0859   WBC 12.1* 18.4* 16.7*   NEUTSPOLYS 75.60* 84.30* 87.10*   LYMPHOCYTES 10.50* 6.40* 5.60*   MONOCYTES 8.20 6.20 4.90   EOSINOPHILS 0.20 0.00 0.10   BASOPHILS 0.60 0.30 0.30   ASTSGOT  --  21  --    ALTSGPT  --  9  --    ALKPHOSPHAT  --  119*  --    TBILIRUBIN  --  0.4  --      Recent Labs     22  0029 03/15/22  0029   SODIUM 137 139   POTASSIUM 2.9* 3.4*   CHLORIDE 102 102   CO2 23 23   GLUCOSE 108* 114*   BUN 3* 4*   CREATININE 0.57 0.67   CALCIUM 6.2* 6.5*     Hemodynamics:  Temp (24hrs), Av.1 °C (98.8 °F), Min:36.8 °C (98.3 °F), Max:37.3 °C (99.1 °F)  Temperature: 36.8 °C (98.3 °F)  Pulse  Av  Min: 75  Max: 142   Blood Pressure: 141/60     Respiratory:    Respiration: 18, Pulse Oximetry: 93 %        RUL Breath Sounds: Clear, RML Breath Sounds: Clear, RLL Breath Sounds: Diminished, MORENO Breath Sounds: Clear, LLL Breath Sounds: Diminished  Fluids:    Intake/Output Summary (Last 24 hours) at 3/10/2022 1155  Last data filed at 3/10/2022 0430  Gross per 24 hour   Intake --   Output 1620 ml   Net -1620 ml        GI/Nutrition:  Orders Placed This Encounter   Procedures   • Diet Order Diet: Low Fiber(GI Soft)     Standing Status:   Standing     Number of Occurrences:   1     Order Specific Question:   Diet:     Answer:   Low Fiber(GI Soft) [2]     Medical Decision Making, by Problem:  Active Hospital Problems    Diagnosis    • *Hypercalcemia [E83.52]        Plan:  This is a 56 y.o. female with stage III (minimum) Grade 2 endometrial adenocarcinoma s/p 6 cycles of neoadjuvant chemotherapy with completed on 22 , who presented with hypercalcemia, nausea, and vomiting     1. POD #13: s/p ex lap, hys, bso, cystectomy with repair and suprapubic catheter placement. Doing well, up to edge of bed only > encouraged continued mobilization, with at minimum up to EOB x 3 a day, working with PT  2. Urinary incontinence: unclear if coming from  "urethra or vagina, has SP in place, discussed with Dr. Dyson, monitor at this time  3. Leukocytosis:  unclear source of infection, UA culture showed e. Coli, BC pending, improved to 16.7, continue Zosyn  4. Post op ileus: stop reglan, resolved, tolerating soft diet  5. Post op pain: well controlled on dilaudid PO PRN, continue Toradol.    6. Hypocalcemia: patient with hx of hypercalemia, secondary to malignancy and dehydration, s/p IVF, lasix, and pamidronate.  Now with hypocalcemia, continue tums, continue cardiac monitor until improved, check PTH and ionized calcium  7. Tachycardia: HR into 140's on 3/12, now improved back to baseline  8. Hypokalemia: K 3.0 today, IV replacement recheck in am  9. Hypomagnesemia: resolved, Mg 1.7  10.Stage III (minimum) Grade 2 endometrial adenocarcinoma:  s/p 6 cycles of neoadjuvant chemotherapy, now s/p ex lap, hys, bso, cysetectomy with suprapubic cath placement on 3/3/22.  Final pathology pending from [facility]  11. Left trimalleolar ankle fracture/dislocation: s/p ORIF 2/26, ortho following.    12. Upper extremity DVT: Venous US positive for DVT, s/p heparin gtt prior to surgery, Hgb stable, now on Xarelto, held this am due to hematuria, now resolved, ok to give tonights dose.    13. Anemia: Secondary to chemotherapy and now secondary to acute blood loss. Hgb 7.5 pre operative on 3/3, s/p 2 unit intra op. S/p 1 unit on 3/5, stable  14. Elevated BP: No hx of HTN. On amlodipine 2.5 mg at home for Raynaud's > amlodipine to 5 mg daily, currently well controlled.   15. History of Lupus: continue home hydroxychloroquine  16. Hx of mood disorder: PTSD and anxiety. Continue home paroxetine and propranolol prn.   17. Hx Raynaud's disease: Continue amlodipine.   18. Hx of allergic rhinitis: Home Azelastine  19. Dispo: continue to mobilize, Acute rehab signed off stating \"poor tolerance for IPR level of therapy.\" SNF and home health orders placed. Discussed with wife and she would like to " try and take patient home with home health.     Patient discussed with MD    Quality-Core Measures

## 2022-03-16 NOTE — THERAPY
Missed Therapy     Patient Name: Magali Leal  Age:  56 y.o., Sex:  female  Medical Record #: [id number]  Today's Date: 3/16/2022    Discussed missed therapy with Rn        03/16/22 5176   Treatment Variance   Reason For Missed Therapy Non-Medical - Other (Please Comment)   Interdisciplinary Plan of Care Collaboration   Collaboration Comments OT tx attempted this am but working w/PT attempted again in PM and pt sound asleep and received ativan will attempt tomorrow am   Session Information   Date / Session Number  3/16 attempted  (3/11, #3 (2/3, 3/12))   Priority 4

## 2022-03-16 NOTE — CARE PLAN
The patient is Watcher - Medium risk of patient condition declining or worsening    Shift Goals  Clinical Goals: Pain Control / Up to Chair / Remain free of falls  Patient Goals: Up to chair / Rest  Family Goals: NA    Progress made toward(s) clinical / shift goals:    Problem: Pain - Standard  Goal: Alleviation of pain or a reduction in pain to the patient’s comfort goal  Outcome: Progressing     Problem: Mobility  Goal: Patient's capacity to carry out activities will improve  Outcome: Progressing     Problem: Fall Risk  Goal: Patient will remain free from falls  Outcome: Progressing       Patient is not progressing towards the following goals:

## 2022-03-16 NOTE — PROGRESS NOTES
Pt alert and oriented x 4. Complaints of pain in bilateral feet with noted spasms to R foot causing internal rotation. Attempted massage and deep breathing and scheduled baclofen with minimal results. Monica DUARTE notified. One time dose of ativan received. Administered with positive results. Spasticity markedly decreased. Midline to LUE flushing well. K riders x 4 administered. Midline incision with small amount of old brownish drainage from superior portion of incision. Suprapubic cath to DD. Purewik in place with moderate amount of pink tinged urine. L ALEIDA to bulb suction with small amount of serosanguinous output. LLE with boot in place. Elevated on pillows. Emesis x 1. Continues with poor po intake. Sleeping comfortably at this time. Friend at the bedside. Tele monitor and  on. Frequent assessment in place.

## 2022-03-16 NOTE — CARE PLAN
The patient is Watcher - Medium risk of patient condition declining or worsening    Shift Goals  Clinical Goals: Pt will mobilize to a chair x 2.  Patient Goals: Pt will verbalize improvement in bilateral foot pain to a 4 or less.  Family Goals: NA    Progress made toward(s) clinical / shift goals:  Pt up to a commode x 1 today. Mobility impaired due to severe muscle spasms to RLE. Pain and spasticity slightly improved with use of dilaudid, ativan and baclofen. Frequent assessment in place.       Problem: Pain - Standard  Goal: Alleviation of pain or a reduction in pain to the patient’s comfort goal  Outcome: Progressing     Problem: Psychosocial  Goal: Patient's level of anxiety will decrease  Outcome: Progressing     Problem: Mobility  Goal: Patient's capacity to carry out activities will improve  Outcome: Progressing       Patient is not progressing towards the following goals:

## 2022-03-16 NOTE — PROGRESS NOTES
Carmen from Lab called with critical result of Calcium of 6.4 mg (corrected 7.6 mg) at 1008. Critical lab result read back to Carmen.   Monica Chapin notified of critical lab result at 1030.  Critical lab result read back by Monica CHAPIN.

## 2022-03-16 NOTE — THERAPY
"Physical Therapy   Daily Treatment     Patient Name: Magali Leal  Age:  56 y.o., Sex:  female  Medical Record #: [id number]  Today's Date: 3/16/2022     Precautions  Precautions: Fall Risk;Non Weight Bearing Left Lower Extremity  Comments: ALEIDA drain, abdominal precautions    Assessment    Patient verbalized motivation to participate with therapy and progress mobility and independence. Provided extensive education and demonstration regarding progression of bed mobility to transfers to gait; patient verbalized understanding. Patient was able to stand from EOB with FWW multiple times and progressed from mod A to min A; technique improved and fear decreased when patient used BUE to push off of bed and transitioned hands one at a time to FWW. She reported need for toileting and was able to perform lateral scoot/depression lift transfer EOB<>BSC without physical assist. Will continue to follow. Recommend patient mobilize to EOB 3x/day at meal times and to BSC with RN staff.     Plan    Continue current treatment plan.    DC Equipment Recommendations: Unable to determine at this time  Discharge Recommendations: Recommend post-acute placement for additional physical therapy services prior to discharge home      Subjective    \"The foot is much better today with the shoe.\"     Objective       03/16/22 1106   Total Time Spent   Total Time Spent (Mins) 45   Charge Group   Charges  Yes   PT Gait Training 1  (10)   PT Therapeutic Activities 2  (35)   Precautions   Precautions Fall Risk;Non Weight Bearing Left Lower Extremity   Comments ALEIDA drain, abdominal precautions   Vitals   O2 (LPM) 0   O2 Delivery Device None - Room Air   Pain 0 - 10 Group   Therapist Pain Assessment   (no pain complaint during session)   Cognition    Cognition / Consciousness WDL   Level of Consciousness Alert   Ability To Follow Commands 3 Step   Comments continuing to improve, pleasant, cooperative, motivated. fearful of falling   Balance   Sitting " Balance (Static) Fair   Sitting Balance (Dynamic) Fair   Standing Balance (Static) Poor   Weight Shift Sitting Fair   Weight Shift Standing Absent   Skilled Intervention Verbal Cuing;Compensatory Strategies;Facilitation   Comments SBA sitting EOB, min A standing with FWW due to fear   Gait Analysis   Gait Level Of Assist Unable to Participate   Comments provided education regarding progression of sitting EOB to STS with FWW, SPT with FWW, hop to gait with FWW   Bed Mobility    Supine to Sit Supervised  (HOB elevated, appears capable)   Sit to Supine Minimal Assist  (for LLE into bed)   Scooting Supervised   Skilled Intervention Verbal Cuing;Compensatory Strategies   Functional Mobility   Sit to Stand Moderate Assist  (progressed to min A during session)   Bed, Chair, Wheelchair Transfer Standby Assist  (for depression lift/lateral scoot EOB<>BSC)   Transfer Method   (lateral scoot)   Skilled Intervention Verbal Cuing;Compensatory Strategies;Sequencing   How much difficulty does the patient currently have...   Turning over in bed (including adjusting bedclothes, sheets and blankets)? 2   Sitting down on and standing up from a chair with arms (e.g., wheelchair, bedside commode, etc.) 1   Moving from lying on back to sitting on the side of the bed? 2   How much help from another person does the patient currently need...   Moving to and from a bed to a chair (including a wheelchair)? 2   Need to walk in a hospital room? 1   Climbing 3-5 steps with a railing? 1   6 clicks Mobility Score 9   Activity Tolerance   Sitting in Chair 6 min on BSC   Sitting Edge of Bed 25 min   Standing 2-3 min total   Comments limited by fatigue, improving   Patient / Family Goals    Patient / Family Goal #1 go to rehab   Goal #1 Outcome Goal not met   Short Term Goals    Short Term Goal # 1 Patient will move supine<>sitting EOB without bed features with supervision within 6tx in order to get in/out of bed   Goal Outcome # 1 Progressing as  expected   Short Term Goal # 2 Patient will move sitting<>standing with supervision within 6tx in order to initiate transfers and gait   Goal Outcome # 2 Progressing as expected   Short Term Goal # 3 Patient will ambulate 15ft with supervision within 6tx in order to access environment   Goal Outcome # 3 Goal not met   Short Term Goal # 4 Patient will self propel WC 50ft with supervision within 6tx in order to access community   Goal Outcome # 4 Goal not met   Anticipated Discharge Equipment and Recommendations   DC Equipment Recommendations Unable to determine at this time   Discharge Recommendations Recommend post-acute placement for additional physical therapy services prior to discharge home   Interdisciplinary Plan of Care Collaboration   IDT Collaboration with  Nursing   Patient Position at End of Therapy In Bed;Call Light within Reach;Tray Table within Reach;Phone within Reach   Collaboration Comments RN aware of visit, response   Session Information   Date / Session Number  3/16-7 (2/4, 3/19) attempted 3/15

## 2022-03-16 NOTE — PROGRESS NOTES
Assumed care of patient, report received from day RN. Paged and updated Dr. Dyson on suprapubic catheter culture results, antibiotics appropriate at this time. Patient A&Ox4, resting with bed in lowest locked position, pain controlled per patient. Goals for evening discussed with patient. Hourly rounding in place.

## 2022-03-17 LAB
ALBUMIN SERPL BCP-MCNC: 2.2 G/DL (ref 3.2–4.9)
ALBUMIN/GLOB SERPL: 0.8 G/DL
ALP SERPL-CCNC: 110 U/L (ref 30–99)
ALT SERPL-CCNC: 10 U/L (ref 2–50)
ANION GAP SERPL CALC-SCNC: 10 MMOL/L (ref 7–16)
AST SERPL-CCNC: 14 U/L (ref 12–45)
BASOPHILS # BLD AUTO: 0.5 % (ref 0–1.8)
BASOPHILS # BLD: 0.06 K/UL (ref 0–0.12)
BILIRUB SERPL-MCNC: 0.3 MG/DL (ref 0.1–1.5)
BUN SERPL-MCNC: 6 MG/DL (ref 8–22)
CA-I SERPL-SCNC: 0.9 MMOL/L (ref 1.1–1.3)
CALCIUM SERPL-MCNC: 6.4 MG/DL (ref 8.5–10.5)
CHLORIDE SERPL-SCNC: 105 MMOL/L (ref 96–112)
CO2 SERPL-SCNC: 23 MMOL/L (ref 20–33)
CREAT SERPL-MCNC: 0.8 MG/DL (ref 0.5–1.4)
EOSINOPHIL # BLD AUTO: 0.07 K/UL (ref 0–0.51)
EOSINOPHIL NFR BLD: 0.6 % (ref 0–6.9)
ERYTHROCYTE [DISTWIDTH] IN BLOOD BY AUTOMATED COUNT: 56.3 FL (ref 35.9–50)
GFR SERPLBLD CREATININE-BSD FMLA CKD-EPI: 86 ML/MIN/1.73 M 2
GLOBULIN SER CALC-MCNC: 2.8 G/DL (ref 1.9–3.5)
GLUCOSE SERPL-MCNC: 88 MG/DL (ref 65–99)
HCT VFR BLD AUTO: 23.6 % (ref 37–47)
HGB BLD-MCNC: 7.5 G/DL (ref 12–16)
IMM GRANULOCYTES # BLD AUTO: 0.33 K/UL (ref 0–0.11)
IMM GRANULOCYTES NFR BLD AUTO: 2.7 % (ref 0–0.9)
LYMPHOCYTES # BLD AUTO: 1.13 K/UL (ref 1–4.8)
LYMPHOCYTES NFR BLD: 9.3 % (ref 22–41)
MCH RBC QN AUTO: 28.8 PG (ref 27–33)
MCHC RBC AUTO-ENTMCNC: 31.8 G/DL (ref 33.6–35)
MCV RBC AUTO: 90.8 FL (ref 81.4–97.8)
MONOCYTES # BLD AUTO: 0.71 K/UL (ref 0–0.85)
MONOCYTES NFR BLD AUTO: 5.8 % (ref 0–13.4)
NEUTROPHILS # BLD AUTO: 9.89 K/UL (ref 2–7.15)
NEUTROPHILS NFR BLD: 81.1 % (ref 44–72)
NRBC # BLD AUTO: 0 K/UL
NRBC BLD-RTO: 0 /100 WBC
PLATELET # BLD AUTO: 406 K/UL (ref 164–446)
PMV BLD AUTO: 8.7 FL (ref 9–12.9)
POTASSIUM SERPL-SCNC: 3.4 MMOL/L (ref 3.6–5.5)
PROT SERPL-MCNC: 5 G/DL (ref 6–8.2)
PTH-INTACT SERPL-MCNC: 125 PG/ML (ref 14–72)
RBC # BLD AUTO: 2.6 M/UL (ref 4.2–5.4)
SODIUM SERPL-SCNC: 138 MMOL/L (ref 135–145)
WBC # BLD AUTO: 12.2 K/UL (ref 4.8–10.8)

## 2022-03-17 PROCEDURE — 700102 HCHG RX REV CODE 250 W/ 637 OVERRIDE(OP): Performed by: STUDENT IN AN ORGANIZED HEALTH CARE EDUCATION/TRAINING PROGRAM

## 2022-03-17 PROCEDURE — 700111 HCHG RX REV CODE 636 W/ 250 OVERRIDE (IP)

## 2022-03-17 PROCEDURE — 36415 COLL VENOUS BLD VENIPUNCTURE: CPT

## 2022-03-17 PROCEDURE — 700111 HCHG RX REV CODE 636 W/ 250 OVERRIDE (IP): Performed by: NURSE PRACTITIONER

## 2022-03-17 PROCEDURE — A9270 NON-COVERED ITEM OR SERVICE: HCPCS | Performed by: STUDENT IN AN ORGANIZED HEALTH CARE EDUCATION/TRAINING PROGRAM

## 2022-03-17 PROCEDURE — 82330 ASSAY OF CALCIUM: CPT

## 2022-03-17 PROCEDURE — 700105 HCHG RX REV CODE 258: Performed by: NURSE PRACTITIONER

## 2022-03-17 PROCEDURE — A9270 NON-COVERED ITEM OR SERVICE: HCPCS | Performed by: NURSE PRACTITIONER

## 2022-03-17 PROCEDURE — 83970 ASSAY OF PARATHORMONE: CPT

## 2022-03-17 PROCEDURE — 770004 HCHG ROOM/CARE - ONCOLOGY PRIVATE *

## 2022-03-17 PROCEDURE — 80053 COMPREHEN METABOLIC PANEL: CPT

## 2022-03-17 PROCEDURE — 85025 COMPLETE CBC W/AUTO DIFF WBC: CPT

## 2022-03-17 PROCEDURE — 700102 HCHG RX REV CODE 250 W/ 637 OVERRIDE(OP)

## 2022-03-17 PROCEDURE — 700102 HCHG RX REV CODE 250 W/ 637 OVERRIDE(OP): Performed by: NURSE PRACTITIONER

## 2022-03-17 PROCEDURE — A9270 NON-COVERED ITEM OR SERVICE: HCPCS

## 2022-03-17 PROCEDURE — 700101 HCHG RX REV CODE 250: Performed by: NURSE PRACTITIONER

## 2022-03-17 RX ORDER — LORAZEPAM 2 MG/ML
1 INJECTION INTRAMUSCULAR ONCE
Status: COMPLETED | OUTPATIENT
Start: 2022-03-17 | End: 2022-03-17

## 2022-03-17 RX ORDER — POTASSIUM CHLORIDE 7.45 MG/ML
10 INJECTION INTRAVENOUS
Status: COMPLETED | OUTPATIENT
Start: 2022-03-17 | End: 2022-03-17

## 2022-03-17 RX ORDER — CALCIUM GLUCONATE 20 MG/ML
1 INJECTION, SOLUTION INTRAVENOUS ONCE
Status: COMPLETED | OUTPATIENT
Start: 2022-03-17 | End: 2022-03-17

## 2022-03-17 RX ADMIN — BACLOFEN 10 MG: 10 TABLET ORAL at 04:59

## 2022-03-17 RX ADMIN — ROPINIROLE HYDROCHLORIDE 1 MG: 1 TABLET, FILM COATED ORAL at 16:53

## 2022-03-17 RX ADMIN — ONDANSETRON 4 MG: 2 INJECTION INTRAMUSCULAR; INTRAVENOUS at 21:32

## 2022-03-17 RX ADMIN — IBUPROFEN 600 MG: 400 TABLET, FILM COATED ORAL at 05:00

## 2022-03-17 RX ADMIN — OMEPRAZOLE 20 MG: 20 CAPSULE, DELAYED RELEASE ORAL at 05:00

## 2022-03-17 RX ADMIN — HYDROMORPHONE HYDROCHLORIDE 2 MG: 2 TABLET ORAL at 21:32

## 2022-03-17 RX ADMIN — NYSTATIN: 100000 POWDER TOPICAL at 05:00

## 2022-03-17 RX ADMIN — POTASSIUM CHLORIDE, DEXTROSE MONOHYDRATE AND SODIUM CHLORIDE: 150; 5; 450 INJECTION, SOLUTION INTRAVENOUS at 16:55

## 2022-03-17 RX ADMIN — CALCIUM CARBONATE 500 MG: 500 TABLET, CHEWABLE ORAL at 11:48

## 2022-03-17 RX ADMIN — HYDROMORPHONE HYDROCHLORIDE 2 MG: 2 TABLET ORAL at 16:53

## 2022-03-17 RX ADMIN — RIVAROXABAN 15 MG: 15 TABLET, FILM COATED ORAL at 06:59

## 2022-03-17 RX ADMIN — PIPERACILLIN SODIUM AND TAZOBACTAM SODIUM 3.38 G: 3; .375 INJECTION, POWDER, LYOPHILIZED, FOR SOLUTION INTRAVENOUS at 04:59

## 2022-03-17 RX ADMIN — BACLOFEN 10 MG: 10 TABLET ORAL at 11:49

## 2022-03-17 RX ADMIN — HYDROMORPHONE HYDROCHLORIDE 2 MG: 2 TABLET ORAL at 06:59

## 2022-03-17 RX ADMIN — POTASSIUM CHLORIDE 10 MEQ: 7.46 INJECTION, SOLUTION INTRAVENOUS at 14:18

## 2022-03-17 RX ADMIN — PIPERACILLIN SODIUM AND TAZOBACTAM SODIUM 3.38 G: 3; .375 INJECTION, POWDER, LYOPHILIZED, FOR SOLUTION INTRAVENOUS at 12:59

## 2022-03-17 RX ADMIN — POTASSIUM CHLORIDE 10 MEQ: 7.46 INJECTION, SOLUTION INTRAVENOUS at 15:29

## 2022-03-17 RX ADMIN — RIVAROXABAN 15 MG: 15 TABLET, FILM COATED ORAL at 16:53

## 2022-03-17 RX ADMIN — CALCIUM CARBONATE 500 MG: 500 TABLET, CHEWABLE ORAL at 16:53

## 2022-03-17 RX ADMIN — LORAZEPAM 1 MG: 2 INJECTION INTRAMUSCULAR; INTRAVENOUS at 19:35

## 2022-03-17 RX ADMIN — PIPERACILLIN SODIUM AND TAZOBACTAM SODIUM 3.38 G: 3; .375 INJECTION, POWDER, LYOPHILIZED, FOR SOLUTION INTRAVENOUS at 20:43

## 2022-03-17 RX ADMIN — IBUPROFEN 600 MG: 400 TABLET, FILM COATED ORAL at 11:47

## 2022-03-17 RX ADMIN — ROPINIROLE HYDROCHLORIDE 1 MG: 1 TABLET, FILM COATED ORAL at 04:59

## 2022-03-17 RX ADMIN — CALCIUM GLUCONATE 1 G: 20 INJECTION, SOLUTION INTRAVENOUS at 11:47

## 2022-03-17 RX ADMIN — AMLODIPINE BESYLATE 5 MG: 5 TABLET ORAL at 16:53

## 2022-03-17 RX ADMIN — BACLOFEN 10 MG: 10 TABLET ORAL at 16:54

## 2022-03-17 RX ADMIN — HYDROXYCHLOROQUINE SULFATE 400 MG: 200 TABLET ORAL at 06:58

## 2022-03-17 RX ADMIN — PAROXETINE 60 MG: 30 TABLET, FILM COATED ORAL at 20:42

## 2022-03-17 RX ADMIN — POTASSIUM CHLORIDE 10 MEQ: 7.46 INJECTION, SOLUTION INTRAVENOUS at 13:04

## 2022-03-17 RX ADMIN — MIRTAZAPINE 22.5 MG: 15 TABLET, FILM COATED ORAL at 20:41

## 2022-03-17 RX ADMIN — CALCIUM CARBONATE 500 MG: 500 TABLET, CHEWABLE ORAL at 05:00

## 2022-03-17 RX ADMIN — DIPHENHYDRAMINE HYDROCHLORIDE 25 MG: 25 TABLET ORAL at 20:43

## 2022-03-17 ASSESSMENT — ENCOUNTER SYMPTOMS
COUGH: 0
CHILLS: 0
NAUSEA: 0
ABDOMINAL PAIN: 0
DIZZINESS: 0
VOMITING: 0
FEVER: 0
SHORTNESS OF BREATH: 0
DIARRHEA: 0

## 2022-03-17 ASSESSMENT — PAIN DESCRIPTION - PAIN TYPE
TYPE: ACUTE PAIN

## 2022-03-17 NOTE — PROGRESS NOTES
Lab called with critical result of Ca 6.4 at 0120. Critical lab result read back.     Dr. Dyson notified of critical lab result at 0127.  Critical lab result read back by Dr. Dyson. No new orders.

## 2022-03-17 NOTE — CARE PLAN
The patient is Watcher - Medium risk of patient condition declining or worsening    Shift Goals  Clinical Goals: Pt will have controlled pain and stable labs  Patient Goals: Decreased pain  Family Goals: NA    Progress made toward(s) clinical / shift goals:    Problem: Pain - Standard  Goal: Alleviation of pain or a reduction in pain to the patient’s comfort goal  Outcome: Progressing     Problem: Knowledge Deficit - Standard  Goal: Patient and family/care givers will demonstrate understanding of plan of care, disease process/condition, diagnostic tests and medications  Outcome: Progressing     Problem: Psychosocial  Goal: Patient's level of anxiety will decrease  Outcome: Progressing  Goal: Patient's ability to verbalize feelings about condition will improve  Outcome: Progressing  Goal: Patient's ability to re-evaluate and adapt role responsibilities will improve  Outcome: Progressing  Goal: Patient and family will demonstrate ability to cope with life altering diagnosis and/or procedure  Outcome: Progressing  Goal: Spiritual and cultural needs incorporated into hospitalization  Outcome: Progressing     Problem: Infection - Standard  Goal: Patient will remain free from infection  Outcome: Progressing     Problem: Respiratory  Goal: Patient will achieve/maintain optimum respiratory ventilation and gas exchange  Outcome: Progressing     Problem: Bowel Elimination  Goal: Establish and maintain regular bowel function  Outcome: Progressing     Problem: Urinary Elimination  Goal: Establish and maintain regular urinary output  Outcome: Progressing     Problem: Fall Risk  Goal: Patient will remain free from falls  Outcome: Progressing     Problem: Skin Integrity  Goal: Skin integrity is maintained or improved  Outcome: Progressing       Patient is not progressing towards the following goals:

## 2022-03-17 NOTE — CARE PLAN
The patient is Watcher - Medium risk of patient condition declining or worsening    Shift Goals  Clinical Goals: Pt will get up to a chair x 1  Patient Goals: Pt will remain free from muscle spasms to feet.  Family Goals: NA    Progress made toward(s) clinical / shift goals:  Pt up to a bedside commode with two person assist and a FWW. Generalized weakness but tolerated well. Spasms well controlled today.     Patient is not progressing towards the following goals:

## 2022-03-17 NOTE — PROGRESS NOTES
Pt alert and oriented x 4 but fatigued. Denies nausea. C/o pain in her LLE. Medicated as needed for pain with oral dilaudid and ibuprofen with positive results. Continues with brownish drainage from midline incision. Suprapubic cath with moderate amount of yellow urine out. Purewik to suction with pink tinged urine out. L foot in boot and elevated on pillows. Spasticity not as severe as yesterday but still with intermittent twitching and tremors. Midline to LUE flushing well with positive blood return. Fluids and antibiotics infusing. Pt resting comfortably. Call light within reach. Hourly rounding in place.

## 2022-03-18 LAB
ANION GAP SERPL CALC-SCNC: 11 MMOL/L (ref 7–16)
BASOPHILS # BLD AUTO: 0.5 % (ref 0–1.8)
BASOPHILS # BLD: 0.05 K/UL (ref 0–0.12)
BUN SERPL-MCNC: 6 MG/DL (ref 8–22)
CA-I SERPL-SCNC: 1 MMOL/L (ref 1.1–1.3)
CALCIUM SERPL-MCNC: 6.5 MG/DL (ref 8.5–10.5)
CHLORIDE SERPL-SCNC: 105 MMOL/L (ref 96–112)
CO2 SERPL-SCNC: 22 MMOL/L (ref 20–33)
CREAT SERPL-MCNC: 0.7 MG/DL (ref 0.5–1.4)
EOSINOPHIL # BLD AUTO: 0.02 K/UL (ref 0–0.51)
EOSINOPHIL NFR BLD: 0.2 % (ref 0–6.9)
ERYTHROCYTE [DISTWIDTH] IN BLOOD BY AUTOMATED COUNT: 55.6 FL (ref 35.9–50)
GFR SERPLBLD CREATININE-BSD FMLA CKD-EPI: 101 ML/MIN/1.73 M 2
GLUCOSE SERPL-MCNC: 101 MG/DL (ref 65–99)
HCT VFR BLD AUTO: 24.8 % (ref 37–47)
HGB BLD-MCNC: 7.7 G/DL (ref 12–16)
IMM GRANULOCYTES # BLD AUTO: 0.4 K/UL (ref 0–0.11)
IMM GRANULOCYTES NFR BLD AUTO: 4.1 % (ref 0–0.9)
LYMPHOCYTES # BLD AUTO: 0.97 K/UL (ref 1–4.8)
LYMPHOCYTES NFR BLD: 9.9 % (ref 22–41)
MAGNESIUM SERPL-MCNC: 1.4 MG/DL (ref 1.5–2.5)
MCH RBC QN AUTO: 28.3 PG (ref 27–33)
MCHC RBC AUTO-ENTMCNC: 31 G/DL (ref 33.6–35)
MCV RBC AUTO: 91.2 FL (ref 81.4–97.8)
MONOCYTES # BLD AUTO: 0.71 K/UL (ref 0–0.85)
MONOCYTES NFR BLD AUTO: 7.3 % (ref 0–13.4)
NEUTROPHILS # BLD AUTO: 7.6 K/UL (ref 2–7.15)
NEUTROPHILS NFR BLD: 78 % (ref 44–72)
NRBC # BLD AUTO: 0 K/UL
NRBC BLD-RTO: 0 /100 WBC
PHOSPHATE SERPL-MCNC: 2.1 MG/DL (ref 2.5–4.5)
PLATELET # BLD AUTO: 407 K/UL (ref 164–446)
PMV BLD AUTO: 8.8 FL (ref 9–12.9)
POTASSIUM SERPL-SCNC: 3.4 MMOL/L (ref 3.6–5.5)
RBC # BLD AUTO: 2.72 M/UL (ref 4.2–5.4)
SODIUM SERPL-SCNC: 138 MMOL/L (ref 135–145)
WBC # BLD AUTO: 9.8 K/UL (ref 4.8–10.8)

## 2022-03-18 PROCEDURE — 700102 HCHG RX REV CODE 250 W/ 637 OVERRIDE(OP)

## 2022-03-18 PROCEDURE — 700101 HCHG RX REV CODE 250: Performed by: NURSE PRACTITIONER

## 2022-03-18 PROCEDURE — 770004 HCHG ROOM/CARE - ONCOLOGY PRIVATE *

## 2022-03-18 PROCEDURE — A9270 NON-COVERED ITEM OR SERVICE: HCPCS

## 2022-03-18 PROCEDURE — 700102 HCHG RX REV CODE 250 W/ 637 OVERRIDE(OP): Performed by: STUDENT IN AN ORGANIZED HEALTH CARE EDUCATION/TRAINING PROGRAM

## 2022-03-18 PROCEDURE — 83735 ASSAY OF MAGNESIUM: CPT

## 2022-03-18 PROCEDURE — A9270 NON-COVERED ITEM OR SERVICE: HCPCS | Performed by: NURSE PRACTITIONER

## 2022-03-18 PROCEDURE — 97530 THERAPEUTIC ACTIVITIES: CPT

## 2022-03-18 PROCEDURE — 84100 ASSAY OF PHOSPHORUS: CPT

## 2022-03-18 PROCEDURE — 80048 BASIC METABOLIC PNL TOTAL CA: CPT

## 2022-03-18 PROCEDURE — A9270 NON-COVERED ITEM OR SERVICE: HCPCS | Performed by: STUDENT IN AN ORGANIZED HEALTH CARE EDUCATION/TRAINING PROGRAM

## 2022-03-18 PROCEDURE — 700101 HCHG RX REV CODE 250

## 2022-03-18 PROCEDURE — 85025 COMPLETE CBC W/AUTO DIFF WBC: CPT

## 2022-03-18 PROCEDURE — 82330 ASSAY OF CALCIUM: CPT

## 2022-03-18 PROCEDURE — 36415 COLL VENOUS BLD VENIPUNCTURE: CPT

## 2022-03-18 PROCEDURE — 700105 HCHG RX REV CODE 258

## 2022-03-18 PROCEDURE — 700105 HCHG RX REV CODE 258: Performed by: NURSE PRACTITIONER

## 2022-03-18 PROCEDURE — 700111 HCHG RX REV CODE 636 W/ 250 OVERRIDE (IP): Performed by: NURSE PRACTITIONER

## 2022-03-18 PROCEDURE — 700102 HCHG RX REV CODE 250 W/ 637 OVERRIDE(OP): Performed by: NURSE PRACTITIONER

## 2022-03-18 PROCEDURE — 97535 SELF CARE MNGMENT TRAINING: CPT

## 2022-03-18 PROCEDURE — 700111 HCHG RX REV CODE 636 W/ 250 OVERRIDE (IP)

## 2022-03-18 RX ORDER — CEFAZOLIN SODIUM 1 G/50ML
1 INJECTION, SOLUTION INTRAVENOUS EVERY 8 HOURS
Status: DISCONTINUED | OUTPATIENT
Start: 2022-03-18 | End: 2022-03-21

## 2022-03-18 RX ORDER — MAGNESIUM SULFATE HEPTAHYDRATE 40 MG/ML
4 INJECTION, SOLUTION INTRAVENOUS ONCE
Status: COMPLETED | OUTPATIENT
Start: 2022-03-18 | End: 2022-03-19

## 2022-03-18 RX ORDER — POTASSIUM CHLORIDE 7.45 MG/ML
10 INJECTION INTRAVENOUS
Status: COMPLETED | OUTPATIENT
Start: 2022-03-18 | End: 2022-03-18

## 2022-03-18 RX ADMIN — PIPERACILLIN SODIUM AND TAZOBACTAM SODIUM 3.38 G: 3; .375 INJECTION, POWDER, LYOPHILIZED, FOR SOLUTION INTRAVENOUS at 12:19

## 2022-03-18 RX ADMIN — MIRTAZAPINE 22.5 MG: 15 TABLET, FILM COATED ORAL at 20:47

## 2022-03-18 RX ADMIN — PIPERACILLIN SODIUM AND TAZOBACTAM SODIUM 3.38 G: 3; .375 INJECTION, POWDER, LYOPHILIZED, FOR SOLUTION INTRAVENOUS at 04:06

## 2022-03-18 RX ADMIN — ROPINIROLE HYDROCHLORIDE 1 MG: 1 TABLET, FILM COATED ORAL at 04:48

## 2022-03-18 RX ADMIN — POTASSIUM CHLORIDE 10 MEQ: 7.46 INJECTION, SOLUTION INTRAVENOUS at 10:49

## 2022-03-18 RX ADMIN — OMEPRAZOLE 20 MG: 20 CAPSULE, DELAYED RELEASE ORAL at 04:47

## 2022-03-18 RX ADMIN — BACLOFEN 10 MG: 10 TABLET ORAL at 16:41

## 2022-03-18 RX ADMIN — BACLOFEN 10 MG: 10 TABLET ORAL at 04:47

## 2022-03-18 RX ADMIN — MAGNESIUM SULFATE HEPTAHYDRATE 4 G: 40 INJECTION, SOLUTION INTRAVENOUS at 20:17

## 2022-03-18 RX ADMIN — POTASSIUM CHLORIDE 10 MEQ: 7.46 INJECTION, SOLUTION INTRAVENOUS at 08:36

## 2022-03-18 RX ADMIN — CALCIUM CARBONATE 500 MG: 500 TABLET, CHEWABLE ORAL at 16:41

## 2022-03-18 RX ADMIN — RIVAROXABAN 15 MG: 15 TABLET, FILM COATED ORAL at 08:08

## 2022-03-18 RX ADMIN — NYSTATIN: 100000 POWDER TOPICAL at 16:42

## 2022-03-18 RX ADMIN — HYDROMORPHONE HYDROCHLORIDE 2 MG: 2 TABLET ORAL at 12:19

## 2022-03-18 RX ADMIN — POTASSIUM CHLORIDE, DEXTROSE MONOHYDRATE AND SODIUM CHLORIDE: 150; 5; 450 INJECTION, SOLUTION INTRAVENOUS at 06:17

## 2022-03-18 RX ADMIN — POTASSIUM PHOSPHATE, MONOBASIC AND POTASSIUM PHOSPHATE, DIBASIC 15 MMOL: 224; 236 INJECTION, SOLUTION, CONCENTRATE INTRAVENOUS at 12:18

## 2022-03-18 RX ADMIN — HYDROMORPHONE HYDROCHLORIDE 2 MG: 2 TABLET ORAL at 20:24

## 2022-03-18 RX ADMIN — PAROXETINE 60 MG: 30 TABLET, FILM COATED ORAL at 20:47

## 2022-03-18 RX ADMIN — ROPINIROLE HYDROCHLORIDE 1 MG: 1 TABLET, FILM COATED ORAL at 16:42

## 2022-03-18 RX ADMIN — AMLODIPINE BESYLATE 5 MG: 5 TABLET ORAL at 16:41

## 2022-03-18 RX ADMIN — BACLOFEN 10 MG: 10 TABLET ORAL at 10:54

## 2022-03-18 RX ADMIN — DIPHENHYDRAMINE HYDROCHLORIDE 25 MG: 25 TABLET ORAL at 20:47

## 2022-03-18 RX ADMIN — HYDROMORPHONE HYDROCHLORIDE 0.5 MG: 1 INJECTION, SOLUTION INTRAMUSCULAR; INTRAVENOUS; SUBCUTANEOUS at 04:07

## 2022-03-18 RX ADMIN — DIPHENHYDRAMINE HYDROCHLORIDE 25 MG: 25 TABLET ORAL at 10:54

## 2022-03-18 RX ADMIN — IBUPROFEN 600 MG: 400 TABLET, FILM COATED ORAL at 10:54

## 2022-03-18 RX ADMIN — HYDROMORPHONE HYDROCHLORIDE 2 MG: 2 TABLET ORAL at 16:41

## 2022-03-18 RX ADMIN — HYDROXYCHLOROQUINE SULFATE 400 MG: 200 TABLET ORAL at 08:08

## 2022-03-18 RX ADMIN — ONDANSETRON 4 MG: 2 INJECTION INTRAMUSCULAR; INTRAVENOUS at 20:17

## 2022-03-18 RX ADMIN — CALCIUM CARBONATE 500 MG: 500 TABLET, CHEWABLE ORAL at 10:54

## 2022-03-18 RX ADMIN — CALCIUM CARBONATE 500 MG: 500 TABLET, CHEWABLE ORAL at 04:47

## 2022-03-18 RX ADMIN — CEFAZOLIN SODIUM 1 G: 1 INJECTION, SOLUTION INTRAVENOUS at 16:42

## 2022-03-18 RX ADMIN — ONDANSETRON 4 MG: 2 INJECTION INTRAMUSCULAR; INTRAVENOUS at 08:11

## 2022-03-18 RX ADMIN — HYDROMORPHONE HYDROCHLORIDE 2 MG: 2 TABLET ORAL at 02:57

## 2022-03-18 RX ADMIN — HYDROMORPHONE HYDROCHLORIDE 2 MG: 2 TABLET ORAL at 08:08

## 2022-03-18 ASSESSMENT — COGNITIVE AND FUNCTIONAL STATUS - GENERAL
SUGGESTED CMS G CODE MODIFIER DAILY ACTIVITY: CK
PERSONAL GROOMING: A LITTLE
DRESSING REGULAR UPPER BODY CLOTHING: A LOT
DRESSING REGULAR LOWER BODY CLOTHING: A LOT
TOILETING: A LOT
HELP NEEDED FOR BATHING: A LOT
DAILY ACTIVITIY SCORE: 15

## 2022-03-18 ASSESSMENT — ENCOUNTER SYMPTOMS
DIZZINESS: 0
ABDOMINAL PAIN: 1
SHORTNESS OF BREATH: 0
FEVER: 0
COUGH: 0
CHILLS: 0
NAUSEA: 0
VOMITING: 1
DIARRHEA: 0

## 2022-03-18 ASSESSMENT — PAIN DESCRIPTION - PAIN TYPE
TYPE: ACUTE PAIN

## 2022-03-18 NOTE — PROGRESS NOTES
GYN/Oncology Progress Note               Author: EILEEN Le Date & Time created: 3/18/2022  10:33 AM     Interval History:  Up to commode. Passing gas but no BM. Feels the urge to void and feels small amount of urine come out when she attempts to void despite having both Brenner and suprapubic catheter in place. Reports leaking from below enough to soak 1/4 of her bed pad with blood tinged output. Denies nausea but vomits after eating. Only able to tolerate small sips of water without vomiting. Reports emesis as small amounts of clear mucus.  Zofran has not been helpful. Pain controlled. Urine bloody this AM.     Review of Systems:  Review of Systems   Constitutional: Negative for chills and fever.   Respiratory: Negative for cough and shortness of breath.    Cardiovascular: Negative for chest pain and leg swelling.   Gastrointestinal: Positive for abdominal pain and vomiting. Negative for diarrhea and nausea.   Genitourinary: Positive for hematuria.        Suprapubic   Neurological: Negative for dizziness.       Physical Exam:  Physical Exam  Constitutional:       Appearance: Normal appearance.   Cardiovascular:      Pulses: Normal pulses.   Pulmonary:      Effort: Pulmonary effort is normal.   Abdominal:      Palpations: Abdomen is soft.      Comments: Midline incision well approximated and  healing appropriately. No drainage or s/s of infection, ALEIDA serous, suprapubic catheter   Genitourinary:     Comments: Brenner Catheter   Bloody UO from both suprapubic and Brenner catheters.   Musculoskeletal:      Comments: Left boot   Skin:     General: Skin is warm and dry.      Capillary Refill: Capillary refill takes 2 to 3 seconds.   Neurological:      Mental Status: She is alert and oriented to person, place, and time.         Labs:          Recent Labs     03/16/22  0859 03/17/22  0009 03/18/22  0028   SODIUM 137 138 138   POTASSIUM 3.0* 3.4* 3.4*   CHLORIDE 103 105 105   CO2 24 23 22   BUN 6* 6* 6*   CREATININE  0.77 0.80 0.70   PHOSPHORUS  --   --  2.1*   CALCIUM 6.4* 6.4* 6.5*     Recent Labs     2259 22  0009 22  0028   ALTSGPT  --  10  --    ASTSGOT  --  14  --    ALKPHOSPHAT  --  110*  --    TBILIRUBIN  --  0.3  --    GLUCOSE 96 88 101*     Recent Labs     2259 22  0009 22  0028   RBC 2.79* 2.60* 2.72*   HEMOGLOBIN 8.0* 7.5* 7.7*   HEMATOCRIT 25.2* 23.6* 24.8*   PLATELETCT 467* 406 407     Recent Labs     2259 22  0009 22  0028   WBC 16.7* 12.2* 9.8   NEUTSPOLYS 87.10* 81.10* 78.00*   LYMPHOCYTES 5.60* 9.30* 9.90*   MONOCYTES 4.90 5.80 7.30   EOSINOPHILS 0.10 0.60 0.20   BASOPHILS 0.30 0.50 0.50   ASTSGOT  --  14  --    ALTSGPT  --  10  --    ALKPHOSPHAT  --  110*  --    TBILIRUBIN  --  0.3  --      Recent Labs     2259 22  0009 22  0028   SODIUM 137 138 138   POTASSIUM 3.0* 3.4* 3.4*   CHLORIDE 103 105 105   CO2 24 23 22   GLUCOSE 96 88 101*   BUN 6* 6* 6*   CREATININE 0.77 0.80 0.70   CALCIUM 6.4* 6.4* 6.5*     Hemodynamics:  Temp (24hrs), Av.9 °C (98.4 °F), Min:36.7 °C (98 °F), Max:37.2 °C (98.9 °F)  Temperature: 36.9 °C (98.4 °F)  Pulse  Av.3  Min: 75  Max: 142   Blood Pressure: 144/63     Respiratory:    Respiration: 17, Pulse Oximetry: 97 %        RUL Breath Sounds: Clear, RML Breath Sounds: Diminished, RLL Breath Sounds: Diminished, MORENO Breath Sounds: Clear, LLL Breath Sounds: Clear  Fluids:    Intake/Output Summary (Last 24 hours) at 3/10/2022 1155  Last data filed at 3/10/2022 0430  Gross per 24 hour   Intake --   Output 1620 ml   Net -1620 ml        GI/Nutrition:  Orders Placed This Encounter   Procedures   • Diet Order Diet: Low Fiber(GI Soft)     Standing Status:   Standing     Number of Occurrences:   1     Order Specific Question:   Diet:     Answer:   Low Fiber(GI Soft) [2]     Medical Decision Making, by Problem:  Active Hospital Problems    Diagnosis    • *Hypercalcemia [E83.52]        Plan:  This is a 56  y.o. female with stage III (minimum) Grade 2 endometrial adenocarcinoma s/p 6 cycles of neoadjuvant chemotherapy with completed on 1/13/22 , who presented with hypercalcemia, nausea, and vomiting     1. POD #15: s/p ex lap, hys, bso, cystectomy with repair and suprapubic catheter placement. Doing well, up to edge of bed and commode > encouraged continued mobilization, with at minimum up to EOB x 3 a day, working with PT  2. Urinary incontinence: Present prior to surgery. Unclear if leaking around Bernner or if coming from vagina. SP in place. Dr. Dyson placed Brenner at bedside 3/17/22 to maximally divert urine given that patient required a cystectomy repair. UO bloody today. Hold Xarelto for now. Monitor.   3. Leukocytosis:  unclear source of infection, UA culture showed e. Coli, BC x 2 NGTD. WBC normalized at 9.8 tuday. Switch from Zosyn to ancef based on sensitivities.   4. Post op ileus: s/p reglan. Resolved, tolerating soft diet  5. Post op pain: well controlled on PO dilaudid and motrin PRN  6. Hypocalcemia: patient with hx of hypercalemia, secondary to malignancy and dehydration, s/p IVF, lasix, and pamidronate.  Now with hypocalcemia, continue tums, continue cardiac monitor until improved. Ionized calcium 1.0  today. Follow AM labs.   7. Tachycardia: HR into 140's on 3/12, now improved back to baseline  8. Hypokalemia:  3.4 today, IV replacement with 15 mmol K Phos and 20 mEq KCl; recheck in am  9. Hypomagnesemia: Mag 1.4 today. Replete with 4 g mag sulfate.   10.Stage III (minimum) Grade 2 endometrial adenocarcinoma:  s/p 6 cycles of neoadjuvant chemotherapy, now s/p ex lap, hys, bso, cysetectomy with suprapubic cath placement on 3/3/22.  Final pathology pending from [facility]  11. Left trimalleolar ankle fracture/dislocation: s/p ORIF 2/26, ortho following.    12. Upper extremity DVT: Venous US positive for DVT, s/p heparin gtt prior to surgery, Hgb stable.  Xarelto held AM of 3/16 due to hematuria. Hematuria  "recurrent today; Xarelto held.   13. Anemia: Secondary to chemotherapy and now secondary to acute blood loss. Hgb 7.5 pre operative on 3/3, s/p 2 unit intra op. S/p 1 unit on 3/5, stable. Monitor.   14. Vomiting: denies nausea. Trial compazine and encourage mobilization.   15. Elevated BP: No hx of HTN. On amlodipine 2.5 mg at home for Raynaud's > amlodipine to 5 mg daily, currently well controlled.   16. History of Lupus: continue home hydroxychloroquine  17. Hx of mood disorder: PTSD and anxiety. Continue home paroxetine and propranolol prn.   18. Hx Raynaud's disease: Continue amlodipine.   19. Hx of allergic rhinitis: Home Azelastine  20. Dispo: continue to mobilize, Acute rehab signed off stating \"poor tolerance for IPR level of therapy.\" SNF and home health orders placed. Wife would like to try and take patient home with home health.     Case discussed with MD    Quality-Core Measures  "

## 2022-03-18 NOTE — CARE PLAN
The patient is Watcher - Medium risk of patient condition declining or worsening    Shift Goals  Clinical Goals: Monitor Pt output, Rest  Patient Goals: Rest  Family Goals: NA    Progress made toward(s) clinical / shift goals:    Problem: Knowledge Deficit - Standard  Goal: Patient and family/care givers will demonstrate understanding of plan of care, disease process/condition, diagnostic tests and medications  Outcome: Progressing     Problem: Psychosocial  Goal: Patient's level of anxiety will decrease  Outcome: Progressing  Note: Pt had garza placed by Dr. Dyson and tolerated well.        Patient is not progressing towards the following goals:      Problem: Urinary Elimination  Goal: Establish and maintain regular urinary output  Outcome: Not Progressing  Note: Pt urinary output still has red tinge to it. Pt now having bloody vaginal discharge.

## 2022-03-18 NOTE — DISCHARGE PLANNING
*INCLUDE SPOUSE IN EVERY D/C PLANNING CONVERSATION*  Anticipated Discharge Disposition: Inpatient Rehab Facility vs SNF    Action: LSW met with pt at bedside and spouse via phone. LSW discussed options moving forward. Pt and spouse feel that rehab is top option for placement at this time and only other facility she might consider is [facility].     LSW educated that we often find challenges with acceptances to SNF with [employer] insurance. Spouse stated she has the best [employer] plan you get elect for and there should be no issue with SNF placement. Spouse asked if I were able to understand what type of policy the pt has. LSW stated I have the correct policy information and each facility will run authorization. Spouse stated     Pt and spouse both requested that spouse, Gosia, is involved in each conversation with d/c planner. RN Manager added note in chart reflecting this.     LSW completed chart review, [facility] not willing to accept pt at this time due to poor tolerance for therapies.     Barriers to Discharge: Placement     Plan: [facility] up with [facility], follow up with therapy if pt is able to tolerate therapies

## 2022-03-18 NOTE — CARE PLAN
Problem: Pain - Standard  Goal: Alleviation of pain or a reduction in pain to the patient’s comfort goal  Outcome: Progressing     Problem: Knowledge Deficit - Standard  Goal: Patient and family/care givers will demonstrate understanding of plan of care, disease process/condition, diagnostic tests and medications  Outcome: Progressing     Problem: Psychosocial  Goal: Patient's level of anxiety will decrease  Outcome: Progressing   The patient is Watcher - Medium risk of patient condition declining or worsening    Shift Goals  Clinical Goals: Monitor Pt output, Rest  Patient Goals: Rest  Family Goals: NA    Progress made toward(s) clinical / shift goals:     Patient is not progressing towards the following goals:

## 2022-03-18 NOTE — PROGRESS NOTES
While administering pt morning medications, there was bloody vaginal discharge assessed while performing omar care on the Pt.

## 2022-03-18 NOTE — PROGRESS NOTES
GYN/Oncology Progress Note               Author: EILEEN Le Date & Time created: 3/17/2022  8:24 PM     Interval History:  Patient doing well. Mild nausea, no vomiting. Able to eat more today. Pain manageable with current pain meds. Up to side of bed and commode today.  Spastic movements stopped. Having perineal drainage captured by Purewick.     Review of Systems:  Review of Systems   Constitutional: Negative for chills and fever.   Respiratory: Negative for cough and shortness of breath.    Cardiovascular: Negative for chest pain and leg swelling.   Gastrointestinal: Negative for abdominal pain, diarrhea, nausea and vomiting.   Genitourinary:        Suprapubic   Neurological: Negative for dizziness.       Physical Exam:  Physical Exam  Constitutional:       Appearance: Normal appearance.   Cardiovascular:      Pulses: Normal pulses.   Pulmonary:      Effort: Pulmonary effort is normal.   Abdominal:      Palpations: Abdomen is soft.      Comments: Midline wound well approximated, minimal drainage to superior portion of incision, no s/s of infection, ALEIDA serosang, suprapubic catheter   Musculoskeletal:      Comments: Left boot   Skin:     General: Skin is warm and dry.      Capillary Refill: Capillary refill takes 2 to 3 seconds.   Neurological:      Mental Status: She is alert and oriented to person, place, and time.         Labs:          Recent Labs     03/15/22  0029 03/16/22  0859 03/17/22  0009   SODIUM 139 137 138   POTASSIUM 3.4* 3.0* 3.4*   CHLORIDE 102 103 105   CO2 23 24 23   BUN 4* 6* 6*   CREATININE 0.67 0.77 0.80   MAGNESIUM 1.7  --   --    CALCIUM 6.5* 6.4* 6.4*     Recent Labs     03/15/22  0029 03/16/22  0859 03/17/22 0009   ALTSGPT 9  --  10   ASTSGOT 21  --  14   ALKPHOSPHAT 119*  --  110*   TBILIRUBIN 0.4  --  0.3   GLUCOSE 114* 96 88     Recent Labs     03/15/22  0029 03/16/22  0859 03/17/22 0009   RBC 3.02* 2.79* 2.60*   HEMOGLOBIN 8.7* 8.0* 7.5*   HEMATOCRIT 27.5* 25.2* 23.6*    PLATELETCT 558* 467* 406     Recent Labs     03/15/22  0029 22  0859 22  0009   WBC 18.4* 16.7* 12.2*   NEUTSPOLYS 84.30* 87.10* 81.10*   LYMPHOCYTES 6.40* 5.60* 9.30*   MONOCYTES 6.20 4.90 5.80   EOSINOPHILS 0.00 0.10 0.60   BASOPHILS 0.30 0.30 0.50   ASTSGOT 21  --  14   ALTSGPT 9  --  10   ALKPHOSPHAT 119*  --  110*   TBILIRUBIN 0.4  --  0.3     Recent Labs     03/15/22  0029 22  0859 22  0009   SODIUM 139 137 138   POTASSIUM 3.4* 3.0* 3.4*   CHLORIDE 102 103 105   CO2 23 24 23   GLUCOSE 114* 96 88   BUN 4* 6* 6*   CREATININE 0.67 0.77 0.80   CALCIUM 6.5* 6.4* 6.4*     Hemodynamics:  Temp (24hrs), Av.1 °C (98.8 °F), Min:36.9 °C (98.4 °F), Max:37.2 °C (99 °F)  Temperature: 37.2 °C (98.9 °F)  Pulse  Av.4  Min: 75  Max: 142   Blood Pressure: 140/92     Respiratory:    Respiration: 17, Pulse Oximetry: 97 %        RUL Breath Sounds: Clear, RML Breath Sounds: Diminished, RLL Breath Sounds: Diminished, MORENO Breath Sounds: Clear, LLL Breath Sounds: Clear  Fluids:    Intake/Output Summary (Last 24 hours) at 3/10/2022 1155  Last data filed at 3/10/2022 0430  Gross per 24 hour   Intake --   Output 1620 ml   Net -1620 ml        GI/Nutrition:  Orders Placed This Encounter   Procedures   • Diet Order Diet: Low Fiber(GI Soft)     Standing Status:   Standing     Number of Occurrences:   1     Order Specific Question:   Diet:     Answer:   Low Fiber(GI Soft) [2]     Medical Decision Making, by Problem:  Active Hospital Problems    Diagnosis    • *Hypercalcemia [E83.52]        Plan:  This is a 56 y.o. female with stage III (minimum) Grade 2 endometrial adenocarcinoma s/p 6 cycles of neoadjuvant chemotherapy with completed on 1/13/22 , who presented with hypercalcemia, nausea, and vomiting     1. POD #14: s/p ex lap, hys, bso, cystectomy with repair and suprapubic catheter placement. Doing well, up to edge of bed and commode > encouraged continued mobilization, with at minimum up to EOB x 3 a day,  working with PT  2. Urinary incontinence: unclear if coming from urethra or vagina, has SP in place. Dr. Dyson placed Brenner at bedside his evenining to maximally divert urine given that patient required a cystectomy repair.   3. Leukocytosis:  unclear source of infection, UA culture showed e. Coli, BC x 2 NGTD. WBC down to 12.2. Continue Zosyn  4. Post op ileus: stop reglan, resolved, tolerating soft diet  5. Post op pain: well controlled on PO dilaudid and motrin PRN  6. Hypocalcemia: patient with hx of hypercalemia, secondary to malignancy and dehydration, s/p IVF, lasix, and pamidronate.  Now with hypocalcemia, continue tums, continue cardiac monitor until improved. Ionized calcium 0.9 today. Repleted with 1g calcium gluconate.   7. Tachycardia: HR into 140's on 3/12, now improved back to baseline  8. Hypokalemia: Improved at 3.4 today, IV replacement with 30 mEq; recheck in am  9. Hypomagnesemia: resolved, Mg 1.7  10.Stage III (minimum) Grade 2 endometrial adenocarcinoma:  s/p 6 cycles of neoadjuvant chemotherapy, now s/p ex lap, hys, bso, cysetectomy with suprapubic cath placement on 3/3/22.  Final pathology pending from [place]  11. Left trimalleolar ankle fracture/dislocation: s/p ORIF 2/26, ortho following.    12. Upper extremity DVT: Venous US positive for DVT, s/p heparin gtt prior to surgery, Hgb stable.  Xarelto held AM of 3/16 due to hematuria, now resolved. Xarelto  resumed   13. Anemia: Secondary to chemotherapy and now secondary to acute blood loss. Hgb 7.5 pre operative on 3/3, s/p 2 unit intra op. S/p 1 unit on 3/5, stable. Monitor.   14. Elevated BP: No hx of HTN. On amlodipine 2.5 mg at home for Raynaud's > amlodipine to 5 mg daily, currently well controlled.   15. History of Lupus: continue home hydroxychloroquine  16. Hx of mood disorder: PTSD and anxiety. Continue home paroxetine and propranolol prn.   17. Hx Raynaud's disease: Continue amlodipine.   18. Hx of allergic rhinitis: Home  "Azelastine  19. Dispo: continue to mobilize, Acute rehab signed off stating \"poor tolerance for IPR level of therapy.\" SNF and home health orders placed. Wife would like to try and take patient home with home health.     Patient seen in conjunction with Dr. Dyson     Quality-Core Measures  "

## 2022-03-18 NOTE — DISCHARGE PLANNING
Agency/Facility Name: [facility]   Spoke To: Kinjal   Outcome: DPA called and left a voicemail regarding patients referral.     LSW notified

## 2022-03-19 ENCOUNTER — APPOINTMENT (OUTPATIENT)
Dept: RADIOLOGY | Facility: MEDICAL CENTER | Age: 57
DRG: 740 | End: 2022-03-19
Attending: PHYSICIAN ASSISTANT
Payer: COMMERCIAL

## 2022-03-19 LAB
ANION GAP SERPL CALC-SCNC: 10 MMOL/L (ref 7–16)
ANISOCYTOSIS BLD QL SMEAR: ABNORMAL
BASOPHILS # BLD AUTO: 0.9 % (ref 0–1.8)
BASOPHILS # BLD: 0.07 K/UL (ref 0–0.12)
BUN SERPL-MCNC: 6 MG/DL (ref 8–22)
CA-I SERPL-SCNC: 1 MMOL/L (ref 1.1–1.3)
CALCIUM SERPL-MCNC: 6.8 MG/DL (ref 8.5–10.5)
CHLORIDE SERPL-SCNC: 103 MMOL/L (ref 96–112)
CO2 SERPL-SCNC: 23 MMOL/L (ref 20–33)
CREAT SERPL-MCNC: 0.87 MG/DL (ref 0.5–1.4)
EOSINOPHIL # BLD AUTO: 0 K/UL (ref 0–0.51)
EOSINOPHIL NFR BLD: 0 % (ref 0–6.9)
ERYTHROCYTE [DISTWIDTH] IN BLOOD BY AUTOMATED COUNT: 56.8 FL (ref 35.9–50)
GFR SERPLBLD CREATININE-BSD FMLA CKD-EPI: 78 ML/MIN/1.73 M 2
GLUCOSE SERPL-MCNC: 95 MG/DL (ref 65–99)
HCT VFR BLD AUTO: 27.7 % (ref 37–47)
HGB BLD-MCNC: 8.4 G/DL (ref 12–16)
LYMPHOCYTES # BLD AUTO: 0.56 K/UL (ref 1–4.8)
LYMPHOCYTES NFR BLD: 7.1 % (ref 22–41)
MACROCYTES BLD QL SMEAR: ABNORMAL
MAGNESIUM SERPL-MCNC: 2.1 MG/DL (ref 1.5–2.5)
MANUAL DIFF BLD: NORMAL
MCH RBC QN AUTO: 27.9 PG (ref 27–33)
MCHC RBC AUTO-ENTMCNC: 30.3 G/DL (ref 33.6–35)
MCV RBC AUTO: 92 FL (ref 81.4–97.8)
MONOCYTES # BLD AUTO: 0.21 K/UL (ref 0–0.85)
MONOCYTES NFR BLD AUTO: 2.7 % (ref 0–13.4)
MORPHOLOGY BLD-IMP: NORMAL
MYELOCYTES NFR BLD MANUAL: 1.8 %
NEUTROPHILS # BLD AUTO: 6.91 K/UL (ref 2–7.15)
NEUTROPHILS NFR BLD: 87.5 % (ref 44–72)
NRBC # BLD AUTO: 0 K/UL
NRBC BLD-RTO: 0 /100 WBC
PHOSPHATE SERPL-MCNC: 3 MG/DL (ref 2.5–4.5)
PLATELET # BLD AUTO: 393 K/UL (ref 164–446)
PLATELET BLD QL SMEAR: NORMAL
PMV BLD AUTO: 8.8 FL (ref 9–12.9)
POTASSIUM SERPL-SCNC: 3.5 MMOL/L (ref 3.6–5.5)
RBC # BLD AUTO: 3.01 M/UL (ref 4.2–5.4)
RBC BLD AUTO: PRESENT
SODIUM SERPL-SCNC: 136 MMOL/L (ref 135–145)
WBC # BLD AUTO: 7.9 K/UL (ref 4.8–10.8)

## 2022-03-19 PROCEDURE — 36415 COLL VENOUS BLD VENIPUNCTURE: CPT

## 2022-03-19 PROCEDURE — A9270 NON-COVERED ITEM OR SERVICE: HCPCS | Performed by: NURSE PRACTITIONER

## 2022-03-19 PROCEDURE — 85027 COMPLETE CBC AUTOMATED: CPT

## 2022-03-19 PROCEDURE — 82330 ASSAY OF CALCIUM: CPT

## 2022-03-19 PROCEDURE — 700102 HCHG RX REV CODE 250 W/ 637 OVERRIDE(OP)

## 2022-03-19 PROCEDURE — 85007 BL SMEAR W/DIFF WBC COUNT: CPT

## 2022-03-19 PROCEDURE — 700111 HCHG RX REV CODE 636 W/ 250 OVERRIDE (IP): Performed by: NURSE PRACTITIONER

## 2022-03-19 PROCEDURE — 700111 HCHG RX REV CODE 636 W/ 250 OVERRIDE (IP): Performed by: EMERGENCY MEDICINE

## 2022-03-19 PROCEDURE — 73610 X-RAY EXAM OF ANKLE: CPT | Mod: LT

## 2022-03-19 PROCEDURE — 700102 HCHG RX REV CODE 250 W/ 637 OVERRIDE(OP): Performed by: SPECIALIST

## 2022-03-19 PROCEDURE — A9270 NON-COVERED ITEM OR SERVICE: HCPCS

## 2022-03-19 PROCEDURE — 700111 HCHG RX REV CODE 636 W/ 250 OVERRIDE (IP)

## 2022-03-19 PROCEDURE — 84100 ASSAY OF PHOSPHORUS: CPT

## 2022-03-19 PROCEDURE — 700102 HCHG RX REV CODE 250 W/ 637 OVERRIDE(OP): Performed by: STUDENT IN AN ORGANIZED HEALTH CARE EDUCATION/TRAINING PROGRAM

## 2022-03-19 PROCEDURE — 80048 BASIC METABOLIC PNL TOTAL CA: CPT

## 2022-03-19 PROCEDURE — 770004 HCHG ROOM/CARE - ONCOLOGY PRIVATE *

## 2022-03-19 PROCEDURE — 700101 HCHG RX REV CODE 250: Performed by: NURSE PRACTITIONER

## 2022-03-19 PROCEDURE — A9270 NON-COVERED ITEM OR SERVICE: HCPCS | Performed by: STUDENT IN AN ORGANIZED HEALTH CARE EDUCATION/TRAINING PROGRAM

## 2022-03-19 PROCEDURE — 700102 HCHG RX REV CODE 250 W/ 637 OVERRIDE(OP): Performed by: NURSE PRACTITIONER

## 2022-03-19 PROCEDURE — A9270 NON-COVERED ITEM OR SERVICE: HCPCS | Performed by: SPECIALIST

## 2022-03-19 PROCEDURE — 83735 ASSAY OF MAGNESIUM: CPT

## 2022-03-19 RX ORDER — POTASSIUM CHLORIDE 7.45 MG/ML
10 INJECTION INTRAVENOUS
Status: COMPLETED | OUTPATIENT
Start: 2022-03-19 | End: 2022-03-19

## 2022-03-19 RX ORDER — SCOLOPAMINE TRANSDERMAL SYSTEM 1 MG/1
1 PATCH, EXTENDED RELEASE TRANSDERMAL
Status: DISCONTINUED | OUTPATIENT
Start: 2022-03-19 | End: 2022-03-24 | Stop reason: HOSPADM

## 2022-03-19 RX ADMIN — HYDROMORPHONE HYDROCHLORIDE 2 MG: 2 TABLET ORAL at 20:07

## 2022-03-19 RX ADMIN — AMLODIPINE BESYLATE 5 MG: 5 TABLET ORAL at 17:26

## 2022-03-19 RX ADMIN — POTASSIUM CHLORIDE 10 MEQ: 7.46 INJECTION, SOLUTION INTRAVENOUS at 11:48

## 2022-03-19 RX ADMIN — PROCHLORPERAZINE EDISYLATE 10 MG: 5 INJECTION INTRAMUSCULAR; INTRAVENOUS at 17:42

## 2022-03-19 RX ADMIN — HYDROMORPHONE HYDROCHLORIDE 2 MG: 2 TABLET ORAL at 14:39

## 2022-03-19 RX ADMIN — CEFAZOLIN SODIUM 1 G: 1 INJECTION, SOLUTION INTRAVENOUS at 20:59

## 2022-03-19 RX ADMIN — PAROXETINE 60 MG: 30 TABLET, FILM COATED ORAL at 20:59

## 2022-03-19 RX ADMIN — CALCIUM CARBONATE 500 MG: 500 TABLET, CHEWABLE ORAL at 17:26

## 2022-03-19 RX ADMIN — POTASSIUM CHLORIDE 10 MEQ: 7.46 INJECTION, SOLUTION INTRAVENOUS at 13:29

## 2022-03-19 RX ADMIN — ONDANSETRON 4 MG: 2 INJECTION INTRAMUSCULAR; INTRAVENOUS at 04:47

## 2022-03-19 RX ADMIN — CALCIUM CARBONATE 500 MG: 500 TABLET, CHEWABLE ORAL at 11:45

## 2022-03-19 RX ADMIN — HYDROMORPHONE HYDROCHLORIDE 0.5 MG: 1 INJECTION, SOLUTION INTRAMUSCULAR; INTRAVENOUS; SUBCUTANEOUS at 17:26

## 2022-03-19 RX ADMIN — HYDROMORPHONE HYDROCHLORIDE 2 MG: 2 TABLET ORAL at 08:27

## 2022-03-19 RX ADMIN — PROCHLORPERAZINE EDISYLATE 10 MG: 5 INJECTION INTRAMUSCULAR; INTRAVENOUS at 00:00

## 2022-03-19 RX ADMIN — BACLOFEN 10 MG: 10 TABLET ORAL at 04:45

## 2022-03-19 RX ADMIN — NYSTATIN: 100000 POWDER TOPICAL at 16:57

## 2022-03-19 RX ADMIN — CEFAZOLIN SODIUM 1 G: 1 INJECTION, SOLUTION INTRAVENOUS at 04:47

## 2022-03-19 RX ADMIN — POTASSIUM CHLORIDE, DEXTROSE MONOHYDRATE AND SODIUM CHLORIDE: 150; 5; 450 INJECTION, SOLUTION INTRAVENOUS at 01:34

## 2022-03-19 RX ADMIN — MIRTAZAPINE 22.5 MG: 15 TABLET, FILM COATED ORAL at 20:59

## 2022-03-19 RX ADMIN — IBUPROFEN 600 MG: 400 TABLET, FILM COATED ORAL at 22:35

## 2022-03-19 RX ADMIN — MAGNESIUM HYDROXIDE 30 ML: 400 SUSPENSION ORAL at 17:29

## 2022-03-19 RX ADMIN — IBUPROFEN 600 MG: 400 TABLET, FILM COATED ORAL at 00:00

## 2022-03-19 RX ADMIN — BACLOFEN 10 MG: 10 TABLET ORAL at 11:45

## 2022-03-19 RX ADMIN — PROCHLORPERAZINE EDISYLATE 10 MG: 5 INJECTION INTRAMUSCULAR; INTRAVENOUS at 20:59

## 2022-03-19 RX ADMIN — CEFAZOLIN SODIUM 1 G: 1 INJECTION, SOLUTION INTRAVENOUS at 14:40

## 2022-03-19 RX ADMIN — ROPINIROLE HYDROCHLORIDE 1 MG: 1 TABLET, FILM COATED ORAL at 17:28

## 2022-03-19 RX ADMIN — DIPHENHYDRAMINE HYDROCHLORIDE 25 MG: 25 TABLET ORAL at 20:59

## 2022-03-19 RX ADMIN — ROPINIROLE HYDROCHLORIDE 1 MG: 1 TABLET, FILM COATED ORAL at 04:47

## 2022-03-19 RX ADMIN — OMEPRAZOLE 20 MG: 20 CAPSULE, DELAYED RELEASE ORAL at 04:45

## 2022-03-19 RX ADMIN — POTASSIUM CHLORIDE, DEXTROSE MONOHYDRATE AND SODIUM CHLORIDE: 150; 5; 450 INJECTION, SOLUTION INTRAVENOUS at 14:40

## 2022-03-19 RX ADMIN — HYDROXYCHLOROQUINE SULFATE 400 MG: 200 TABLET ORAL at 08:27

## 2022-03-19 RX ADMIN — PROCHLORPERAZINE EDISYLATE 10 MG: 5 INJECTION INTRAMUSCULAR; INTRAVENOUS at 08:27

## 2022-03-19 RX ADMIN — SCOPALAMINE 1 PATCH: 1 PATCH, EXTENDED RELEASE TRANSDERMAL at 14:40

## 2022-03-19 RX ADMIN — CALCIUM CARBONATE 500 MG: 500 TABLET, CHEWABLE ORAL at 04:45

## 2022-03-19 RX ADMIN — SIMETHICONE 125 MG: 125 TABLET, CHEWABLE ORAL at 17:29

## 2022-03-19 RX ADMIN — BACLOFEN 10 MG: 10 TABLET ORAL at 17:26

## 2022-03-19 RX ADMIN — HYDROMORPHONE HYDROCHLORIDE 0.5 MG: 1 INJECTION, SOLUTION INTRAMUSCULAR; INTRAVENOUS; SUBCUTANEOUS at 11:52

## 2022-03-19 ASSESSMENT — ENCOUNTER SYMPTOMS
DIARRHEA: 0
NAUSEA: 0
SHORTNESS OF BREATH: 0
ABDOMINAL PAIN: 1
COUGH: 0
DIZZINESS: 0
VOMITING: 1
FEVER: 0
CHILLS: 0

## 2022-03-19 ASSESSMENT — PAIN DESCRIPTION - PAIN TYPE
TYPE: ACUTE PAIN

## 2022-03-19 NOTE — CARE PLAN
The patient is Watcher - Medium risk of patient condition declining or worsening    Shift Goals  Clinical Goals: Monitor output, pain control, incision  Patient Goals: Rest  Family Goals: NA    Progress made toward(s) clinical / shift goals:    Problem: Pain - Standard  Goal: Alleviation of pain or a reduction in pain to the patient’s comfort goal  Outcome: Progressing     Problem: Knowledge Deficit - Standard  Goal: Patient and family/care givers will demonstrate understanding of plan of care, disease process/condition, diagnostic tests and medications  Outcome: Progressing  Note: Pt has been kept up to date on the plan of care. All needs addressed at this time. Pt resting comfortably.      Problem: Psychosocial  Goal: Patient's level of anxiety will decrease  Outcome: Progressing       Patient is not progressing towards the following goals:

## 2022-03-19 NOTE — PROGRESS NOTES
"   Orthopaedic Progress Note    Interval changes:  Patient doing well  LLE sutures and staples removed and seristrips placed with benzoin    ROS - Patient denies any new issues.  Pain well controlled.    /74   Pulse 91   Temp 36.5 °C (97.7 °F) (Temporal)   Resp 18   Ht 1.702 m (5' 7\")   Wt 99.1 kg (218 lb 7.6 oz)   SpO2 97%       Patient seen and examined  No acute distress  Breathing non labored  RRR  LLE in cam boot, all sutures and staples removed and seristrips placed with benzoin, DNVI moves all toes, cap refill <2 sec.     Recent Labs     03/17/22  0009 03/18/22  0028 03/19/22  0417   WBC 12.2* 9.8 7.9   RBC 2.60* 2.72* 3.01*   HEMOGLOBIN 7.5* 7.7* 8.4*   HEMATOCRIT 23.6* 24.8* 27.7*   MCV 90.8 91.2 92.0   MCH 28.8 28.3 27.9   MCHC 31.8* 31.0* 30.3*   RDW 56.3* 55.6* 56.8*   PLATELETCT 406 407 393   MPV 8.7* 8.8* 8.8*       Active Hospital Problems    Diagnosis    • Hypercalcemia [E83.52]        Assessment/Plan:  Patient doing well  POD#21 S/P   1.  Open treatment with internal fixation, left trimalleolar ankle fracture with fixation of posterior lip.  2.  Open treatment without fixation of anterolateral distal tibia fracture with fragment excision.  3.  Surgical fixation of left distal tibiofibular joint disruption.  4.  Physician applied stress examination of the left ankle using fluoroscopy  Wt bearing status - NWB LLE  Wound care/Drains - Dressings to be changed every other day by nursing  Future Procedures - none planned   Sutures/Staples -out   PT/OT-initiated  Antibiotics: Perioperative completed  DVT Prophylaxis- TEDS/SCDs/Foot pumps  Brenner-none planned   Case Coordination for Discharge Planning - Disposition pending         "

## 2022-03-19 NOTE — PROGRESS NOTES
GYN/Oncology Progress Note               Author: EILEEN Le Date & Time created: 3/19/2022  11:02 AM     Interval History:  Pain stable; controlled with oral dilaudid. No nausea but did have one episode of since yesterday where she vomited clear mucus. Believes  this to be from fluid in her left ear; has had issues with this in the past and was planning to see an ENT soon for this problem. Able to keep food down so far today. Notices small amount of urine coming out when on commode. But cannot tell if this is coming from her vagina or from around the Brenner. Twitching symptoms in right leg have almost entirely resolved.       Review of Systems:   Review of Systems   Constitutional: Negative for chills and fever.   Respiratory: Negative for cough and shortness of breath.    Cardiovascular: Negative for chest pain and leg swelling.   Gastrointestinal: Positive for abdominal pain and vomiting. Negative for diarrhea and nausea.   Genitourinary: Positive for hematuria.        Suprapubic   Neurological: Negative for dizziness.       Physical Exam:  Physical Exam  Constitutional:       Appearance: Normal appearance.   Cardiovascular:      Rate and Rhythm: Normal rate and regular rhythm.      Pulses: Normal pulses.   Pulmonary:      Effort: Pulmonary effort is normal.      Breath sounds: Normal breath sounds.   Abdominal:      Palpations: Abdomen is soft.      Comments: Midline incision well approximated and  healing appropriately. No drainage or s/s of infection, ALEIDA serous, suprapubic catheter   Genitourinary:     Comments: Bloody UO from Brenner catheter  Suprapubic with clear yellow output   Musculoskeletal:      Comments: Left boot   Skin:     General: Skin is warm and dry.      Capillary Refill: Capillary refill takes 2 to 3 seconds.   Neurological:      Mental Status: She is alert and oriented to person, place, and time.         Labs:          Recent Labs     03/17/22  0009 03/18/22  0028 03/19/22  0417    SODIUM 138 138 136   POTASSIUM 3.4* 3.4* 3.5*   CHLORIDE 105 105 103   CO2 23 22 23   BUN 6* 6* 6*   CREATININE 0.80 0.70 0.87   MAGNESIUM  --  1.4* 2.1   PHOSPHORUS  --  2.1* 3.0   CALCIUM 6.4* 6.5* 6.8*     Recent Labs     22   ALTSGPT 10  --   --    ASTSGOT 14  --   --    ALKPHOSPHAT 110*  --   --    TBILIRUBIN 0.3  --   --    GLUCOSE 88 101* 95     Recent Labs     22   RBC 2.60* 2.72* 3.01*   HEMOGLOBIN 7.5* 7.7* 8.4*   HEMATOCRIT 23.6* 24.8* 27.7*   PLATELETCT 406 407 393     Recent Labs     22   WBC 12.2* 9.8 7.9   NEUTSPOLYS 81.10* 78.00* 87.50*   LYMPHOCYTES 9.30* 9.90* 7.10*   MONOCYTES 5.80 7.30 2.70   EOSINOPHILS 0.60 0.20 0.00   BASOPHILS 0.50 0.50 0.90   ASTSGOT 14  --   --    ALTSGPT 10  --   --    ALKPHOSPHAT 110*  --   --    TBILIRUBIN 0.3  --   --      Recent Labs     22   SODIUM 138 138 136   POTASSIUM 3.4* 3.4* 3.5*   CHLORIDE 105 105 103   CO2 23 22    GLUCOSE 88 101* 95   BUN 6* 6* 6*   CREATININE 0.80 0.70 0.87   CALCIUM 6.4* 6.5* 6.8*     Hemodynamics:  Temp (24hrs), Av.8 °C (98.2 °F), Min:36.5 °C (97.7 °F), Max:37.1 °C (98.8 °F)  Temperature: 36.5 °C (97.7 °F)  Pulse  Av.2  Min: 75  Max: 142   Blood Pressure: 149/74     Respiratory:    Respiration: 18, Pulse Oximetry: 97 %        RUL Breath Sounds: Clear, RML Breath Sounds: Diminished, RLL Breath Sounds: Diminished, MORENO Breath Sounds: Clear, LLL Breath Sounds: Diminished  Fluids:    Intake/Output Summary (Last 24 hours) at 3/10/2022 1155  Last data filed at 3/10/2022 0430  Gross per 24 hour   Intake --   Output 1620 ml   Net -1620 ml        GI/Nutrition:  Orders Placed This Encounter   Procedures   • Diet Order Diet: Low Fiber(GI Soft)     Standing Status:   Standing     Number of Occurrences:   1     Order Specific Question:   Diet:     Answer:   Low Fiber(GI  Soft) [2]     Medical Decision Making, by Problem:  Active Hospital Problems    Diagnosis    • *Hypercalcemia [E83.52]        Plan:  This is a 56 y.o. female with stage III (minimum) Grade 2 endometrial adenocarcinoma s/p 6 cycles of neoadjuvant chemotherapy with completed on 1/13/22 , who presented with hypercalcemia, nausea, and vomiting     1. POD #16: s/p ex lap, hys, bso, cystectomy with repair and suprapubic catheter placement. Doing well, up to edge of bed and commode > encouraged continued mobilization, with at minimum up to EOB x 3 a day, working with PT  2. Urinary incontinence: Present prior to surgery. Unclear if leaking around Brenner or if coming from vagina. SP in place. Dr. Dyson placed Brenner at bedside 3/17/22 to maximally divert urine given that patient required a cystectomy repair. UO bloody from Brenner, now clear yellow from suprapubic. Continue to hold Xarelto for now. Monitor.   3. Leukocytosis:  unclear source of infection, UA culture showed e. Coli, BC x 2 NGTD. WBC normalized. Switched from Zosyn to ancef based on sensitivities.   4. Post op ileus: s/p reglan. Resolved, tolerating soft diet.   5. Post op pain: well controlled on PO dilaudid and motrin PRN  6. Hypocalcemia: patient with hx of hypercalemia, secondary to malignancy and dehydration, s/p IVF, lasix, and pamidronate.  Now with hypocalcemia, continue tums, continue cardiac monitor until improved. Ionized calcium stable at 1.0  Today. Monitor.    7. Tachycardia: HR into 140's on 3/12, now improved back to baseline  8. Hypokalemia:  3.5 today. On IVMF with 20K. Will give 20 meq KCl today.   9. Hypomagnesemia: Improved, now 2.1 today after 4 g mag sulfate on 3/18.   10.Stage III (minimum) Grade 2 endometrial adenocarcinoma:  s/p 6 cycles of neoadjuvant chemotherapy, now s/p ex lap, hys, bso, cysetectomy with suprapubic cath placement on 3/3/22.  Final pathology pending from [place]   11. Left trimalleolar ankle fracture/dislocation: s/p  "ORIF 2/26, ortho following.     12. Upper extremity DVT: Venous US positive for DVT, s/p heparin gtt prior to surgery, Hgb stable.  Xarelto held AM of 3/16 due to hematuria. Continue to hold for hematuria today. Hgb stable at 8.4.   13. Anemia: Secondary to chemotherapy and now secondary to acute blood loss. Hgb 7.5 pre operative on 3/3, s/p 2 unit intra op. S/p 1 unit on 3/5, stable. Monitor.   14. Vomiting: denies nausea. Compazine and Zofran ineffective at preventing emesis. Start scopolamine.   15. Elevated BP: No hx of HTN. On amlodipine 2.5 mg at home for Raynaud's > amlodipine to 5 mg daily, currently well controlled.   16. History of Lupus: continue home hydroxychloroquine  17. Hx of mood disorder: PTSD and anxiety. Continue home paroxetine and propranolol prn.   18. Hx Raynaud's disease: Continue amlodipine.   19. Hx of allergic rhinitis: Home Azelastine  20. Dispo: continue to mobilize, Acute rehab signed off stating \"poor tolerance for IPR level of therapy.\" SNF and home health orders placed. Wife would like to try and take patient home with home health.     Case discussed with MD    Quality-Core Measures  "

## 2022-03-19 NOTE — CARE PLAN
The patient is Watcher - Medium risk of patient condition declining or worsening    Shift Goals  Clinical Goals: Monitor urinary output, Pain control  Patient Goals: Mobility, Get sutures out  Family Goals: NA    Progress made toward(s) clinical / shift goals:    Problem: Pain - Standard  Goal: Alleviation of pain or a reduction in pain to the patient’s comfort goal  Outcome: Progressing     Problem: Knowledge Deficit - Standard  Goal: Patient and family/care givers will demonstrate understanding of plan of care, disease process/condition, diagnostic tests and medications  Outcome: Progressing     Problem: Psychosocial  Goal: Patient's level of anxiety will decrease  Outcome: Progressing  Goal: Patient's ability to verbalize feelings about condition will improve  Outcome: Progressing  Goal: Patient's ability to re-evaluate and adapt role responsibilities will improve  Outcome: Progressing     Problem: Mobility  Goal: Patient's capacity to carry out activities will improve  Outcome: Progressing     Problem: Self Care  Goal: Patient will have the ability to perform ADLs independently or with assistance (bathe, groom, dress, toilet and feed)  Outcome: Progressing     Problem: Infection - Standard  Goal: Patient will remain free from infection  Outcome: Progressing       Patient is not progressing towards the following goals:

## 2022-03-19 NOTE — PROGRESS NOTES
Patient requesting to see orthopedic surgeon about suture removal. Ortho PA notified via voalte. Patient notified that she will be updated with more info when Ortho PA responds.

## 2022-03-20 ENCOUNTER — APPOINTMENT (OUTPATIENT)
Dept: RADIOLOGY | Facility: MEDICAL CENTER | Age: 57
DRG: 740 | End: 2022-03-20
Payer: COMMERCIAL

## 2022-03-20 LAB
ANION GAP SERPL CALC-SCNC: 12 MMOL/L (ref 7–16)
ANISOCYTOSIS BLD QL SMEAR: ABNORMAL
BACTERIA BLD CULT: NORMAL
BACTERIA BLD CULT: NORMAL
BASOPHILS # BLD AUTO: 0 % (ref 0–1.8)
BASOPHILS # BLD: 0 K/UL (ref 0–0.12)
BUN SERPL-MCNC: 6 MG/DL (ref 8–22)
CA-I SERPL-SCNC: 1.1 MMOL/L (ref 1.1–1.3)
CALCIUM SERPL-MCNC: 7.6 MG/DL (ref 8.5–10.5)
CHLORIDE SERPL-SCNC: 101 MMOL/L (ref 96–112)
CO2 SERPL-SCNC: 22 MMOL/L (ref 20–33)
CREAT SERPL-MCNC: 0.88 MG/DL (ref 0.5–1.4)
EOSINOPHIL # BLD AUTO: 0 K/UL (ref 0–0.51)
EOSINOPHIL NFR BLD: 0 % (ref 0–6.9)
ERYTHROCYTE [DISTWIDTH] IN BLOOD BY AUTOMATED COUNT: 52.8 FL (ref 35.9–50)
GFR SERPLBLD CREATININE-BSD FMLA CKD-EPI: 77 ML/MIN/1.73 M 2
GLUCOSE SERPL-MCNC: 107 MG/DL (ref 65–99)
HCT VFR BLD AUTO: 26.4 % (ref 37–47)
HGB BLD-MCNC: 8.5 G/DL (ref 12–16)
HYPOCHROMIA BLD QL SMEAR: ABNORMAL
LYMPHOCYTES # BLD AUTO: 0.92 K/UL (ref 1–4.8)
LYMPHOCYTES NFR BLD: 10.6 % (ref 22–41)
MAGNESIUM SERPL-MCNC: 1.6 MG/DL (ref 1.5–2.5)
MANUAL DIFF BLD: NORMAL
MCH RBC QN AUTO: 28.1 PG (ref 27–33)
MCHC RBC AUTO-ENTMCNC: 32.2 G/DL (ref 33.6–35)
MCV RBC AUTO: 87.4 FL (ref 81.4–97.8)
METAMYELOCYTES NFR BLD MANUAL: 1.8 %
MICROCYTES BLD QL SMEAR: ABNORMAL
MONOCYTES # BLD AUTO: 0.84 K/UL (ref 0–0.85)
MONOCYTES NFR BLD AUTO: 9.7 % (ref 0–13.4)
MORPHOLOGY BLD-IMP: NORMAL
MYELOCYTES NFR BLD MANUAL: 2.7 %
NEUTROPHILS # BLD AUTO: 6.54 K/UL (ref 2–7.15)
NEUTROPHILS NFR BLD: 75.2 % (ref 44–72)
NRBC # BLD AUTO: 0.02 K/UL
NRBC BLD-RTO: 0.2 /100 WBC
PLATELET # BLD AUTO: 399 K/UL (ref 164–446)
PLATELET BLD QL SMEAR: NORMAL
PMV BLD AUTO: 9.1 FL (ref 9–12.9)
POIKILOCYTOSIS BLD QL SMEAR: NORMAL
POLYCHROMASIA BLD QL SMEAR: NORMAL
POTASSIUM SERPL-SCNC: 3.7 MMOL/L (ref 3.6–5.5)
RBC # BLD AUTO: 3.02 M/UL (ref 4.2–5.4)
RBC BLD AUTO: PRESENT
SCHISTOCYTES BLD QL SMEAR: NORMAL
SIGNIFICANT IND 70042: NORMAL
SIGNIFICANT IND 70042: NORMAL
SITE SITE: NORMAL
SITE SITE: NORMAL
SODIUM SERPL-SCNC: 135 MMOL/L (ref 135–145)
SOURCE SOURCE: NORMAL
SOURCE SOURCE: NORMAL
WBC # BLD AUTO: 8.7 K/UL (ref 4.8–10.8)

## 2022-03-20 PROCEDURE — 700102 HCHG RX REV CODE 250 W/ 637 OVERRIDE(OP): Performed by: NURSE PRACTITIONER

## 2022-03-20 PROCEDURE — 700102 HCHG RX REV CODE 250 W/ 637 OVERRIDE(OP)

## 2022-03-20 PROCEDURE — 82330 ASSAY OF CALCIUM: CPT

## 2022-03-20 PROCEDURE — 700111 HCHG RX REV CODE 636 W/ 250 OVERRIDE (IP)

## 2022-03-20 PROCEDURE — 36415 COLL VENOUS BLD VENIPUNCTURE: CPT

## 2022-03-20 PROCEDURE — A9270 NON-COVERED ITEM OR SERVICE: HCPCS | Performed by: NURSE PRACTITIONER

## 2022-03-20 PROCEDURE — 85027 COMPLETE CBC AUTOMATED: CPT

## 2022-03-20 PROCEDURE — 700101 HCHG RX REV CODE 250: Performed by: NURSE PRACTITIONER

## 2022-03-20 PROCEDURE — 83735 ASSAY OF MAGNESIUM: CPT

## 2022-03-20 PROCEDURE — A9270 NON-COVERED ITEM OR SERVICE: HCPCS

## 2022-03-20 PROCEDURE — 80048 BASIC METABOLIC PNL TOTAL CA: CPT

## 2022-03-20 PROCEDURE — 700111 HCHG RX REV CODE 636 W/ 250 OVERRIDE (IP): Performed by: NURSE PRACTITIONER

## 2022-03-20 PROCEDURE — 770004 HCHG ROOM/CARE - ONCOLOGY PRIVATE *

## 2022-03-20 PROCEDURE — 85007 BL SMEAR W/DIFF WBC COUNT: CPT

## 2022-03-20 PROCEDURE — 700111 HCHG RX REV CODE 636 W/ 250 OVERRIDE (IP): Performed by: EMERGENCY MEDICINE

## 2022-03-20 RX ORDER — MAGNESIUM SULFATE HEPTAHYDRATE 40 MG/ML
2 INJECTION, SOLUTION INTRAVENOUS ONCE
Status: COMPLETED | OUTPATIENT
Start: 2022-03-20 | End: 2022-03-20

## 2022-03-20 RX ORDER — POTASSIUM CHLORIDE 7.45 MG/ML
10 INJECTION INTRAVENOUS
Status: DISCONTINUED | OUTPATIENT
Start: 2022-03-20 | End: 2022-03-20

## 2022-03-20 RX ORDER — LORAZEPAM 2 MG/ML
1 INJECTION INTRAMUSCULAR ONCE
Status: COMPLETED | OUTPATIENT
Start: 2022-03-20 | End: 2022-03-20

## 2022-03-20 RX ADMIN — CEFAZOLIN SODIUM 1 G: 1 INJECTION, SOLUTION INTRAVENOUS at 21:01

## 2022-03-20 RX ADMIN — CALCIUM CARBONATE 500 MG: 500 TABLET, CHEWABLE ORAL at 18:00

## 2022-03-20 RX ADMIN — CALCIUM CARBONATE 500 MG: 500 TABLET, CHEWABLE ORAL at 05:31

## 2022-03-20 RX ADMIN — HYDROMORPHONE HYDROCHLORIDE 0.5 MG: 1 INJECTION, SOLUTION INTRAMUSCULAR; INTRAVENOUS; SUBCUTANEOUS at 13:06

## 2022-03-20 RX ADMIN — LORAZEPAM 1 MG: 2 INJECTION INTRAMUSCULAR; INTRAVENOUS at 23:08

## 2022-03-20 RX ADMIN — MAGNESIUM SULFATE HEPTAHYDRATE 2 G: 40 INJECTION, SOLUTION INTRAVENOUS at 08:45

## 2022-03-20 RX ADMIN — ENOXAPARIN SODIUM 40 MG: 40 INJECTION SUBCUTANEOUS at 15:08

## 2022-03-20 RX ADMIN — PROCHLORPERAZINE EDISYLATE 10 MG: 5 INJECTION INTRAMUSCULAR; INTRAVENOUS at 05:31

## 2022-03-20 RX ADMIN — POTASSIUM CHLORIDE, DEXTROSE MONOHYDRATE AND SODIUM CHLORIDE: 150; 5; 450 INJECTION, SOLUTION INTRAVENOUS at 05:32

## 2022-03-20 RX ADMIN — PROCHLORPERAZINE EDISYLATE 10 MG: 5 INJECTION INTRAMUSCULAR; INTRAVENOUS at 18:10

## 2022-03-20 RX ADMIN — HYDROMORPHONE HYDROCHLORIDE 2 MG: 2 TABLET ORAL at 05:31

## 2022-03-20 RX ADMIN — HYDROXYCHLOROQUINE SULFATE 400 MG: 200 TABLET ORAL at 08:53

## 2022-03-20 RX ADMIN — CEFAZOLIN SODIUM 1 G: 1 INJECTION, SOLUTION INTRAVENOUS at 15:09

## 2022-03-20 RX ADMIN — POTASSIUM CHLORIDE 10 MEQ: 10 INJECTION, SOLUTION INTRAVENOUS at 12:23

## 2022-03-20 RX ADMIN — ROPINIROLE HYDROCHLORIDE 1 MG: 1 TABLET, FILM COATED ORAL at 18:11

## 2022-03-20 RX ADMIN — AMLODIPINE BESYLATE 5 MG: 5 TABLET ORAL at 18:10

## 2022-03-20 RX ADMIN — HYDROMORPHONE HYDROCHLORIDE 0.5 MG: 1 INJECTION, SOLUTION INTRAMUSCULAR; INTRAVENOUS; SUBCUTANEOUS at 08:53

## 2022-03-20 RX ADMIN — CALCIUM CARBONATE 500 MG: 500 TABLET, CHEWABLE ORAL at 12:22

## 2022-03-20 RX ADMIN — CEFAZOLIN SODIUM 1 G: 1 INJECTION, SOLUTION INTRAVENOUS at 05:29

## 2022-03-20 RX ADMIN — BACLOFEN 10 MG: 10 TABLET ORAL at 05:31

## 2022-03-20 RX ADMIN — MIRTAZAPINE 22.5 MG: 15 TABLET, FILM COATED ORAL at 21:01

## 2022-03-20 RX ADMIN — IBUPROFEN 600 MG: 400 TABLET, FILM COATED ORAL at 15:13

## 2022-03-20 RX ADMIN — OMEPRAZOLE 20 MG: 20 CAPSULE, DELAYED RELEASE ORAL at 05:31

## 2022-03-20 RX ADMIN — HYDROMORPHONE HYDROCHLORIDE 2 MG: 2 TABLET ORAL at 23:55

## 2022-03-20 RX ADMIN — PAROXETINE 60 MG: 30 TABLET, FILM COATED ORAL at 21:01

## 2022-03-20 RX ADMIN — BACLOFEN 10 MG: 10 TABLET ORAL at 18:11

## 2022-03-20 RX ADMIN — HYDROMORPHONE HYDROCHLORIDE 2 MG: 2 TABLET ORAL at 18:10

## 2022-03-20 RX ADMIN — NYSTATIN: 100000 POWDER TOPICAL at 18:11

## 2022-03-20 RX ADMIN — DIPHENHYDRAMINE HYDROCHLORIDE 25 MG: 25 TABLET ORAL at 21:01

## 2022-03-20 RX ADMIN — PROCHLORPERAZINE EDISYLATE 10 MG: 5 INJECTION INTRAMUSCULAR; INTRAVENOUS at 13:06

## 2022-03-20 RX ADMIN — HYDROMORPHONE HYDROCHLORIDE 0.5 MG: 1 INJECTION, SOLUTION INTRAMUSCULAR; INTRAVENOUS; SUBCUTANEOUS at 20:47

## 2022-03-20 RX ADMIN — POTASSIUM CHLORIDE, DEXTROSE MONOHYDRATE AND SODIUM CHLORIDE: 150; 5; 450 INJECTION, SOLUTION INTRAVENOUS at 20:51

## 2022-03-20 RX ADMIN — ROPINIROLE HYDROCHLORIDE 1 MG: 1 TABLET, FILM COATED ORAL at 05:34

## 2022-03-20 RX ADMIN — BACLOFEN 10 MG: 10 TABLET ORAL at 12:22

## 2022-03-20 RX ADMIN — NYSTATIN: 100000 POWDER TOPICAL at 05:31

## 2022-03-20 RX ADMIN — HYDROMORPHONE HYDROCHLORIDE 2 MG: 2 TABLET ORAL at 01:28

## 2022-03-20 ASSESSMENT — ENCOUNTER SYMPTOMS
SHORTNESS OF BREATH: 0
CHILLS: 0
DIZZINESS: 0
DIARRHEA: 0
COUGH: 0
VOMITING: 1
NAUSEA: 0
ABDOMINAL PAIN: 1
FEVER: 0

## 2022-03-20 ASSESSMENT — PAIN DESCRIPTION - PAIN TYPE
TYPE: ACUTE PAIN

## 2022-03-20 NOTE — CARE PLAN
The patient is Watcher - Medium risk of patient condition declining or worsening    Shift Goals  Clinical Goals: Monitor output, pain control, mobility, maintain skin integrity  Patient Goals: Get up to chair tonight, pain control, have a bowel movement  Family Goals: NA    Progress made toward(s) clinical / shift goals:    Problem: Pain - Standard  Goal: Alleviation of pain or a reduction in pain to the patient’s comfort goal  Outcome: Progressing     Problem: Knowledge Deficit - Standard  Goal: Patient and family/care givers will demonstrate understanding of plan of care, disease process/condition, diagnostic tests and medications  Outcome: Progressing     Problem: Psychosocial  Goal: Patient's level of anxiety will decrease  Outcome: Progressing  Goal: Patient's ability to verbalize feelings about condition will improve  Outcome: Progressing  Goal: Patient's ability to re-evaluate and adapt role responsibilities will improve  Outcome: Progressing  Goal: Patient and family will demonstrate ability to cope with life altering diagnosis and/or procedure  Outcome: Progressing  Goal: Spiritual and cultural needs incorporated into hospitalization  Outcome: Progressing     Problem: Mobility  Goal: Patient's capacity to carry out activities will improve  Outcome: Progressing     Problem: Self Care  Goal: Patient will have the ability to perform ADLs independently or with assistance (bathe, groom, dress, toilet and feed)  Outcome: Progressing       Patient is not progressing towards the following goals:

## 2022-03-20 NOTE — PROGRESS NOTES
GYN/Oncology Progress Note               Author: EILEEN Le Date & Time created: 3/20/2022  12:19 PM     Interval History:  Vomited clear mucus 3 times overnight. Feels that scopolamine patch helped her tolerate breakfast. Still denies nausea. Started having episodes of vomiting mucus not proceeded by nausea 1-2 weeks before coming into the hospital. Was arranging to see an ENT for this as she was concerned that it was related to an inner ear issue, but has not been seen by an ENT yet. Vomited her MOM yesterday, but had BM x2 today. Pain stable; controlled with oral dilaudid. Still notices small amount of urine leaking despite Brenner and suprapubic catheters.       Review of Systems:   Review of Systems   Constitutional: Negative for chills and fever.   Respiratory: Negative for cough and shortness of breath.    Cardiovascular: Negative for chest pain and leg swelling.   Gastrointestinal: Positive for abdominal pain and vomiting. Negative for diarrhea and nausea.   Genitourinary: Negative for hematuria.        Suprapubic   Neurological: Negative for dizziness.       Physical Exam:  Physical Exam  Constitutional:       Appearance: Normal appearance.   Cardiovascular:      Rate and Rhythm: Normal rate and regular rhythm.      Pulses: Normal pulses.   Pulmonary:      Effort: Pulmonary effort is normal.      Breath sounds: Normal breath sounds.   Abdominal:      Palpations: Abdomen is soft.      Comments: Midline incision well approximated and  healing appropriately. No drainage or s/s of infection, ALEIDA serous, suprapubic catheter   Genitourinary:     Comments: Ila UO from Brenner catheter  Suprapubic with clear yellow output   Musculoskeletal:      Comments: Left boot   Skin:     General: Skin is warm and dry.      Capillary Refill: Capillary refill takes 2 to 3 seconds.   Neurological:      Mental Status: She is alert and oriented to person, place, and time.         Labs:          Recent Labs      22  0028 227 22  0611   SODIUM 138 136 135   POTASSIUM 3.4* 3.5* 3.7   CHLORIDE 105 103 101   CO2    BUN 6* 6* 6*   CREATININE 0.70 0.87 0.88   MAGNESIUM 1.4* 2.1 1.6   PHOSPHORUS 2.1* 3.0  --    CALCIUM 6.5* 6.8* 7.6*     Recent Labs     228 227 22  0611   GLUCOSE 101* 95 107*     Recent Labs     22  0611   RBC 2.72* 3.01* 3.02*   HEMOGLOBIN 7.7* 8.4* 8.5*   HEMATOCRIT 24.8* 27.7* 26.4*   PLATELETCT 407 393 399     Recent Labs     22  0611   WBC 9.8 7.9 8.7   NEUTSPOLYS 78.00* 87.50* 75.20*   LYMPHOCYTES 9.90* 7.10* 10.60*   MONOCYTES 7.30 2.70 9.70   EOSINOPHILS 0.20 0.00 0.00   BASOPHILS 0.50 0.90 0.00     Recent Labs     22  0611   SODIUM 138 136 135   POTASSIUM 3.4* 3.5* 3.7   CHLORIDE 105 103 101   CO2    GLUCOSE 101* 95 107*   BUN 6* 6* 6*   CREATININE 0.70 0.87 0.88   CALCIUM 6.5* 6.8* 7.6*     Hemodynamics:  Temp (24hrs), Av.8 °C (98.2 °F), Min:36.3 °C (97.4 °F), Max:37.2 °C (99 °F)  Temperature: 36.8 °C (98.3 °F)  Pulse  Av.2  Min: 75  Max: 142   Blood Pressure: (!) 164/77 (RN notified)     Respiratory:    Respiration: 16, Pulse Oximetry: 96 %        RUL Breath Sounds: Clear, RML Breath Sounds: Diminished, RLL Breath Sounds: Diminished, MORENO Breath Sounds: Clear, LLL Breath Sounds: Diminished  Fluids:    Intake/Output Summary (Last 24 hours) at 3/10/2022 1155  Last data filed at 3/10/2022 0430  Gross per 24 hour   Intake --   Output 1620 ml   Net -1620 ml        GI/Nutrition:  Orders Placed This Encounter   Procedures   • Diet Order Diet: Low Fiber(GI Soft)     Standing Status:   Standing     Number of Occurrences:   1     Order Specific Question:   Diet:     Answer:   Low Fiber(GI Soft) [2]     Medical Decision Making, by Problem:  Active Hospital Problems    Diagnosis    • *Hypercalcemia [E83.52]        Plan:  This is a 56  y.o. female with stage III (minimum) Grade 2 endometrial adenocarcinoma s/p 6 cycles of neoadjuvant chemotherapy with completed on 1/13/22 , who presented with hypercalcemia, nausea, and vomiting     1. POD #17: s/p ex lap, hys, bso, cystectomy with repair and suprapubic catheter placement. Doing well, up to edge of bed and commode > encouraged continued mobilization, with at minimum up to EOB x 3 a day, working with PT  2. Urinary incontinence: Present prior to surgery. Unclear if leaking around Bernner or if coming from vagina. SP in place. Dr. Dyson placed Brenner at bedside 3/17/22 to maximally divert urine given that patient required a cystectomy repair. UO from Brenner lillian today; clear yellow urine from suprapubic. Hgb stable. Trial dose of Lovanox x1  Today. Will resume Xarelto tomorrow if Hgb remains stable and no further hematuria.   3. Leukocytosis:  unclear source of infection, UA culture showed e. Coli, BC x 2 NGTD. WBC normalized. Switched from Zosyn to ancef based on sensitivities.   4. Post op ileus: s/p reglan. Resolved, tolerating soft diet. BM x2 on 3/20.   5. Post op pain: well controlled on PO dilaudid and motrin PRN  6. Hypocalcemia: patient with hx of hypercalemia, secondary to malignancy and dehydration, s/p IVF, lasix, and pamidronate. Now with hypocalcemia, continue tums, and cardiac monitor Ionized calcium up to 1.1 today. Monitor.    7. Tachycardia: HR into 140's on 3/12, now improved back to baseline  8. Hypokalemia:  3.5 today. On IVMF with 20K. Monitor.   9. Hypomagnesemia: Improved. Mag 1.6 today, replete with 2g mag sulfate.    10.Stage III (minimum) Grade 2 endometrial adenocarcinoma:  s/p 6 cycles of neoadjuvant chemotherapy, now s/p ex lap, hys, bso, cysetectomy with suprapubic cath placement on 3/3/22.  Final pathology pending from [place]   11. Left trimalleolar ankle fracture/dislocation: s/p ORIF 2/26, ortho following.     12. Upper extremity DVT: Venous US positive for DVT, s/p  "heparin gtt prior to surgery. Xarelto held AM of 3/16 due to hematuria. Trial dose Lovenox now that hematuria is resolving. Reinitiate Xarelto tomorrow if no recurrence of haematuria and hgb stable.   13. Anemia: Secondary to chemotherapy and now secondary to acute blood loss. Hgb 7.5 pre operative on 3/3, s/p 2 unit intra op. S/p 1 unit on 3/5, stable. Monitor.   14. Vomiting: denies nausea. Compazine and Zofran prn. Started scopolamine 3/19. Will schedule Zofran if vomiting persists.   15. Elevated BP: No hx of HTN. On amlodipine 2.5 mg at home for Raynaud's > amlodipine to 5 mg daily. BP elevated today but well controled prior. Continue to monitor.    16. History of Lupus: continue home hydroxychloroquine  17. Hx of mood disorder: PTSD and anxiety. Continue home paroxetine and propranolol prn.   18. Hx Raynaud's disease: Continue amlodipine.   19. Hx of allergic rhinitis: Home Azelastine  20. Dispo: continue to mobilize. Acute rehab signed off stating \"poor tolerance for IPR level of therapy.\" SNF and home health orders placed. Wife would like to try and take patient home with home health.     Case discussed with MD    Quality-Core Measures  "

## 2022-03-20 NOTE — CARE PLAN
The patient is Watcher - Medium risk of patient condition declining or worsening    Shift Goals  Clinical Goals: Monitor output, pain control, mobility, maintain skin integrity  Patient Goals: Get up to chair tonight, pain control, have a bowel movement  Family Goals: NA    Progress made toward(s) clinical / shift goals:      Problem: Pain - Standard  Goal: Alleviation of pain or a reduction in pain to the patient’s comfort goal  Outcome: Progressing     Problem: Knowledge Deficit - Standard  Goal: Patient and family/care givers will demonstrate understanding of plan of care, disease process/condition, diagnostic tests and medications  Outcome: Progressing     Problem: Psychosocial  Goal: Patient's level of anxiety will decrease  Outcome: Progressing     Problem: Mobility  Goal: Patient's capacity to carry out activities will improve  Outcome: Progressing     Problem: Bowel Elimination  Goal: Establish and maintain regular bowel function  Outcome: Progressing     Problem: Fall Risk  Goal: Patient will remain free from falls  Outcome: Progressing     Problem: Skin Integrity  Goal: Skin integrity is maintained or improved  Outcome: Progressing       Patient is not progressing towards the following goals:

## 2022-03-21 LAB
ANION GAP SERPL CALC-SCNC: 12 MMOL/L (ref 7–16)
ANISOCYTOSIS BLD QL SMEAR: ABNORMAL
BASOPHILS # BLD AUTO: 0.9 % (ref 0–1.8)
BASOPHILS # BLD: 0.08 K/UL (ref 0–0.12)
BUN SERPL-MCNC: 6 MG/DL (ref 8–22)
CA-I SERPL-SCNC: 1.1 MMOL/L (ref 1.1–1.3)
CALCIUM SERPL-MCNC: 7.8 MG/DL (ref 8.5–10.5)
CHLORIDE SERPL-SCNC: 100 MMOL/L (ref 96–112)
CO2 SERPL-SCNC: 21 MMOL/L (ref 20–33)
CREAT SERPL-MCNC: 0.91 MG/DL (ref 0.5–1.4)
EOSINOPHIL # BLD AUTO: 0 K/UL (ref 0–0.51)
EOSINOPHIL NFR BLD: 0 % (ref 0–6.9)
ERYTHROCYTE [DISTWIDTH] IN BLOOD BY AUTOMATED COUNT: 54.3 FL (ref 35.9–50)
GFR SERPLBLD CREATININE-BSD FMLA CKD-EPI: 74 ML/MIN/1.73 M 2
GLUCOSE SERPL-MCNC: 112 MG/DL (ref 65–99)
HCT VFR BLD AUTO: 26.2 % (ref 37–47)
HGB BLD-MCNC: 8.2 G/DL (ref 12–16)
LYMPHOCYTES # BLD AUTO: 0.33 K/UL (ref 1–4.8)
LYMPHOCYTES NFR BLD: 3.5 % (ref 22–41)
MACROCYTES BLD QL SMEAR: ABNORMAL
MAGNESIUM SERPL-MCNC: 1.8 MG/DL (ref 1.5–2.5)
MANUAL DIFF BLD: NORMAL
MCH RBC QN AUTO: 28.1 PG (ref 27–33)
MCHC RBC AUTO-ENTMCNC: 31.3 G/DL (ref 33.6–35)
MCV RBC AUTO: 89.7 FL (ref 81.4–97.8)
METAMYELOCYTES NFR BLD MANUAL: 0.9 %
MICROCYTES BLD QL SMEAR: ABNORMAL
MONOCYTES # BLD AUTO: 0.25 K/UL (ref 0–0.85)
MONOCYTES NFR BLD AUTO: 2.7 % (ref 0–13.4)
MORPHOLOGY BLD-IMP: NORMAL
MYELOCYTES NFR BLD MANUAL: 1.8 %
NEUTROPHILS # BLD AUTO: 8.48 K/UL (ref 2–7.15)
NEUTROPHILS NFR BLD: 90.2 % (ref 44–72)
NRBC # BLD AUTO: 0 K/UL
NRBC BLD-RTO: 0 /100 WBC
OVALOCYTES BLD QL SMEAR: NORMAL
PLATELET # BLD AUTO: 391 K/UL (ref 164–446)
PLATELET BLD QL SMEAR: NORMAL
PMV BLD AUTO: 8.9 FL (ref 9–12.9)
POIKILOCYTOSIS BLD QL SMEAR: NORMAL
POLYCHROMASIA BLD QL SMEAR: NORMAL
POTASSIUM SERPL-SCNC: 3.7 MMOL/L (ref 3.6–5.5)
RBC # BLD AUTO: 2.92 M/UL (ref 4.2–5.4)
RBC BLD AUTO: PRESENT
SMUDGE CELLS BLD QL SMEAR: NORMAL
SODIUM SERPL-SCNC: 133 MMOL/L (ref 135–145)
WBC # BLD AUTO: 9.4 K/UL (ref 4.8–10.8)

## 2022-03-21 PROCEDURE — 700111 HCHG RX REV CODE 636 W/ 250 OVERRIDE (IP): Performed by: NURSE PRACTITIONER

## 2022-03-21 PROCEDURE — A9270 NON-COVERED ITEM OR SERVICE: HCPCS

## 2022-03-21 PROCEDURE — 700102 HCHG RX REV CODE 250 W/ 637 OVERRIDE(OP): Performed by: NURSE PRACTITIONER

## 2022-03-21 PROCEDURE — 700111 HCHG RX REV CODE 636 W/ 250 OVERRIDE (IP): Performed by: EMERGENCY MEDICINE

## 2022-03-21 PROCEDURE — 83735 ASSAY OF MAGNESIUM: CPT

## 2022-03-21 PROCEDURE — 97530 THERAPEUTIC ACTIVITIES: CPT

## 2022-03-21 PROCEDURE — 770004 HCHG ROOM/CARE - ONCOLOGY PRIVATE *

## 2022-03-21 PROCEDURE — 80048 BASIC METABOLIC PNL TOTAL CA: CPT

## 2022-03-21 PROCEDURE — 36415 COLL VENOUS BLD VENIPUNCTURE: CPT

## 2022-03-21 PROCEDURE — A9270 NON-COVERED ITEM OR SERVICE: HCPCS | Performed by: NURSE PRACTITIONER

## 2022-03-21 PROCEDURE — 85007 BL SMEAR W/DIFF WBC COUNT: CPT

## 2022-03-21 PROCEDURE — 700102 HCHG RX REV CODE 250 W/ 637 OVERRIDE(OP)

## 2022-03-21 PROCEDURE — 85027 COMPLETE CBC AUTOMATED: CPT

## 2022-03-21 PROCEDURE — 700111 HCHG RX REV CODE 636 W/ 250 OVERRIDE (IP): Performed by: SPECIALIST

## 2022-03-21 PROCEDURE — 700102 HCHG RX REV CODE 250 W/ 637 OVERRIDE(OP): Performed by: OBSTETRICS & GYNECOLOGY

## 2022-03-21 PROCEDURE — 700101 HCHG RX REV CODE 250: Performed by: SPECIALIST

## 2022-03-21 PROCEDURE — A9270 NON-COVERED ITEM OR SERVICE: HCPCS | Performed by: OBSTETRICS & GYNECOLOGY

## 2022-03-21 PROCEDURE — 700101 HCHG RX REV CODE 250: Performed by: NURSE PRACTITIONER

## 2022-03-21 PROCEDURE — 700111 HCHG RX REV CODE 636 W/ 250 OVERRIDE (IP)

## 2022-03-21 PROCEDURE — 82330 ASSAY OF CALCIUM: CPT

## 2022-03-21 RX ORDER — PROPRANOLOL HYDROCHLORIDE 20 MG/1
20 TABLET ORAL EVERY EVENING
Status: DISCONTINUED | OUTPATIENT
Start: 2022-03-21 | End: 2022-03-21

## 2022-03-21 RX ORDER — PROPRANOLOL HYDROCHLORIDE 20 MG/1
20 TABLET ORAL DAILY
Status: DISCONTINUED | OUTPATIENT
Start: 2022-03-21 | End: 2022-03-24 | Stop reason: HOSPADM

## 2022-03-21 RX ORDER — PROPRANOLOL HYDROCHLORIDE 20 MG/1
20 TABLET ORAL NIGHTLY
Status: DISCONTINUED | OUTPATIENT
Start: 2022-03-21 | End: 2022-03-21

## 2022-03-21 RX ORDER — HYDROMORPHONE HYDROCHLORIDE 2 MG/1
2 TABLET ORAL EVERY 4 HOURS
Status: DISCONTINUED | OUTPATIENT
Start: 2022-03-21 | End: 2022-03-24 | Stop reason: HOSPADM

## 2022-03-21 RX ORDER — ONDANSETRON 4 MG/1
4 TABLET, ORALLY DISINTEGRATING ORAL EVERY 6 HOURS
Status: DISCONTINUED | OUTPATIENT
Start: 2022-03-21 | End: 2022-03-24 | Stop reason: HOSPADM

## 2022-03-21 RX ADMIN — BACLOFEN 10 MG: 10 TABLET ORAL at 04:49

## 2022-03-21 RX ADMIN — POTASSIUM CHLORIDE, DEXTROSE MONOHYDRATE AND SODIUM CHLORIDE: 150; 5; 450 INJECTION, SOLUTION INTRAVENOUS at 18:45

## 2022-03-21 RX ADMIN — CEFAZOLIN SODIUM 1 G: 1 INJECTION, SOLUTION INTRAVENOUS at 04:49

## 2022-03-21 RX ADMIN — CALCIUM CARBONATE 500 MG: 500 TABLET, CHEWABLE ORAL at 11:43

## 2022-03-21 RX ADMIN — BACLOFEN 10 MG: 10 TABLET ORAL at 17:59

## 2022-03-21 RX ADMIN — WATER 1 G: 100 INJECTION, SOLUTION INTRAVENOUS at 23:57

## 2022-03-21 RX ADMIN — NYSTATIN: 100000 POWDER TOPICAL at 18:00

## 2022-03-21 RX ADMIN — HYDROMORPHONE HYDROCHLORIDE 2 MG: 2 TABLET ORAL at 03:14

## 2022-03-21 RX ADMIN — PROCHLORPERAZINE EDISYLATE 10 MG: 5 INJECTION INTRAMUSCULAR; INTRAVENOUS at 04:49

## 2022-03-21 RX ADMIN — HYDROMORPHONE HYDROCHLORIDE 2 MG: 2 TABLET ORAL at 18:00

## 2022-03-21 RX ADMIN — ROPINIROLE HYDROCHLORIDE 1 MG: 1 TABLET, FILM COATED ORAL at 18:00

## 2022-03-21 RX ADMIN — CALCIUM CARBONATE 500 MG: 500 TABLET, CHEWABLE ORAL at 04:49

## 2022-03-21 RX ADMIN — HYDROXYCHLOROQUINE SULFATE 400 MG: 200 TABLET ORAL at 09:37

## 2022-03-21 RX ADMIN — POTASSIUM CHLORIDE, DEXTROSE MONOHYDRATE AND SODIUM CHLORIDE: 150; 5; 450 INJECTION, SOLUTION INTRAVENOUS at 05:01

## 2022-03-21 RX ADMIN — BACLOFEN 10 MG: 10 TABLET ORAL at 11:42

## 2022-03-21 RX ADMIN — PROPRANOLOL HYDROCHLORIDE 20 MG: 20 TABLET ORAL at 14:11

## 2022-03-21 RX ADMIN — NYSTATIN: 100000 POWDER TOPICAL at 04:50

## 2022-03-21 RX ADMIN — HYDROMORPHONE HYDROCHLORIDE 2 MG: 2 TABLET ORAL at 09:53

## 2022-03-21 RX ADMIN — ROPINIROLE HYDROCHLORIDE 1 MG: 1 TABLET, FILM COATED ORAL at 04:49

## 2022-03-21 RX ADMIN — HYDROMORPHONE HYDROCHLORIDE 0.5 MG: 1 INJECTION, SOLUTION INTRAMUSCULAR; INTRAVENOUS; SUBCUTANEOUS at 11:51

## 2022-03-21 RX ADMIN — PAROXETINE 60 MG: 30 TABLET, FILM COATED ORAL at 21:38

## 2022-03-21 RX ADMIN — ONDANSETRON 4 MG: 4 TABLET, ORALLY DISINTEGRATING ORAL at 23:58

## 2022-03-21 RX ADMIN — HYDROMORPHONE HYDROCHLORIDE 0.5 MG: 1 INJECTION, SOLUTION INTRAMUSCULAR; INTRAVENOUS; SUBCUTANEOUS at 00:50

## 2022-03-21 RX ADMIN — ONDANSETRON 4 MG: 4 TABLET, ORALLY DISINTEGRATING ORAL at 17:59

## 2022-03-21 RX ADMIN — HYDROMORPHONE HYDROCHLORIDE 2 MG: 2 TABLET ORAL at 14:11

## 2022-03-21 RX ADMIN — OMEPRAZOLE 20 MG: 20 CAPSULE, DELAYED RELEASE ORAL at 04:49

## 2022-03-21 RX ADMIN — WATER 1 G: 100 INJECTION, SOLUTION INTRAVENOUS at 14:10

## 2022-03-21 RX ADMIN — ONDANSETRON 4 MG: 4 TABLET, ORALLY DISINTEGRATING ORAL at 13:04

## 2022-03-21 RX ADMIN — HYDROMORPHONE HYDROCHLORIDE 2 MG: 2 TABLET ORAL at 21:39

## 2022-03-21 RX ADMIN — MIRTAZAPINE 22.5 MG: 15 TABLET, FILM COATED ORAL at 21:38

## 2022-03-21 RX ADMIN — HYDROMORPHONE HYDROCHLORIDE 0.5 MG: 1 INJECTION, SOLUTION INTRAMUSCULAR; INTRAVENOUS; SUBCUTANEOUS at 07:26

## 2022-03-21 RX ADMIN — CALCIUM CARBONATE 500 MG: 500 TABLET, CHEWABLE ORAL at 17:59

## 2022-03-21 RX ADMIN — AMLODIPINE BESYLATE 5 MG: 5 TABLET ORAL at 18:00

## 2022-03-21 RX ADMIN — IBUPROFEN 600 MG: 400 TABLET, FILM COATED ORAL at 14:16

## 2022-03-21 ASSESSMENT — PAIN DESCRIPTION - PAIN TYPE
TYPE: ACUTE PAIN
TYPE: ACUTE PAIN;SURGICAL PAIN
TYPE: ACUTE PAIN

## 2022-03-21 ASSESSMENT — COGNITIVE AND FUNCTIONAL STATUS - GENERAL
MOBILITY SCORE: 9
TURNING FROM BACK TO SIDE WHILE IN FLAT BAD: A LOT
MOVING FROM LYING ON BACK TO SITTING ON SIDE OF FLAT BED: UNABLE
STANDING UP FROM CHAIR USING ARMS: A LOT
CLIMB 3 TO 5 STEPS WITH RAILING: TOTAL
WALKING IN HOSPITAL ROOM: TOTAL
MOVING TO AND FROM BED TO CHAIR: A LOT
SUGGESTED CMS G CODE MODIFIER MOBILITY: CM

## 2022-03-21 ASSESSMENT — ENCOUNTER SYMPTOMS
ABDOMINAL PAIN: 0
SHORTNESS OF BREATH: 0
DIZZINESS: 0
FEVER: 0
COUGH: 0
NAUSEA: 0
VOMITING: 0
CHILLS: 0
DIARRHEA: 0

## 2022-03-21 ASSESSMENT — GAIT ASSESSMENTS: GAIT LEVEL OF ASSIST: UNABLE TO PARTICIPATE

## 2022-03-21 NOTE — PROGRESS NOTES
Pt with lavonne blood output from suprapubic catheter, bloody output from garza. Updated Monica BRUNER. Pt currently not on any anticoagulants, no new orders.

## 2022-03-21 NOTE — CARE PLAN
The patient is Watcher - Medium risk of patient condition declining or worsening    Shift Goals  Clinical Goals: Monitor catheters/drain and output, pain/nausea control  Patient Goals: Increase mobility, get up to chair or commode, pain control  Family Goals: NA    Progress made toward(s) clinical / shift goals:      Problem: Pain - Standard  Goal: Alleviation of pain or a reduction in pain to the patient’s comfort goal  Outcome: Progressing     Problem: Knowledge Deficit - Standard  Goal: Patient and family/care givers will demonstrate understanding of plan of care, disease process/condition, diagnostic tests and medications  Outcome: Progressing     Problem: Mobility  Goal: Patient's capacity to carry out activities will improve  Outcome: Progressing       Patient is not progressing towards the following goals:

## 2022-03-21 NOTE — PROGRESS NOTES
GYN/Oncology Progress Note               Author: EILEEN Dumont Date & Time created: 3/21/2022  10:02 AM     Interval History:  Patient doing well.  She spits up sputum but no emesis, but does have nausea. She is having minimal perineal drainage, unclear if it is leaking around garza.     Review of Systems:  Review of Systems   Constitutional: Negative for chills and fever.   Respiratory: Negative for cough and shortness of breath.    Cardiovascular: Negative for chest pain and leg swelling.   Gastrointestinal: Negative for abdominal pain, diarrhea, nausea and vomiting.   Genitourinary:        Suprapubic   Neurological: Negative for dizziness.       Physical Exam:  Physical Exam  Constitutional:       Appearance: Normal appearance.   Cardiovascular:      Pulses: Normal pulses.   Pulmonary:      Effort: Pulmonary effort is normal.   Abdominal:      Palpations: Abdomen is soft.      Comments: Midline wound well approximated, no s/s of infection, ALEIDA serous, suprapubic catheter and garza with hematuria   Musculoskeletal:      Comments: Left boot   Skin:     General: Skin is warm and dry.      Capillary Refill: Capillary refill takes 2 to 3 seconds.   Neurological:      Mental Status: She is alert and oriented to person, place, and time.         Labs:          Recent Labs     03/19/22 0417 03/20/22  0611 03/21/22  0006   SODIUM 136 135 133*   POTASSIUM 3.5* 3.7 3.7   CHLORIDE 103 101 100   CO2 23 22 21   BUN 6* 6* 6*   CREATININE 0.87 0.88 0.91   MAGNESIUM 2.1 1.6 1.8   PHOSPHORUS 3.0  --   --    CALCIUM 6.8* 7.6* 7.8*     Recent Labs     03/19/22 0417 03/20/22  0611 03/21/22  0006   GLUCOSE 95 107* 112*     Recent Labs     03/19/22 0417 03/20/22  0611 03/21/22  0006   RBC 3.01* 3.02* 2.92*   HEMOGLOBIN 8.4* 8.5* 8.2*   HEMATOCRIT 27.7* 26.4* 26.2*   PLATELETCT 393 399 391     Recent Labs     03/19/22 0417 03/20/22  0611 03/21/22  0006   WBC 7.9 8.7 9.4   NEUTSPOLYS 87.50* 75.20* 90.20*   LYMPHOCYTES 7.10*  10.60* 3.50*   MONOCYTES 2.70 9.70 2.70   EOSINOPHILS 0.00 0.00 0.00   BASOPHILS 0.90 0.00 0.90     Recent Labs     22  0417 22  0611 22  0006   SODIUM 136 135 133*   POTASSIUM 3.5* 3.7 3.7   CHLORIDE 103 101 100   CO2 23 22 21   GLUCOSE 95 107* 112*   BUN 6* 6* 6*   CREATININE 0.87 0.88 0.91   CALCIUM 6.8* 7.6* 7.8*     Hemodynamics:  Temp (24hrs), Av.8 °C (98.2 °F), Min:36.6 °C (97.9 °F), Max:36.9 °C (98.4 °F)  Temperature: 36.8 °C (98.2 °F)  Pulse  Av.2  Min: 73  Max: 142   Blood Pressure: (!) 176/86 (RN notified.)     Respiratory:    Respiration: 18, Pulse Oximetry: 96 %        RML Breath Sounds: Diminished, RLL Breath Sounds: Diminished, LLL Breath Sounds: Diminished  Fluids:    Intake/Output Summary (Last 24 hours) at 3/10/2022 1155  Last data filed at 3/10/2022 0430  Gross per 24 hour   Intake --   Output 1620 ml   Net -1620 ml        GI/Nutrition:  Orders Placed This Encounter   Procedures   • Diet Order Diet: Low Fiber(GI Soft)     Standing Status:   Standing     Number of Occurrences:   1     Order Specific Question:   Diet:     Answer:   Low Fiber(GI Soft) [2]     Medical Decision Making, by Problem:  Active Hospital Problems    Diagnosis    • *Hypercalcemia [E83.52]        Plan:  This is a 56 y.o. female with stage III (minimum) Grade 2 endometrial adenocarcinoma s/p 6 cycles of neoadjuvant chemotherapy with completed on 22 , who presented with hypercalcemia, nausea, and vomiting     1. POD #18: s/p ex lap, hys, bso, cystectomy with repair and suprapubic catheter placement. Doing well, up to edge of bed and commode > encouraged continued mobilization, with at minimum up to EOB x 3 a day, working with PT  2. Urinary incontinence: Present prior to surgery. Unclear if leaking around Brenner or if coming from vagina. SP in place. Dr. Dyson placed Brenner at bedside 3/17/22 to maximally divert urine given that patient required a cystectomy repair. Hematuria after 1 lovenox dose,  "hold anticoagulation for now  3. UTI:  UA culture showed e. Coli, BC x 2 NGTD. WBC normalized. Switched from Zosyn to ancef based on sensitivities.    4. Post op pain: well controlled on PO dilaudid and motrin PRN, requiring IV dilaudid, schedule PO dilaudid and d/c IV, ok to use Motrin in setting of hematuria  5. Hypocalcemia: patient with hx of hypercalemia, secondary to malignancy and dehydration, s/p IVF, lasix, and pamidronate. Now with hypocalcemia, continue tums, and cardiac monitor Ionized calcium up to 1.1 today. Monitor.  d/c tele  6. Hypokalemia:  3.7  today. On IVMF with 20K. Monitor.   7.Stage III (minimum) Grade 2 endometrial adenocarcinoma:  s/p 6 cycles of neoadjuvant chemotherapy, now s/p ex lap, hys, bso, cysetectomy with suprapubic cath placement on 3/3/22.  Final pathology pending from [place]   8. Left trimalleolar ankle fracture/dislocation: s/p ORIF 2/26, ortho signed off, NWB  9. Upper extremity DVT: Venous US positive for DVT, s/p heparin gtt prior to surgery. Xarelto held AM of 3/16 due to hematuria  10. Anemia: Secondary to chemotherapy and now secondary to acute blood loss. Hgb 7.5 pre operative on 3/3, s/p 2 unit intra op. S/p 1 unit on 3/5, stable. Monitor.   11. Nausea: patient is having episodes of spitting up sputum, no wrenching, Started scopolamine 3/19. zofran scheduled  12.HTN: No hx of HTN. On amlodipine 2.5 mg at home for Raynaud's > amlodipine to 5 mg daily. Restarted propranolol daily (used at home for anxiety)  13. History of Lupus: continue home hydroxychloroquine  14. Hx of mood disorder: PTSD and anxiety. Continue home paroxetine and propranolol prn.   15. Hx Raynaud's disease: Continue amlodipine.   16. Hx of allergic rhinitis: Home Azelastine  17. Dispo: continue to mobilize. Acute rehab signed off stating \"poor tolerance for IPR level of therapy.\" SNF and home health orders placed. Wife would like to try and take patient home with home health. Plan for PT/OT to see " patient and then re-consult acute rehab as patient has been making improvements with mobility and is very motivated.      Case discussed with MD    Quality-Core Measures

## 2022-03-21 NOTE — DISCHARGE PLANNING
Anticipated Discharge Disposition: home with HHC    Action: CM spoke with the representative from the patients insurance and obtained a list of in network providers and sent the list to the patients spouse via email.     Barriers to Discharge: medical stability and securement of HHC services    Plan: CM will f/u with the patients spouse and obtain choice for HHC. CM will continue to follow.

## 2022-03-21 NOTE — PROGRESS NOTES
"   Orthopaedic Progress Note    Interval changes:  Patient doing well  LLE incisions without issue  Xrays reviewed plates in place without change from placement films  Ortho to sign off- patient to follow up in 3 weeks    ROS - Patient denies any new issues.  Pain well controlled.    /78   Pulse (!) 105   Temp 36.8 °C (98.2 °F) (Temporal)   Resp 17   Ht 1.702 m (5' 7\")   Wt 99.1 kg (218 lb 7.6 oz)   SpO2 96%       Patient seen and examined  No acute distress  Breathing non labored  RRR  LLE in cam boot, incisions without issue, DNVI moves all toes, cap refill <2 sec.     Recent Labs     03/18/22  0028 03/19/22  0417 03/20/22  0611   WBC 9.8 7.9 8.7   RBC 2.72* 3.01* 3.02*   HEMOGLOBIN 7.7* 8.4* 8.5*   HEMATOCRIT 24.8* 27.7* 26.4*   MCV 91.2 92.0 87.4   MCH 28.3 27.9 28.1   MCHC 31.0* 30.3* 32.2*   RDW 55.6* 56.8* 52.8*   PLATELETCT 407 393 399   MPV 8.8* 8.8* 9.1       Active Hospital Problems    Diagnosis    • Hypercalcemia [E83.52]        Assessment/Plan:  Patient doing well  LLE incisions without issue  Xrays reviewed plates in place without change from placement films  Ortho to sign off- patient to follow up in 3 weeks  POD#22 S/P   1.  Open treatment with internal fixation, left trimalleolar ankle fracture with fixation of posterior lip.  2.  Open treatment without fixation of anterolateral distal tibia fracture with fragment excision.  3.  Surgical fixation of left distal tibiofibular joint disruption.  4.  Physician applied stress examination of the left ankle using fluoroscopy  Wt bearing status - NWB LLE  Wound care/Drains - open to air  Future Procedures - none planned   Sutures/Staples -out   PT/OT-initiated  Antibiotics: Perioperative completed  DVT Prophylaxis- TEDS/SCDs/Foot pumps  Brenner-none planned   Case Coordination for Discharge Planning - Disposition SNF        "

## 2022-03-21 NOTE — PROGRESS NOTES
Pt anxious with increase in lower back muscle spasms. Updated Monica APRN-new orders for 1 mg IV ativan once. Also, noted pink tinged output from garza is darkening in color. Flushed garza and suprapubic catheter at patient request. Okay to do so, per Monica.

## 2022-03-21 NOTE — CARE PLAN
Problem: Pain - Standard  Goal: Alleviation of pain or a reduction in pain to the patient’s comfort goal  3/21/2022 1510 by Venancio Roger R.N.  Outcome: Progressing    Problem: Urinary Elimination  Goal: Establish and maintain regular urinary output  Outcome: Not Progressing  Note: Pt experiencing bloody urine output via garza catheter and suprapubic catheter. MD panda, continuing to monitor.      Problem: Fall Risk  Goal: Patient will remain free from falls  Outcome: Progressing     Problem: Skin Integrity  Goal: Skin integrity is maintained or improved  Outcome: Progressing   The patient is Watcher - Medium risk of patient condition declining or worsening    Shift Goals  Clinical Goals: monitor I/O, pain management  Patient Goals: manage pain and nausea  Family Goals: NA    Progress made toward(s) clinical / shift goals:      Pt remains free from falls, skin integrity is intact, and pt's pain medication has been adjusted.    Patient is not progressing towards the following goals:      Problem: Urinary Elimination  Goal: Establish and maintain regular urinary output  Outcome: Not Progressing  Note: Pt experiencing bloody urine output via garza catheter and suprapubic catheter. MD panda, continuing to monitor.

## 2022-03-22 ENCOUNTER — APPOINTMENT (OUTPATIENT)
Dept: RADIOLOGY | Facility: MEDICAL CENTER | Age: 57
DRG: 740 | End: 2022-03-22
Attending: SPECIALIST
Payer: COMMERCIAL

## 2022-03-22 LAB
ANION GAP SERPL CALC-SCNC: 9 MMOL/L (ref 7–16)
BUN SERPL-MCNC: 6 MG/DL (ref 8–22)
CALCIUM SERPL-MCNC: 8 MG/DL (ref 8.5–10.5)
CHLORIDE SERPL-SCNC: 101 MMOL/L (ref 96–112)
CO2 SERPL-SCNC: 22 MMOL/L (ref 20–33)
CREAT SERPL-MCNC: 1.01 MG/DL (ref 0.5–1.4)
ERYTHROCYTE [DISTWIDTH] IN BLOOD BY AUTOMATED COUNT: 54.7 FL (ref 35.9–50)
GFR SERPLBLD CREATININE-BSD FMLA CKD-EPI: 65 ML/MIN/1.73 M 2
GLUCOSE SERPL-MCNC: 92 MG/DL (ref 65–99)
HCT VFR BLD AUTO: 24.4 % (ref 37–47)
HGB BLD-MCNC: 7.6 G/DL (ref 12–16)
MCH RBC QN AUTO: 28 PG (ref 27–33)
MCHC RBC AUTO-ENTMCNC: 31.1 G/DL (ref 33.6–35)
MCV RBC AUTO: 90 FL (ref 81.4–97.8)
PLATELET # BLD AUTO: 368 K/UL (ref 164–446)
PMV BLD AUTO: 9.3 FL (ref 9–12.9)
POTASSIUM SERPL-SCNC: 3.8 MMOL/L (ref 3.6–5.5)
RBC # BLD AUTO: 2.71 M/UL (ref 4.2–5.4)
SODIUM SERPL-SCNC: 132 MMOL/L (ref 135–145)
WBC # BLD AUTO: 9 K/UL (ref 4.8–10.8)

## 2022-03-22 PROCEDURE — 700111 HCHG RX REV CODE 636 W/ 250 OVERRIDE (IP): Performed by: NURSE PRACTITIONER

## 2022-03-22 PROCEDURE — 36415 COLL VENOUS BLD VENIPUNCTURE: CPT

## 2022-03-22 PROCEDURE — A9270 NON-COVERED ITEM OR SERVICE: HCPCS | Performed by: NURSE PRACTITIONER

## 2022-03-22 PROCEDURE — 85027 COMPLETE CBC AUTOMATED: CPT

## 2022-03-22 PROCEDURE — 700102 HCHG RX REV CODE 250 W/ 637 OVERRIDE(OP): Performed by: NURSE PRACTITIONER

## 2022-03-22 PROCEDURE — A9270 NON-COVERED ITEM OR SERVICE: HCPCS

## 2022-03-22 PROCEDURE — 80048 BASIC METABOLIC PNL TOTAL CA: CPT

## 2022-03-22 PROCEDURE — 87086 URINE CULTURE/COLONY COUNT: CPT

## 2022-03-22 PROCEDURE — 87186 SC STD MICRODIL/AGAR DIL: CPT

## 2022-03-22 PROCEDURE — 87077 CULTURE AEROBIC IDENTIFY: CPT

## 2022-03-22 PROCEDURE — 700101 HCHG RX REV CODE 250: Performed by: NURSE PRACTITIONER

## 2022-03-22 PROCEDURE — A9270 NON-COVERED ITEM OR SERVICE: HCPCS | Performed by: OBSTETRICS & GYNECOLOGY

## 2022-03-22 PROCEDURE — 97535 SELF CARE MNGMENT TRAINING: CPT

## 2022-03-22 PROCEDURE — 700102 HCHG RX REV CODE 250 W/ 637 OVERRIDE(OP): Performed by: OBSTETRICS & GYNECOLOGY

## 2022-03-22 PROCEDURE — 700111 HCHG RX REV CODE 636 W/ 250 OVERRIDE (IP): Performed by: SPECIALIST

## 2022-03-22 PROCEDURE — 700101 HCHG RX REV CODE 250: Performed by: SPECIALIST

## 2022-03-22 PROCEDURE — 770004 HCHG ROOM/CARE - ONCOLOGY PRIVATE *

## 2022-03-22 PROCEDURE — 87205 SMEAR GRAM STAIN: CPT

## 2022-03-22 PROCEDURE — 700102 HCHG RX REV CODE 250 W/ 637 OVERRIDE(OP)

## 2022-03-22 RX ORDER — GUAIFENESIN 600 MG/1
600 TABLET, EXTENDED RELEASE ORAL EVERY 12 HOURS
Status: DISCONTINUED | OUTPATIENT
Start: 2022-03-22 | End: 2022-03-24 | Stop reason: HOSPADM

## 2022-03-22 RX ADMIN — BACLOFEN 10 MG: 10 TABLET ORAL at 05:39

## 2022-03-22 RX ADMIN — HYDROMORPHONE HYDROCHLORIDE 2 MG: 2 TABLET ORAL at 17:16

## 2022-03-22 RX ADMIN — SCOPALAMINE 1 PATCH: 1 PATCH, EXTENDED RELEASE TRANSDERMAL at 13:06

## 2022-03-22 RX ADMIN — AMLODIPINE BESYLATE 5 MG: 5 TABLET ORAL at 17:16

## 2022-03-22 RX ADMIN — IBUPROFEN 600 MG: 400 TABLET, FILM COATED ORAL at 03:49

## 2022-03-22 RX ADMIN — HYDROMORPHONE HYDROCHLORIDE 2 MG: 2 TABLET ORAL at 21:33

## 2022-03-22 RX ADMIN — PAROXETINE 60 MG: 30 TABLET, FILM COATED ORAL at 21:33

## 2022-03-22 RX ADMIN — PROPRANOLOL HYDROCHLORIDE 20 MG: 20 TABLET ORAL at 05:39

## 2022-03-22 RX ADMIN — POTASSIUM CHLORIDE, DEXTROSE MONOHYDRATE AND SODIUM CHLORIDE: 150; 5; 450 INJECTION, SOLUTION INTRAVENOUS at 05:41

## 2022-03-22 RX ADMIN — GUAIFENESIN 600 MG: 600 TABLET, EXTENDED RELEASE ORAL at 17:16

## 2022-03-22 RX ADMIN — RIVAROXABAN 15 MG: 15 TABLET, FILM COATED ORAL at 17:16

## 2022-03-22 RX ADMIN — CALCIUM CARBONATE 500 MG: 500 TABLET, CHEWABLE ORAL at 05:39

## 2022-03-22 RX ADMIN — ONDANSETRON 4 MG: 4 TABLET, ORALLY DISINTEGRATING ORAL at 13:06

## 2022-03-22 RX ADMIN — OMEPRAZOLE 20 MG: 20 CAPSULE, DELAYED RELEASE ORAL at 05:39

## 2022-03-22 RX ADMIN — NYSTATIN: 100000 POWDER TOPICAL at 05:41

## 2022-03-22 RX ADMIN — ONDANSETRON 4 MG: 4 TABLET, ORALLY DISINTEGRATING ORAL at 17:16

## 2022-03-22 RX ADMIN — HYDROXYCHLOROQUINE SULFATE 400 MG: 200 TABLET ORAL at 09:12

## 2022-03-22 RX ADMIN — HYDROMORPHONE HYDROCHLORIDE 2 MG: 2 TABLET ORAL at 13:06

## 2022-03-22 RX ADMIN — ONDANSETRON 4 MG: 4 TABLET, ORALLY DISINTEGRATING ORAL at 05:59

## 2022-03-22 RX ADMIN — IBUPROFEN 600 MG: 400 TABLET, FILM COATED ORAL at 20:02

## 2022-03-22 RX ADMIN — HYDROMORPHONE HYDROCHLORIDE 2 MG: 2 TABLET ORAL at 02:13

## 2022-03-22 RX ADMIN — HYDROMORPHONE HYDROCHLORIDE 2 MG: 2 TABLET ORAL at 09:12

## 2022-03-22 RX ADMIN — WATER 1 G: 100 INJECTION, SOLUTION INTRAVENOUS at 17:17

## 2022-03-22 RX ADMIN — BACLOFEN 10 MG: 10 TABLET ORAL at 13:06

## 2022-03-22 RX ADMIN — CALCIUM CARBONATE 500 MG: 500 TABLET, CHEWABLE ORAL at 13:06

## 2022-03-22 RX ADMIN — ROPINIROLE HYDROCHLORIDE 1 MG: 1 TABLET, FILM COATED ORAL at 17:17

## 2022-03-22 RX ADMIN — BACLOFEN 10 MG: 10 TABLET ORAL at 17:17

## 2022-03-22 RX ADMIN — ROPINIROLE HYDROCHLORIDE 1 MG: 1 TABLET, FILM COATED ORAL at 09:12

## 2022-03-22 RX ADMIN — MIRTAZAPINE 22.5 MG: 15 TABLET, FILM COATED ORAL at 21:32

## 2022-03-22 RX ADMIN — CALCIUM CARBONATE 500 MG: 500 TABLET, CHEWABLE ORAL at 17:17

## 2022-03-22 RX ADMIN — WATER 1 G: 100 INJECTION, SOLUTION INTRAVENOUS at 05:39

## 2022-03-22 RX ADMIN — HYDROMORPHONE HYDROCHLORIDE 2 MG: 2 TABLET ORAL at 05:39

## 2022-03-22 ASSESSMENT — PAIN DESCRIPTION - PAIN TYPE
TYPE: ACUTE PAIN

## 2022-03-22 ASSESSMENT — COGNITIVE AND FUNCTIONAL STATUS - GENERAL
PERSONAL GROOMING: A LITTLE
DAILY ACTIVITIY SCORE: 16
DRESSING REGULAR UPPER BODY CLOTHING: A LITTLE
HELP NEEDED FOR BATHING: A LOT
TOILETING: A LOT
DRESSING REGULAR LOWER BODY CLOTHING: A LOT
SUGGESTED CMS G CODE MODIFIER DAILY ACTIVITY: CK

## 2022-03-22 ASSESSMENT — ENCOUNTER SYMPTOMS
COUGH: 0
ABDOMINAL PAIN: 0
FEVER: 0
VOMITING: 0
CHILLS: 0
SHORTNESS OF BREATH: 0
DIZZINESS: 0
NAUSEA: 0
DIARRHEA: 0

## 2022-03-22 NOTE — THERAPY
Occupational Therapy  Daily Treatment     Patient Name: Magali Leal  Age:  56 y.o., Sex:  female  Medical Record #: [id number]  Today's Date: 3/22/2022     Precautions  Precautions: Fall Risk,Non Weight Bearing Left Lower Extremity  Comments: CAM boot on LLE, ALEIDA drain, and abdominal binder during mobility    Assessment    Pt seen for OT session. Pt motivated and progressing with activity tolerance and strength. Mod A for txfs: bed>chair>BSC>chair, req min A and v/cs to maintain NWB. Seated g/h with SPV, min A for don/doff abdominal binder. Spent time educating pt on expectations of rehab v.SNF, importance of continued OOB activity while in house, and safety with txfs. Continues to be limited by decreased functional mobility, activity tolerance, strength, balance, adherence to precautions, and pain which are currently affecting pt's ability to complete ADLs/IADLs at baseline. Will continue to follow.     Plan    Continue current treatment plan.    DC Equipment Recommendations: Unable to determine at this time  Discharge Recommendations: Recommend post-acute placement for additional occupational therapy services prior to discharge home (pt and SO have changed mind and would like pt to be re-evaluated for rehab; RN aware)     Objective       03/22/22 1421   Precautions   Precautions Fall Risk;Non Weight Bearing Left Lower Extremity   Comments CAM boot on LLE, ALEIDA drain, and abdominal binder during mobility   Vitals   O2 Delivery Device None - Room Air   Pain 0 - 10 Group   Location Abdomen;Leg   Location Orientation Left   Therapist Pain Assessment Post Activity;During Activity;Nurse Notified  (not quantified)   Cognition    Cognition / Consciousness WDL   Comments pleasant and cooperative; motivated, but initial anxiousness with mobility.   Passive ROM Upper Body   Passive ROM Upper Body WDL   Active ROM Upper Body   Active ROM Upper Body  WDL   Strength Upper Body   Upper Body Strength  X   Gross Strength Generalized  Weakness, Equal Bilaterally.    Comments improving   Other Treatments   Other Treatments Provided Spent time educating pt on expectations of rehab v.SNF, importance of continued OOB activity, and safety with txfs.   Balance   Sitting Balance (Static) Good   Sitting Balance (Dynamic) Fair +   Standing Balance (Static) Poor +   Standing Balance (Dynamic) Poor +   Weight Shift Sitting Fair   Weight Shift Standing Absent  (NWB LLE req v/cs to maintain)   Skilled Intervention Compensatory Strategies;Facilitation;Verbal Cuing;Tactile Cuing   Comments w/fww for SPTxfs   Bed Mobility    Supine to Sit Standby Assist  (HOB elevated and heavy use of rail)   Sit to Supine   (left in chair)   Scooting Supervised   Skilled Intervention Verbal Cuing;Compensatory Strategies   Activities of Daily Living   Eating Supervision  (spooning ice to mouth)   Grooming Supervision;Seated  (oral care and wiping face)   Upper Body Dressing Minimal Assist  (abdominal binder donning)   Lower Body Dressing Moderate Assist  (min A for shoe in supine and max A for CAM boot)   Toileting Minimal Assist  (able to complete pericare on BSC)   Skilled Intervention Verbal Cuing;Tactile Cuing;Facilitation;Compensatory Strategies   Functional Mobility   Sit to Stand Moderate Assist  (bed elevated and knee brace, req increased assist for lower surfaces)   Bed, Chair, Wheelchair Transfer Moderate Assist   Toilet Transfers Moderate Assist  (x2 ppl; pt prefers for comfort)   Transfer Method Stand Pivot   Mobility w/FWW; bed>chair>BSC>chairreq mod v/cs to maintain NWB LLE   Skilled Intervention Verbal Cuing;Tactile Cuing;Facilitation;Compensatory Strategies;Sequencing   Comments fatigues quickly in standing   Activity Tolerance   Comments limited by fatigue and pain; sat ~30 min in chair prior to return to supine   Edema Management   Other Edema Mgmt Treatments elevated LLE once in recliner   Patient / Family Goals   Patient / Family Goal #1 to go home   Short  Term Goals   Short Term Goal # 1 Pt will complete ADL transfers with supervision   Goal Outcome # 1 Progressing as expected   Short Term Goal # 2 Pt will complete LB dressing with supervision   Goal Outcome # 2 Progressing as expected   Short Term Goal # 3 Pt will complete toileting with supervision   Goal Outcome # 3 Progressing as expected   Short Term Goal # 4 will eat sitting EOB 3x/day w/SPV   Goal Outcome # 4 Progressing as expected   Education Group   Education Provided Pathology of bedrest;Activities of Daily Living;Transfers   Transfers Patient Response Patient;Acceptance;Explanation;Action Demonstration;Reinforcement Needed;Demonstration   ADL Patient Response Patient;Acceptance;Explanation;Verbal Demonstration;Reinforcement Needed   Pathology of Bedrest Patient Response Patient;Acceptance;Explanation;Verbal Demonstration;Reinforcement Needed

## 2022-03-22 NOTE — PROGRESS NOTES
GYN/Oncology Progress Note               Author: EILEEN Le Date & Time created: 3/22/2022  9:22 AM     Interval History:  Patient doing well. Still with nausea and spitting up sputum.Unsure if scheduled Zofran is helpful. Pain controled. Would like to be reevaluated to acute rehab.     Review of Systems:  Review of Systems   Constitutional: Negative for chills and fever.   Respiratory: Negative for cough and shortness of breath.    Cardiovascular: Negative for chest pain and leg swelling.   Gastrointestinal: Negative for abdominal pain, diarrhea, nausea and vomiting.   Genitourinary: Positive for hematuria.        Suprapubic   Neurological: Negative for dizziness.       Physical Exam:  Physical Exam  Constitutional:       Appearance: Normal appearance.   Cardiovascular:      Pulses: Normal pulses.   Pulmonary:      Effort: Pulmonary effort is normal.   Abdominal:      Palpations: Abdomen is soft.      Comments: Midline wound well approximated, no s/s of infection, ALEIDA serous, suprapubic catheter with hematuria. Brenner with dark lillian UO   Musculoskeletal:      Comments: Left boot   Skin:     General: Skin is warm and dry.      Capillary Refill: Capillary refill takes 2 to 3 seconds.   Neurological:      Mental Status: She is alert and oriented to person, place, and time.         Labs:          Recent Labs     03/20/22  0611 03/21/22  0006 03/22/22  0718   SODIUM 135 133* 132*   POTASSIUM 3.7 3.7 3.8   CHLORIDE 101 100 101   CO2 22 21 22   BUN 6* 6* 6*   CREATININE 0.88 0.91 1.01   MAGNESIUM 1.6 1.8  --    CALCIUM 7.6* 7.8* 8.0*     Recent Labs     03/20/22  0611 03/21/22  0006 03/22/22  0718   GLUCOSE 107* 112* 92     Recent Labs     03/20/22  0611 03/21/22  0006 03/22/22  0718   RBC 3.02* 2.92* 2.71*   HEMOGLOBIN 8.5* 8.2* 7.6*   HEMATOCRIT 26.4* 26.2* 24.4*   PLATELETCT 399 391 368     Recent Labs     03/20/22  0611 03/21/22  0006 03/22/22  0718   WBC 8.7 9.4 9.0   NEUTSPOLYS 75.20* 90.20*  --     LYMPHOCYTES 10.60* 3.50*  --    MONOCYTES 9.70 2.70  --    EOSINOPHILS 0.00 0.00  --    BASOPHILS 0.00 0.90  --      Recent Labs     22  0611 22  0006 22  0718   SODIUM 135 133* 132*   POTASSIUM 3.7 3.7 3.8   CHLORIDE 101 100 101   CO2    GLUCOSE 107* 112* 92   BUN 6* 6* 6*   CREATININE 0.88 0.91 1.01   CALCIUM 7.6* 7.8* 8.0*     Hemodynamics:  Temp (24hrs), Av.7 °C (98 °F), Min:36.4 °C (97.5 °F), Max:36.9 °C (98.5 °F)  Temperature: 36.7 °C (98 °F)  Pulse  Av.6  Min: 71  Max: 142   Blood Pressure: 151/71     Respiratory:    Respiration: 16, Pulse Oximetry: 96 %        RUL Breath Sounds: Clear, RML Breath Sounds: Clear, RLL Breath Sounds: Diminished, MORENO Breath Sounds: Clear, LLL Breath Sounds: Diminished  Fluids:    Intake/Output Summary (Last 24 hours) at 3/10/2022 1155  Last data filed at 3/10/2022 0430  Gross per 24 hour   Intake --   Output 1620 ml   Net -1620 ml        GI/Nutrition:  Orders Placed This Encounter   Procedures   • Diet Order Diet: Low Fiber(GI Soft)     Standing Status:   Standing     Number of Occurrences:   1     Order Specific Question:   Diet:     Answer:   Low Fiber(GI Soft) [2]     Medical Decision Making, by Problem:  Active Hospital Problems    Diagnosis    • *Hypercalcemia [E83.52]        Plan:  This is a 56 y.o. female with stage III (minimum) Grade 2 endometrial adenocarcinoma s/p 6 cycles of neoadjuvant chemotherapy with completed on 22 , who presented with hypercalcemia, nausea, and vomiting     1. POD #19: s/p ex lap, hys, bso, cystectomy with repair and suprapubic catheter placement. Doing well, up to edge of bed and commode > encouraged continued mobilization, with at minimum up to EOB x 3 a day, working with PT. Physiatry re consulted for new evaluation.   2. Urinary incontinence: Present prior to surgery. Unclear if leaking around Brenner or if coming from vagina. SP in place. Dr. Dyson placed Brenner at bedside 3/17/22 to maximally divert  urine given that patient required a cystectomy repair. Hematuria after 1 lovenox dose. Anticoagulation restarted today per Dr. Dyson.   3. UTI:  UA culture showed e. Coli, BC x 2 NGTD. WBC normalized. Switched from Zosyn to ancef based on sensitivities. Ancef complete 3/22. Repeat UC ordered.   4. Post op pain: well controlled on PO dilaudid and motrin PRN, requiring IV dilaudid, schedule PO dilaudid and d/c IV, ok to use Motrin in setting of hematuria  5. Hypocalcemia: patient with hx of hypercalemia, secondary to malignancy and dehydration, s/p IVF, lasix, and pamidronate. Now with hypocalcemia. Ionized calcium up to 1.1; continue TUMS. Monitor.  Tele DC'd/ 6. Hypokalemia:  3.8  today. On IVMF with 20K. Monitor.   7.Stage III (minimum) Grade 2 endometrial adenocarcinoma:  s/p 6 cycles of neoadjuvant chemotherapy, now s/p ex lap, hys, bso, cysetectomy with suprapubic cath placement on 3/3/22.  Final pathology pending from [facility]   8. Left trimalleolar ankle fracture/dislocation: s/p ORIF 2/26, ortho signed off, NWB.   9. Upper extremity DVT: Venous US positive for DVT, s/p heparin gtt prior to surgery. Xarelto held AM of 3/16 due to hematuria that recurred after one dose of Lovenox. Discussed with Dr. Dyson; will restart Xarelto today.   10. Anemia: Secondary to chemotherapy and now secondary to acute blood loss. Hgb 7.5 pre operative on 3/3, s/p 2 unit intra op. S/p 1 unit on 3/5, stable. Monitor.   11. Nausea: patient is having episodes of spitting up sputum, no wrenching, Started scopolamine 3/19. On schduled zofran  12.HTN: No hx of HTN. On amlodipine 2.5 mg at home for Raynaud's > amlodipine to 5 mg daily. Restarted propranolol daily (used at home for anxiety)  13. History of Lupus: continue home hydroxychloroquine  14. Hx of mood disorder: PTSD and anxiety. Continue home paroxetine and propranolol   15. Hx Raynaud's disease: Continue amlodipine.   16. Hx of allergic rhinitis: Home Azelastine  17. Dispo:  "continue to mobilize. Acute rehab signed off stating \"poor tolerance for IPR level of therapy.\" Physiatry reconsulted to see if patient will qualify for acute rehab now that she has been working with PT.   SNF and home health orders placed. Wife and patient would like to try and take patient home with home health or for patient to go to acute rehab.      Case discussed with MD    Quality-Core Measures  "

## 2022-03-22 NOTE — CARE PLAN
The patient is Watcher - Medium risk of patient condition declining or worsening    Shift Goals  Clinical Goals: Mobilization, Pain control  Patient Goals: Pain and nausea control  Family Goals: NA    Progress made toward(s) clinical / shift goals:    Problem: Pain - Standard  Goal: Alleviation of pain or a reduction in pain to the patient’s comfort goal  Outcome: Progressing     Problem: Knowledge Deficit - Standard  Goal: Patient and family/care givers will demonstrate understanding of plan of care, disease process/condition, diagnostic tests and medications  Outcome: Progressing     Problem: Psychosocial  Goal: Patient's level of anxiety will decrease  Outcome: Progressing  Goal: Patient's ability to verbalize feelings about condition will improve  Outcome: Progressing     Problem: Mobility  Goal: Patient's capacity to carry out activities will improve  Outcome: Progressing     Problem: Self Care  Goal: Patient will have the ability to perform ADLs independently or with assistance (bathe, groom, dress, toilet and feed)  Outcome: Progressing     Problem: Infection - Standard  Goal: Patient will remain free from infection  Outcome: Progressing     Problem: Bowel Elimination  Goal: Establish and maintain regular bowel function  Outcome: Progressing     Problem: Urinary Elimination  Goal: Establish and maintain regular urinary output  Outcome: Progressing     Problem: Fall Risk  Goal: Patient will remain free from falls  Outcome: Progressing       Patient is not progressing towards the following goals:

## 2022-03-22 NOTE — THERAPY
Physical Therapy   Daily Treatment     Patient Name: Magali Leal  Age:  56 y.o., Sex:  female  Medical Record #: [id number]  Today's Date: 3/21/2022     Precautions  Precautions: Fall Risk;Non Weight Bearing Left Lower Extremity  Comments: CAM boot on LLE, ALEIDA drain    Assessment    Patient demonstrating slow progress with functional mobility. She was able to stand from BS with mod A (2 people required to facilitate extension) and perform heel to toe pivot with BUE use on FWW to transfer to chair. She is motivated to progress. Provided education regarding LE strengthening. Will continue to follow.    Plan    Continue current treatment plan.    DC Equipment Recommendations: Unable to determine at this time  Discharge Recommendations: Recommend post-acute placement for additional physical therapy services prior to discharge home (however home at  level may be possible)     Objective       03/21/22 1449   Total Time Spent   Total Time Spent (Mins) 20   Charge Group   Charges  Yes   PT Therapeutic Activities 1   Precautions   Precautions Fall Risk;Non Weight Bearing Left Lower Extremity   Comments CAM boot on LLE, ALEIDA drain   Vitals   O2 (LPM) 0   O2 Delivery Device None - Room Air   Vitals Comments patient appeared pale after transfer but improved with seated rest   Pain 0 - 10 Group   Therapist Pain Assessment   (no pain complaint during session)   Cognition    Comments pleasant, cooperative, motivated. anxious   Balance   Sitting Balance (Static) Fair +   Sitting Balance (Dynamic) Fair   Standing Balance (Static) Poor +   Standing Balance (Dynamic) Poor   Weight Shift Sitting Fair   Weight Shift Standing Absent  (NWB LLE)   Skilled Intervention Verbal Cuing;Compensatory Strategies;Facilitation   Comments with FWW, facilitation of NWB LLE   Gait Analysis   Gait Level Of Assist Unable to Participate   Weight Bearing Status NWB LLE   Bed Mobility    Supine to Sit   (NT, patient on BSC on entry)   Scooting  Supervised  (seated)   Skilled Intervention Verbal Cuing;Compensatory Strategies   Functional Mobility   Sit to Stand Moderate Assist   Bed, Chair, Wheelchair Transfer Moderate Assist   Transfer Method Stand Step   Skilled Intervention Verbal Cuing;Compensatory Strategies;Facilitation   How much difficulty does the patient currently have...   Turning over in bed (including adjusting bedclothes, sheets and blankets)? 2   Sitting down on and standing up from a chair with arms (e.g., wheelchair, bedside commode, etc.) 1   Moving from lying on back to sitting on the side of the bed? 2   How much help from another person does the patient currently need...   Moving to and from a bed to a chair (including a wheelchair)? 2   Need to walk in a hospital room? 1   Climbing 3-5 steps with a railing? 1   6 clicks Mobility Score 9   Activity Tolerance   Sitting in Chair 5+ min on BSC, left in recliner   Sitting Edge of Bed NT   Standing 3-5 min total   Comments limited by weakness, fatigue   Patient / Family Goals    Patient / Family Goal #1 go to rehab   Goal #1 Outcome Goal not met   Short Term Goals    Short Term Goal # 1 Patient will move supine<>sitting EOB without bed features with supervision within 6tx in order to get in/out of bed   Goal Outcome # 1 Other (see comments)  (not observed this session)   Short Term Goal # 2 Patient will move sitting<>standing with supervision within 6tx in order to initiate transfers and gait   Goal Outcome # 2 Goal not met   Short Term Goal # 3 Patient will ambulate 15ft with supervision within 6tx in order to access environment   Goal Outcome # 3 Goal not met   Short Term Goal # 4 Patient will self propel WC 50ft with supervision within 6tx in order to access community   Goal Outcome # 4 Other (see comments)  (WC mobility not attempted, patient appears capable)   Anticipated Discharge Equipment and Recommendations   DC Equipment Recommendations Unable to determine at this time   Discharge  Recommendations Recommend post-acute placement for additional physical therapy services prior to discharge home  (however home at  level may be possible)   Interdisciplinary Plan of Care Collaboration   IDT Collaboration with  Nursing;Certified Nursing Assistant   Patient Position at End of Therapy Seated;Call Light within Reach;Tray Table within Reach;Phone within Reach   Collaboration Comments RN aware of visit, response   Session Information   Date / Session Number  3/21-8 (1/4, 3/26)

## 2022-03-22 NOTE — DIETARY
"Nutrition Services: Update   Day 26 of admit.  Magali Leal is a 56 y.o. female with admitting DX of Hypercalcemia [E83.52]    Pt is currently on Low Fiber (GI Soft) diet. PO intake <50% for meals per ADL's since 3/19. PO intake <50% for meals and supplements for >20 days.     Met with pt at bedside. She reports she has an increased appetite but states she \"still can't hold anything down,\" is spitting up mucous, and has nausea. She states she has been able to tolerate a \"few bites\" of foods, water, applesauce, and was able to eat some wonton soup recently. She had a Boost Breeze at bedside and plans to try it. We discussed trying Boost VHC for added calories and protein and pt agreed to try it.     Wt 2/23: 99.1 kg via stand up scale - communicated to RN for updated weight.     Malnutrition Risk: Pt at risk d/t poor PO intake for >20 days.     Recommendations/Plan:  1. Continue current PO diet as tolerated  2. Recommend nutrition support as pt has had inadequate nutrition for >20 days- communicated to MD via Voalte  3. Added Boost VHC 1x/d  4. Encourage intake of meals  5. Document intake of all meals as % taken in ADL's to provide interdisciplinary communication across all shifts.   6. Monitor weight.      RD following    "

## 2022-03-22 NOTE — DISCHARGE PLANNING
Received repeat PMR consult. Patient was able to stand from BSC with mod A (2 people required to facilitate extension) and perform heel to toe pivot with BUE use on FWW to transfer to chair. Notified physiatry, [facility] will follow.

## 2022-03-22 NOTE — CARE PLAN
The patient is Watcher - Medium risk of patient condition declining or worsening    Shift Goals  Clinical Goals: monitor I/O, pain management  Patient Goals: manage pain, sleep  Family Goals: NA    Progress made toward(s) clinical / shift goals:  I's and O's monitored and documented; WNL. Pain treated per MAR, repositioning and heat application.     A&Ox4. Patient did not ambulate this shift; sat EOB; performed ROM.  Denies nausea, still vomiting mucous. Pain reported 6/10, reports average is 5/10; treated per MAR and nonpharmacologic interventions. All drains CDI. Suprapubic with dark red drainage; Brenner with blood tinged urine. POC discussed. All questions answered; patient demonstrates understanding. Call light and belongings within reach, calls appropriately to make needs known; hourly rounding in place.    Patient is not progressing towards the following goals: Patient slept until about 2300 and then could not sleep. Pain unrelieved; generalized discomfort throughout this shift.     Problem: Pain - Standard  Goal: Alleviation of pain or a reduction in pain to the patient’s comfort goal  Outcome: Not Progressing     Problem: Urinary Elimination  Goal: Establish and maintain regular urinary output  Outcome: Not Progressing

## 2022-03-22 NOTE — CARE PLAN
Problem: Nutritional:  Goal: Achieve adequate nutritional intake  Description: Patient will tolerate regular diet and consume 50% of meals  Outcome: Not Met     See RD note.

## 2022-03-23 LAB
ANION GAP SERPL CALC-SCNC: 11 MMOL/L (ref 7–16)
ANISOCYTOSIS BLD QL SMEAR: ABNORMAL
BASOPHILS # BLD AUTO: 0 % (ref 0–1.8)
BASOPHILS # BLD: 0 K/UL (ref 0–0.12)
BUN SERPL-MCNC: 7 MG/DL (ref 8–22)
BURR CELLS BLD QL SMEAR: NORMAL
CA-I SERPL-SCNC: 1.2 MMOL/L (ref 1.1–1.3)
CALCIUM SERPL-MCNC: 8.3 MG/DL (ref 8.5–10.5)
CHLORIDE SERPL-SCNC: 102 MMOL/L (ref 96–112)
CO2 SERPL-SCNC: 22 MMOL/L (ref 20–33)
CREAT SERPL-MCNC: 1.03 MG/DL (ref 0.5–1.4)
EOSINOPHIL # BLD AUTO: 0 K/UL (ref 0–0.51)
EOSINOPHIL NFR BLD: 0 % (ref 0–6.9)
ERYTHROCYTE [DISTWIDTH] IN BLOOD BY AUTOMATED COUNT: 54.6 FL (ref 35.9–50)
GFR SERPLBLD CREATININE-BSD FMLA CKD-EPI: 64 ML/MIN/1.73 M 2
GLUCOSE SERPL-MCNC: 96 MG/DL (ref 65–99)
GRAM STN SPEC: NORMAL
HCT VFR BLD AUTO: 26.3 % (ref 37–47)
HGB BLD-MCNC: 8.1 G/DL (ref 12–16)
LYMPHOCYTES # BLD AUTO: 0.48 K/UL (ref 1–4.8)
LYMPHOCYTES NFR BLD: 5.4 % (ref 22–41)
MACROCYTES BLD QL SMEAR: ABNORMAL
MAGNESIUM SERPL-MCNC: 1.6 MG/DL (ref 1.5–2.5)
MANUAL DIFF BLD: NORMAL
MCH RBC QN AUTO: 27.6 PG (ref 27–33)
MCHC RBC AUTO-ENTMCNC: 30.8 G/DL (ref 33.6–35)
MCV RBC AUTO: 89.8 FL (ref 81.4–97.8)
MICROCYTES BLD QL SMEAR: ABNORMAL
MONOCYTES # BLD AUTO: 0.08 K/UL (ref 0–0.85)
MONOCYTES NFR BLD AUTO: 0.9 % (ref 0–13.4)
MORPHOLOGY BLD-IMP: NORMAL
MYELOCYTES NFR BLD MANUAL: 6.2 %
NEUTROPHILS # BLD AUTO: 7.7 K/UL (ref 2–7.15)
NEUTROPHILS NFR BLD: 87.5 % (ref 44–72)
NRBC # BLD AUTO: 0 K/UL
NRBC BLD-RTO: 0 /100 WBC
OVALOCYTES BLD QL SMEAR: NORMAL
PLATELET # BLD AUTO: 351 K/UL (ref 164–446)
PLATELET BLD QL SMEAR: NORMAL
PMV BLD AUTO: 9.2 FL (ref 9–12.9)
POIKILOCYTOSIS BLD QL SMEAR: NORMAL
POLYCHROMASIA BLD QL SMEAR: NORMAL
POTASSIUM SERPL-SCNC: 3.8 MMOL/L (ref 3.6–5.5)
RBC # BLD AUTO: 2.93 M/UL (ref 4.2–5.4)
RBC BLD AUTO: PRESENT
SIGNIFICANT IND 70042: NORMAL
SITE SITE: NORMAL
SMUDGE CELLS BLD QL SMEAR: NORMAL
SODIUM SERPL-SCNC: 135 MMOL/L (ref 135–145)
SOURCE SOURCE: NORMAL
WBC # BLD AUTO: 8.8 K/UL (ref 4.8–10.8)

## 2022-03-23 PROCEDURE — 700111 HCHG RX REV CODE 636 W/ 250 OVERRIDE (IP)

## 2022-03-23 PROCEDURE — A9270 NON-COVERED ITEM OR SERVICE: HCPCS | Performed by: NURSE PRACTITIONER

## 2022-03-23 PROCEDURE — 99232 SBSQ HOSP IP/OBS MODERATE 35: CPT | Performed by: PHYSICAL MEDICINE & REHABILITATION

## 2022-03-23 PROCEDURE — 700111 HCHG RX REV CODE 636 W/ 250 OVERRIDE (IP): Performed by: NURSE PRACTITIONER

## 2022-03-23 PROCEDURE — 700102 HCHG RX REV CODE 250 W/ 637 OVERRIDE(OP): Performed by: SPECIALIST

## 2022-03-23 PROCEDURE — 36415 COLL VENOUS BLD VENIPUNCTURE: CPT

## 2022-03-23 PROCEDURE — 82330 ASSAY OF CALCIUM: CPT

## 2022-03-23 PROCEDURE — 85027 COMPLETE CBC AUTOMATED: CPT

## 2022-03-23 PROCEDURE — A9270 NON-COVERED ITEM OR SERVICE: HCPCS

## 2022-03-23 PROCEDURE — A9270 NON-COVERED ITEM OR SERVICE: HCPCS | Performed by: SPECIALIST

## 2022-03-23 PROCEDURE — 83735 ASSAY OF MAGNESIUM: CPT

## 2022-03-23 PROCEDURE — 700101 HCHG RX REV CODE 250: Performed by: NURSE PRACTITIONER

## 2022-03-23 PROCEDURE — 770004 HCHG ROOM/CARE - ONCOLOGY PRIVATE *

## 2022-03-23 PROCEDURE — 700102 HCHG RX REV CODE 250 W/ 637 OVERRIDE(OP): Performed by: NURSE PRACTITIONER

## 2022-03-23 PROCEDURE — 85007 BL SMEAR W/DIFF WBC COUNT: CPT

## 2022-03-23 PROCEDURE — 700102 HCHG RX REV CODE 250 W/ 637 OVERRIDE(OP)

## 2022-03-23 PROCEDURE — 80048 BASIC METABOLIC PNL TOTAL CA: CPT

## 2022-03-23 PROCEDURE — 700101 HCHG RX REV CODE 250

## 2022-03-23 RX ORDER — MAGNESIUM SULFATE HEPTAHYDRATE 40 MG/ML
2 INJECTION, SOLUTION INTRAVENOUS ONCE
Status: COMPLETED | OUTPATIENT
Start: 2022-03-23 | End: 2022-03-23

## 2022-03-23 RX ADMIN — HYDROMORPHONE HYDROCHLORIDE 2 MG: 2 TABLET ORAL at 17:38

## 2022-03-23 RX ADMIN — ROPINIROLE HYDROCHLORIDE 1 MG: 1 TABLET, FILM COATED ORAL at 17:38

## 2022-03-23 RX ADMIN — CALCIUM CARBONATE 500 MG: 500 TABLET, CHEWABLE ORAL at 12:45

## 2022-03-23 RX ADMIN — HYDROMORPHONE HYDROCHLORIDE 2 MG: 2 TABLET ORAL at 21:31

## 2022-03-23 RX ADMIN — IBUPROFEN 600 MG: 400 TABLET, FILM COATED ORAL at 15:23

## 2022-03-23 RX ADMIN — CALCIUM CARBONATE 500 MG: 500 TABLET, CHEWABLE ORAL at 04:57

## 2022-03-23 RX ADMIN — ONDANSETRON 4 MG: 4 TABLET, ORALLY DISINTEGRATING ORAL at 17:38

## 2022-03-23 RX ADMIN — GUAIFENESIN 600 MG: 600 TABLET, EXTENDED RELEASE ORAL at 17:37

## 2022-03-23 RX ADMIN — HYDROMORPHONE HYDROCHLORIDE 2 MG: 2 TABLET ORAL at 04:53

## 2022-03-23 RX ADMIN — HYDROXYCHLOROQUINE SULFATE 400 MG: 200 TABLET ORAL at 08:49

## 2022-03-23 RX ADMIN — POTASSIUM CHLORIDE, DEXTROSE MONOHYDRATE AND SODIUM CHLORIDE: 150; 5; 450 INJECTION, SOLUTION INTRAVENOUS at 04:56

## 2022-03-23 RX ADMIN — PAROXETINE 60 MG: 30 TABLET, FILM COATED ORAL at 20:03

## 2022-03-23 RX ADMIN — MAGNESIUM SULFATE HEPTAHYDRATE 2 G: 40 INJECTION, SOLUTION INTRAVENOUS at 08:49

## 2022-03-23 RX ADMIN — CALCIUM CARBONATE 500 MG: 500 TABLET, CHEWABLE ORAL at 17:38

## 2022-03-23 RX ADMIN — NYSTATIN: 100000 POWDER TOPICAL at 04:57

## 2022-03-23 RX ADMIN — MAGNESIUM HYDROXIDE 30 ML: 400 SUSPENSION ORAL at 12:43

## 2022-03-23 RX ADMIN — ONDANSETRON 4 MG: 4 TABLET, ORALLY DISINTEGRATING ORAL at 23:29

## 2022-03-23 RX ADMIN — BACLOFEN 10 MG: 10 TABLET ORAL at 12:44

## 2022-03-23 RX ADMIN — GUAIFENESIN 600 MG: 600 TABLET, EXTENDED RELEASE ORAL at 04:54

## 2022-03-23 RX ADMIN — MIRTAZAPINE 22.5 MG: 15 TABLET, FILM COATED ORAL at 20:02

## 2022-03-23 RX ADMIN — AMLODIPINE BESYLATE 5 MG: 5 TABLET ORAL at 17:38

## 2022-03-23 RX ADMIN — BACLOFEN 10 MG: 10 TABLET ORAL at 17:38

## 2022-03-23 RX ADMIN — BACLOFEN 10 MG: 10 TABLET ORAL at 04:54

## 2022-03-23 RX ADMIN — ROPINIROLE HYDROCHLORIDE 1 MG: 1 TABLET, FILM COATED ORAL at 05:01

## 2022-03-23 RX ADMIN — ONDANSETRON 4 MG: 4 TABLET, ORALLY DISINTEGRATING ORAL at 04:54

## 2022-03-23 RX ADMIN — RIVAROXABAN 15 MG: 15 TABLET, FILM COATED ORAL at 08:49

## 2022-03-23 RX ADMIN — HYDROMORPHONE HYDROCHLORIDE 2 MG: 2 TABLET ORAL at 08:48

## 2022-03-23 RX ADMIN — IBUPROFEN 600 MG: 400 TABLET, FILM COATED ORAL at 23:28

## 2022-03-23 RX ADMIN — HYDROMORPHONE HYDROCHLORIDE 2 MG: 2 TABLET ORAL at 12:44

## 2022-03-23 RX ADMIN — NYSTATIN: 100000 POWDER TOPICAL at 18:00

## 2022-03-23 RX ADMIN — RIVAROXABAN 15 MG: 15 TABLET, FILM COATED ORAL at 17:38

## 2022-03-23 RX ADMIN — OMEPRAZOLE 20 MG: 20 CAPSULE, DELAYED RELEASE ORAL at 04:54

## 2022-03-23 RX ADMIN — PROPRANOLOL HYDROCHLORIDE 20 MG: 20 TABLET ORAL at 04:59

## 2022-03-23 RX ADMIN — ONDANSETRON 4 MG: 4 TABLET, ORALLY DISINTEGRATING ORAL at 12:44

## 2022-03-23 RX ADMIN — POTASSIUM CHLORIDE, DEXTROSE MONOHYDRATE AND SODIUM CHLORIDE: 150; 5; 450 INJECTION, SOLUTION INTRAVENOUS at 17:44

## 2022-03-23 ASSESSMENT — ENCOUNTER SYMPTOMS
CHILLS: 0
DIARRHEA: 0
SINUS PAIN: 0
ABDOMINAL PAIN: 0
FEVER: 0
SHORTNESS OF BREATH: 0
SENSORY CHANGE: 0
COUGH: 0
POLYDIPSIA: 0
NAUSEA: 0
VOMITING: 0
FOCAL WEAKNESS: 0
SORE THROAT: 0
DIZZINESS: 0

## 2022-03-23 ASSESSMENT — PAIN DESCRIPTION - PAIN TYPE
TYPE: ACUTE PAIN

## 2022-03-23 NOTE — CARE PLAN
The patient is Stable - Low risk of patient condition declining or worsening    Shift Goals  Clinical Goals: Monitor Output, nausea and vomitting, pain control  Patient Goals: sit in the chair, rest  Family Goals: NA    Progress made toward(s) clinical / shift goals:    Problem: Mobility  Goal: Patient's capacity to carry out activities will improve  Outcome: Progressing  Note: Pt was able to get up into the chair with the help of 2 people and the use of a front wheel walker.      Problem: Pain - Standard  Goal: Alleviation of pain or a reduction in pain to the patient’s comfort goal  Outcome: Progressing     Problem: Knowledge Deficit - Standard  Goal: Patient and family/care givers will demonstrate understanding of plan of care, disease process/condition, diagnostic tests and medications  Outcome: Progressing     Problem: Fall Risk  Goal: Patient will remain free from falls  Outcome: Progressing       Patient is not progressing towards the following goals:

## 2022-03-23 NOTE — PROGRESS NOTES
Midline unable to flush per bedside RN, dressing removed, noted to be pulled back a few cm, adjusted and was able to flush with brisk blood return, dressing changed, bedside RN made aware.

## 2022-03-23 NOTE — PROGRESS NOTES
Physical Medicine and Rehabilitation Consultation              Date of initial consultation: 3/11/2022  Consulting provider: KAMINI Mckee  Reason for consultation: assess for acute inpatient rehab appropriateness  LOS: 27 Day(s)    SUBJECTIVE  Patient seen in room. No visitors present.  GI: last BM 3/21. Has been having vomiting without nausea. Vomiting comes suddenly and without warning  : bladder managed by SP tube and indwelling urethral catheter.  Psych: sleeping well at night. No daytime drowsiness  MSK: pain well controlled. Has been participating in therapies. Transfers are moderate assist, which is a significant improvement..    Medications:  Current Facility-Administered Medications   Medication Dose   • magnesium sulfate IVPB premix 2 g  2 g   • guaiFENesin ER (MUCINEX) tablet 600 mg  600 mg   • rivaroxaban (XARELTO) tablet 15 mg  15 mg    Followed by   • [START ON 4/12/2022] rivaroxaban (XARELTO) tablet 20 mg  20 mg   • HYDROmorphone (DILAUDID) tablet 2 mg  2 mg   • ondansetron (ZOFRAN ODT) dispertab 4 mg  4 mg   • propranolol (INDERAL) tablet 20 mg  20 mg   • scopolamine (TRANSDERM-SCOP) patch 1 Patch  1 Patch   • ibuprofen (MOTRIN) tablet 600 mg  600 mg   • omeprazole (PRILOSEC) capsule 20 mg  20 mg   • calcium carbonate (TUMS) chewable tab 500 mg  500 mg   • nystatin (MYCOSTATIN) powder     • dextrose 5 % and 0.45 % NaCl with KCl 20 mEq     • simethicone (Mylicon) chewable tablet 125 mg  125 mg   • amLODIPine (NORVASC) tablet 5 mg  5 mg   • lidocaine-prilocaine (EMLA) 2.5-2.5 % cream     • magnesium hydroxide (MILK OF MAGNESIA) suspension 30 mL  30 mL   • baclofen (LIORESAL) tablet 10 mg  10 mg   • prochlorperazine (COMPAZINE) injection 10 mg  10 mg   • diphenhydrAMINE (BENADRYL) tablet/capsule 25 mg  25 mg   • hydroxychloroquine (Plaquenil) tablet 400 mg  400 mg   • mirtazapine (Remeron) tablet 22.5 mg  22.5 mg   • PARoxetine (PAXIL) tablet 60 mg  60 mg   • ROPINIRole (REQUIP) tablet 1 mg   "1 mg       Allergies:  Allergies   Allergen Reactions   • Bactrim Ds Rash and Itching     \"Itchy rash\"   • Ciprofloxacin Rash     States \"something always goes wrong\" Dysthesia   • Morphine Unspecified     Family history of becoming violent \"Paranoia, aggression\"   • Tramadol Vomiting and Nausea   • Lavender Oil Swelling         Physical Exam:  Vitals: /69   Pulse 79   Temp 36.8 °C (98.3 °F) (Temporal)   Resp 18   Ht 1.702 m (5' 7\")   Wt 99.1 kg (218 lb 7.6 oz)   SpO2 94%   Gen: no acute distress, no visitors present  Eyes/ Nose/ Mouth:  moist mucous membranes  Cardio: good peripheral perfusion, minimal swelling in left arm  Pulm: normal respiratory effort, no cyanosis   Abd: ALEIDA drain exiting left abdomen, Suprapic cath exiting over left bladder, large midline incision covered with surgical dressing  Neuro  Mental status: answers questions appropriately follows commands  Speech: fluent, no aphasia or dysarthria    Motor-not tested today:      Upper Extremity  Myotome R L   Shoulder flexion C5 5 5   Elbow flexion C5 5 5   Wrist extension C6 5 5   Elbow extension C7 5 5   Finger flexion C8 5 5   Finger abduction T1 5 5     Lower Extremity Myotome R L   Hip flexion L2 4/5 3/5   Knee extension L3 4/5 3/5   Ankle dorsiflexion L4 5 boot   Toe extension L5 5 boot   Ankle plantarflexion S1 5 boot     Psych: appropriate affect    Labs: Reviewed and significant for   Recent Labs     03/21/22  0006 03/22/22  0718 03/23/22  0016   RBC 2.92* 2.71* 2.93*   HEMOGLOBIN 8.2* 7.6* 8.1*   HEMATOCRIT 26.2* 24.4* 26.3*   PLATELETCT 391 368 351     Recent Labs     03/21/22  0006 03/22/22  0718 03/23/22  0016   SODIUM 133* 132* 135   POTASSIUM 3.7 3.8 3.8   CHLORIDE 100 101 102   CO2 21 22 22   GLUCOSE 112* 92 96   BUN 6* 6* 7*   CREATININE 0.91 1.01 1.03   CALCIUM 7.8* 8.0* 8.3*     Recent Results (from the past 24 hour(s))   Basic Metabolic Panel    Collection Time: 03/23/22 12:16 AM   Result Value Ref Range    Sodium 135 " 135 - 145 mmol/L    Potassium 3.8 3.6 - 5.5 mmol/L    Chloride 102 96 - 112 mmol/L    Co2 22 20 - 33 mmol/L    Glucose 96 65 - 99 mg/dL    Bun 7 (L) 8 - 22 mg/dL    Creatinine 1.03 0.50 - 1.40 mg/dL    Calcium 8.3 (L) 8.5 - 10.5 mg/dL    Anion Gap 11.0 7.0 - 16.0   CBC WITH DIFFERENTIAL    Collection Time: 03/23/22 12:16 AM   Result Value Ref Range    WBC 8.8 4.8 - 10.8 K/uL    RBC 2.93 (L) 4.20 - 5.40 M/uL    Hemoglobin 8.1 (L) 12.0 - 16.0 g/dL    Hematocrit 26.3 (L) 37.0 - 47.0 %    MCV 89.8 81.4 - 97.8 fL    MCH 27.6 27.0 - 33.0 pg    MCHC 30.8 (L) 33.6 - 35.0 g/dL    RDW 54.6 (H) 35.9 - 50.0 fL    Platelet Count 351 164 - 446 K/uL    MPV 9.2 9.0 - 12.9 fL    Neutrophils-Polys 87.50 (H) 44.00 - 72.00 %    Lymphocytes 5.40 (L) 22.00 - 41.00 %    Monocytes 0.90 0.00 - 13.40 %    Eosinophils 0.00 0.00 - 6.90 %    Basophils 0.00 0.00 - 1.80 %    Nucleated RBC 0.00 /100 WBC    Neutrophils (Absolute) 7.70 (H) 2.00 - 7.15 K/uL    Lymphs (Absolute) 0.48 (L) 1.00 - 4.80 K/uL    Monos (Absolute) 0.08 0.00 - 0.85 K/uL    Eos (Absolute) 0.00 0.00 - 0.51 K/uL    Baso (Absolute) 0.00 0.00 - 0.12 K/uL    NRBC (Absolute) 0.00 K/uL    Anisocytosis 1+     Macrocytosis 1+     Microcytosis 1+    IONIZED CALCIUM    Collection Time: 03/23/22 12:16 AM   Result Value Ref Range    Ionized Calcium 1.2 1.1 - 1.3 mmol/L   MAGNESIUM    Collection Time: 03/23/22 12:16 AM   Result Value Ref Range    Magnesium 1.6 1.5 - 2.5 mg/dL   ESTIMATED GFR    Collection Time: 03/23/22 12:16 AM   Result Value Ref Range    GFR (CKD-EPI) 64 >60 mL/min/1.73 m 2   DIFFERENTIAL MANUAL    Collection Time: 03/23/22 12:16 AM   Result Value Ref Range    Myelocytes 6.20 %    Manual Diff Status PERFORMED    PERIPHERAL SMEAR REVIEW    Collection Time: 03/23/22 12:16 AM   Result Value Ref Range    Peripheral Smear Review see below    PLATELET ESTIMATE    Collection Time: 03/23/22 12:16 AM   Result Value Ref Range    Plt Estimation Normal    MORPHOLOGY    Collection  Time: 03/23/22 12:16 AM   Result Value Ref Range    RBC Morphology Present     Polychromia 1+     Poikilocytosis 1+     Ovalocytes 1+     Echinocytes 1+     Smudge Cells Few          ASSESSMENT:  Patient is a 56 y.o. female admitted for hysterectomy 2/2 endometrioid adenocarcinoma who also suffered a fall in the hospital, now s/p ORIF left ankle by Dr. Mildred MD on 2/26.     [facility] Code / Diagnosis to Support: 0017.8 - Medically Complex: Medical/Surgical Complications    Rehabilitation: Impaired ADLs and mobility  Patient is currently not functioning at baseline as detailed on PT and OT  evaluations. Patient has good home support and can tolerate three hours of therapy a day. Goal of acute inpatient rehabilitation is modified independence at the wheelchair level and multimodal pain management to return patient to her baseline pain medications with close follow up by her outpatient pain management team. Therefore, it is my medical opinion that she would benefit from aggressive therapies in OT and PT and in acute inpatient rehabilitation with goal of returning home with family.    If patient no longer requires aggressive therapies in in two disciplines, physiatry will reassess his postacute rehabilitation needs.    Estimated length of stay: 10-14 days  Anticipated discharge destination: home with spouse  Prognosis: good  Code: full resuscitation    Barriers to transfer include: Insurance authorization, TCCs to verify disposition, medical clearance and bed availability     All cases are subject to administrative review and recommendations may change    Additional Recommendations:  - pain well controlled by primary team. Avoid IV pain medication.    -PMR to follow in the periphery for rehab appropriateness, please reach out with questions or request for medical management      Medical Complexity:  Leukocytosis, resolved   Anemia   Hypocalcemia, adjusted calcium levels at normal level     DVT PPX: Xarelto       Thank you  for allowing us to participate in the care of this patient.     Patient was seen for 27 minutes on unit/floor of which > 50% of time was spent on counseling and coordination of care regarding the above, including prognosis, risk reduction, benefits of treatment, and options for next stage of care.    Mine Palumbo, DO  Physical Medicine and Rehabilitation    Please note that this dictation was created using voice recognition software. I have made every reasonable attempt to correct obvious errors, but there may be errors of grammar and possibly content that I did not discover before finalizing the note.

## 2022-03-23 NOTE — DISCHARGE PLANNING
Anticipated Discharge Disposition: Rehab vs Home with HHC    Action: Chart reviewed.  CM noted insurance is pending for rehab.  CM attempted to speak with the patients wife to discuss the secondary plan for HHC and obtain choice if insurance does not approve for rehab.  CM previously sent the patients spouse the list of in network HHC providers via email.  CM was unable to leave a message as her voicemail was full.    Barriers to Discharge: placement    Plan: CM will continue to follow for insurance approval.  If insurance does not approve, CM will plan for HHC.

## 2022-03-23 NOTE — DISCHARGE PLANNING
Per physiatry patient can now tolerate IPR level of therapy and is a candidate, submitted to insurance ([place]) for prior authorization.

## 2022-03-23 NOTE — PROGRESS NOTES
GYN/Oncology Progress Note               Author: EILEEN Le Date & Time created: 3/23/2022  8:43 AM     Interval History:  Still with nausea and spitting up mucus. +Wretching but no gastric contents comes up. Nausea triggered by smells and mucus in the back of her throat. Feels this is due to trouble with her eustacean tubes and is hopeful that Musinex will be helpful. Denies nasal congestion and sinus pain/pressure. Eating small bites of food and keeping this down. Uncertain of how much she is drinking. Pain in lower abdomen and left ankle controled. Working on mobilizing as much as possible as she is very motivated to go to an acute rehab for intensive PT/OT. Still having moderate amount to urine leaking form down below.     Review of Systems:  Review of Systems   Constitutional: Negative for chills and fever.   HENT: Negative for congestion, sinus pain and sore throat.    Respiratory: Negative for cough and shortness of breath.    Cardiovascular: Negative for chest pain and leg swelling.   Gastrointestinal: Negative for abdominal pain, diarrhea, nausea and vomiting.   Genitourinary: Negative for hematuria.        Suprapubic   Neurological: Negative for dizziness, sensory change and focal weakness.   Endo/Heme/Allergies: Negative for polydipsia.       Physical Exam:  Physical Exam  Constitutional:       Appearance: Normal appearance.   Cardiovascular:      Pulses: Normal pulses.   Pulmonary:      Effort: Pulmonary effort is normal.   Abdominal:      Palpations: Abdomen is soft.      Comments: Midline wound well approximated, no s/s of infection, ALEIDA serosang. Suprapubic catheter and Brenner with lillian UO   Musculoskeletal:      Comments: Left boot   Skin:     General: Skin is warm and dry.      Capillary Refill: Capillary refill takes 2 to 3 seconds.   Neurological:      Mental Status: She is alert and oriented to person, place, and time.         Labs:          Recent Labs     03/21/22  0006 03/22/22  0718  22  0016   SODIUM 133* 132* 135   POTASSIUM 3.7 3.8 3.8   CHLORIDE 100 101 102   CO2    BUN 6* 6* 7*   CREATININE 0.91 1.01 1.03   MAGNESIUM 1.8  --  1.6   CALCIUM 7.8* 8.0* 8.3*     Recent Labs     22  0006 22  0718 22  0016   GLUCOSE 112* 92 96     Recent Labs     22  0006 22  0718 22  0016   RBC 2.92* 2.71* 2.93*   HEMOGLOBIN 8.2* 7.6* 8.1*   HEMATOCRIT 26.2* 24.4* 26.3*   PLATELETCT 391 368 351     Recent Labs     22  0006 22  0718 22  0016   WBC 9.4 9.0 8.8   NEUTSPOLYS 90.20*  --  87.50*   LYMPHOCYTES 3.50*  --  5.40*   MONOCYTES 2.70  --  0.90   EOSINOPHILS 0.00  --  0.00   BASOPHILS 0.90  --  0.00     Recent Labs     22  0006 22  0718 22  0016   SODIUM 133* 132* 135   POTASSIUM 3.7 3.8 3.8   CHLORIDE 100 101 102   CO2    GLUCOSE 112* 92 96   BUN 6* 6* 7*   CREATININE 0.91 1.01 1.03   CALCIUM 7.8* 8.0* 8.3*     Hemodynamics:  Temp (24hrs), Av.8 °C (98.2 °F), Min:36.6 °C (97.9 °F), Max:37 °C (98.6 °F)  Temperature: 36.8 °C (98.3 °F)  Pulse  Av.1  Min: 71  Max: 142   Blood Pressure: 150/69     Respiratory:    Respiration: 18, Pulse Oximetry: 94 %        RUL Breath Sounds: Clear, RML Breath Sounds: Clear, RLL Breath Sounds: Diminished, MORENO Breath Sounds: Clear, LLL Breath Sounds: Diminished  Fluids:    Intake/Output Summary (Last 24 hours) at 3/10/2022 1155  Last data filed at 3/10/2022 0430  Gross per 24 hour   Intake --   Output 1620 ml   Net -1620 ml        GI/Nutrition:  Orders Placed This Encounter   Procedures   • Diet Order Diet: Low Fiber(GI Soft)     Standing Status:   Standing     Number of Occurrences:   1     Order Specific Question:   Diet:     Answer:   Low Fiber(GI Soft) [2]     Medical Decision Making, by Problem:  Active Hospital Problems    Diagnosis    • *Hypercalcemia [E83.52]        Plan:  This is a 56 y.o. female with stage III (minimum) Grade 2 endometrial adenocarcinoma s/p 6 cycles of  neoadjuvant chemotherapy with completed on 1/13/22 , who presented with hypercalcemia, nausea, and vomiting     1. POD #20: s/p ex lap, hys, bso, cystectomy with repair and suprapubic catheter placement. Doing well, up to edge of bed and commode > encouraged continued mobilization, with at minimum up to EOB x 3 a day, working with PT. Physiatry re consulted on 3/22 for new evaluation.   2. Urinary incontinence: Present prior to surgery. Unclear if leaking around Brenner or if coming from vagina. SP in place. Dr. Dyson placed Brenner at bedside 3/17/22 to maximally divert urine given that patient required a cystectomy repair. Hematuria after 1 lovenox dose. Xarelto reinitiated on 3/22 after discussion with Dr. Dyson. No hematuria today.   3. UTI:  UA culture showed e. Coli, C x 2 NGTD. WBC normalized. Switched from Zosyn to ancef based on sensitivities. Ancef complete 3/22. Repeat UCx in process.   4. Post op pain: well controlled on PO dilaudid and motrin PRN, requiring IV dilaudid, schedule PO dilaudid and d/c IV, ok to use Motrin in setting of hematuria  5. Hypocalcemia: patient with hx of hypercalemia, secondary to malignancy and dehydration, s/p IVF, lasix, and pamidronate. Now with hypocalcemia. Ionized calcium up to 1.2; continue TUMS. Monitor.     6. Hypokalemia:  Stable at 3.8  today. On IVMF with 20K. Monitor.   7.Stage III (minimum) Grade 2 endometrial adenocarcinoma:  s/p 6 cycles of neoadjuvant chemotherapy, now s/p ex lap, hys, bso, cysetectomy with suprapubic cath placement on 3/3/22.  Final pathology pending from [facility]   8. Left trimalleolar ankle fracture/dislocation: s/p ORIF 2/26, ortho signed off, [facility].   9. Upper extremity DVT: Venous US positive for DVT, s/p heparin gtt prior to surgery. Xarelto held AM of 3/16 due to hematuria that recurred after one dose of Lovenox. Discussed with Dr. Dyson; Xarelto restarted 2/22.   10. Anemia: Secondary to chemotherapy and now secondary to acute blood loss. Hgb 7.5  "pre operative on 3/3, s/p 2 unit intra op. S/p 1 unit on 3/5, stable. Monitor.   11. Nausea: patient is having episodes of spitting up sputum, no wrenching, Started scopolamine 3/19. On schduled zofran  12.HTN: No hx of HTN. On amlodipine 2.5 mg at home for Raynaud's > amlodipine to 5 mg daily. Restarted propranolol daily (used at home for anxiety)  13. History of Lupus: continue home hydroxychloroquine  14. Hx of mood disorder: PTSD and anxiety. Continue home paroxetine and propranolol   15. Hx Raynaud's disease: Continue amlodipine.   16. Hx of allergic rhinitis: Home Azelastine  17. Dispo: continue to mobilize. Acute rehab signed off stating \"poor tolerance for IPR level of therapy.\" Physiatry reconsulted to see if patient will qualify for acute rehab now that she has been working with PT.   SNF and home health orders placed. Wife and patient would like to try and take patient home with home health or for patient to go to acute rehab.      Case discussed with MD    Quality-Core Measures  "

## 2022-03-23 NOTE — PREADMISSION SCREENING NOTE
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 about 80 minutes.    She was awoken from anesthesia and transferred on the gurney and taken to   postanesthesia care unit in stable condition.     PLAN:    1.  The patient will be readmitted postoperatively.  2.  She should be nonweightbearing left lower extremity in her splint.  3.  She should work with physical and occupational therapy as soon as possible   for mobilization.  4.  Ultimately, she can follow up with me in 2 weeks postop for routine wound   check and staple removal.     Amy Champagne PA-C was present and essential for the duration of the procedure.   Required assistance included positioning, draping, retracting, and wound closure.            ______________________________  Omkar Levy MD         OP Report 3/3/22:  Immediate Post OP Note     PreOp Diagnosis: 20 week size uterine cancer  Abdominal pain        PostOp Diagnosis: same        Procedure(s):  HYSTERECTOMY, TOTAL, ABDOMINAL - Wound Class: Clean Contaminated  SALPINGO-OOPHORECTOMY - Wound Class: Clean Contaminated  LAPAROSCOPY - Wound Class: Clean Contaminated  LAPAROTOMY, EXPLORATORY - Wound Class: Clean Contaminated  INSERTION,  SUPRAPUBIC CATHETER - Wound Class: Clean Contaminated  PORT REMOVAL - Wound Class: Clean     Surgeon(s):  Ray Dyson M.D.     Anesthesiologist/Type of Anesthesia:  Anesthesiologist: Adi Graham M.D./General     Surgical Staff:  Assistant: Sofia Jean-Baptiste P.A.-C.  Circulator: Gena Sanchez R.N.  Scrub Person: Carito Gutierrez  [place]: Jeanine Cullen R.N.     Specimens removed if any:  ID Type Source Tests Collected by Time Destination   A : uterus, cervix Tissue Uterus PATHOLOGY SPECIMEN Ray Dyson M.D. 3/3/2022  6:42 PM     B : left fallopian tube and ovary Tissue Ovary PATHOLOGY SPECIMEN Ray Dyson M.D. 3/3/2022  6:59 PM     C : right fallopian tube and ovary Tissue Ovary PATHOLOGY SPECIMEN Ray Dyson M.D. 3/3/2022  7:00 PM     D : mediport Other Other GROSS ONLY REQUEST Ray Dyson M.D. 3/3/2022  8:16 PM           Estimated Blood Loss: 1000 liter     Findings: There is some pelvic fluid is noted.  Normal right left diaphragm liver capsule was smooth stomach was palpated to be normal the gallbladder was quite distended however I was not able to appreciate any stones within the gallbladder.  Omentum appeared grossly normal.  The small bowel was unremarkable abdominal peritoneal surfaces were unremarkable.     In the pelvis uterus was approximately 20 weeks in size.  Clearly there was tumor that infiltrated the serosa and the entire uterus was completely encased with tumor approximately 20 weeks in size.  Tumor extended down to the uterosacral ligament and down to the cervix and densely intimately attached to the base of the bladder.  There was enlarged lymph nodes both on the right and left pelvic lymph nodes.     Examination anesthesia again revealed a intact hymenal ring although inspection of the vaginal canal revealed purulent infiltration into the vagina.  The urethra had been completely displaced secondary to the enlarged uterus.  In fact it was exceedingly difficult to place a Brenner  "catheter due to the fact that it had been displaced.  There was no recognizable urethral opening.  I was concerned that given the extent of tumor in the vagina that the urethra would obstruct for this reason a suprapubic catheter was placed.     Should be noted that the tumor is exceedingly vascular the tumor had completely removed through and through the muscular musculature extending down to the lower uterine segment extended out to the uterosacral ligament and the base of the bladder.     Complications: Dissectional cystotomy at the base of the bladder           3/3/2022 9:31 PM Ray Dyson M.D.      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   • Recurrent urinary tract infection 02/15/2022   • Class 2 obesity due to excess calories without serious comorbidity with body mass index (BMI) of 36.0 to 36.9 in adult 02/15/2022   • Hypercalcemia 02/15/2022   • Anemia associated with chemotherapy 12/22/2021   • Dizzy spells 11/29/2017   • Lupus (HCC) 11/29/2017     A/O  Current Vital Signs:   Temperature: 36.8 °C (98.3 °F) Pulse: 79 Respiration: 18 Blood Pressure: 150/69  Weight: 99.1 kg (218 lb 7.6 oz) Height: 170.2 cm (5' 7\")  Pulse Oximetry: 94 % O2 (LPM): 0      Completed Laboratory Reports:  Recent Labs     03/21/22  0006 03/22/22  0718 03/23/22  0016   WBC 9.4 9.0 8.8   HEMOGLOBIN 8.2* 7.6* 8.1*   HEMATOCRIT 26.2* 24.4* 26.3*   PLATELETCT 391 368 351   SODIUM 133* 132* 135   POTASSIUM 3.7 3.8 3.8   BUN 6* 6* 7*   CREATININE 0.91 1.01 1.03   GLUCOSE 112* 92 96     Additional Labs: Not Applicable    Prior Living Situation:   Housing / " Facility: 1 [facility] with - Patient's Self Care Capacity: Spouse    Prior Level of Function / Living Situation:   Physical Therapy: Prior Services: None  Housing / Facility: 1 [facility]  Bathroom Set up: Walk In [facility] with - Patient's Self Care Capacity: Spouse  Bed Mobility: Independent  Transfer Status: Independent  Ambulation: Independent  Distance Ambulation (Feet):  (community)  Assistive Devices Used: Unable to Determine At This Time  Current Level of Function:   Gait Level Of Assist: Unable to Participate  Weight Bearing Status: NWB LLE  Supine to Sit: Standby Assist (HOB elevated and heavy use of rail)  Sit to Supine:  (left in chair)  Scooting: Supervised  Rolling: Moderate Assist to Lt.  Skilled Intervention: Verbal Cuing,Compensatory Strategies  Comments: HOB elevated  Sit to Stand: Moderate Assist (bed elevated and knee brace, req increased assist for lower surfaces)  Bed, Chair, Wheelchair Transfer: Moderate Assist  Toilet Transfers: Moderate Assist (x2 ppl; pt prefers for comfort)  Transfer Method: Stand Pivot  Skilled Intervention: Verbal Cuing,Tactile Cuing,Facilitation,Compensatory Strategies,Sequencing  Sitting in Chair: 5+ min on BSC, left in recliner  Sitting Edge of Bed: NT  Standing: 3-5 min total  Occupational Therapy:   Self Feeding: Independent  Grooming / Hygiene: Independent  Bathing: Independent  Dressing: Independent  Toileting: Independent  Prior Level Of Mobility: Independent Without Device in Community  Prior Services: None  Housing / Facility: 1 [facility]  Current Level of Function:   Eating: Supervision (spooning ice to mouth)  Upper Body Dressing: Minimal Assist (abdominal binder donning)  Lower Body Dressing: Moderate Assist (min A for shoe in supine and max A for CAM boot)  Toileting: Minimal Assist (able to complete pericare on BSC)  Skilled Intervention: Verbal Cuing,Tactile Cuing,Facilitation,Compensatory Strategies  Comments: limited by fatigue and  fear  Speech Language Pathology:      Rehabilitation Prognosis/Potential: Good  Estimated Length of Stay: 10-14 days    Nursing:      Brenner in Place    Scope/Intensity of Services Recommended:  Physical Therapy: 1.5 hr / day  5 days / week. Therapeutic Interventions Required: Maximize Endurance, Mobility, Strength and Safety  Occupational Therapy: 1.5 hr / day 5 days / week. Therapeutic Interventions Required: Maximize Self Care, ADLs, IADLs and Energy Conservation  Rehabilitation Nursin/7. Therapeutic Interventions Required: Monitor Pain, Skin, Wound(s), Vital Signs, Intake and Output, Labs, Safety and Family Training  Rehabilitation Physician: 3 - 5 days / week. Therapeutic Interventions Required: Medical Management    She requires 24-hour rehabilitation nursing to manage bowel and bladder function, skin care, surgical incision, nutrition and fluid intake, pain control, safety, medication management and patient/family goals. In addition, rehabilitation nursing will reiterate and reinforce therapy skills and equipment use, including ADLs, as well as provide education to the patient and family. Magali Leal is willing to participate in and is able to tolerate the proposed plan of care.    Rehabilitation Goals and Plan (Expected frequency & duration of treatment in the IRF):   Return to the Community, Modified Independent Level of Care, Minimal Assist Level of Care and Family Able to Provide  Assistance  Anticipated Date of Rehabilitation Admission: 22  Patient/Family oriented IRF level of care/facility/plan: Yes  Patient/Family willing to participate in IRF care/facility/plan: Yes  Patient able to tolerate IRF level of care proposed: Yes  Patient has potential to benefit IRF level of care proposed: Yes  Comments: Not Applicable    Special Needs or Precautions - Medical Necessity:  Safety Concerns/Precautions:  Fall Risk / High Risk for Falls, Balance and Bed / Chair Alarm  Pain Management  IV Site:  Peripheral  Current Medications:    Current Facility-Administered Medications Ordered in Epic   Medication Dose Route Frequency Provider Last Rate Last Admin   • magnesium sulfate IVPB premix 2 g  2 g Intravenous Once Ines Deleon, A.P.R.N. 25 mL/hr at 03/23/22 0849 2 g at 03/23/22 0849   • guaiFENesin ER (MUCINEX) tablet 600 mg  600 mg Oral Q12HRS Ines Deleon A.P.R.N.   600 mg at 03/23/22 0454   • rivaroxaban (XARELTO) tablet 15 mg  15 mg Oral BID WITH MEALS Ines Deleon A.P.R.N.   15 mg at 03/23/22 0849    Followed by   • [START ON 4/12/2022] rivaroxaban (XARELTO) tablet 20 mg  20 mg Oral PM MEAL Ines Deleon A.P.R.N.       • HYDROmorphone (DILAUDID) tablet 2 mg  2 mg Oral Q4HR Monica Balatzar, A.P.R.N.   2 mg at 03/23/22 0848   • ondansetron (ZOFRAN ODT) dispertab 4 mg  4 mg Oral Q6HRS Monica Baltazar, A.P.R.N.   4 mg at 03/23/22 0454   • propranolol (INDERAL) tablet 20 mg  20 mg Oral DAILY Monica Baltazar, A.P.R.N.   20 mg at 03/23/22 0459   • scopolamine (TRANSDERM-SCOP) patch 1 Patch  1 Patch Transdermal Q72HRS Ines Deleon, A.P.R.N.   1 Patch at 03/22/22 1306   • ibuprofen (MOTRIN) tablet 600 mg  600 mg Oral Q6HRS PRN Monica Baltazar, A.P.R.N.   600 mg at 03/22/22 2002   • omeprazole (PRILOSEC) capsule 20 mg  20 mg Oral DAILY Monica Baltazar, A.P.R.N.   20 mg at 03/23/22 0454   • calcium carbonate (TUMS) chewable tab 500 mg  500 mg Oral TID Monica Baltazar, A.P.R.N.   500 mg at 03/23/22 0457   • nystatin (MYCOSTATIN) powder   Topical BID Jessica Martinez M.D.   Given at 03/23/22 0457   • dextrose 5 % and 0.45 % NaCl with KCl 20 mEq   Intravenous Continuous Monica Baltazar, A.P.R.N. 75 mL/hr at 03/23/22 0456 New Bag at 03/23/22 0456   • simethicone (Mylicon) chewable tablet 125 mg  125 mg Oral TID PRN Sofia Jean-Baptiste, P.A.-C.   125 mg at 03/19/22 1729   • amLODIPine (NORVASC) tablet 5 mg  5 mg Oral Q EVENING Ines Deleon, A.P.R.N.   5 mg at 03/22/22 1716   • lidocaine-prilocaine (EMLA) 2.5-2.5 % cream    Topical PRN Ines Deleon, A.P.R.N.   1 Bottle at 02/27/22 0939   • magnesium hydroxide (MILK OF MAGNESIA) suspension 30 mL  30 mL Oral Q4HRS PRN Ray Dyson M.D.   30 mL at 03/19/22 1729   • baclofen (LIORESAL) tablet 10 mg  10 mg Oral TID Ines Deleon A.P.R.N.   10 mg at 03/23/22 0454   • prochlorperazine (COMPAZINE) injection 10 mg  10 mg Intravenous Q4HRS PRN You Carroll M.D.   10 mg at 03/21/22 0449   • diphenhydrAMINE (BENADRYL) tablet/capsule 25 mg  25 mg Oral Q6HRS PRN Ines Deleon A.P.R.N.   25 mg at 03/20/22 2101   • hydroxychloroquine (Plaquenil) tablet 400 mg  400 mg Oral DAILY Ines Deleon A.P.R.N.   400 mg at 03/23/22 0849   • mirtazapine (Remeron) tablet 22.5 mg  22.5 mg Oral QHS Ines Deleon, A.P.R.N.   22.5 mg at 03/22/22 2132   • PARoxetine (PAXIL) tablet 60 mg  60 mg Oral DAILY Ines Deleon, A.P.R.N.   60 mg at 03/22/22 2133   • ROPINIRole (REQUIP) tablet 1 mg  1 mg Oral BID Ines Deleon, A.P.R.N.   1 mg at 03/23/22 0501     Current Outpatient Medications Ordered in Epic   Medication Sig Dispense Refill   • azelastine (ASTELIN) 137 MCG/SPRAY nasal spray instill 2 sprays into each nostril twice daily 30 mL 11     Diet:   DIET ORDERS (From admission to next 24h)     Start     Ordered    03/14/22 1125  Supplements  ALL MEALS        Comments: Please send magic cups, pt cant do the boost.   Question:  Which Supplement  Answer:  OTHER (see comments)    03/14/22 1125    03/12/22 0905  Diet Order Diet: Low Fiber(GI Soft)  ALL MEALS        Question:  Diet:  Answer:  Low Fiber(GI Soft)    03/12/22 0904    02/27/22 1115  Supplements  ONCE        Question:  Which Supplement  Answer:  Per RD    02/27/22 1114                Anticipated Discharge Destination / Patient/Family Goal:  Destination: Home with Assistance Support System: Spouse and Family   Anticipated home health services: OT, PT, Nursing, Social Work and Aide  Previously used HH service/ provider: Not Applicable  Anticipated DME Needs:  Walker, Wheelchair, Ramp, Commode and Life Line  Outpatient Services: OT and PT  Alternative resources to address additional identified needs:   No future appointments.  Pre-Screen Completed: 3/23/2022 10:34 AM Morelia Agosto

## 2022-03-24 ENCOUNTER — HOSPITAL ENCOUNTER (INPATIENT)
Facility: REHABILITATION | Age: 57
LOS: 4 days | DRG: 948 | End: 2022-03-28
Attending: PHYSICAL MEDICINE & REHABILITATION | Admitting: PHYSICAL MEDICINE & REHABILITATION
Payer: COMMERCIAL

## 2022-03-24 VITALS
DIASTOLIC BLOOD PRESSURE: 68 MMHG | SYSTOLIC BLOOD PRESSURE: 155 MMHG | RESPIRATION RATE: 16 BRPM | WEIGHT: 218.48 LBS | TEMPERATURE: 98.3 F | BODY MASS INDEX: 34.29 KG/M2 | OXYGEN SATURATION: 95 % | HEIGHT: 67 IN | HEART RATE: 78 BPM

## 2022-03-24 PROBLEM — G25.81 RESTLESS LEGS: Status: ACTIVE | Noted: 2022-03-24

## 2022-03-24 PROBLEM — Z93.59 SUPRAPUBIC CATHETER (HCC): Status: ACTIVE | Noted: 2022-03-24

## 2022-03-24 PROBLEM — I82.629 ACUTE DEEP VEIN THROMBOSIS (DVT) OF BRACHIAL VEIN (HCC): Status: ACTIVE | Noted: 2022-03-24

## 2022-03-24 PROBLEM — S82.852D CLOSED TRIMALLEOLAR FRACTURE OF LEFT ANKLE WITH ROUTINE HEALING: Status: ACTIVE | Noted: 2022-03-24

## 2022-03-24 PROBLEM — F43.10 PTSD (POST-TRAUMATIC STRESS DISORDER): Status: ACTIVE | Noted: 2022-03-24

## 2022-03-24 PROBLEM — F41.9 ANXIETY: Status: ACTIVE | Noted: 2022-03-24

## 2022-03-24 PROBLEM — R53.81 DEBILITY: Status: ACTIVE | Noted: 2022-03-24

## 2022-03-24 PROBLEM — C55 ENDOMETRIOID ADENOCARCINOMA OF UTERUS (HCC): Status: ACTIVE | Noted: 2022-03-24

## 2022-03-24 PROBLEM — R11.0 NAUSEA: Status: ACTIVE | Noted: 2022-03-24

## 2022-03-24 PROBLEM — Z98.890 S/P BLADDER REPAIR: Status: ACTIVE | Noted: 2022-03-24

## 2022-03-24 PROBLEM — C54.1 ENDOMETRIAL CARCINOMA (HCC): Status: ACTIVE | Noted: 2022-03-24

## 2022-03-24 PROBLEM — R32 URINARY INCONTINENCE: Status: ACTIVE | Noted: 2022-03-24

## 2022-03-24 PROBLEM — I10 PRIMARY HYPERTENSION: Status: ACTIVE | Noted: 2022-03-24

## 2022-03-24 PROBLEM — E83.51 HYPOCALCEMIA: Status: ACTIVE | Noted: 2022-03-24

## 2022-03-24 PROBLEM — I73.00 RAYNAUD'S DISEASE WITHOUT GANGRENE: Status: ACTIVE | Noted: 2022-03-24

## 2022-03-24 PROCEDURE — 94760 N-INVAS EAR/PLS OXIMETRY 1: CPT

## 2022-03-24 PROCEDURE — 0240U HCHG SARS-COV-2 COVID-19 NFCT DS RESP RNA 3 TRGT MIC: CPT

## 2022-03-24 PROCEDURE — 700102 HCHG RX REV CODE 250 W/ 637 OVERRIDE(OP)

## 2022-03-24 PROCEDURE — A9270 NON-COVERED ITEM OR SERVICE: HCPCS

## 2022-03-24 PROCEDURE — 700102 HCHG RX REV CODE 250 W/ 637 OVERRIDE(OP): Performed by: NURSE PRACTITIONER

## 2022-03-24 PROCEDURE — 700111 HCHG RX REV CODE 636 W/ 250 OVERRIDE (IP): Performed by: EMERGENCY MEDICINE

## 2022-03-24 PROCEDURE — A9270 NON-COVERED ITEM OR SERVICE: HCPCS | Performed by: NURSE PRACTITIONER

## 2022-03-24 PROCEDURE — 700111 HCHG RX REV CODE 636 W/ 250 OVERRIDE (IP): Performed by: NURSE PRACTITIONER

## 2022-03-24 PROCEDURE — A9270 NON-COVERED ITEM OR SERVICE: HCPCS | Performed by: PHYSICAL MEDICINE & REHABILITATION

## 2022-03-24 PROCEDURE — 770005 HCHG ROOM/CARE - REHAB PRIVATE (11*

## 2022-03-24 PROCEDURE — 700102 HCHG RX REV CODE 250 W/ 637 OVERRIDE(OP): Performed by: PHYSICAL MEDICINE & REHABILITATION

## 2022-03-24 RX ORDER — GUAIFENESIN 600 MG/1
600 TABLET, EXTENDED RELEASE ORAL EVERY 12 HOURS
Status: CANCELLED | OUTPATIENT
Start: 2022-03-24

## 2022-03-24 RX ORDER — SCOLOPAMINE TRANSDERMAL SYSTEM 1 MG/1
1 PATCH, EXTENDED RELEASE TRANSDERMAL
Status: DISCONTINUED | OUTPATIENT
Start: 2022-03-25 | End: 2022-03-29 | Stop reason: HOSPADM

## 2022-03-24 RX ORDER — OMEPRAZOLE 20 MG/1
20 CAPSULE, DELAYED RELEASE ORAL DAILY
Status: DISCONTINUED | OUTPATIENT
Start: 2022-03-25 | End: 2022-03-25

## 2022-03-24 RX ORDER — MIRTAZAPINE 15 MG/1
22.5 TABLET, ORALLY DISINTEGRATING ORAL
Status: DISCONTINUED | OUTPATIENT
Start: 2022-03-24 | End: 2022-03-29 | Stop reason: HOSPADM

## 2022-03-24 RX ORDER — HYDROXYCHLOROQUINE SULFATE 200 MG/1
400 TABLET, FILM COATED ORAL DAILY
Status: CANCELLED | OUTPATIENT
Start: 2022-03-25

## 2022-03-24 RX ORDER — IBUPROFEN 400 MG/1
600 TABLET ORAL EVERY 6 HOURS PRN
Status: DISCONTINUED | OUTPATIENT
Start: 2022-03-24 | End: 2022-03-29 | Stop reason: HOSPADM

## 2022-03-24 RX ORDER — DIPHENHYDRAMINE HCL 25 MG
25 TABLET ORAL EVERY 6 HOURS PRN
Status: DISCONTINUED | OUTPATIENT
Start: 2022-03-24 | End: 2022-03-29 | Stop reason: HOSPADM

## 2022-03-24 RX ORDER — SIMETHICONE 125 MG
125 TABLET,CHEWABLE ORAL 3 TIMES DAILY PRN
Qty: 120 TABLET | Refills: 0 | Status: ON HOLD | OUTPATIENT
Start: 2022-03-24 | End: 2022-04-04

## 2022-03-24 RX ORDER — HYDROXYCHLOROQUINE SULFATE 200 MG/1
400 TABLET, FILM COATED ORAL DAILY
Status: DISCONTINUED | OUTPATIENT
Start: 2022-03-25 | End: 2022-03-29 | Stop reason: HOSPADM

## 2022-03-24 RX ORDER — ACETAMINOPHEN 325 MG/1
650 TABLET ORAL EVERY 4 HOURS PRN
Status: DISCONTINUED | OUTPATIENT
Start: 2022-03-24 | End: 2022-03-29 | Stop reason: HOSPADM

## 2022-03-24 RX ORDER — BACLOFEN 10 MG/1
10 TABLET ORAL 3 TIMES DAILY
Status: DISCONTINUED | OUTPATIENT
Start: 2022-03-24 | End: 2022-03-29 | Stop reason: HOSPADM

## 2022-03-24 RX ORDER — NYSTATIN 100000 [USP'U]/G
POWDER TOPICAL 2 TIMES DAILY
Status: CANCELLED | OUTPATIENT
Start: 2022-03-24 | End: 2022-03-28

## 2022-03-24 RX ORDER — AMLODIPINE BESYLATE 5 MG/1
5 TABLET ORAL EVERY EVENING
Status: CANCELLED | OUTPATIENT
Start: 2022-03-24

## 2022-03-24 RX ORDER — HYDROMORPHONE HYDROCHLORIDE 2 MG/1
2 TABLET ORAL EVERY 4 HOURS
Status: CANCELLED | OUTPATIENT
Start: 2022-03-24

## 2022-03-24 RX ORDER — PROPRANOLOL HYDROCHLORIDE 20 MG/1
20 TABLET ORAL DAILY
Status: CANCELLED | OUTPATIENT
Start: 2022-03-25

## 2022-03-24 RX ORDER — ONDANSETRON 4 MG/1
4 TABLET, ORALLY DISINTEGRATING ORAL EVERY 6 HOURS
Qty: 10 TABLET | Refills: 0 | Status: ON HOLD | OUTPATIENT
Start: 2022-03-24 | End: 2022-03-28

## 2022-03-24 RX ORDER — LIDOCAINE AND PRILOCAINE 25; 25 MG/G; MG/G
CREAM TOPICAL
Qty: 30 G | Refills: 0 | Status: ON HOLD | OUTPATIENT
Start: 2022-03-24 | End: 2022-03-28

## 2022-03-24 RX ORDER — SIMETHICONE 125 MG
125 TABLET,CHEWABLE ORAL 3 TIMES DAILY PRN
Status: DISCONTINUED | OUTPATIENT
Start: 2022-03-24 | End: 2022-03-29 | Stop reason: HOSPADM

## 2022-03-24 RX ORDER — POLYVINYL ALCOHOL 14 MG/ML
1 SOLUTION/ DROPS OPHTHALMIC PRN
Status: DISCONTINUED | OUTPATIENT
Start: 2022-03-24 | End: 2022-03-29 | Stop reason: HOSPADM

## 2022-03-24 RX ORDER — IBUPROFEN 400 MG/1
600 TABLET ORAL EVERY 6 HOURS PRN
Status: CANCELLED | OUTPATIENT
Start: 2022-03-24

## 2022-03-24 RX ORDER — GUAIFENESIN 600 MG/1
600 TABLET, EXTENDED RELEASE ORAL EVERY 12 HOURS
Status: DISCONTINUED | OUTPATIENT
Start: 2022-03-24 | End: 2022-03-29 | Stop reason: HOSPADM

## 2022-03-24 RX ORDER — PAROXETINE HYDROCHLORIDE 20 MG/1
60 TABLET, FILM COATED ORAL DAILY
Status: CANCELLED | OUTPATIENT
Start: 2022-03-24

## 2022-03-24 RX ORDER — NYSTATIN 100000 [USP'U]/G
POWDER TOPICAL 2 TIMES DAILY
Status: DISCONTINUED | OUTPATIENT
Start: 2022-03-24 | End: 2022-03-28 | Stop reason: HOSPADM

## 2022-03-24 RX ORDER — ROPINIROLE 1 MG/1
1 TABLET, FILM COATED ORAL 2 TIMES DAILY
Status: CANCELLED | OUTPATIENT
Start: 2022-03-24

## 2022-03-24 RX ORDER — CALCIUM CARBONATE 500 MG/1
500 TABLET, CHEWABLE ORAL 3 TIMES DAILY
Status: DISCONTINUED | OUTPATIENT
Start: 2022-03-24 | End: 2022-03-29 | Stop reason: HOSPADM

## 2022-03-24 RX ORDER — ROPINIROLE 1 MG/1
1 TABLET, FILM COATED ORAL 2 TIMES DAILY
Status: DISCONTINUED | OUTPATIENT
Start: 2022-03-24 | End: 2022-03-29 | Stop reason: HOSPADM

## 2022-03-24 RX ORDER — HYDRALAZINE HYDROCHLORIDE 10 MG/1
10 TABLET, FILM COATED ORAL EVERY 8 HOURS PRN
Status: DISCONTINUED | OUTPATIENT
Start: 2022-03-24 | End: 2022-03-29 | Stop reason: HOSPADM

## 2022-03-24 RX ORDER — SIMETHICONE 125 MG
125 TABLET,CHEWABLE ORAL 3 TIMES DAILY PRN
Status: CANCELLED | OUTPATIENT
Start: 2022-03-24

## 2022-03-24 RX ORDER — ONDANSETRON 2 MG/ML
4 INJECTION INTRAMUSCULAR; INTRAVENOUS 4 TIMES DAILY PRN
Status: DISCONTINUED | OUTPATIENT
Start: 2022-03-24 | End: 2022-03-29 | Stop reason: HOSPADM

## 2022-03-24 RX ORDER — SCOLOPAMINE TRANSDERMAL SYSTEM 1 MG/1
1 PATCH, EXTENDED RELEASE TRANSDERMAL
Status: CANCELLED | OUTPATIENT
Start: 2022-03-25

## 2022-03-24 RX ORDER — MIRTAZAPINE 15 MG/1
22.5 TABLET, FILM COATED ORAL
Status: CANCELLED | OUTPATIENT
Start: 2022-03-24

## 2022-03-24 RX ORDER — ALUMINA, MAGNESIA, AND SIMETHICONE 2400; 2400; 240 MG/30ML; MG/30ML; MG/30ML
20 SUSPENSION ORAL
Status: DISCONTINUED | OUTPATIENT
Start: 2022-03-24 | End: 2022-03-29 | Stop reason: HOSPADM

## 2022-03-24 RX ORDER — ECHINACEA PURPUREA EXTRACT 125 MG
2 TABLET ORAL PRN
Status: DISCONTINUED | OUTPATIENT
Start: 2022-03-24 | End: 2022-03-29 | Stop reason: HOSPADM

## 2022-03-24 RX ORDER — HYDROMORPHONE HYDROCHLORIDE 2 MG/1
2 TABLET ORAL EVERY 4 HOURS PRN
Qty: 42 TABLET | Refills: 0 | Status: ON HOLD
Start: 2022-03-24 | End: 2022-03-28

## 2022-03-24 RX ORDER — SCOLOPAMINE TRANSDERMAL SYSTEM 1 MG/1
1 PATCH, EXTENDED RELEASE TRANSDERMAL
Qty: 4 PATCH | Refills: 3 | Status: SHIPPED | OUTPATIENT
Start: 2022-03-25

## 2022-03-24 RX ORDER — PROCHLORPERAZINE MALEATE 10 MG
10 TABLET ORAL EVERY 6 HOURS PRN
Status: DISCONTINUED | OUTPATIENT
Start: 2022-03-24 | End: 2022-03-29 | Stop reason: HOSPADM

## 2022-03-24 RX ORDER — GUAIFENESIN 600 MG/1
600 TABLET, EXTENDED RELEASE ORAL EVERY 12 HOURS
Qty: 28 TABLET | Refills: 0 | Status: SHIPPED | OUTPATIENT
Start: 2022-03-24

## 2022-03-24 RX ORDER — BACLOFEN 10 MG/1
10 TABLET ORAL 3 TIMES DAILY
Status: CANCELLED | OUTPATIENT
Start: 2022-03-24

## 2022-03-24 RX ORDER — HYDROMORPHONE HYDROCHLORIDE 2 MG/1
2 TABLET ORAL EVERY 4 HOURS
Status: DISCONTINUED | OUTPATIENT
Start: 2022-03-24 | End: 2022-03-27

## 2022-03-24 RX ORDER — LANOLIN ALCOHOL/MO/W.PET/CERES
3 CREAM (GRAM) TOPICAL NIGHTLY PRN
Status: DISCONTINUED | OUTPATIENT
Start: 2022-03-24 | End: 2022-03-29 | Stop reason: HOSPADM

## 2022-03-24 RX ORDER — OMEPRAZOLE 20 MG/1
20 CAPSULE, DELAYED RELEASE ORAL DAILY
Status: CANCELLED | OUTPATIENT
Start: 2022-03-25

## 2022-03-24 RX ORDER — PROPRANOLOL HYDROCHLORIDE 10 MG/1
20 TABLET ORAL DAILY
Status: DISCONTINUED | OUTPATIENT
Start: 2022-03-25 | End: 2022-03-29 | Stop reason: HOSPADM

## 2022-03-24 RX ORDER — DIPHENHYDRAMINE HCL 25 MG
25 TABLET ORAL EVERY 6 HOURS PRN
Status: CANCELLED | OUTPATIENT
Start: 2022-03-24

## 2022-03-24 RX ORDER — AMLODIPINE BESYLATE 5 MG/1
5 TABLET ORAL EVERY EVENING
Status: DISCONTINUED | OUTPATIENT
Start: 2022-03-24 | End: 2022-03-27

## 2022-03-24 RX ORDER — LACTULOSE 20 G/30ML
30 SOLUTION ORAL
Status: DISCONTINUED | OUTPATIENT
Start: 2022-03-24 | End: 2022-03-29 | Stop reason: HOSPADM

## 2022-03-24 RX ORDER — CALCIUM CARBONATE 500 MG/1
500 TABLET, CHEWABLE ORAL 3 TIMES DAILY
Status: CANCELLED | OUTPATIENT
Start: 2022-03-24

## 2022-03-24 RX ORDER — HYDROXYZINE HYDROCHLORIDE 25 MG/1
50 TABLET, FILM COATED ORAL EVERY 6 HOURS PRN
Status: DISCONTINUED | OUTPATIENT
Start: 2022-03-24 | End: 2022-03-29 | Stop reason: HOSPADM

## 2022-03-24 RX ORDER — ONDANSETRON 4 MG/1
4 TABLET, ORALLY DISINTEGRATING ORAL 4 TIMES DAILY PRN
Status: DISCONTINUED | OUTPATIENT
Start: 2022-03-24 | End: 2022-03-29 | Stop reason: HOSPADM

## 2022-03-24 RX ORDER — PAROXETINE HYDROCHLORIDE 20 MG/1
60 TABLET, FILM COATED ORAL DAILY
Status: DISCONTINUED | OUTPATIENT
Start: 2022-03-24 | End: 2022-03-29 | Stop reason: HOSPADM

## 2022-03-24 RX ORDER — IBUPROFEN 600 MG/1
600 TABLET ORAL EVERY 6 HOURS PRN
Qty: 30 TABLET | Refills: 0 | Status: ON HOLD | OUTPATIENT
Start: 2022-03-24 | End: 2022-04-04

## 2022-03-24 RX ORDER — CALCIUM CARBONATE 500 MG/1
500 TABLET, CHEWABLE ORAL 3 TIMES DAILY
Qty: 30 TABLET | Refills: 0 | Status: ON HOLD | OUTPATIENT
Start: 2022-03-24 | End: 2022-04-04

## 2022-03-24 RX ADMIN — PAROXETINE HYDROCHLORIDE 60 MG: 20 TABLET, FILM COATED ORAL at 20:44

## 2022-03-24 RX ADMIN — PROCHLORPERAZINE EDISYLATE 10 MG: 5 INJECTION INTRAMUSCULAR; INTRAVENOUS at 13:07

## 2022-03-24 RX ADMIN — OMEPRAZOLE 20 MG: 20 CAPSULE, DELAYED RELEASE ORAL at 05:28

## 2022-03-24 RX ADMIN — PROCHLORPERAZINE MALEATE 10 MG: 10 TABLET ORAL at 20:43

## 2022-03-24 RX ADMIN — NYSTATIN: 100000 POWDER TOPICAL at 05:29

## 2022-03-24 RX ADMIN — IBUPROFEN 600 MG: 400 TABLET, FILM COATED ORAL at 16:56

## 2022-03-24 RX ADMIN — RIVAROXABAN 15 MG: 15 TABLET, FILM COATED ORAL at 08:58

## 2022-03-24 RX ADMIN — MIRTAZAPINE 22.5 MG: 15 TABLET, ORALLY DISINTEGRATING ORAL at 20:43

## 2022-03-24 RX ADMIN — CALCIUM CARBONATE 500 MG: 500 TABLET, CHEWABLE ORAL at 05:29

## 2022-03-24 RX ADMIN — ROPINIROLE HYDROCHLORIDE 1 MG: 1 TABLET, FILM COATED ORAL at 21:00

## 2022-03-24 RX ADMIN — ROPINIROLE HYDROCHLORIDE 1 MG: 1 TABLET, FILM COATED ORAL at 05:28

## 2022-03-24 RX ADMIN — HYDROMORPHONE HYDROCHLORIDE 2 MG: 2 TABLET ORAL at 08:58

## 2022-03-24 RX ADMIN — HYDROMORPHONE HYDROCHLORIDE 2 MG: 2 TABLET ORAL at 22:09

## 2022-03-24 RX ADMIN — HYDROMORPHONE HYDROCHLORIDE 2 MG: 2 TABLET ORAL at 19:27

## 2022-03-24 RX ADMIN — AMLODIPINE BESYLATE 5 MG: 5 TABLET ORAL at 20:45

## 2022-03-24 RX ADMIN — PROPRANOLOL HYDROCHLORIDE 20 MG: 20 TABLET ORAL at 05:28

## 2022-03-24 RX ADMIN — GUAIFENESIN 600 MG: 600 TABLET, EXTENDED RELEASE ORAL at 05:28

## 2022-03-24 RX ADMIN — HYDROXYCHLOROQUINE SULFATE 400 MG: 200 TABLET ORAL at 08:57

## 2022-03-24 RX ADMIN — BACLOFEN 10 MG: 10 TABLET ORAL at 05:29

## 2022-03-24 RX ADMIN — HYDROMORPHONE HYDROCHLORIDE 2 MG: 2 TABLET ORAL at 05:29

## 2022-03-24 RX ADMIN — RIVAROXABAN 15 MG: 15 TABLET, FILM COATED ORAL at 16:56

## 2022-03-24 RX ADMIN — CALCIUM CARBONATE 500 MG: 500 TABLET, CHEWABLE ORAL at 19:27

## 2022-03-24 RX ADMIN — NYSTATIN: 100000 POWDER TOPICAL at 20:46

## 2022-03-24 RX ADMIN — HYDROMORPHONE HYDROCHLORIDE 2 MG: 2 TABLET ORAL at 13:07

## 2022-03-24 RX ADMIN — CALCIUM CARBONATE 500 MG: 500 TABLET, CHEWABLE ORAL at 13:07

## 2022-03-24 RX ADMIN — BACLOFEN 10 MG: 10 TABLET ORAL at 13:07

## 2022-03-24 RX ADMIN — GUAIFENESIN 600 MG: 600 TABLET, EXTENDED RELEASE ORAL at 20:44

## 2022-03-24 RX ADMIN — BACLOFEN 10 MG: 10 TABLET ORAL at 19:27

## 2022-03-24 RX ADMIN — ONDANSETRON 4 MG: 4 TABLET, ORALLY DISINTEGRATING ORAL at 05:28

## 2022-03-24 ASSESSMENT — LIFESTYLE VARIABLES
EVER FELT BAD OR GUILTY ABOUT YOUR DRINKING: NO
HAVE YOU EVER FELT YOU SHOULD CUT DOWN ON YOUR DRINKING: NO
HAVE PEOPLE ANNOYED YOU BY CRITICIZING YOUR DRINKING: NO
CONSUMPTION TOTAL: NEGATIVE
EVER HAD A DRINK FIRST THING IN THE MORNING TO STEADY YOUR NERVES TO GET RID OF A HANGOVER: NO
ON A TYPICAL DAY WHEN YOU DRINK ALCOHOL HOW MANY DRINKS DO YOU HAVE: 1
TOTAL SCORE: 0
HOW MANY TIMES IN THE PAST YEAR HAVE YOU HAD 5 OR MORE DRINKS IN A DAY: 0
TOTAL SCORE: 0
TOTAL SCORE: 0
ALCOHOL_USE: YES
AVERAGE NUMBER OF DAYS PER WEEK YOU HAVE A DRINK CONTAINING ALCOHOL: 0

## 2022-03-24 ASSESSMENT — PAIN DESCRIPTION - PAIN TYPE
TYPE: ACUTE PAIN
TYPE: ACUTE PAIN
TYPE: ACUTE PAIN;CHRONIC PAIN
TYPE: ACUTE PAIN;CHRONIC PAIN
TYPE: ACUTE PAIN
TYPE: ACUTE PAIN

## 2022-03-24 ASSESSMENT — PATIENT HEALTH QUESTIONNAIRE - PHQ9
1. LITTLE INTEREST OR PLEASURE IN DOING THINGS: NOT AT ALL
2. FEELING DOWN, DEPRESSED, IRRITABLE, OR HOPELESS: NOT AT ALL
SUM OF ALL RESPONSES TO PHQ9 QUESTIONS 1 AND 2: 0

## 2022-03-24 ASSESSMENT — COPD QUESTIONNAIRES
HAVE YOU SMOKED AT LEAST 100 CIGARETTES IN YOUR ENTIRE LIFE: NO/DON'T KNOW
DURING THE PAST 4 WEEKS HOW MUCH DID YOU FEEL SHORT OF BREATH: NONE/LITTLE OF THE TIME
COPD SCREENING SCORE: 1
DO YOU EVER COUGH UP ANY MUCUS OR PHLEGM?: NO/ONLY WITH OCCASIONAL COLDS OR INFECTIONS

## 2022-03-24 NOTE — CARE PLAN
Problem: Nutritional:  Goal: Achieve adequate nutritional intake  Description: Patient will tolerate regular diet and consume 50% of meals  Outcome: Not Met     Continues to have nausea and vomiting, although vomiting is sometimes just mucus and not always right after eating.  Eating only bites here and there per patient, but she is trying to eat more frequently.  Need current weight.

## 2022-03-24 NOTE — CARE PLAN
The patient is Watcher - Medium risk of patient condition declining or worsening    Shift Goals  Clinical Goals: Pain control, Discharge plan  Patient Goals: Discharge plan  Family Goals: NA    Progress made toward(s) clinical / shift goals:    Problem: Pain - Standard  Goal: Alleviation of pain or a reduction in pain to the patient’s comfort goal  Outcome: Progressing     Problem: Knowledge Deficit - Standard  Goal: Patient and family/care givers will demonstrate understanding of plan of care, disease process/condition, diagnostic tests and medications  Outcome: Progressing     Problem: Psychosocial  Goal: Patient's level of anxiety will decrease  Outcome: Progressing  Goal: Patient's ability to verbalize feelings about condition will improve  Outcome: Progressing  Goal: Patient's ability to re-evaluate and adapt role responsibilities will improve  Outcome: Progressing     Problem: Mobility  Goal: Patient's capacity to carry out activities will improve  Outcome: Progressing     Problem: Self Care  Goal: Patient will have the ability to perform ADLs independently or with assistance (bathe, groom, dress, toilet and feed)  Outcome: Progressing     Problem: Infection - Standard  Goal: Patient will remain free from infection  Outcome: Progressing     Problem: Urinary Elimination  Goal: Establish and maintain regular urinary output  Outcome: Progressing       Patient is not progressing towards the following goals:

## 2022-03-24 NOTE — CARE PLAN
The patient is Watcher - Medium risk of patient condition declining or worsening    Shift Goals  Clinical Goals: Control pain and nausea, maintain safety  Patient Goals: Control pain, rest  Family Goals: NA    Progress made toward(s) clinical / shift goals:  Pt's pain and nausea controlled with current scheduled meds.       Problem: Pain - Standard  Goal: Alleviation of pain or a reduction in pain to the patient’s comfort goal  Outcome: Progressing     Problem: Knowledge Deficit - Standard  Goal: Patient and family/care givers will demonstrate understanding of plan of care, disease process/condition, diagnostic tests and medications  Outcome: Progressing     Problem: Psychosocial  Goal: Patient's level of anxiety will decrease  Outcome: Progressing  Goal: Patient's ability to verbalize feelings about condition will improve  Outcome: Progressing  Goal: Patient's ability to re-evaluate and adapt role responsibilities will improve  Outcome: Progressing  Goal: Patient and family will demonstrate ability to cope with life altering diagnosis and/or procedure  Outcome: Progressing  Goal: Spiritual and cultural needs incorporated into hospitalization  Outcome: Progressing     Problem: Mobility  Goal: Patient's capacity to carry out activities will improve  Outcome: Progressing     Problem: Self Care  Goal: Patient will have the ability to perform ADLs independently or with assistance (bathe, groom, dress, toilet and feed)  Outcome: Progressing     Problem: Infection - Standard  Goal: Patient will remain free from infection  Outcome: Progressing     Problem: Bowel Elimination  Goal: Establish and maintain regular bowel function  Outcome: Progressing     Problem: Urinary Elimination  Goal: Establish and maintain regular urinary output  Outcome: Progressing     Problem: Fall Risk  Goal: Patient will remain free from falls  Outcome: Progressing     Problem: Skin Integrity  Goal: Skin integrity is maintained or  improved  Outcome: Progressing       Patient is not progressing towards the following goals:

## 2022-03-24 NOTE — DISCHARGE INSTRUCTIONS
Rivaroxaban oral tablets  What is this medicine?  RIVAROXABAN (ri va AL a ban) is an anticoagulant (blood thinner). It is used to treat blood clots in the lungs or in the veins. It is also used to prevent blood clots in the lungs or in the veins. It is also used to lower the chance of stroke in people with a medical condition called atrial fibrillation.  This medicine may be used for other purposes; ask your health care provider or pharmacist if you have questions.  COMMON BRAND NAME(S): Xarelto, Xarelto Starter Pack  What should I tell my health care provider before I take this medicine?  They need to know if you have any of these conditions:  · antiphospholipid antibody syndrome  · artificial heart valve  · bleeding disorders  · bleeding in the brain  · blood in your stools (black or tarry stools) or if you have blood in your vomit  · history of blood clots  · history of stomach bleeding  · kidney disease  · liver disease  · low blood counts, like low white cell, platelet, or red cell counts  · recent or planned spinal or epidural procedure  · take medicines that treat or prevent blood clots  · an unusual or allergic reaction to rivaroxaban, other medicines, foods, dyes, or preservatives  · pregnant or trying to get pregnant  · breast-feeding  How should I use this medicine?  Take this medicine by mouth with a glass of water. Follow the directions on the prescription label. Take your medicine at regular intervals. Do not take it more often than directed. Do not stop taking except on your doctor's advice. Stopping this medicine may increase your risk of a blood clot. Be sure to refill your prescription before you run out of medicine.  If you are taking this medicine after hip or knee replacement surgery, take it with or without food. If you are taking this medicine for atrial fibrillation, take it with your evening meal. If you are taking this medicine to treat blood clots, take it with food at the same time  each day. If you are unable to swallow your tablet, you may crush the tablet and mix it in applesauce. Then, immediately eat the applesauce. You should eat more food right after you eat the applesauce containing the crushed tablet.  Talk to your pediatrician regarding the use of this medicine in children. Special care may be needed.  Overdosage: If you think you have taken too much of this medicine contact a poison control center or emergency room at once.  NOTE: This medicine is only for you. Do not share this medicine with others.  What if I miss a dose?  If you take your medicine once a day and miss a dose, take the missed dose as soon as you remember. If it is almost time for your next dose, take only that dose. Do not take double or extra doses.  If you take your medicine twice a day and miss a dose, take the missed dose immediately. In this instance, 2 tablets may be taken at the same time. The next day you should take 1 tablet twice a day as directed.  What may interact with this medicine?  Do not take this medicine with any of the following medications:  · defibrotide  This medicine may also interact with the following medications:  · aspirin and aspirin-like medicines  · certain antibiotics like erythromycin, azithromycin, and clarithromycin  · certain medicines for fungal infections like ketoconazole and itraconazole  · certain medicines for irregular heart beat like amiodarone, quinidine, dronedarone  · certain medicines for seizures like carbamazepine, phenytoin  · certain medicines that treat or prevent blood clots like warfarin, enoxaparin, and dalteparin  · conivaptan  · felodipine  · indinavir  · lopinavir; ritonavir  · NSAIDS, medicines for pain and inflammation, like ibuprofen or naproxen  · ranolazine  · rifampin  · ritonavir  · SNRIs, medicines for depression, like desvenlafaxine, duloxetine, levomilnacipran, venlafaxine  · SSRIs, medicines for depression, like citalopram, escitalopram,  fluoxetine, fluvoxamine, paroxetine, sertraline  · Marisol's wort  · verapamil  This list may not describe all possible interactions. Give your health care provider a list of all the medicines, herbs, non-prescription drugs, or dietary supplements you use. Also tell them if you smoke, drink alcohol, or use illegal drugs. Some items may interact with your medicine.  What should I watch for while using this medicine?  Visit your healthcare professional for regular checks on your progress. You may need blood work done while you are taking this medicine. Your condition will be monitored carefully while you are receiving this medicine. It is important not to miss any appointments.  Avoid sports and activities that might cause injury while you are using this medicine. Severe falls or injuries can cause unseen bleeding. Be careful when using sharp tools or knives. Consider using an electric razor. Take special care brushing or flossing your teeth. Report any injuries, bruising, or red spots on the skin to your healthcare professional.  If you are going to need surgery or other procedure, tell your healthcare professional that you are taking this medicine.  Wear a medical ID bracelet or chain. Carry a card that describes your disease and details of your medicine and dosage times.  What side effects may I notice from receiving this medicine?  Side effects that you should report to your doctor or health care professional as soon as possible:  · allergic reactions like skin rash, itching or hives, swelling of the face, lips, or tongue  · back pain  · redness, blistering, peeling or loosening of the skin, including inside the mouth  · signs and symptoms of bleeding such as bloody or black, tarry stools; red or dark-brown urine; spitting up blood or brown material that looks like coffee grounds; red spots on the skin; unusual bruising or bleeding from the eye, gums, or nose  · signs and symptoms of a blood clot such as chest  pain; shortness of breath; pain, swelling, or warmth in the leg  · signs and symptoms of a stroke such as changes in vision; confusion; trouble speaking or understanding; severe headaches; sudden numbness or weakness of the face, arm or leg; trouble walking; dizziness; loss of coordination  Side effects that usually do not require medical attention (report to your doctor or health care professional if they continue or are bothersome):  · dizziness  · muscle pain  This list may not describe all possible side effects. Call your doctor for medical advice about side effects. You may report side effects to FDA at 3-665-BMJ-6669.  Where should I keep my medicine?  Keep out of the reach of children.  Store at room temperature between 15 and 30 degrees C (59 and 86 degrees F). Throw away any unused medicine after the expiration date.  NOTE: This sheet is a summary. It may not cover all possible information. If you have questions about this medicine, talk to your doctor, pharmacist, or health care provider.  © 2020 [employer]/Gold Standard (2020-03-16 09:45:59)  Discharge Instructions    Discharged to other by [facility] with escort. Discharged via wheelchair, hospital escort: Yes.  Special equipment needed: Walker    Be sure to schedule a follow-up appointment with your primary care doctor or any specialists as instructed.     Discharge Plan:   Diet Plan: Discussed  Activity Level: Discussed  Confirmed Follow up Appointment: No (Comments)  Confirmed Symptoms Management: Discussed  Medication Reconciliation Updated: Yes  Influenza Vaccine Indication: Not indicated: Previously immunized this influenza season and > 8 years of age    I understand that a diet low in cholesterol, fat, and sodium is recommended for good health. Unless I have been given specific instructions below for another diet, I accept this instruction as my diet prescription.   Other diet: Regular    · Is patient discharged on Warfarin / Coumadin?   No      Depression / Suicide Risk    As you are discharged from this [facility] facility, it is important to learn how to keep safe from harming yourself.    Recognize the warning signs:  · Abrupt changes in personality, positive or negative- including increase in energy   · Giving away possessions  · Change in eating patterns- significant weight changes-  positive or negative  · Change in sleeping patterns- unable to sleep or sleeping all the time   · Unwillingness or inability to communicate  · Depression  · Unusual sadness, discouragement and loneliness  · Talk of wanting to die  · Neglect of personal appearance   · Rebelliousness- reckless behavior  · Withdrawal from people/activities they love  · Confusion- inability to concentrate     If you or a loved one observes any of these behaviors or has concerns about self-harm, here's what you can do:  · Talk about it- your feelings and reasons for harming yourself  · Remove any means that you might use to hurt yourself (examples: pills, rope, extension cords, firearm)  · Get professional help from the community (Mental Health, Substance Abuse, psychological counseling)  · Do not be alone:Call your Safe Contact- someone whom you trust who will be there for you.  · Call your local CRISIS HOTLINE 070-0892 or 087-867-6018  · Call your local Children's Mobile Crisis Response Team [place] (357) 881-7195 or www.[employer]  · Call the toll free National Suicide Prevention Hotlines   · National Suicide Prevention Lifeline 529-318-MDPM (6188)  · National Hope Line Network 800-SUICIDE (811-8436)

## 2022-03-24 NOTE — DISCHARGE PLANNING
[facility] Transitional Care Coordination    Telephone call to patient to discuss pending IRF admission.  Explained specifics of inpatient rehab, answered questions/concerns.  Suzan enthusiastically agreeable to rehab admission.  Reviewed visitor policy [facility] - 2 designated individuals allowed to visit daily, 9a-5p, pending confirmation of negative COVID status on arrival RR.  Test may require up to 48hrs for result.  Suzan voiced understanding, requested I speak with spouse Gosia.  Call out to oGsia Talbot.  No answer.  Left voice message request for return call to [facility].      Will follow to assist as needed with transition to [facility].

## 2022-03-24 NOTE — DISCHARGE PLANNING
[facility] Transitional Care Coordination    Insurance has authorized inpatient rehab.  Voalte message to JONATHON Roldan for status of medical clearance for IRF.  Following for update.
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
## 2022-03-24 NOTE — PROGRESS NOTES
Pt dc'd to [facility].Personal belongings with pt when leaving unit. Pt given discharge instructions prior to leaving unit; verbalizes understanding. Copy of discharge instructions with pt and in the chart.

## 2022-03-24 NOTE — FLOWSHEET NOTE
03/24/22 1613   Events/Summary/Plan   Events/Summary/Plan RT Assessment pt on RA stable no respiratory issuesl   Vital Signs   Pulse 74   Respiration 18   Pulse Oximetry 95 %   $ Pulse Oximetry (Spot Check) Yes   Respiratory Assessment   Respiratory Pattern Within Normal Limits   Level of Consciousness Alert   Chest Exam   Work Of Breathing / Effort Within Normal Limits   Breath Sounds   RUL Breath Sounds Clear   RML Breath Sounds Clear   RLL Breath Sounds Clear   MORENO Breath Sounds Clear   LLL Breath Sounds Clear   Secretions   Cough Non Productive   Oxygen   O2 (LPM) 0   O2 Delivery Device None - Room Air

## 2022-03-24 NOTE — DISCHARGE PLANNING
1413: COBRA completed without the MD signature or verbal.  MD refused to sign or provide a verbal.  COBRA given to the nurse manager.     Anticipated Discharge Disposition: inpatient rehab at [facility]    Action: JOSE D was informed the patient has been approved for inpatient rehab.  JOSE D sent a voalte message to the attending inquiring if the patient is cleared medically.  Per the attending, the patient is medically cleared.  CM requested d/c orders.  CM informed the patients wife Gosia.       Plan: JOSE D is waiting on the information for the COBRA and time of transit.

## 2022-03-25 ENCOUNTER — APPOINTMENT (OUTPATIENT)
Dept: RADIOLOGY | Facility: REHABILITATION | Age: 57
DRG: 948 | End: 2022-03-25
Attending: HOSPITALIST
Payer: COMMERCIAL

## 2022-03-25 PROBLEM — D64.9 ANEMIA: Status: ACTIVE | Noted: 2021-12-22

## 2022-03-25 PROBLEM — E87.6 LOW BLOOD POTASSIUM: Status: ACTIVE | Noted: 2022-03-25

## 2022-03-25 PROBLEM — R14.0 DISTENDED ABDOMEN: Status: ACTIVE | Noted: 2022-03-25

## 2022-03-25 PROBLEM — E87.1 HYPONATREMIA: Status: ACTIVE | Noted: 2022-03-25

## 2022-03-25 PROBLEM — R60.0 EDEMA, LOWER EXTREMITY: Status: ACTIVE | Noted: 2022-03-25

## 2022-03-25 PROBLEM — N17.9 ACUTE KIDNEY INJURY (HCC): Status: ACTIVE | Noted: 2022-03-25

## 2022-03-25 PROBLEM — E55.9 VITAMIN D DEFICIENCY: Status: ACTIVE | Noted: 2022-03-25

## 2022-03-25 PROBLEM — J30.9 ALLERGIC RHINITIS: Status: ACTIVE | Noted: 2022-03-25

## 2022-03-25 LAB
25(OH)D3 SERPL-MCNC: 26 NG/ML (ref 30–100)
ALBUMIN SERPL BCP-MCNC: 2.6 G/DL (ref 3.2–4.9)
ALBUMIN/GLOB SERPL: 1 G/DL
ALP SERPL-CCNC: 111 U/L (ref 30–99)
ALT SERPL-CCNC: <5 U/L (ref 2–50)
ANION GAP SERPL CALC-SCNC: 13 MMOL/L (ref 7–16)
AST SERPL-CCNC: 22 U/L (ref 12–45)
BASOPHILS # BLD AUTO: 0.3 % (ref 0–1.8)
BASOPHILS # BLD: 0.03 K/UL (ref 0–0.12)
BILIRUB SERPL-MCNC: 0.2 MG/DL (ref 0.1–1.5)
BUN SERPL-MCNC: 11 MG/DL (ref 8–22)
CALCIUM SERPL-MCNC: 8.8 MG/DL (ref 8.5–10.5)
CHLORIDE SERPL-SCNC: 100 MMOL/L (ref 96–112)
CO2 SERPL-SCNC: 21 MMOL/L (ref 20–33)
CREAT SERPL-MCNC: 1.59 MG/DL (ref 0.5–1.4)
EOSINOPHIL # BLD AUTO: 0.01 K/UL (ref 0–0.51)
EOSINOPHIL NFR BLD: 0.1 % (ref 0–6.9)
ERYTHROCYTE [DISTWIDTH] IN BLOOD BY AUTOMATED COUNT: 55.5 FL (ref 35.9–50)
FLUAV RNA SPEC QL NAA+PROBE: NEGATIVE
FLUBV RNA SPEC QL NAA+PROBE: NEGATIVE
GFR SERPLBLD CREATININE-BSD FMLA CKD-EPI: 38 ML/MIN/1.73 M 2
GLOBULIN SER CALC-MCNC: 2.7 G/DL (ref 1.9–3.5)
GLUCOSE SERPL-MCNC: 81 MG/DL (ref 65–99)
HCT VFR BLD AUTO: 25.3 % (ref 37–47)
HGB BLD-MCNC: 8 G/DL (ref 12–16)
IMM GRANULOCYTES # BLD AUTO: 0.32 K/UL (ref 0–0.11)
IMM GRANULOCYTES NFR BLD AUTO: 3.4 % (ref 0–0.9)
LYMPHOCYTES # BLD AUTO: 1.1 K/UL (ref 1–4.8)
LYMPHOCYTES NFR BLD: 11.6 % (ref 22–41)
MAGNESIUM SERPL-MCNC: 1.7 MG/DL (ref 1.5–2.5)
MCH RBC QN AUTO: 28.2 PG (ref 27–33)
MCHC RBC AUTO-ENTMCNC: 31.6 G/DL (ref 33.6–35)
MCV RBC AUTO: 89.1 FL (ref 81.4–97.8)
MONOCYTES # BLD AUTO: 0.72 K/UL (ref 0–0.85)
MONOCYTES NFR BLD AUTO: 7.6 % (ref 0–13.4)
NEUTROPHILS # BLD AUTO: 7.3 K/UL (ref 2–7.15)
NEUTROPHILS NFR BLD: 77 % (ref 44–72)
NRBC # BLD AUTO: 0 K/UL
NRBC BLD-RTO: 0 /100 WBC
PLATELET # BLD AUTO: 381 K/UL (ref 164–446)
PMV BLD AUTO: 9.5 FL (ref 9–12.9)
POTASSIUM SERPL-SCNC: 3.6 MMOL/L (ref 3.6–5.5)
PROT SERPL-MCNC: 5.3 G/DL (ref 6–8.2)
RBC # BLD AUTO: 2.84 M/UL (ref 4.2–5.4)
SARS-COV-2 RNA RESP QL NAA+PROBE: NOTDETECTED
SODIUM SERPL-SCNC: 134 MMOL/L (ref 135–145)
SPECIMEN SOURCE: NORMAL
WBC # BLD AUTO: 9.5 K/UL (ref 4.8–10.8)

## 2022-03-25 PROCEDURE — 97166 OT EVAL MOD COMPLEX 45 MIN: CPT

## 2022-03-25 PROCEDURE — 85025 COMPLETE CBC W/AUTO DIFF WBC: CPT

## 2022-03-25 PROCEDURE — 700102 HCHG RX REV CODE 250 W/ 637 OVERRIDE(OP): Performed by: HOSPITALIST

## 2022-03-25 PROCEDURE — 99223 1ST HOSP IP/OBS HIGH 75: CPT | Performed by: PHYSICAL MEDICINE & REHABILITATION

## 2022-03-25 PROCEDURE — 97535 SELF CARE MNGMENT TRAINING: CPT

## 2022-03-25 PROCEDURE — 82306 VITAMIN D 25 HYDROXY: CPT

## 2022-03-25 PROCEDURE — 97162 PT EVAL MOD COMPLEX 30 MIN: CPT

## 2022-03-25 PROCEDURE — 700102 HCHG RX REV CODE 250 W/ 637 OVERRIDE(OP): Performed by: PHYSICAL MEDICINE & REHABILITATION

## 2022-03-25 PROCEDURE — A9270 NON-COVERED ITEM OR SERVICE: HCPCS | Performed by: HOSPITALIST

## 2022-03-25 PROCEDURE — 97110 THERAPEUTIC EXERCISES: CPT

## 2022-03-25 PROCEDURE — 99223 1ST HOSP IP/OBS HIGH 75: CPT | Performed by: HOSPITALIST

## 2022-03-25 PROCEDURE — 97530 THERAPEUTIC ACTIVITIES: CPT

## 2022-03-25 PROCEDURE — 770005 HCHG ROOM/CARE - REHAB PRIVATE (11*

## 2022-03-25 PROCEDURE — 700105 HCHG RX REV CODE 258: Performed by: HOSPITALIST

## 2022-03-25 PROCEDURE — 74018 RADEX ABDOMEN 1 VIEW: CPT

## 2022-03-25 PROCEDURE — 80053 COMPREHEN METABOLIC PANEL: CPT

## 2022-03-25 PROCEDURE — 83735 ASSAY OF MAGNESIUM: CPT

## 2022-03-25 PROCEDURE — 36415 COLL VENOUS BLD VENIPUNCTURE: CPT

## 2022-03-25 PROCEDURE — A9270 NON-COVERED ITEM OR SERVICE: HCPCS | Performed by: PHYSICAL MEDICINE & REHABILITATION

## 2022-03-25 RX ORDER — VITAMIN B COMPLEX
1000 TABLET ORAL DAILY
Status: DISCONTINUED | OUTPATIENT
Start: 2022-03-25 | End: 2022-03-29 | Stop reason: HOSPADM

## 2022-03-25 RX ORDER — SODIUM CHLORIDE 9 MG/ML
INJECTION, SOLUTION INTRAVENOUS CONTINUOUS
Status: DISCONTINUED | OUTPATIENT
Start: 2022-03-25 | End: 2022-03-26

## 2022-03-25 RX ORDER — SIMETHICONE 125 MG
125 TABLET,CHEWABLE ORAL
Status: DISCONTINUED | OUTPATIENT
Start: 2022-03-25 | End: 2022-03-29 | Stop reason: HOSPADM

## 2022-03-25 RX ORDER — AZELASTINE HYDROCHLORIDE 137 UG/1
1 SPRAY, METERED NASAL 3 TIMES DAILY
Status: DISCONTINUED | OUTPATIENT
Start: 2022-03-25 | End: 2022-03-29 | Stop reason: HOSPADM

## 2022-03-25 RX ORDER — OMEPRAZOLE 20 MG/1
20 CAPSULE, DELAYED RELEASE ORAL
Status: DISCONTINUED | OUTPATIENT
Start: 2022-03-26 | End: 2022-03-29 | Stop reason: HOSPADM

## 2022-03-25 RX ORDER — POTASSIUM CHLORIDE 20 MEQ/1
20 TABLET, EXTENDED RELEASE ORAL DAILY
Status: DISCONTINUED | OUTPATIENT
Start: 2022-03-25 | End: 2022-03-29 | Stop reason: HOSPADM

## 2022-03-25 RX ADMIN — RIVAROXABAN 15 MG: 15 TABLET, FILM COATED ORAL at 16:44

## 2022-03-25 RX ADMIN — Medication 1000 UNITS: at 13:56

## 2022-03-25 RX ADMIN — AZELASTINE HYDROCHLORIDE 1 SPRAY: 137 SPRAY, METERED NASAL at 21:24

## 2022-03-25 RX ADMIN — THERA TABS 1 TABLET: TAB at 13:58

## 2022-03-25 RX ADMIN — IBUPROFEN 600 MG: 400 TABLET, FILM COATED ORAL at 16:43

## 2022-03-25 RX ADMIN — HYDROXYCHLOROQUINE SULFATE 400 MG: 200 TABLET ORAL at 09:15

## 2022-03-25 RX ADMIN — HYDROMORPHONE HYDROCHLORIDE 2 MG: 2 TABLET ORAL at 17:46

## 2022-03-25 RX ADMIN — SIMETHICONE 125 MG: 125 TABLET, CHEWABLE ORAL at 16:42

## 2022-03-25 RX ADMIN — BACLOFEN 10 MG: 10 TABLET ORAL at 21:18

## 2022-03-25 RX ADMIN — CALCIUM CARBONATE 500 MG: 500 TABLET, CHEWABLE ORAL at 21:18

## 2022-03-25 RX ADMIN — NYSTATIN: 100000 POWDER TOPICAL at 13:58

## 2022-03-25 RX ADMIN — SCOPALAMINE 1 PATCH: 1 PATCH, EXTENDED RELEASE TRANSDERMAL at 13:59

## 2022-03-25 RX ADMIN — PAROXETINE HYDROCHLORIDE 60 MG: 20 TABLET, FILM COATED ORAL at 21:17

## 2022-03-25 RX ADMIN — GUAIFENESIN 600 MG: 600 TABLET, EXTENDED RELEASE ORAL at 09:15

## 2022-03-25 RX ADMIN — GUAIFENESIN 600 MG: 600 TABLET, EXTENDED RELEASE ORAL at 21:17

## 2022-03-25 RX ADMIN — SIMETHICONE 125 MG: 125 TABLET, CHEWABLE ORAL at 17:46

## 2022-03-25 RX ADMIN — MIRTAZAPINE 22.5 MG: 15 TABLET, ORALLY DISINTEGRATING ORAL at 21:18

## 2022-03-25 RX ADMIN — HYDROMORPHONE HYDROCHLORIDE 2 MG: 2 TABLET ORAL at 09:20

## 2022-03-25 RX ADMIN — AZELASTINE HYDROCHLORIDE 1 SPRAY: 137 SPRAY, METERED NASAL at 16:44

## 2022-03-25 RX ADMIN — SODIUM CHLORIDE: 9 INJECTION, SOLUTION INTRAVENOUS at 21:56

## 2022-03-25 RX ADMIN — HYDROMORPHONE HYDROCHLORIDE 2 MG: 2 TABLET ORAL at 13:59

## 2022-03-25 RX ADMIN — PROPRANOLOL HYDROCHLORIDE 20 MG: 10 TABLET ORAL at 09:15

## 2022-03-25 RX ADMIN — AMLODIPINE BESYLATE 5 MG: 5 TABLET ORAL at 21:18

## 2022-03-25 RX ADMIN — OMEPRAZOLE 20 MG: 20 CAPSULE, DELAYED RELEASE ORAL at 09:15

## 2022-03-25 RX ADMIN — HYDROMORPHONE HYDROCHLORIDE 2 MG: 2 TABLET ORAL at 02:11

## 2022-03-25 RX ADMIN — BACLOFEN 10 MG: 10 TABLET ORAL at 09:16

## 2022-03-25 RX ADMIN — CALCIUM CARBONATE 500 MG: 500 TABLET, CHEWABLE ORAL at 09:15

## 2022-03-25 RX ADMIN — SODIUM CHLORIDE: 9 INJECTION, SOLUTION INTRAVENOUS at 13:55

## 2022-03-25 RX ADMIN — HYDROMORPHONE HYDROCHLORIDE 2 MG: 2 TABLET ORAL at 21:18

## 2022-03-25 RX ADMIN — HYDROMORPHONE HYDROCHLORIDE 2 MG: 2 TABLET ORAL at 05:39

## 2022-03-25 RX ADMIN — NYSTATIN: 100000 POWDER TOPICAL at 21:25

## 2022-03-25 RX ADMIN — BACLOFEN 10 MG: 10 TABLET ORAL at 16:43

## 2022-03-25 RX ADMIN — PROCHLORPERAZINE MALEATE 10 MG: 10 TABLET ORAL at 17:49

## 2022-03-25 RX ADMIN — ROPINIROLE HYDROCHLORIDE 1 MG: 1 TABLET, FILM COATED ORAL at 21:18

## 2022-03-25 RX ADMIN — RIVAROXABAN 15 MG: 15 TABLET, FILM COATED ORAL at 09:15

## 2022-03-25 RX ADMIN — POTASSIUM CHLORIDE 20 MEQ: 1500 TABLET, EXTENDED RELEASE ORAL at 13:58

## 2022-03-25 RX ADMIN — ROPINIROLE HYDROCHLORIDE 1 MG: 1 TABLET, FILM COATED ORAL at 09:15

## 2022-03-25 RX ADMIN — CALCIUM CARBONATE 500 MG: 500 TABLET, CHEWABLE ORAL at 16:43

## 2022-03-25 ASSESSMENT — BRIEF INTERVIEW FOR MENTAL STATUS (BIMS)
ASKED TO RECALL BED: NO, COULD NOT RECALL
WHAT DAY OF THE WEEK IS IT: CORRECT
WHAT MONTH IS IT: ACCURATE WITHIN 5 DAYS
WHAT YEAR IS IT: CORRECT
BIMS SUMMARY SCORE: 9
ASKED TO RECALL BLUE: NO, COULD NOT RECALL
ASKED TO RECALL SOCK: NO, COULD NOT RECALL
INITIAL REPETITION OF BED BLUE SOCK - FIRST ATTEMPT: 3

## 2022-03-25 ASSESSMENT — ACTIVITIES OF DAILY LIVING (ADL)
TOILETING: INDEPENDENT
BED_CHAIR_WHEELCHAIR_TRANSFER_DESCRIPTION: SET-UP OF EQUIPMENT;SUPERVISION FOR SAFETY;VERBAL CUEING
TOILET_TRANSFER_DESCRIPTION: SET-UP OF EQUIPMENT;SUPERVISION FOR SAFETY;VERBAL CUEING;REQUIRES LIFT
BED_CHAIR_WHEELCHAIR_TRANSFER_DESCRIPTION: REQUIRES LIFT

## 2022-03-25 ASSESSMENT — FIBROSIS 4 INDEX: FIB4 SCORE: 1.52

## 2022-03-25 ASSESSMENT — GAIT ASSESSMENTS: GAIT LEVEL OF ASSIST: UNABLE TO PARTICIPATE

## 2022-03-25 ASSESSMENT — PAIN DESCRIPTION - PAIN TYPE
TYPE: ACUTE PAIN
TYPE: ACUTE PAIN
TYPE: ACUTE PAIN;SURGICAL PAIN
TYPE: ACUTE PAIN

## 2022-03-25 NOTE — CARE PLAN
Problem: Pain - Standard  Goal: Alleviation of pain or a reduction in pain to the patient’s comfort goal  Flowsheets (Taken 3/25/2022 0211)  Pain Rating Scale (NPRS): 6  Note: Patient able to verbalize pain level and verbalize an acceptable level of pain.      Problem: Fall Risk - Rehab  Goal: Patient will remain free from falls  Note: Patient remains free from falls this shift. Patient was educated on using the call light to prevent falls. Patients bed is in the lowest position. The patients belongings are placed in near proximity to the patient.     The patient is Stable - Low risk of patient condition declining or worsening

## 2022-03-25 NOTE — DIETARY
"Nutrition services: Day 1 of admit.  Magali Leal is a 56 y.o. female with admitting DX of Debility.    Consult received for MST score 2 for 14-23 lb wt loss in 1 month without decreased PO intake. Due to department policy regarding COVID+ patients/droplet isolation pre-cautions, RD unable to interview patient at bedside. RD called pt to complete nutrition interview; pt verified name, . Pt reports eating \"less and less\" over past month; per [facility] RD chart notes, eating <50% of meals for ~3 weeks during admit. Trying to eat more, a few extra bites per meal. Pt unsure wt changes as has not been weighed since 22; says she feels as though she was \"puffy\" w/ fluids gained over past month of admit. Pt reports unable to tolerate excessively sweet foods as these cause her increased nausea/vomiting. Describes emesis as \"mucous\", last 3/24. RD reviewed alternate snacks w/ pt: pt in agreement for Greek yogurts TID and tuna/egg salad w/ crackers afternoon snack. Pt gave verbal consent for nutrition representative to call her on her cell phone for meal orders if unable to reach her via room phone.    Assessment:  No measured wt yet for pt this admit. Per chart hx, most recent wt is 99.1 kg ( 218 lb 7.6 oz), ht 5'7\", BMI 34.22 kg/m2 (Obesity class I) - wt measured on 22  Diet/Intake: GI soft/low fiber w/ magic cup supplements     Evaluation:   1. Hospital problems include DVT, anemia r/t chemotherapy, closer L ankle fracture, endometrial carcinoma, lupus, nausea, PTSD, Raynaud's disease w/o gangrene, s/p bladder repair.  2. Labs: Na 134, crea 1.59, GFR 38, alb 2.6  3. MAR: Tums, dilaudid, remeron, prilosec, PRN compazine (last admin 3/24)  4. Generalized abdominal edema, 2+ LLE generalized edema  5. Last BM: 3/23    Malnutrition Risk: Pt at risk w/ poor PO x 3 weeks per chart hx/interview, <50% of meals. Unable to meet criteria d/t unable to physically assess and no updated wt x 1 " month.    Recommendations/Plan:  1. Hold supplements at this time; pt unable to tolerate.   2. Provide greek yogurts TID w/ meals, afternoon snack protein salad w/ crackers.  3. Small portions of meals per pt preference.  4. Encourage intake of meals; document intake of all PO as % taken in ADL's to provide interdisciplinary communication across all shifts.   5. Obtain measured weight.  6. RD communicated pt's preferences for cool/cold foods and no concentrated sweets to nutrition representative.  7. Nutrition rep will continue to see patient for ongoing meal and snack preferences.     RD following.

## 2022-03-25 NOTE — PROGRESS NOTES
Patient admitted to facility at 1550 via w/c; 2 person assist to bed  Patient assisted to room and positioned in bed for comfort and safety; call light within reach.  Patient assisted with stowing belongings and oriented to room and facility.  Admission assessment performed and documented in computer.  Admission paperwork completed; signed copies placed in chart.  Will continue to monitor.
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
## 2022-03-25 NOTE — IDT DISCHARGE PLANNING
CASE MANAGEMENT INITIAL ASSESSMENT    Admit Date:  3/24/2022     I met with patient to discuss role of case management , discharge planning , and  team conference.       Diagnosis: Debility [R53.81]    Co-morbidities:   Patient Active Problem List    Diagnosis Date Noted   • Debility 03/24/2022   • Closed trimalleolar fracture of left ankle with routine healing 03/24/2022   • Nausea 03/24/2022   • Raynaud's disease without gangrene 03/24/2022   • Primary hypertension 03/24/2022   • Anxiety 03/24/2022   • PTSD (post-traumatic stress disorder) 03/24/2022   • Endometrial carcinoma (HCC) 03/24/2022   • Hypocalcemia 03/24/2022   • Acute deep vein thrombosis (DVT) of brachial vein ([facility]) 03/24/2022   • Restless legs 03/24/2022   • Urinary incontinence 03/24/2022   • Suprapubic catheter ([facility]) 03/24/2022   • S/P bladder repair 03/24/2022   • Hypomagnesemia 02/15/2022   • Hypokalemia 02/15/2022   • Shortness of breath 02/15/2022   • Recurrent urinary tract infection 02/15/2022   • Class 2 obesity due to excess calories without serious comorbidity with body mass index (BMI) of 36.0 to 36.9 in adult 02/15/2022   • Hypercalcemia 02/15/2022   • Anemia associated with chemotherapy 12/22/2021   • Dizzy spells 11/29/2017   • Lupus ([facility]) 11/29/2017     Prior Living Situation:  Housing / Facility: 1 Story House  Lives with - Patient's Self Care Capacity: Spouse    Prior Level of Function:  Medication Management: Independent  Finances: Independent  Home Management: Independent  Shopping: Independent  Prior Level Of Mobility: Independent Without Device in Home,Independent With Device in Community  Driving / Transportation: Driving Independent    Support Systems:  Primary : Gosia Talbot         Previous Services Utilized:   Equipment Owned: Single Point Cane,Tub / Shower Seat,Sock Aid,Reacher  Prior Services: Home-Independent    Other Information:  Occupation (Pre-Hospital Vocational):  (self employed)     Primary Payor  Source: Other (Comments) ([place])    Patient / Family Goal:       Plan:  1. Continue to follow patient through hospitalization and provide discharge planning in collaboration with patient, family, physicians and ancillary services.     2. Utilize community resources to ensure a safe discharge.

## 2022-03-25 NOTE — CARE PLAN
Problem: Inadequate nutrient intake  Goal: Patient to consume greater than or equal to 50% of meals  3/25/2022 0840 by Silvia Huizar R.D.  Outcome: Not Met    See RD note.
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
## 2022-03-25 NOTE — ASSESSMENT & PLAN NOTE
Pt feels like she has a lot of gas build up  Has diminished BM's (but wasn't eating much before)  AXR: shows some scattered mildly dilated loops of small bowel; no evidence of bowel obstruction or free air  On Simethicone

## 2022-03-25 NOTE — ASSESSMENT & PLAN NOTE
Bun: wnl  Cr: 1.03 --> 1.59 --> 2.02 (3/27) --> 2.25 (2/27)  Likely 2nd to diminished fluid intake (has some nausea)  Off IVF's   Restarted IVF's NS at 83 cc/hr --> will increase to 125 cc/hr (pt having low UO)  Encouraging fluid intake (but pt has occ N/V when drinking too much water)  Monitor

## 2022-03-25 NOTE — PROGRESS NOTES
2 RN Skin Check    2 RN skin check complete.   Devices in place: LEFT MAGDALENA BOOT SPLINT.  Skin assessed under devices: yes.  Confirmed pressure ulcers found on: N/A.  New potential pressure ulcers noted on N/A.   Wound consult placed N/A.  The following interventions in place Pillows, Lotion and Barrier cream.

## 2022-03-25 NOTE — PROGRESS NOTES
2 RN skin check performed with admit RN, pt resting in bed in lowest position, call light with in reach , pt denies any distress, will continue to monitor.

## 2022-03-25 NOTE — ASSESSMENT & PLAN NOTE
Has LLE edema  Has RLE ankle/pedal swelling  LLE edema likely 2nd to surgery  On Xarelto for brachial vein thrombosis
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
## 2022-03-25 NOTE — ASSESSMENT & PLAN NOTE
Had no prior hx of hypertension  BP elevated   On Norvasc --> will increase dose  On Inderal  Note: restarting IVF's (for dehydration)  Note: pt is on Norvasc at home and uses it during the winter for her Raynaud's            pt takes Inderal at home for anxiety  Cont to monitor

## 2022-03-26 LAB
BACTERIA UR CULT: ABNORMAL
GRAM STN SPEC: ABNORMAL
SIGNIFICANT IND 70042: ABNORMAL
SITE SITE: ABNORMAL
SOURCE SOURCE: ABNORMAL

## 2022-03-26 PROCEDURE — 97112 NEUROMUSCULAR REEDUCATION: CPT | Mod: CQ

## 2022-03-26 PROCEDURE — 700102 HCHG RX REV CODE 250 W/ 637 OVERRIDE(OP): Performed by: HOSPITALIST

## 2022-03-26 PROCEDURE — 700102 HCHG RX REV CODE 250 W/ 637 OVERRIDE(OP): Performed by: PHYSICAL MEDICINE & REHABILITATION

## 2022-03-26 PROCEDURE — 770005 HCHG ROOM/CARE - REHAB PRIVATE (11*

## 2022-03-26 PROCEDURE — A9270 NON-COVERED ITEM OR SERVICE: HCPCS | Performed by: HOSPITALIST

## 2022-03-26 PROCEDURE — 92523 SPEECH SOUND LANG COMPREHEN: CPT

## 2022-03-26 PROCEDURE — 97530 THERAPEUTIC ACTIVITIES: CPT | Mod: CQ

## 2022-03-26 PROCEDURE — A9270 NON-COVERED ITEM OR SERVICE: HCPCS | Performed by: PHYSICAL MEDICINE & REHABILITATION

## 2022-03-26 PROCEDURE — 99232 SBSQ HOSP IP/OBS MODERATE 35: CPT | Performed by: HOSPITALIST

## 2022-03-26 RX ORDER — FLUCONAZOLE 100 MG/1
200 TABLET ORAL ONCE
Status: COMPLETED | OUTPATIENT
Start: 2022-03-26 | End: 2022-03-26

## 2022-03-26 RX ORDER — LINEZOLID 600 MG/1
600 TABLET, FILM COATED ORAL EVERY 12 HOURS
Status: DISCONTINUED | OUTPATIENT
Start: 2022-03-26 | End: 2022-03-29 | Stop reason: HOSPADM

## 2022-03-26 RX ORDER — FLUCONAZOLE 100 MG/1
100 TABLET ORAL DAILY
Status: DISCONTINUED | OUTPATIENT
Start: 2022-03-27 | End: 2022-03-29 | Stop reason: HOSPADM

## 2022-03-26 RX ADMIN — SIMETHICONE 125 MG: 125 TABLET, CHEWABLE ORAL at 17:24

## 2022-03-26 RX ADMIN — HYDROMORPHONE HYDROCHLORIDE 2 MG: 2 TABLET ORAL at 17:29

## 2022-03-26 RX ADMIN — GUAIFENESIN 600 MG: 600 TABLET, EXTENDED RELEASE ORAL at 21:55

## 2022-03-26 RX ADMIN — NYSTATIN 1 APPLICATION: 100000 POWDER TOPICAL at 10:09

## 2022-03-26 RX ADMIN — BACLOFEN 10 MG: 10 TABLET ORAL at 14:38

## 2022-03-26 RX ADMIN — AMLODIPINE BESYLATE 5 MG: 5 TABLET ORAL at 21:59

## 2022-03-26 RX ADMIN — PAROXETINE HYDROCHLORIDE 60 MG: 20 TABLET, FILM COATED ORAL at 21:58

## 2022-03-26 RX ADMIN — PROPRANOLOL HYDROCHLORIDE 20 MG: 10 TABLET ORAL at 08:12

## 2022-03-26 RX ADMIN — HYDROMORPHONE HYDROCHLORIDE 2 MG: 2 TABLET ORAL at 14:38

## 2022-03-26 RX ADMIN — RIVAROXABAN 15 MG: 15 TABLET, FILM COATED ORAL at 08:08

## 2022-03-26 RX ADMIN — HYDROXYCHLOROQUINE SULFATE 400 MG: 200 TABLET ORAL at 08:07

## 2022-03-26 RX ADMIN — HYDROMORPHONE HYDROCHLORIDE 2 MG: 2 TABLET ORAL at 05:19

## 2022-03-26 RX ADMIN — SIMETHICONE 125 MG: 125 TABLET, CHEWABLE ORAL at 10:08

## 2022-03-26 RX ADMIN — ROPINIROLE HYDROCHLORIDE 1 MG: 1 TABLET, FILM COATED ORAL at 08:08

## 2022-03-26 RX ADMIN — BACLOFEN 10 MG: 10 TABLET ORAL at 08:06

## 2022-03-26 RX ADMIN — POTASSIUM CHLORIDE 20 MEQ: 1500 TABLET, EXTENDED RELEASE ORAL at 08:08

## 2022-03-26 RX ADMIN — RIVAROXABAN 15 MG: 15 TABLET, FILM COATED ORAL at 17:29

## 2022-03-26 RX ADMIN — AZELASTINE HYDROCHLORIDE 1 SPRAY: 137 SPRAY, METERED NASAL at 22:01

## 2022-03-26 RX ADMIN — CALCIUM CARBONATE 500 MG: 500 TABLET, CHEWABLE ORAL at 22:00

## 2022-03-26 RX ADMIN — FLUCONAZOLE 200 MG: 100 TABLET ORAL at 11:39

## 2022-03-26 RX ADMIN — LINEZOLID 600 MG: 600 TABLET, FILM COATED ORAL at 11:39

## 2022-03-26 RX ADMIN — OMEPRAZOLE 20 MG: 20 CAPSULE, DELAYED RELEASE ORAL at 08:08

## 2022-03-26 RX ADMIN — Medication 1000 UNITS: at 08:10

## 2022-03-26 RX ADMIN — LACTULOSE 30 ML: 20 SOLUTION ORAL at 17:29

## 2022-03-26 RX ADMIN — AZELASTINE HYDROCHLORIDE 1 SPRAY: 137 SPRAY, METERED NASAL at 14:50

## 2022-03-26 RX ADMIN — THERA TABS 1 TABLET: TAB at 08:06

## 2022-03-26 RX ADMIN — AZELASTINE HYDROCHLORIDE 1 SPRAY: 137 SPRAY, METERED NASAL at 08:05

## 2022-03-26 RX ADMIN — LINEZOLID 600 MG: 600 TABLET, FILM COATED ORAL at 21:58

## 2022-03-26 RX ADMIN — GUAIFENESIN 600 MG: 600 TABLET, EXTENDED RELEASE ORAL at 08:06

## 2022-03-26 RX ADMIN — CALCIUM CARBONATE 500 MG: 500 TABLET, CHEWABLE ORAL at 14:38

## 2022-03-26 RX ADMIN — SIMETHICONE 125 MG: 125 TABLET, CHEWABLE ORAL at 08:10

## 2022-03-26 RX ADMIN — HYDROMORPHONE HYDROCHLORIDE 2 MG: 2 TABLET ORAL at 10:07

## 2022-03-26 RX ADMIN — CALCIUM CARBONATE 500 MG: 500 TABLET, CHEWABLE ORAL at 08:06

## 2022-03-26 RX ADMIN — ROPINIROLE HYDROCHLORIDE 1 MG: 1 TABLET, FILM COATED ORAL at 21:57

## 2022-03-26 RX ADMIN — HYDROMORPHONE HYDROCHLORIDE 2 MG: 2 TABLET ORAL at 21:57

## 2022-03-26 RX ADMIN — HYDROMORPHONE HYDROCHLORIDE 2 MG: 2 TABLET ORAL at 01:59

## 2022-03-26 RX ADMIN — BACLOFEN 10 MG: 10 TABLET ORAL at 21:59

## 2022-03-26 RX ADMIN — MIRTAZAPINE 22.5 MG: 15 TABLET, ORALLY DISINTEGRATING ORAL at 21:55

## 2022-03-26 ASSESSMENT — BRIEF INTERVIEW FOR MENTAL STATUS (BIMS)
INITIAL REPETITION OF BED BLUE SOCK - FIRST ATTEMPT: 3
ASKED TO RECALL BLUE: YES, NO CUE REQUIRED
ASKED TO RECALL SOCK: YES, NO CUE REQUIRED
BIMS SUMMARY SCORE: 15
WHAT YEAR IS IT: CORRECT
ASKED TO RECALL BED: YES, NO CUE REQUIRED
WHAT DAY OF THE WEEK IS IT: CORRECT
WHAT MONTH IS IT: ACCURATE WITHIN 5 DAYS

## 2022-03-26 ASSESSMENT — ENCOUNTER SYMPTOMS
NAUSEA: 0
PALPITATIONS: 0
HEADACHES: 0
VOMITING: 0
BLURRED VISION: 0
FEVER: 0
SHORTNESS OF BREATH: 0
DIZZINESS: 0
HALLUCINATIONS: 0

## 2022-03-26 ASSESSMENT — PAIN DESCRIPTION - PAIN TYPE
TYPE: ACUTE PAIN;SURGICAL PAIN
TYPE: ACUTE PAIN;SURGICAL PAIN

## 2022-03-26 ASSESSMENT — GAIT ASSESSMENTS: GAIT LEVEL OF ASSIST: UNABLE TO PARTICIPATE
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
## 2022-03-26 NOTE — PROGRESS NOTES
Patient care assumed. Report received from day RN Kenroy. Patient is alert and calm, resting in bed. Call light and bedside table within reach. Will continue to monitor.

## 2022-03-26 NOTE — PROGRESS NOTES
[facility] Medicine Daily Progress Note    Date of Service  3/26/2022     Chief Complaint:  Hypertension  ILIA  Anemia    Interval History:  Pt states her nausea is getting better.  She is also eating better recently.    Review of Systems  Review of Systems   Constitutional: Negative for fever.   Eyes: Negative for blurred vision.   Respiratory: Negative for shortness of breath.    Cardiovascular: Negative for palpitations.   Gastrointestinal: Negative for nausea and vomiting.   Neurological: Negative for dizziness and headaches.   Psychiatric/Behavioral: Negative for hallucinations.        Physical Exam  Temp:  [36.4 °C (97.5 °F)-37.4 °C (99.3 °F)] 36.4 °C (97.5 °F)  Pulse:  [] 102  Resp:  [14-19] 14  BP: (148-173)/(77-86) 173/81  SpO2:  [93 %-96 %] 96 %    Physical Exam  Vitals and nursing note reviewed.   Constitutional:       General: She is not in acute distress.  HENT:      Mouth/Throat:      Mouth: Mucous membranes are moist.      Pharynx: Oropharynx is clear.   Eyes:      General: No scleral icterus.  Neck:      Vascular: No JVD.   Cardiovascular:      Rate and Rhythm: Normal rate and regular rhythm.      Heart sounds: Normal heart sounds.   Pulmonary:      Effort: Pulmonary effort is normal.      Breath sounds: No wheezing or rales.   Abdominal:      General: Bowel sounds are normal.      Palpations: Abdomen is soft.   Musculoskeletal:      Cervical back: No rigidity.      Right lower leg: No edema.      Left lower leg: No edema.   Skin:     General: Skin is warm and dry.   Neurological:      Mental Status: She is alert and oriented to person, place, and time.   Psychiatric:         Mood and Affect: Mood normal.         Behavior: Behavior normal.         Fluids    Intake/Output Summary (Last 24 hours) at 3/26/2022 1052  Last data filed at 3/26/2022 0900  Gross per 24 hour   Intake 840 ml   Output 2050 ml   Net -1210 ml       Laboratory  Recent Labs     03/25/22  0552   WBC 9.5   RBC 2.84*   HEMOGLOBIN  8.0*   HEMATOCRIT 25.3*   MCV 89.1   MCH 28.2   MCHC 31.6*   RDW 55.5*   PLATELETCT 381   MPV 9.5     Recent Labs     03/25/22  0552   SODIUM 134*   POTASSIUM 3.6   CHLORIDE 100   CO2 21   GLUCOSE 81   BUN 11   CREATININE 1.59*   CALCIUM 8.8                   Imaging    Assessment/Plan  Edema, lower extremity  Assessment & Plan  Has LLE edema  Has RLE ankle/pedal swelling  LLE edema likely 2nd to surgery  On Xarelto for brachial vein thrombosis    Distended abdomen  Assessment & Plan  Pt feels like she has a lot of gas build up  Has diminished BM's (but wasn't eating much before)  AXR: shows some scattered mildly dilated loops of small bowel; no evidence of bowel obstruction or free air  On Simethicone    Low blood potassium  Assessment & Plan  K+ low normal at 3.6 (3/25)  K+ trending lower -- likely 2nd to diminished appetite and oral intake  On daily K+ supplements  Note: pt eating better recently  Monitor    Vitamin D deficiency- (present on admission)  Assessment & Plan  Vit D: 26  On supplements    Acute kidney injury (HCC)- (present on admission)  Assessment & Plan  Bun: wnl  Cr: 1.03 --> 1.59  Likely 2nd to diminished fluid intake (has some nausea)  On IVF's NS at 83 cc/hr --> will d/c (pt eating and drinking more)  Encouraging fluid intake (but pt has occ N/V when drinking too much water -- has improved recently)  Monitor    Acute deep vein thrombosis (DVT) of brachial vein (HCC)- (present on admission)  Assessment & Plan  On Xarelto    Endometrial carcinoma (HCC)- (present on admission)  Assessment & Plan  S/P total hysterectomy  Had 6 cycles of chemo    Primary hypertension- (present on admission)  Assessment & Plan  Had no prior hx of hypertension  BP elevated -- ? 2nd to IVF's  On Norvasc   On Inderal  On IVF's --> will d/c  Note: pt is on Norvasc at home and uses it during the winter for her Raynaud's            pt takes Inderal at home for anxiety  Will monitor another day off IVF's before adjusting  meds    Nausea- (present on admission)  Assessment & Plan  2nd to cancer and chemo  On Scopolamine patch    Closed trimalleolar fracture of left ankle with routine healing- (present on admission)  Assessment & Plan  S/P ORIF    UTI (urinary tract infection)  Assessment & Plan  Urine cultures --> VRE and Candida  Will give Zyvox  Will give Diflucan    Anemia- (present on admission)  Assessment & Plan  H&H stable with Hb 8.0  2nd to cancer and chemo and Lupus  Monitor     Lupus (HCC)- (present on admission)  Assessment & Plan  On Plaquenil

## 2022-03-26 NOTE — CARE PLAN
Problem: Knowledge Deficit - Standard  Goal: Patient and family/care givers will demonstrate understanding of plan of care, disease process/condition, diagnostic tests and medications  Outcome: Progressing   Pt education given regarding plan of care, pt shows good understanding, will continue to reinforce education and continue to monitor.         Problem: Fall Risk - Rehab  Goal: Patient will remain free from falls  Outcome: Progressing   Pt education given regarding fall precautions AND safety measures, pt shows good understanding, has not attempted to self transfer this shift, will continue to reinforce education and continue to monitor.
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
## 2022-03-26 NOTE — PROGRESS NOTES
Shift report received from YOJANA Barriga RNs, POC discussed. Pt is awake in bed with OT present, w/ no s/s distress noted. Call light and bedside table w/in reach. Monitoring in progress.

## 2022-03-26 NOTE — CARE PLAN
The patient is Watcher - Medium risk of patient condition declining or worsening    Shift Goals  Clinical Goals: safety, pain control  Patient Goals: pain control    Problem: Pain - Standard  Goal: Alleviation of pain or a reduction in pain to the patient’s comfort goal  Outcome: Not Met. Patient taking scheduled pain medication every four hours. Patient educated on the pain rating scale and side effects of this medication. Will continue to assess pain.     Problem: Bladder / Voiding  Goal: Patient will establish and maintain bladder regimen  Outcome: Progressing. Patient with a garza catheter and suprapubic catheter, both are patent, draining, and drainage bags are below the level of the bladder. Will continue to assess.

## 2022-03-26 NOTE — PROGRESS NOTES
Patient had vaginal bleeding this morning upon assessment. There was one blood clot in the bleeding. Patient cleaned up, provided with perineal pad and underwear. Will pass it on in report to day shift.
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
## 2022-03-27 PROBLEM — R53.83 LETHARGIC: Status: ACTIVE | Noted: 2022-03-27

## 2022-03-27 PROBLEM — R40.0 SOMNOLENCE: Status: ACTIVE | Noted: 2022-03-27

## 2022-03-27 LAB
ALBUMIN SERPL BCP-MCNC: 2.6 G/DL (ref 3.2–4.9)
ALBUMIN/GLOB SERPL: 0.9 G/DL
ALP SERPL-CCNC: 109 U/L (ref 30–99)
ALT SERPL-CCNC: 7 U/L (ref 2–50)
ANION GAP SERPL CALC-SCNC: 15 MMOL/L (ref 7–16)
ANION GAP SERPL CALC-SCNC: 16 MMOL/L (ref 7–16)
AST SERPL-CCNC: 32 U/L (ref 12–45)
BASOPHILS # BLD AUTO: 0.2 % (ref 0–1.8)
BASOPHILS # BLD: 0.02 K/UL (ref 0–0.12)
BILIRUB SERPL-MCNC: 0.2 MG/DL (ref 0.1–1.5)
BUN SERPL-MCNC: 15 MG/DL (ref 8–22)
BUN SERPL-MCNC: 16 MG/DL (ref 8–22)
CALCIUM SERPL-MCNC: 8.8 MG/DL (ref 8.5–10.5)
CALCIUM SERPL-MCNC: 9 MG/DL (ref 8.5–10.5)
CHLORIDE SERPL-SCNC: 101 MMOL/L (ref 96–112)
CHLORIDE SERPL-SCNC: 101 MMOL/L (ref 96–112)
CO2 SERPL-SCNC: 17 MMOL/L (ref 20–33)
CO2 SERPL-SCNC: 18 MMOL/L (ref 20–33)
CREAT SERPL-MCNC: 2.02 MG/DL (ref 0.5–1.4)
CREAT SERPL-MCNC: 2.25 MG/DL (ref 0.5–1.4)
EOSINOPHIL # BLD AUTO: 0 K/UL (ref 0–0.51)
EOSINOPHIL NFR BLD: 0 % (ref 0–6.9)
ERYTHROCYTE [DISTWIDTH] IN BLOOD BY AUTOMATED COUNT: 56.1 FL (ref 35.9–50)
GFR SERPLBLD CREATININE-BSD FMLA CKD-EPI: 25 ML/MIN/1.73 M 2
GFR SERPLBLD CREATININE-BSD FMLA CKD-EPI: 28 ML/MIN/1.73 M 2
GLOBULIN SER CALC-MCNC: 2.8 G/DL (ref 1.9–3.5)
GLUCOSE SERPL-MCNC: 86 MG/DL (ref 65–99)
GLUCOSE SERPL-MCNC: 91 MG/DL (ref 65–99)
HCT VFR BLD AUTO: 23.5 % (ref 37–47)
HGB BLD-MCNC: 7.5 G/DL (ref 12–16)
IMM GRANULOCYTES # BLD AUTO: 0.29 K/UL (ref 0–0.11)
IMM GRANULOCYTES NFR BLD AUTO: 2.6 % (ref 0–0.9)
LYMPHOCYTES # BLD AUTO: 0.93 K/UL (ref 1–4.8)
LYMPHOCYTES NFR BLD: 8.2 % (ref 22–41)
MAGNESIUM SERPL-MCNC: 1.7 MG/DL (ref 1.5–2.5)
MCH RBC QN AUTO: 28.1 PG (ref 27–33)
MCHC RBC AUTO-ENTMCNC: 31.9 G/DL (ref 33.6–35)
MCV RBC AUTO: 88 FL (ref 81.4–97.8)
MONOCYTES # BLD AUTO: 0.8 K/UL (ref 0–0.85)
MONOCYTES NFR BLD AUTO: 7.1 % (ref 0–13.4)
NEUTROPHILS # BLD AUTO: 9.24 K/UL (ref 2–7.15)
NEUTROPHILS NFR BLD: 81.9 % (ref 44–72)
NRBC # BLD AUTO: 0 K/UL
NRBC BLD-RTO: 0 /100 WBC
PHOSPHATE SERPL-MCNC: 2.7 MG/DL (ref 2.5–4.5)
PLATELET # BLD AUTO: 337 K/UL (ref 164–446)
PMV BLD AUTO: 9.5 FL (ref 9–12.9)
POTASSIUM SERPL-SCNC: 3.8 MMOL/L (ref 3.6–5.5)
POTASSIUM SERPL-SCNC: 4 MMOL/L (ref 3.6–5.5)
PROT SERPL-MCNC: 5.4 G/DL (ref 6–8.2)
RBC # BLD AUTO: 2.67 M/UL (ref 4.2–5.4)
SODIUM SERPL-SCNC: 134 MMOL/L (ref 135–145)
SODIUM SERPL-SCNC: 134 MMOL/L (ref 135–145)
WBC # BLD AUTO: 11.3 K/UL (ref 4.8–10.8)

## 2022-03-27 PROCEDURE — A9270 NON-COVERED ITEM OR SERVICE: HCPCS | Performed by: HOSPITALIST

## 2022-03-27 PROCEDURE — 700105 HCHG RX REV CODE 258: Performed by: HOSPITALIST

## 2022-03-27 PROCEDURE — 80053 COMPREHEN METABOLIC PANEL: CPT

## 2022-03-27 PROCEDURE — 84100 ASSAY OF PHOSPHORUS: CPT

## 2022-03-27 PROCEDURE — A9270 NON-COVERED ITEM OR SERVICE: HCPCS | Performed by: PHYSICAL MEDICINE & REHABILITATION

## 2022-03-27 PROCEDURE — 36415 COLL VENOUS BLD VENIPUNCTURE: CPT

## 2022-03-27 PROCEDURE — 97129 THER IVNTJ 1ST 15 MIN: CPT | Performed by: SPEECH-LANGUAGE PATHOLOGIST

## 2022-03-27 PROCEDURE — 770005 HCHG ROOM/CARE - REHAB PRIVATE (11*

## 2022-03-27 PROCEDURE — 97130 THER IVNTJ EA ADDL 15 MIN: CPT | Performed by: SPEECH-LANGUAGE PATHOLOGIST

## 2022-03-27 PROCEDURE — 700102 HCHG RX REV CODE 250 W/ 637 OVERRIDE(OP): Performed by: PHYSICAL MEDICINE & REHABILITATION

## 2022-03-27 PROCEDURE — 99232 SBSQ HOSP IP/OBS MODERATE 35: CPT | Performed by: HOSPITALIST

## 2022-03-27 PROCEDURE — 700102 HCHG RX REV CODE 250 W/ 637 OVERRIDE(OP): Performed by: HOSPITALIST

## 2022-03-27 PROCEDURE — 85025 COMPLETE CBC W/AUTO DIFF WBC: CPT

## 2022-03-27 PROCEDURE — 80048 BASIC METABOLIC PNL TOTAL CA: CPT

## 2022-03-27 PROCEDURE — 83735 ASSAY OF MAGNESIUM: CPT

## 2022-03-27 PROCEDURE — 700111 HCHG RX REV CODE 636 W/ 250 OVERRIDE (IP): Performed by: HOSPITALIST

## 2022-03-27 RX ORDER — AMLODIPINE BESYLATE 5 MG/1
7.5 TABLET ORAL EVERY EVENING
Status: DISCONTINUED | OUTPATIENT
Start: 2022-03-27 | End: 2022-03-29 | Stop reason: HOSPADM

## 2022-03-27 RX ORDER — SODIUM CHLORIDE 9 MG/ML
INJECTION, SOLUTION INTRAVENOUS CONTINUOUS
Status: DISCONTINUED | OUTPATIENT
Start: 2022-03-27 | End: 2022-03-29 | Stop reason: HOSPADM

## 2022-03-27 RX ORDER — AMLODIPINE BESYLATE 5 MG/1
2.5 TABLET ORAL ONCE
Status: COMPLETED | OUTPATIENT
Start: 2022-03-27 | End: 2022-03-27

## 2022-03-27 RX ORDER — NALOXONE HYDROCHLORIDE 0.4 MG/ML
0.4 INJECTION, SOLUTION INTRAMUSCULAR; INTRAVENOUS; SUBCUTANEOUS ONCE
Status: COMPLETED | OUTPATIENT
Start: 2022-03-27 | End: 2022-03-27

## 2022-03-27 RX ORDER — HYDROMORPHONE HYDROCHLORIDE 2 MG/1
1 TABLET ORAL EVERY 4 HOURS
Status: DISCONTINUED | OUTPATIENT
Start: 2022-03-27 | End: 2022-03-28

## 2022-03-27 RX ADMIN — HYDROXYCHLOROQUINE SULFATE 400 MG: 200 TABLET ORAL at 08:45

## 2022-03-27 RX ADMIN — ACETAMINOPHEN 650 MG: 325 TABLET ORAL at 10:55

## 2022-03-27 RX ADMIN — SODIUM CHLORIDE 1000 ML: 9 INJECTION, SOLUTION INTRAVENOUS at 21:02

## 2022-03-27 RX ADMIN — HYDROMORPHONE HYDROCHLORIDE 1 MG: 2 TABLET ORAL at 17:42

## 2022-03-27 RX ADMIN — PROPRANOLOL HYDROCHLORIDE 20 MG: 10 TABLET ORAL at 08:46

## 2022-03-27 RX ADMIN — BACLOFEN 10 MG: 10 TABLET ORAL at 21:43

## 2022-03-27 RX ADMIN — HYDROMORPHONE HYDROCHLORIDE 2 MG: 2 TABLET ORAL at 02:02

## 2022-03-27 RX ADMIN — ROPINIROLE HYDROCHLORIDE 1 MG: 1 TABLET, FILM COATED ORAL at 08:55

## 2022-03-27 RX ADMIN — THERA TABS 1 TABLET: TAB at 08:55

## 2022-03-27 RX ADMIN — ACETAMINOPHEN 650 MG: 325 TABLET ORAL at 15:08

## 2022-03-27 RX ADMIN — LINEZOLID 600 MG: 600 TABLET, FILM COATED ORAL at 21:42

## 2022-03-27 RX ADMIN — CALCIUM CARBONATE 500 MG: 500 TABLET, CHEWABLE ORAL at 21:43

## 2022-03-27 RX ADMIN — MIRTAZAPINE 22.5 MG: 15 TABLET, ORALLY DISINTEGRATING ORAL at 21:41

## 2022-03-27 RX ADMIN — POTASSIUM CHLORIDE 20 MEQ: 1500 TABLET, EXTENDED RELEASE ORAL at 08:56

## 2022-03-27 RX ADMIN — RIVAROXABAN 15 MG: 15 TABLET, FILM COATED ORAL at 08:55

## 2022-03-27 RX ADMIN — SIMETHICONE 125 MG: 125 TABLET, CHEWABLE ORAL at 17:42

## 2022-03-27 RX ADMIN — GUAIFENESIN 600 MG: 600 TABLET, EXTENDED RELEASE ORAL at 21:40

## 2022-03-27 RX ADMIN — AMLODIPINE BESYLATE 2.5 MG: 5 TABLET ORAL at 10:57

## 2022-03-27 RX ADMIN — NALOXONE HYDROCHLORIDE 0.4 MG: 0.4 INJECTION, SOLUTION INTRAMUSCULAR; INTRAVENOUS; SUBCUTANEOUS at 11:58

## 2022-03-27 RX ADMIN — SIMETHICONE 125 MG: 125 TABLET, CHEWABLE ORAL at 11:00

## 2022-03-27 RX ADMIN — ROPINIROLE HYDROCHLORIDE 1 MG: 1 TABLET, FILM COATED ORAL at 21:40

## 2022-03-27 RX ADMIN — Medication 1000 UNITS: at 08:55

## 2022-03-27 RX ADMIN — FLUCONAZOLE 100 MG: 100 TABLET ORAL at 08:45

## 2022-03-27 RX ADMIN — NYSTATIN 1 APPLICATION: 100000 POWDER TOPICAL at 08:55

## 2022-03-27 RX ADMIN — GUAIFENESIN 600 MG: 600 TABLET, EXTENDED RELEASE ORAL at 08:45

## 2022-03-27 RX ADMIN — OMEPRAZOLE 20 MG: 20 CAPSULE, DELAYED RELEASE ORAL at 08:46

## 2022-03-27 RX ADMIN — BACLOFEN 10 MG: 10 TABLET ORAL at 15:08

## 2022-03-27 RX ADMIN — BACLOFEN 10 MG: 10 TABLET ORAL at 08:45

## 2022-03-27 RX ADMIN — SIMETHICONE 125 MG: 125 TABLET, CHEWABLE ORAL at 08:44

## 2022-03-27 RX ADMIN — LINEZOLID 600 MG: 600 TABLET, FILM COATED ORAL at 08:45

## 2022-03-27 RX ADMIN — CALCIUM CARBONATE 500 MG: 500 TABLET, CHEWABLE ORAL at 15:08

## 2022-03-27 RX ADMIN — CALCIUM CARBONATE 500 MG: 500 TABLET, CHEWABLE ORAL at 08:45

## 2022-03-27 RX ADMIN — AZELASTINE HYDROCHLORIDE 1 SPRAY: 137 SPRAY, METERED NASAL at 15:57

## 2022-03-27 RX ADMIN — AZELASTINE HYDROCHLORIDE 1 SPRAY: 137 SPRAY, METERED NASAL at 08:44

## 2022-03-27 RX ADMIN — SODIUM CHLORIDE: 9 INJECTION, SOLUTION INTRAVENOUS at 11:01

## 2022-03-27 RX ADMIN — HYDROMORPHONE HYDROCHLORIDE 2 MG: 2 TABLET ORAL at 06:33

## 2022-03-27 RX ADMIN — AMLODIPINE BESYLATE 7.5 MG: 5 TABLET ORAL at 21:38

## 2022-03-27 RX ADMIN — RIVAROXABAN 15 MG: 15 TABLET, FILM COATED ORAL at 17:42

## 2022-03-27 ASSESSMENT — ENCOUNTER SYMPTOMS
BLURRED VISION: 0
DIARRHEA: 0
NERVOUS/ANXIOUS: 0
FEVER: 0
COUGH: 0
DIZZINESS: 0

## 2022-03-27 ASSESSMENT — PAIN DESCRIPTION - PAIN TYPE: TYPE: ACUTE PAIN;SURGICAL PAIN

## 2022-03-27 ASSESSMENT — FIBROSIS 4 INDEX: FIB4 SCORE: 1.52

## 2022-03-27 NOTE — PROGRESS NOTES
[facility] Medicine Daily Progress Note    Date of Service  3/27/2022     Chief Complaint:  Hypertension  ILIA  Anemia    Interval History:  Pt was quite somnolent this am.    Review of Systems  Review of Systems   Constitutional: Negative for fever.   Eyes: Negative for blurred vision.   Respiratory: Negative for cough.    Cardiovascular: Negative for chest pain.   Gastrointestinal: Negative for diarrhea.   Musculoskeletal: Negative for joint pain.   Neurological: Negative for dizziness.   Psychiatric/Behavioral: The patient is not nervous/anxious.         Physical Exam  Temp:  [36.5 °C (97.7 °F)-36.9 °C (98.5 °F)] 36.9 °C (98.5 °F)  Pulse:  [] 100  Resp:  [18-19] 18  BP: (145-152)/(84-87) 145/85  SpO2:  [93 %-94 %] 94 %    Physical Exam  Vitals and nursing note reviewed.   Constitutional:       Appearance: She is not diaphoretic.   HENT:      Mouth/Throat:      Pharynx: No oropharyngeal exudate or posterior oropharyngeal erythema.   Eyes:      Extraocular Movements: Extraocular movements intact.   Neck:      Vascular: No carotid bruit or JVD.   Cardiovascular:      Rate and Rhythm: Normal rate and regular rhythm.      Heart sounds: Normal heart sounds.   Pulmonary:      Effort: Pulmonary effort is normal.      Breath sounds: No wheezing or rales.   Abdominal:      General: There is no distension.      Palpations: Abdomen is soft.      Tenderness: There is no abdominal tenderness.   Musculoskeletal:      Right lower leg: No edema.      Left lower leg: No edema.   Skin:     General: Skin is warm and dry.   Neurological:      Mental Status: She is alert and oriented to person, place, and time.   Psychiatric:         Mood and Affect: Mood normal.         Behavior: Behavior normal.         Fluids    Intake/Output Summary (Last 24 hours) at 3/27/2022 1031  Last data filed at 3/27/2022 0900  Gross per 24 hour   Intake 990 ml   Output 1120 ml   Net -130 ml       Laboratory  Recent Labs     03/25/22  0552   WBC 9.5    RBC 2.84*   HEMOGLOBIN 8.0*   HEMATOCRIT 25.3*   MCV 89.1   MCH 28.2   MCHC 31.6*   RDW 55.5*   PLATELETCT 381   MPV 9.5     Recent Labs     03/25/22  0552 03/27/22  0546   SODIUM 134* 134*   POTASSIUM 3.6 3.8   CHLORIDE 100 101   CO2 21 17*   GLUCOSE 81 91   BUN 11 15   CREATININE 1.59* 2.02*   CALCIUM 8.8 9.0                   Imaging    Assessment/Plan  Somnolence  Assessment & Plan  Pt was quite somnolent this am  Has been taking pain meds regularly  S/P Narcan x 1 --> pt now more awake  Dilaudid adjusted (by Physiatry)    Edema, lower extremity  Assessment & Plan  Has LLE edema  Has RLE ankle/pedal swelling  LLE edema likely 2nd to surgery  On Xarelto for brachial vein thrombosis    Distended abdomen  Assessment & Plan  Pt feels like she has a lot of gas build up  Has diminished BM's (but wasn't eating much before)  AXR: shows some scattered mildly dilated loops of small bowel; no evidence of bowel obstruction or free air  On Simethicone    Low blood potassium  Assessment & Plan  K+: 3.6 (3/25) --> 3.8 (3/27)  On daily K+ supplements  Note: has a diminished appetite  Monitor    Vitamin D deficiency- (present on admission)  Assessment & Plan  Vit D: 26  On supplements    Acute kidney injury (HCC)- (present on admission)  Assessment & Plan  Bun: wnl  Cr: 1.03 --> 1.59 --> 2.02 (3/27) --> 2.25 (2/27)  Likely 2nd to diminished fluid intake (has some nausea)  Off IVF's   Restarted IVF's NS at 83 cc/hr --> will increase to 125 cc/hr (pt having low UO)  Encouraging fluid intake (but pt has occ N/V when drinking too much water)  Monitor    Acute deep vein thrombosis (DVT) of brachial vein (HCC)- (present on admission)  Assessment & Plan  On Xarelto    Endometrial carcinoma (HCC)- (present on admission)  Assessment & Plan  S/P total hysterectomy  Had 6 cycles of chemo    Primary hypertension- (present on admission)  Assessment & Plan  Had no prior hx of hypertension  BP elevated   On Norvasc --> will increase dose  On  Inderal  Note: restarting IVF's (for dehydration)  Note: pt is on Norvasc at home and uses it during the winter for her Raynaud's            pt takes Inderal at home for anxiety  Cont to monitor    Nausea- (present on admission)  Assessment & Plan  2nd to cancer and chemo  On Scopolamine patch    Closed trimalleolar fracture of left ankle with routine healing- (present on admission)  Assessment & Plan  S/P ORIF    UTI (urinary tract infection)  Assessment & Plan  Urine cultures --> VRE and Candida  On Zyvox  On Diflucan    Anemia- (present on admission)  Assessment & Plan  H&H stable with Hb 8.0  2nd to cancer and chemo and Lupus  Monitor     Lupus (HCC)- (present on admission)  Assessment & Plan  On Plaquenil

## 2022-03-27 NOTE — PROGRESS NOTES
Received patient during shift change, report rec'd from day shift RN. Resting in bed, VS stable on room air. Per report cont/incontinent of Bowel, garza & suprapubic cath in place. Max assist for transfers. A&O x 4, able to make needs known. Bed in low position, call light within reach.

## 2022-03-27 NOTE — PROGRESS NOTES
Dr Mcgovern notified of sanguinous drainage from pt's garza. Order received to flush garza with 60 mL normal saline twice a day.

## 2022-03-27 NOTE — ASSESSMENT & PLAN NOTE
Pt was quite somnolent this am  Has been taking pain meds regularly  S/P Narcan x 1 --> pt now more awake  Dilaudid adjusted (by Physiatry)

## 2022-03-27 NOTE — THERAPY
Speech Language Pathology  Daily Treatment     Patient Name: Magali Leal  Age:  56 y.o., Sex:  female  Medical Record #: [id number]  Today's Date: 3/27/2022     Precautions  Precautions: Fall Risk,Non Weight Bearing Left Lower Extremity  Comments: Boot L foot    Subjective    Patient was seen for tx, seated upright in bed with family present. Patient was very motivated to work with SLP but required max cues to sustain alertness for tx. RN reported recent diagnosis of UTI as possible reason for change in [facility]LE[facility]O.      Objective       03/27/22 1331   Precautions   Precautions Fall Risk;Non Weight Bearing Left Lower Extremity   Comments Boot L foot   Reading Comprehension    Functional Reading Materials Moderate (3)   Cognition   Simple Attention Moderate (3)   Sleep/Wake Cycle   Sleep & Rest Awake  (very lethargic)   SLP Total Time Spent   SLP Individual Total Time Spent (Mins) 60   Treatment Charges   SLP Cognitive Skill Development First 15 Minutes 1   SLP Cognitive Skill Development Additional 15 Minutes 3       Assessment    SLP provided patient with current medication list. Patient attempted to read the labels but required moderate verbal and point prompt to orient to the accurate area of the text. She also attempted to write the medications on a med log but was unable to complete. SLP reviewed the list and wrote the name/purpose/dosage/frequency on the medication log. They did not complete the entire list due to time constraints.     Strengths: Willingly participates in therapeutic activities,Supportive family,Pleasant and cooperative,Motivated for self care and independence,Independent prior level of function,Good insight into deficits/needs,Alert and oriented  Barriers: Impaired functional cognition,Impaired carryover of learning    Plan    Finish med log.     Passport items to be completed:  [unfilled]    Speech Therapy Problems (Active)       Problem: Memory STGs       Dates: Start: 03/26/22         Goal:  STG-Within one week, patient will       Dates: Start: 03/26/22       Description: Recall novel information with 70% acc. With min cues to utilize trained internal and external memory strategies.                Problem: Problem Solving STGs       Dates: Start: 03/26/22         Goal: STG-Within one week, patient will       Dates: Start: 03/26/22       Description: Complete functional attention tasks with 70% acc. With min cues.             Problem: Speech/Swallowing LTGs       Dates: Start: 03/26/22         Goal: LTG-By discharge, patient will       Dates: Start: 03/26/22       Description: Complete cog-ling tasks with standby assist to ensure safe return to PLOF.

## 2022-03-27 NOTE — PROGRESS NOTES
"approx 10:30, In pt room to give scheduled dilaudid, pt has delayed responses, rr 11, b/p 147/89. . O2 94 (varies 90-97%). Left pupil response sluggish, right pupil response brisk. Pt is oriented but sleepy. Dr Mcgovern and Trupti CMCALLUM notified. Received order from Dr Mcgovern to change scheduled dilaudid 2 mg every 4 hours to 1 mg every 4 hours. gave pt 650 mg tylenol, warm pack for abdomen, held 10 am dose 2 mg dose dilaudid.   11:15, about 125 ml bloody urine out of garza, pt continues to be lethargic with delayed responses, c/o \"whooshing\" in her ears. Dr Rivera notified. Pt assessed by Dr Rivera, order received for 0.4 mg narcan IV - administered per order. Continuing to monitor pt.  approx 12:05, Pt spouse at bedside. Updates given to her. Pt reports the \"whooshing\" in her ears is decreasing.   "

## 2022-03-27 NOTE — CARE PLAN
The patient is Watcher - Medium risk of patient condition declining or worsening    Shift Goals  Clinical Goals: infection free, safety  Patient Goals: pain control      Problem: Fall Risk - Rehab  Goal: Patient will remain free from falls  Outcome: Progressing: Pt oriented x4, does not attempt self transfer. Calm and cooperative. Uses call light appropriately when in need of assistance.call light and personal items w/ in reach. Bed locked and in low position.      Problem: Bowel Elimination  Goal: Patient will participate in bowel management program  Outcome: Progressing. Pt reports a small bm with therapy this a.m. Prior bm 3/23/2022. Prn lactulose given.

## 2022-03-27 NOTE — CARE PLAN
"The patient is Watcher - Medium risk of patient condition declining or worsening    Shift Goals  Clinical Goals: safety, bm  Patient Goals: pain control, sleep well    Progress made toward(s) clinical / shift goals:  Resting in bed after hs meds. Sleeping off and on. Using call light for assist as needed.     Darline Good Fall risk Assessment Score: 12    Moderate fall risk Interventions  - Bed and strip alarm   - Yellow sign by the door   - Yellow wrist band \"Fall risk\"  - Room near to the nurse station  - Do not leave patient unattended in the bathroom  - Fall risk education provided  - Call light within reach   - Yellow  socks   - Belongings within reach   - Bed in the lowest position       Problem: Pain - Standard  Goal: Alleviation of pain or a reduction in pain to the patient’s comfort goal  Outcome: Progressing     Problem: Bowel Elimination  Goal: Patient will participate in bowel management program  Outcome: Progressing  Pt request to use bedpan for bm, did not want to attempt to transfer. Pt reports being tired from therapy and does not feel secure for transfer at this time r/t overall weakness and nwb on LLE.      Problem: Skin Integrity  Goal: Patient's skin integrity will be maintained or improve  Outcome: Progressing  Kathrine care provided, barrier cream applied after incontinent episode of bowel, post PRN lactulose administration. Skin intact.         "

## 2022-03-28 ENCOUNTER — HOSPITAL ENCOUNTER (INPATIENT)
Facility: MEDICAL CENTER | Age: 57
LOS: 7 days | DRG: 755 | End: 2022-04-04
Attending: EMERGENCY MEDICINE | Admitting: SPECIALIST
Payer: COMMERCIAL

## 2022-03-28 ENCOUNTER — APPOINTMENT (OUTPATIENT)
Dept: RADIOLOGY | Facility: MEDICAL CENTER | Age: 57
DRG: 755 | End: 2022-03-28
Attending: STUDENT IN AN ORGANIZED HEALTH CARE EDUCATION/TRAINING PROGRAM
Payer: COMMERCIAL

## 2022-03-28 ENCOUNTER — APPOINTMENT (OUTPATIENT)
Dept: RADIOLOGY | Facility: MEDICAL CENTER | Age: 57
DRG: 755 | End: 2022-03-28
Attending: EMERGENCY MEDICINE
Payer: COMMERCIAL

## 2022-03-28 ENCOUNTER — HOSPITAL ENCOUNTER (INPATIENT)
Facility: REHABILITATION | Age: 57
End: 2022-03-28
Attending: PHYSICAL MEDICINE & REHABILITATION | Admitting: PHYSICAL MEDICINE & REHABILITATION
Payer: COMMERCIAL

## 2022-03-28 VITALS
WEIGHT: 218.48 LBS | HEART RATE: 106 BPM | OXYGEN SATURATION: 96 % | SYSTOLIC BLOOD PRESSURE: 155 MMHG | RESPIRATION RATE: 18 BRPM | DIASTOLIC BLOOD PRESSURE: 87 MMHG | BODY MASS INDEX: 34.29 KG/M2 | TEMPERATURE: 97.9 F | HEIGHT: 67 IN

## 2022-03-28 DIAGNOSIS — N17.9 ACUTE RENAL FAILURE, UNSPECIFIED ACUTE RENAL FAILURE TYPE (HCC): ICD-10-CM

## 2022-03-28 DIAGNOSIS — C54.1 ENDOMETRIAL CARCINOMA (HCC): ICD-10-CM

## 2022-03-28 DIAGNOSIS — R40.0 SOMNOLENCE: ICD-10-CM

## 2022-03-28 DIAGNOSIS — R79.1 ELEVATED INR: ICD-10-CM

## 2022-03-28 DIAGNOSIS — R31.9 HEMATURIA, UNSPECIFIED TYPE: ICD-10-CM

## 2022-03-28 PROBLEM — R41.82 ALTERED MENTAL STATUS: Status: ACTIVE | Noted: 2022-03-28

## 2022-03-28 LAB
ALBUMIN SERPL BCP-MCNC: 2.5 G/DL (ref 3.2–4.9)
ALBUMIN/GLOB SERPL: 0.8 G/DL
ALP SERPL-CCNC: 115 U/L (ref 30–99)
ALT SERPL-CCNC: 7 U/L (ref 2–50)
AMPHET UR QL SCN: NEGATIVE
ANION GAP SERPL CALC-SCNC: 14 MMOL/L (ref 7–16)
ANION GAP SERPL CALC-SCNC: 16 MMOL/L (ref 7–16)
APPEARANCE UR: ABNORMAL
AST SERPL-CCNC: 33 U/L (ref 12–45)
BACTERIA #/AREA URNS HPF: NEGATIVE /HPF
BARBITURATES UR QL SCN: NEGATIVE
BASOPHILS # BLD AUTO: 0.1 % (ref 0–1.8)
BASOPHILS # BLD AUTO: 0.1 % (ref 0–1.8)
BASOPHILS # BLD: 0.02 K/UL (ref 0–0.12)
BASOPHILS # BLD: 0.02 K/UL (ref 0–0.12)
BENZODIAZ UR QL SCN: NEGATIVE
BILIRUB SERPL-MCNC: 0.2 MG/DL (ref 0.1–1.5)
BILIRUB UR QL STRIP.AUTO: NEGATIVE
BUN SERPL-MCNC: 18 MG/DL (ref 8–22)
BUN SERPL-MCNC: 19 MG/DL (ref 8–22)
BZE UR QL SCN: NEGATIVE
CA-I SERPL-SCNC: 1.3 MMOL/L (ref 1.1–1.3)
CALCIUM SERPL-MCNC: 8.8 MG/DL (ref 8.5–10.5)
CALCIUM SERPL-MCNC: 9.1 MG/DL (ref 8.5–10.5)
CANNABINOIDS UR QL SCN: NEGATIVE
CHLORIDE SERPL-SCNC: 103 MMOL/L (ref 96–112)
CHLORIDE SERPL-SCNC: 104 MMOL/L (ref 96–112)
CO2 SERPL-SCNC: 15 MMOL/L (ref 20–33)
CO2 SERPL-SCNC: 18 MMOL/L (ref 20–33)
COLOR UR: ABNORMAL
CREAT SERPL-MCNC: 2.53 MG/DL (ref 0.5–1.4)
CREAT SERPL-MCNC: 2.54 MG/DL (ref 0.5–1.4)
EOSINOPHIL # BLD AUTO: 0 K/UL (ref 0–0.51)
EOSINOPHIL # BLD AUTO: 0.01 K/UL (ref 0–0.51)
EOSINOPHIL NFR BLD: 0 % (ref 0–6.9)
EOSINOPHIL NFR BLD: 0.1 % (ref 0–6.9)
EPI CELLS #/AREA URNS HPF: ABNORMAL /HPF
ERYTHROCYTE [DISTWIDTH] IN BLOOD BY AUTOMATED COUNT: 56.9 FL (ref 35.9–50)
ERYTHROCYTE [DISTWIDTH] IN BLOOD BY AUTOMATED COUNT: 57.6 FL (ref 35.9–50)
GFR SERPLBLD CREATININE-BSD FMLA CKD-EPI: 22 ML/MIN/1.73 M 2
GFR SERPLBLD CREATININE-BSD FMLA CKD-EPI: 22 ML/MIN/1.73 M 2
GLOBULIN SER CALC-MCNC: 3.1 G/DL (ref 1.9–3.5)
GLUCOSE SERPL-MCNC: 87 MG/DL (ref 65–99)
GLUCOSE SERPL-MCNC: 97 MG/DL (ref 65–99)
GLUCOSE UR STRIP.AUTO-MCNC: NEGATIVE MG/DL
HCT VFR BLD AUTO: 22.7 % (ref 37–47)
HCT VFR BLD AUTO: 23.4 % (ref 37–47)
HGB BLD-MCNC: 7.1 G/DL (ref 12–16)
HGB BLD-MCNC: 7.4 G/DL (ref 12–16)
HYALINE CASTS #/AREA URNS LPF: ABNORMAL /LPF
IMM GRANULOCYTES # BLD AUTO: 0.26 K/UL (ref 0–0.11)
IMM GRANULOCYTES # BLD AUTO: 0.28 K/UL (ref 0–0.11)
IMM GRANULOCYTES NFR BLD AUTO: 1.8 % (ref 0–0.9)
IMM GRANULOCYTES NFR BLD AUTO: 2.1 % (ref 0–0.9)
INR PPP: 4.22 (ref 0.87–1.13)
KETONES UR STRIP.AUTO-MCNC: NEGATIVE MG/DL
LACTATE BLD-SCNC: 1.4 MMOL/L (ref 0.5–2)
LEUKOCYTE ESTERASE UR QL STRIP.AUTO: ABNORMAL
LYMPHOCYTES # BLD AUTO: 0.81 K/UL (ref 1–4.8)
LYMPHOCYTES # BLD AUTO: 1.05 K/UL (ref 1–4.8)
LYMPHOCYTES NFR BLD: 6 % (ref 22–41)
LYMPHOCYTES NFR BLD: 7.2 % (ref 22–41)
MAGNESIUM SERPL-MCNC: 1.6 MG/DL (ref 1.5–2.5)
MCH RBC QN AUTO: 28.1 PG (ref 27–33)
MCH RBC QN AUTO: 28.2 PG (ref 27–33)
MCHC RBC AUTO-ENTMCNC: 31.3 G/DL (ref 33.6–35)
MCHC RBC AUTO-ENTMCNC: 31.6 G/DL (ref 33.6–35)
MCV RBC AUTO: 89.3 FL (ref 81.4–97.8)
MCV RBC AUTO: 89.7 FL (ref 81.4–97.8)
METHADONE UR QL SCN: NEGATIVE
MICRO URNS: ABNORMAL
MONOCYTES # BLD AUTO: 0.86 K/UL (ref 0–0.85)
MONOCYTES # BLD AUTO: 0.86 K/UL (ref 0–0.85)
MONOCYTES NFR BLD AUTO: 5.9 % (ref 0–13.4)
MONOCYTES NFR BLD AUTO: 6.4 % (ref 0–13.4)
NEUTROPHILS # BLD AUTO: 11.42 K/UL (ref 2–7.15)
NEUTROPHILS # BLD AUTO: 12.32 K/UL (ref 2–7.15)
NEUTROPHILS NFR BLD: 85 % (ref 44–72)
NEUTROPHILS NFR BLD: 85.3 % (ref 44–72)
NITRITE UR QL STRIP.AUTO: NEGATIVE
NRBC # BLD AUTO: 0 K/UL
NRBC # BLD AUTO: 0 K/UL
NRBC BLD-RTO: 0 /100 WBC
NRBC BLD-RTO: 0 /100 WBC
OPIATES UR QL SCN: POSITIVE
OXYCODONE UR QL SCN: NEGATIVE
PCP UR QL SCN: NEGATIVE
PH UR STRIP.AUTO: 6 [PH] (ref 5–8)
PHOSPHATE SERPL-MCNC: 2.4 MG/DL (ref 2.5–4.5)
PLATELET # BLD AUTO: 352 K/UL (ref 164–446)
PLATELET # BLD AUTO: 359 K/UL (ref 164–446)
PMV BLD AUTO: 9.3 FL (ref 9–12.9)
PMV BLD AUTO: 9.6 FL (ref 9–12.9)
POTASSIUM SERPL-SCNC: 3.8 MMOL/L (ref 3.6–5.5)
POTASSIUM SERPL-SCNC: 3.9 MMOL/L (ref 3.6–5.5)
PROPOXYPH UR QL SCN: NEGATIVE
PROT SERPL-MCNC: 5.6 G/DL (ref 6–8.2)
PROT UR QL STRIP: 100 MG/DL
PROTHROMBIN TIME: 39.4 SEC (ref 12–14.6)
RBC # BLD AUTO: 2.53 M/UL (ref 4.2–5.4)
RBC # BLD AUTO: 2.62 M/UL (ref 4.2–5.4)
RBC # URNS HPF: >150 /HPF
RBC UR QL AUTO: ABNORMAL
SODIUM SERPL-SCNC: 135 MMOL/L (ref 135–145)
SODIUM SERPL-SCNC: 135 MMOL/L (ref 135–145)
SP GR UR STRIP.AUTO: 1.02
UROBILINOGEN UR STRIP.AUTO-MCNC: 0.2 MG/DL
WBC # BLD AUTO: 13.4 K/UL (ref 4.8–10.8)
WBC # BLD AUTO: 14.5 K/UL (ref 4.8–10.8)
WBC #/AREA URNS HPF: ABNORMAL /HPF

## 2022-03-28 PROCEDURE — A9270 NON-COVERED ITEM OR SERVICE: HCPCS | Performed by: HOSPITALIST

## 2022-03-28 PROCEDURE — 97535 SELF CARE MNGMENT TRAINING: CPT

## 2022-03-28 PROCEDURE — 700102 HCHG RX REV CODE 250 W/ 637 OVERRIDE(OP): Performed by: HOSPITALIST

## 2022-03-28 PROCEDURE — 71045 X-RAY EXAM CHEST 1 VIEW: CPT

## 2022-03-28 PROCEDURE — 82330 ASSAY OF CALCIUM: CPT

## 2022-03-28 PROCEDURE — 99285 EMERGENCY DEPT VISIT HI MDM: CPT

## 2022-03-28 PROCEDURE — A9270 NON-COVERED ITEM OR SERVICE: HCPCS | Performed by: PHYSICAL MEDICINE & REHABILITATION

## 2022-03-28 PROCEDURE — 97530 THERAPEUTIC ACTIVITIES: CPT

## 2022-03-28 PROCEDURE — 700105 HCHG RX REV CODE 258: Performed by: EMERGENCY MEDICINE

## 2022-03-28 PROCEDURE — 85025 COMPLETE CBC W/AUTO DIFF WBC: CPT

## 2022-03-28 PROCEDURE — 770024 HCHG ROOM/CARE - REHAB LOA (180)

## 2022-03-28 PROCEDURE — 700102 HCHG RX REV CODE 250 W/ 637 OVERRIDE(OP): Performed by: PHYSICAL MEDICINE & REHABILITATION

## 2022-03-28 PROCEDURE — 70450 CT HEAD/BRAIN W/O DYE: CPT

## 2022-03-28 PROCEDURE — 770004 HCHG ROOM/CARE - ONCOLOGY PRIVATE *

## 2022-03-28 PROCEDURE — 36415 COLL VENOUS BLD VENIPUNCTURE: CPT

## 2022-03-28 PROCEDURE — 83735 ASSAY OF MAGNESIUM: CPT

## 2022-03-28 PROCEDURE — 80053 COMPREHEN METABOLIC PANEL: CPT

## 2022-03-28 PROCEDURE — 85025 COMPLETE CBC W/AUTO DIFF WBC: CPT | Mod: 91

## 2022-03-28 PROCEDURE — 700111 HCHG RX REV CODE 636 W/ 250 OVERRIDE (IP)

## 2022-03-28 PROCEDURE — 96374 THER/PROPH/DIAG INJ IV PUSH: CPT

## 2022-03-28 PROCEDURE — 83605 ASSAY OF LACTIC ACID: CPT

## 2022-03-28 PROCEDURE — 87040 BLOOD CULTURE FOR BACTERIA: CPT

## 2022-03-28 PROCEDURE — 85610 PROTHROMBIN TIME: CPT

## 2022-03-28 PROCEDURE — 81001 URINALYSIS AUTO W/SCOPE: CPT

## 2022-03-28 PROCEDURE — 99239 HOSP IP/OBS DSCHRG MGMT >30: CPT | Performed by: PHYSICAL MEDICINE & REHABILITATION

## 2022-03-28 PROCEDURE — 80048 BASIC METABOLIC PNL TOTAL CA: CPT

## 2022-03-28 PROCEDURE — 700105 HCHG RX REV CODE 258: Performed by: HOSPITALIST

## 2022-03-28 PROCEDURE — 80307 DRUG TEST PRSMV CHEM ANLYZR: CPT

## 2022-03-28 PROCEDURE — 700105 HCHG RX REV CODE 258: Performed by: STUDENT IN AN ORGANIZED HEALTH CARE EDUCATION/TRAINING PROGRAM

## 2022-03-28 PROCEDURE — 84100 ASSAY OF PHOSPHORUS: CPT

## 2022-03-28 RX ORDER — MIRTAZAPINE 15 MG/1
22.5 TABLET, FILM COATED ORAL EVERY EVENING
Status: DISCONTINUED | OUTPATIENT
Start: 2022-03-28 | End: 2022-04-04 | Stop reason: HOSPADM

## 2022-03-28 RX ORDER — PROCHLORPERAZINE MALEATE 10 MG
10 TABLET ORAL EVERY 6 HOURS PRN
Status: DISCONTINUED | OUTPATIENT
Start: 2022-03-28 | End: 2022-04-04 | Stop reason: HOSPADM

## 2022-03-28 RX ORDER — NYSTATIN 100000 [USP'U]/G
1 POWDER TOPICAL 2 TIMES DAILY
Status: DISPENSED | OUTPATIENT
Start: 2022-03-28 | End: 2022-04-04

## 2022-03-28 RX ORDER — AMOXICILLIN 250 MG
2 CAPSULE ORAL 2 TIMES DAILY
Status: DISCONTINUED | OUTPATIENT
Start: 2022-03-28 | End: 2022-04-04 | Stop reason: HOSPADM

## 2022-03-28 RX ORDER — BISACODYL 10 MG
10 SUPPOSITORY, RECTAL RECTAL
Status: DISCONTINUED | OUTPATIENT
Start: 2022-03-28 | End: 2022-04-04 | Stop reason: HOSPADM

## 2022-03-28 RX ORDER — ACETAMINOPHEN 325 MG/1
650 TABLET ORAL EVERY 4 HOURS PRN
COMMUNITY

## 2022-03-28 RX ORDER — LINEZOLID 600 MG/1
600 TABLET, FILM COATED ORAL 2 TIMES DAILY
Status: ON HOLD | COMMUNITY
Start: 2022-03-26 | End: 2022-04-04

## 2022-03-28 RX ORDER — MELATONIN
1000 DAILY
Status: ON HOLD | COMMUNITY
End: 2022-04-04

## 2022-03-28 RX ORDER — FLUCONAZOLE 100 MG/1
100 TABLET ORAL DAILY
Status: ON HOLD | COMMUNITY
Start: 2022-03-27 | End: 2022-04-04

## 2022-03-28 RX ORDER — ONDANSETRON 2 MG/ML
4 INJECTION INTRAMUSCULAR; INTRAVENOUS EVERY 6 HOURS PRN
Status: DISCONTINUED | OUTPATIENT
Start: 2022-03-28 | End: 2022-04-04 | Stop reason: HOSPADM

## 2022-03-28 RX ORDER — PAROXETINE HYDROCHLORIDE 20 MG/1
60 TABLET, FILM COATED ORAL DAILY
Status: DISCONTINUED | OUTPATIENT
Start: 2022-03-28 | End: 2022-04-04 | Stop reason: HOSPADM

## 2022-03-28 RX ORDER — OMEPRAZOLE 20 MG/1
20 CAPSULE, DELAYED RELEASE ORAL DAILY
COMMUNITY

## 2022-03-28 RX ORDER — NALOXONE HYDROCHLORIDE 0.4 MG/ML
0.4 INJECTION, SOLUTION INTRAMUSCULAR; INTRAVENOUS; SUBCUTANEOUS ONCE
Status: COMPLETED | OUTPATIENT
Start: 2022-03-28 | End: 2022-03-28

## 2022-03-28 RX ORDER — OMEPRAZOLE 20 MG/1
20 CAPSULE, DELAYED RELEASE ORAL DAILY
Status: DISCONTINUED | OUTPATIENT
Start: 2022-03-29 | End: 2022-04-04 | Stop reason: HOSPADM

## 2022-03-28 RX ORDER — LACTULOSE 10 G/15ML
20 SOLUTION ORAL
Status: ON HOLD | COMMUNITY
End: 2022-04-04

## 2022-03-28 RX ORDER — CALCIUM CARBONATE 500 MG/1
500 TABLET, CHEWABLE ORAL 3 TIMES DAILY
Status: DISCONTINUED | OUTPATIENT
Start: 2022-03-28 | End: 2022-04-04 | Stop reason: HOSPADM

## 2022-03-28 RX ORDER — SODIUM CHLORIDE 9 MG/ML
500 INJECTION, SOLUTION INTRAVENOUS ONCE
Status: COMPLETED | OUTPATIENT
Start: 2022-03-28 | End: 2022-03-28

## 2022-03-28 RX ORDER — HYDROXYCHLOROQUINE SULFATE 200 MG/1
400 TABLET, FILM COATED ORAL DAILY
Status: DISCONTINUED | OUTPATIENT
Start: 2022-03-29 | End: 2022-04-04 | Stop reason: HOSPADM

## 2022-03-28 RX ORDER — BACLOFEN 10 MG/1
10 TABLET ORAL 3 TIMES DAILY
Status: DISCONTINUED | OUTPATIENT
Start: 2022-03-28 | End: 2022-03-29

## 2022-03-28 RX ORDER — ROPINIROLE 1 MG/1
1 TABLET, FILM COATED ORAL 2 TIMES DAILY
Status: DISCONTINUED | OUTPATIENT
Start: 2022-03-28 | End: 2022-04-04 | Stop reason: HOSPADM

## 2022-03-28 RX ORDER — NYSTATIN 100000 [USP'U]/G
1 POWDER TOPICAL 2 TIMES DAILY
COMMUNITY

## 2022-03-28 RX ORDER — GUAIFENESIN 600 MG/1
600 TABLET, EXTENDED RELEASE ORAL EVERY 12 HOURS
Status: DISCONTINUED | OUTPATIENT
Start: 2022-03-28 | End: 2022-04-04 | Stop reason: HOSPADM

## 2022-03-28 RX ORDER — NALOXONE HYDROCHLORIDE 0.4 MG/ML
INJECTION, SOLUTION INTRAMUSCULAR; INTRAVENOUS; SUBCUTANEOUS
Status: COMPLETED
Start: 2022-03-28 | End: 2022-03-28

## 2022-03-28 RX ORDER — SODIUM CHLORIDE 9 MG/ML
INJECTION, SOLUTION INTRAVENOUS CONTINUOUS
Status: DISCONTINUED | OUTPATIENT
Start: 2022-03-28 | End: 2022-04-04 | Stop reason: HOSPADM

## 2022-03-28 RX ORDER — POTASSIUM CHLORIDE 1.5 G/1.58G
20 POWDER, FOR SOLUTION ORAL DAILY
Status: ON HOLD | COMMUNITY
End: 2022-04-04

## 2022-03-28 RX ORDER — POLYETHYLENE GLYCOL 3350 17 G/17G
1 POWDER, FOR SOLUTION ORAL
Status: DISCONTINUED | OUTPATIENT
Start: 2022-03-28 | End: 2022-04-04 | Stop reason: HOSPADM

## 2022-03-28 RX ADMIN — ROPINIROLE HYDROCHLORIDE 1 MG: 1 TABLET, FILM COATED ORAL at 08:33

## 2022-03-28 RX ADMIN — HYDROXYCHLOROQUINE SULFATE 400 MG: 200 TABLET ORAL at 08:31

## 2022-03-28 RX ADMIN — HYDROMORPHONE HYDROCHLORIDE 1 MG: 2 TABLET ORAL at 05:04

## 2022-03-28 RX ADMIN — BACLOFEN 10 MG: 10 TABLET ORAL at 08:34

## 2022-03-28 RX ADMIN — POTASSIUM CHLORIDE 20 MEQ: 1500 TABLET, EXTENDED RELEASE ORAL at 08:34

## 2022-03-28 RX ADMIN — NYSTATIN 1 APPLICATION: 100000 POWDER TOPICAL at 08:26

## 2022-03-28 RX ADMIN — LINEZOLID 600 MG: 600 TABLET, FILM COATED ORAL at 08:39

## 2022-03-28 RX ADMIN — SODIUM CHLORIDE: 9 INJECTION, SOLUTION INTRAVENOUS at 14:45

## 2022-03-28 RX ADMIN — GUAIFENESIN 600 MG: 600 TABLET, EXTENDED RELEASE ORAL at 08:34

## 2022-03-28 RX ADMIN — OMEPRAZOLE 20 MG: 20 CAPSULE, DELAYED RELEASE ORAL at 08:31

## 2022-03-28 RX ADMIN — RIVAROXABAN 15 MG: 15 TABLET, FILM COATED ORAL at 08:30

## 2022-03-28 RX ADMIN — NALOXONE HYDROCHLORIDE 0.4 MG: 0.4 INJECTION, SOLUTION INTRAMUSCULAR; INTRAVENOUS; SUBCUTANEOUS at 12:04

## 2022-03-28 RX ADMIN — SODIUM CHLORIDE 500 ML: 9 INJECTION, SOLUTION INTRAVENOUS at 13:59

## 2022-03-28 RX ADMIN — FLUCONAZOLE 100 MG: 100 TABLET ORAL at 08:34

## 2022-03-28 RX ADMIN — Medication 1000 UNITS: at 08:34

## 2022-03-28 RX ADMIN — PROPRANOLOL HYDROCHLORIDE 20 MG: 10 TABLET ORAL at 08:39

## 2022-03-28 RX ADMIN — SODIUM CHLORIDE 1000 ML: 9 INJECTION, SOLUTION INTRAVENOUS at 05:06

## 2022-03-28 RX ADMIN — ACETAMINOPHEN 650 MG: 325 TABLET ORAL at 09:51

## 2022-03-28 RX ADMIN — SIMETHICONE 125 MG: 125 TABLET, CHEWABLE ORAL at 08:29

## 2022-03-28 RX ADMIN — THERA TABS 1 TABLET: TAB at 08:33

## 2022-03-28 RX ADMIN — CALCIUM CARBONATE 500 MG: 500 TABLET, CHEWABLE ORAL at 08:39

## 2022-03-28 RX ADMIN — AZELASTINE HYDROCHLORIDE 1 SPRAY: 137 SPRAY, METERED NASAL at 08:26

## 2022-03-28 ASSESSMENT — FIBROSIS 4 INDEX: FIB4 SCORE: 1.89

## 2022-03-28 ASSESSMENT — PAIN DESCRIPTION - PAIN TYPE: TYPE: ACUTE PAIN;SURGICAL PAIN

## 2022-03-28 NOTE — ED TRIAGE NOTES
Patient biba from [facility] for decrease level of consciousness since this morning per staff patient appears more tired. OA, patient is arousable to voice and alert to self, with purposeful movement and vital signs are stable at this time.

## 2022-03-28 NOTE — CARE PLAN
Problem: Mobility  Goal: STG-Within one week, patient will propel wheelchair community - inside of hospital with Mod I in manual wc.  Outcome: Not Met  Note: PT eval completed on 3/25/22; will monitor pt's progress towards STGs this week.     Problem: Mobility Transfers  Goal: STG-Within one week, patient will transfer bed to chair with Min A, lateral scoot or slide board tx.  Outcome: Not Met  Note: PT eval completed on 3/25/22; will monitor pt's progress towards STGs this week.  Goal: STG-Within one week, patient will move supine to sit SBA with bed flat.  Outcome: Not Met  Note: PT eval completed on 3/25/22; will monitor pt's progress towards STGs this week.

## 2022-03-28 NOTE — PROGRESS NOTES
Notified Dr Rivera that patient's urinary OP for first 9 hours of shift = 200mL. IV fluids increased from 83mL/hr to 125mL /hr. Pt's family at bedside encouraging fluids. Will endorse to night shift RN.

## 2022-03-28 NOTE — CARE PLAN
Problem: Problem Solving STGs  Goal: STG-Within one week, patient will  Description: Complete functional attention tasks with 70% acc. With min cues.   Outcome: Not Met  Note: Goal not yet targeted     Problem: Memory STGs  Goal: STG-Within one week, patient will  Description: Recall novel information with 70% acc. With min cues to utilize trained internal and external memory strategies.      Outcome: Not Met  Note: Goal not yet targeted

## 2022-03-28 NOTE — PROGRESS NOTES
Pt more alert but does remain drowsy. Dr Mcgovern notified. 14:00 scheduled dose 1 mg dilaudid held.

## 2022-03-28 NOTE — THERAPY
03/28/22 1425   IDT Conference   IDT Conference I have reviewed and discussed the POC and barriers to discharge for this patient.  I am knowledgeable of the patient's needs and have attended the IDT Conference.  (Anticipated d/c Friday 4/8/22.  Please focus on FWW transfers with good LLE NWB adherence and decreased A; family training; w/c level mobility.)

## 2022-03-28 NOTE — PROGRESS NOTES
Handoff rport given to [facility] paramedic, Pt hx and current condition discussed. Pt transferred to [facility] via [facility] at 1024 for stat CT of head and abdomen.

## 2022-03-28 NOTE — PROGRESS NOTES
Pt continues to have delayed responses, lethargic, Dr Awan and Dr Rivera assessed pt.. Pt to have CT scan of brain and abdomen.

## 2022-03-28 NOTE — PROGRESS NOTES
Received patient during shift change, report rec'd from day shift RN. Resting in bed, VS stable on room air. Incontinent of Bowel, garza and suprapubic cath in place draining bloody urine, MD aware. Max assist for transfers. Pt sleepy, easily awakened, able to make needs known. Bed in low position, call light within reach.

## 2022-03-28 NOTE — PROGRESS NOTES
Shift report received from YOJANA Bob RN, POC discussed. Pt is awake in bed, restless. Gown placed on her. Call light and bedside table w/in reach. Monitoring in progress.

## 2022-03-28 NOTE — THERAPY
Missed Therapy     Patient Name: Magali Leal  Age:  56 y.o., Sex:  female  Medical Record #: [id number]  Today's Date: 3/28/2022    Discussed missed therapy with Physical therapist.  Per PT, hold orders have already been placed, and pt has discharged from the facility.         03/28/22 1431   Therapy Missed   Missed Therapy (Minutes) 60   Reason For Missed Therapy Non-Medical - Patient out of Facility  (discharged to acute due to change in mental status)
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
## 2022-03-28 NOTE — THERAPY
Missed Therapy     Patient Name: Magali Leal  Age:  56 y.o., Sex:  female  Medical Record #: [id number]  Today's Date: 3/28/2022    Discussed missed therapy with OT, 2 RNs, therapy scheduling, and SLP.       03/28/22 1031   Interdisciplinary Plan of Care Collaboration   IDT Collaboration with  Nursing;Occupational Therapist;Speech Therapist;Therapy Tech   Collaboration Comments Discussed pt's CLOF, medical limitations; Patient transferred acute to [facility]; pt currently out of facility; ordering does not allow for therapy holds to be officially placed as of this time with patient out of facility; notified SLP and discussed with therapy scheduling, RN, and OT.   Therapy Missed   Missed Therapy (Minutes) 60   Reason For Missed Therapy Medical - Patient not Able To Participate;Medical - Patient Is Not Medically Stable;Non-Medical - Patient out of Facility  (Patient transferred to acute.)

## 2022-03-28 NOTE — CARE PLAN
The patient is Unstable - High likelihood or risk of patient condition declining or worsening    Shift Goals  Clinical Goals: safety, bm  Patient Goals: pain control, sleep well    Problem: Knowledge Deficit - Standard  Goal: Patient and family/care givers will demonstrate understanding of plan of care, disease process/condition, diagnostic tests and medications  Outcome: Progressing: Pt's spouse here from approx 11:00 to 17:00. POC and updates discussed with pt and her spouse.

## 2022-03-28 NOTE — ED NOTES
Med rec completed per pt's MAR   Allergies reviewed    Pt is on a course of Zyvox that is due to end on 3/31/2022    Pt is on a course of Fluconazole that's is due to end on 3/31/2022    Pt is due to start Xarelto 20 mg on 4/12/2022

## 2022-03-28 NOTE — PROGRESS NOTES
While administering omar care for incontinent episode of bowel, increased vaginal bleeding noted, placed feminine pad with brief. Continues bright red drainage to both garza and suprapubic catheter bags, lines patent. VS stable. Pt sleepy, but responsive, follows commands.

## 2022-03-28 NOTE — ED PROVIDER NOTES
"ED Provider Note    CHIEF COMPLAINT  Chief Complaint   Patient presents with   • Mental Status Change     Patient is nonverbal and unable to give a history  Patient's brother is at the bedside and later the patient's wife is also present    HPI  Magali Leal is a 56 y.o. female with uterine cancer who presents from rehab where she was admitted for PT following ORIF of the left ankle for evaluation of altered mental state/somnolence.    Patient's brother gives much of the history initially.  He states she was discharged from [facility] to rehab last week.  Yesterday morning she was somewhat sleepy.  The staff at rehab has been trying to wean down her pain medications and gave her Narcan yesterday which improved her symptoms at that time.  She was interactive and verbal yesterday evening when her brother left to go home.  When he arrived this morning she was much drowsier and unable to take her medications.    Patient currently has a UTI and has bleeding from her suprapubic catheter.    ALLERGIES  Allergies   Allergen Reactions   • Bactrim Ds Rash and Itching     \"Itchy rash\"   • Ciprofloxacin Rash     States \"something always goes wrong\" Dysthesia   • Morphine Unspecified     Family history of becoming violent \"Paranoia, aggression\"   • Tramadol Vomiting and Nausea   • Lavender Oil Swelling       CURRENT MEDICATIONS  Home Medications     Reviewed by Faina Figueroa (Pharmacy Tech) on 03/28/22 at 1126  Med List Status: Complete   Medication Last Dose Status   acetaminophen (TYLENOL) 325 MG Tab 3/28/2022 Active   amLODIPine (NORVASC) 5 MG Tab 3/27/2022 Active   Azelastine HCl 137 MCG/SPRAY Solution 3/28/2022 Active   baclofen (LIORESAL) 10 MG Tab 3/28/2022 Active   calcium carbonate (TUMS) 500 MG Chew Tab 3/28/2022 Active   fluconazole (DIFLUCAN) 100 MG Tab 3/28/2022 Active   guaiFENesin ER (MUCINEX) 600 MG TABLET SR 12 HR 3/28/2022 Active   hydroxychloroquine (PLAQUENIL) 200 MG Tab 3/28/2022 Active   ibuprofen " (MOTRIN) 600 MG Tab 3/25/2022 Active   lactulose 10 GM/15ML Solution 3/26/2022 Active   linezolid (ZYVOX) 600 MG Tab 3/28/2022 Active   mirtazapine (REMERON) 15 MG Tab 3/27/2022 Active   multivitamin (THERAGRAN) Tab 3/28/2022 Active   nystatin (MYCOSTATIN) powder 3/28/2022 Active   omeprazole (PRILOSEC) 20 MG delayed-release capsule 3/28/2022 Active   PARoxetine (PAXIL) 20 MG Tab 3/26/2022 Active   potassium chloride (KLOR-CON) 20 MEQ Pack 3/28/2022 Active   prochlorperazine (COMPAZINE) 10 MG Tab 3/25/2022 Active   propranolol (INDERAL) 20 MG Tab 3/28/2022 Active   rivaroxaban (XARELTO) 15 MG Tab tablet 3/28/2022 Active   rivaroxaban (XARELTO) 20 MG Tab tablet NOT YET STARTED Active   ROPINIRole (REQUIP) 1 MG Tab 3/28/2022 Active   scopolamine (TRANSDERM-SCOP) 1 mg/72hr PATCH 72 HR 3/25/2022 Active   simethicone (MYLICON) 125 MG chewable tablet 3/28/2022 Active   vitamin D (CHOLECALCIFEROL) 1000 Unit Tab 3/28/2022 Active                PAST MEDICAL HISTORY   has a past medical history of Allergic rhinitis, Anemia, Anxiety, Arthritis, Cancer (HCC) (07/2021), Chronic pain syndrome, Chronic prescription opiate use, Fibromyalgia, Lupus (HCC), Pilonidal cyst, Psychiatric problem, and Raynaud disease.    SURGICAL HISTORY   has a past surgical history that includes pilonidal cyst excision (1/23/2020); other orthopedic surgery; cath placement (Right, 10/14/2021); other (07/2020); pelvic examination w anesth (12/10/2021); orif, ankle (Left, 2/26/2022); total abdom hysterectomy (3/3/2022); lap,diagnostic abdomen (3/3/2022); exploratory of abdomen (3/3/2022); aspiration bladder insert suprapubic catheter (3/3/2022); removal tunneled cv cath (3/3/2022); and salpingo oophorectomy (Bilateral, 3/3/2022).    SOCIAL HISTORY  Social History     Tobacco Use   • Smoking status: Never Smoker   • Smokeless tobacco: Never Used   Vaping Use   • Vaping Use: Never used   Substance and Sexual Activity   • Alcohol use: Not Currently   • Drug  "use: No   • Sexual activity: Not on file     Here with her brother and her wife    Family Hx:  Unobtainable given patient's nonverbal state      REVIEW OF SYSTEMS  Unable to obtain given patient's altered mental state      PHYSICAL EXAM  VITAL SIGNS: /76   Pulse 94   Temp 36.2 °C (97.2 °F) (Temporal)   Resp (!) 22   Ht 1.702 m (5' 7\")   Wt 99.1 kg (218 lb 7.6 oz)   SpO2 95%   BMI 34.22 kg/m²    General:  WDWN female, chronically ill appearing in NAD; somnolent, not arousable; V/S as above; afebrile  Skin: warm and dry; pale color; no rash  HEENT: NCAT; EOMs intact; pupils are 4 mm bilaterally, sluggish; no scleral icterus   Neck: Soft, supple  Cardiovascular: Regular heart rate and rhythm.  No murmurs, rubs, or gallops; pulses 2+ bilaterally radially and DP areas  Lungs: Clear to auscultation with good air movement bilaterally.  No wheezes, rhonchi, or rales.   Abdomen: BS present; soft; no apparent tenderness; healing midline incision without drainage or erythema; no rebound, guarding, or rigidity.  No organomegaly or pulsatile mass  Extremities: Moving her arms occasionally; walking boot on left lower extremity  Neurologic: Somnolent, not arousable, nonverbal, normal tone  Psychiatric: Unable to assess given patient's altered mental state    LABS  Results for orders placed or performed during the hospital encounter of 03/28/22   CBC WITH DIFFERENTIAL   Result Value Ref Range    WBC 14.5 (H) 4.8 - 10.8 K/uL    RBC 2.62 (L) 4.20 - 5.40 M/uL    Hemoglobin 7.4 (L) 12.0 - 16.0 g/dL    Hematocrit 23.4 (L) 37.0 - 47.0 %    MCV 89.3 81.4 - 97.8 fL    MCH 28.2 27.0 - 33.0 pg    MCHC 31.6 (L) 33.6 - 35.0 g/dL    RDW 56.9 (H) 35.9 - 50.0 fL    Platelet Count 352 164 - 446 K/uL    MPV 9.3 9.0 - 12.9 fL    Neutrophils-Polys 85.00 (H) 44.00 - 72.00 %    Lymphocytes 7.20 (L) 22.00 - 41.00 %    Monocytes 5.90 0.00 - 13.40 %    Eosinophils 0.00 0.00 - 6.90 %    Basophils 0.10 0.00 - 1.80 %    Immature Granulocytes " 1.80 (H) 0.00 - 0.90 %    Nucleated RBC 0.00 /100 WBC    Neutrophils (Absolute) 12.32 (H) 2.00 - 7.15 K/uL    Lymphs (Absolute) 1.05 1.00 - 4.80 K/uL    Monos (Absolute) 0.86 (H) 0.00 - 0.85 K/uL    Eos (Absolute) 0.00 0.00 - 0.51 K/uL    Baso (Absolute) 0.02 0.00 - 0.12 K/uL    Immature Granulocytes (abs) 0.26 (H) 0.00 - 0.11 K/uL    NRBC (Absolute) 0.00 K/uL   CMP   Result Value Ref Range    Sodium 135 135 - 145 mmol/L    Potassium 3.9 3.6 - 5.5 mmol/L    Chloride 104 96 - 112 mmol/L    Co2 15 (L) 20 - 33 mmol/L    Anion Gap 16.0 7.0 - 16.0    Glucose 97 65 - 99 mg/dL    Bun 19 8 - 22 mg/dL    Creatinine 2.54 (H) 0.50 - 1.40 mg/dL    Calcium 9.1 8.5 - 10.5 mg/dL    AST(SGOT) 33 12 - 45 U/L    ALT(SGPT) 7 2 - 50 U/L    Alkaline Phosphatase 115 (H) 30 - 99 U/L    Total Bilirubin 0.2 0.1 - 1.5 mg/dL    Albumin 2.5 (L) 3.2 - 4.9 g/dL    Total Protein 5.6 (L) 6.0 - 8.2 g/dL    Globulin 3.1 1.9 - 3.5 g/dL    A-G Ratio 0.8 g/dL   URINALYSIS    Specimen: Urine   Result Value Ref Range    Color Red (A)     Character Bloody (A)     Specific Gravity 1.020 <1.035    Ph 6.0 5.0 - 8.0    Glucose Negative Negative mg/dL    Ketones Negative Negative mg/dL    Protein 100 (A) Negative mg/dL    Bilirubin Negative Negative    Urobilinogen, Urine 0.2 Negative    Nitrite Negative Negative    Leukocyte Esterase Moderate (A) Negative    Occult Blood Large (A) Negative    Micro Urine Req Microscopic    URINE DRUG SCREEN   Result Value Ref Range    Amphetamines Urine Negative Negative    Barbiturates Negative Negative    Benzodiazepines Negative Negative    Cocaine Metabolite Negative Negative    Methadone Negative Negative    Opiates Positive (A) Negative    Oxycodone Negative Negative    Phencyclidine -Pcp Negative Negative    Propoxyphene Negative Negative    Cannabinoid Metab Negative Negative   Lactic Acid -STAT Once   Result Value Ref Range    Lactic Acid 1.4 0.5 - 2.0 mmol/L   Prothrombin time (INR)   Result Value Ref Range    PT  39.4 (H) 12.0 - 14.6 sec    INR 4.22 (H) 0.87 - 1.13   IONIZED CALCIUM   Result Value Ref Range    Ionized Calcium 1.3 1.1 - 1.3 mmol/L   ESTIMATED GFR   Result Value Ref Range    GFR (CKD-EPI) 22 (A) >60 mL/min/1.73 m 2   URINE MICROSCOPIC (W/UA)   Result Value Ref Range    WBC 20-50 (A) /hpf    RBC >150 (A) /hpf    Bacteria Negative None /hpf    Epithelial Cells Rare /hpf    Hyaline Cast 0-2 /lpf       IMAGING  CT-HEAD W/O   Final Result      Head CT without contrast within normal limits. No evidence of acute cerebral infarction, hemorrhage or mass lesion.         DX-CHEST-PORTABLE (1 VIEW)   Final Result      1.  Interval removal of port venous access device.   2.  Borderline cardiomegaly accentuated by hypoinflation.   3.  No acute cardiopulmonary disease.          MEDICAL RECORD  I have reviewed patient's medical record and pertinent results are listed below.      COURSE & MEDICAL DECISION MAKING  I have reviewed any medical record information, laboratory studies and radiographic results as noted.    Magali Leal is a 56 y.o. female who presents for evaluation of altered mental state.  Patient is currently at rehab.  She is receiving narcotic pain medications.  She was given Narcan yesterday with improvement for similar symptoms.  Narcan was given here and she improved slightly here as well.  CT head demonstrated no intracranial hemorrhage or mass.  Labs demonstrate worsening creatinine.  Patient has a suprapubic and Brenner catheter in place.  There is gross hematuria noted.  Perhaps creatinine is due to obstructive uropathy.  We are hesitant to irrigate the bladder given recent surgery which included repair of the bladder by Dr. Dyson.  Patient's anemia is stable.  INR is elevated to 4.2.  Per report, the patient is only on Xarelto.  Patient's white blood cell count is 14.5.  CO2 was 15.  She is dehydrated based on these labs.  Lactic acid is normal and she is afebrile.  She is currently on antibiotics already  for UTI.    Appropriate PPE was worn at all times while interacting with the patient.    I advised the patient's wife about testing results and the plan to admit. She is amenable to this.    1:40 PM  I discussed the case with Dr. Dyson who will admit the patient.      IMPRESSION  1. Hematuria, unspecified type     2. Elevated INR     3. Acute renal failure, unspecified acute renal failure type (HCC)     4. Somnolence         Electronically signed by: Deanna Melvin M.D., 3/28/2022 11:02 AM

## 2022-03-28 NOTE — CARE PLAN
Problem: Bathing  Goal: STG-Within one week, patient will bathe body with min A using AE/AD  Outcome: Not Met  Note: Mod A     Problem: Dressing  Goal: STG-Within one week, patient will dress LB with mod A using AE/AD  Outcome: Not Met  Note: Total Assist     Problem: Toileting  Goal: STG-Within one week, patient will complete toileting tasks with max A using AE/AD  Outcome: Not Met  Note: Total Assist

## 2022-03-28 NOTE — PROGRESS NOTES
"Rehab Progress Note     Date of Service: 3/28/2022  Chief Complaint: Follow-up debility    Interval Events (Subjective)    Patient seen and examined today in her room.  Her brother is present.  She had altered mental status yesterday requiring Narcan.  She continues to be altered today.  She denies any pain or headache.  She has developed acute kidney injury is not improving with fluids over the weekend, as well as gross hematuria in both of her catheters.  For this reason she was sent urgently to the emergency department for further evaluation and management.    Objective:  VITAL SIGNS: /87   Pulse (!) 106   Temp 36.6 °C (97.9 °F) (Oral)   Resp 18   Ht 1.702 m (5' 7.01\")   Wt 99.1 kg (218 lb 7.6 oz)   SpO2 96%   BMI 34.21 kg/m²   Gen: Somnolent, difficult to arouse  MSK: Pain in the left ankle  Neuro: notable for dilated pupils, minimally reactive      Recent Results (from the past 72 hour(s))   Basic Metabolic Panel    Collection Time: 03/27/22  5:46 AM   Result Value Ref Range    Sodium 134 (L) 135 - 145 mmol/L    Potassium 3.8 3.6 - 5.5 mmol/L    Chloride 101 96 - 112 mmol/L    Co2 17 (L) 20 - 33 mmol/L    Glucose 91 65 - 99 mg/dL    Bun 15 8 - 22 mg/dL    Creatinine 2.02 (H) 0.50 - 1.40 mg/dL    Calcium 9.0 8.5 - 10.5 mg/dL    Anion Gap 16.0 7.0 - 16.0   MAGNESIUM    Collection Time: 03/27/22  5:46 AM   Result Value Ref Range    Magnesium 1.7 1.5 - 2.5 mg/dL   PHOSPHORUS    Collection Time: 03/27/22  5:46 AM   Result Value Ref Range    Phosphorus 2.7 2.5 - 4.5 mg/dL   ESTIMATED GFR    Collection Time: 03/27/22  5:46 AM   Result Value Ref Range    GFR (CKD-EPI) 28 (A) >60 mL/min/1.73 m 2   CBC WITH DIFFERENTIAL    Collection Time: 03/27/22  3:05 PM   Result Value Ref Range    WBC 11.3 (H) 4.8 - 10.8 K/uL    RBC 2.67 (L) 4.20 - 5.40 M/uL    Hemoglobin 7.5 (L) 12.0 - 16.0 g/dL    Hematocrit 23.5 (L) 37.0 - 47.0 %    MCV 88.0 81.4 - 97.8 fL    MCH 28.1 27.0 - 33.0 pg    MCHC 31.9 (L) 33.6 - 35.0 g/dL "    RDW 56.1 (H) 35.9 - 50.0 fL    Platelet Count 337 164 - 446 K/uL    MPV 9.5 9.0 - 12.9 fL    Neutrophils-Polys 81.90 (H) 44.00 - 72.00 %    Lymphocytes 8.20 (L) 22.00 - 41.00 %    Monocytes 7.10 0.00 - 13.40 %    Eosinophils 0.00 0.00 - 6.90 %    Basophils 0.20 0.00 - 1.80 %    Immature Granulocytes 2.60 (H) 0.00 - 0.90 %    Nucleated RBC 0.00 /100 WBC    Neutrophils (Absolute) 9.24 (H) 2.00 - 7.15 K/uL    Lymphs (Absolute) 0.93 (L) 1.00 - 4.80 K/uL    Monos (Absolute) 0.80 0.00 - 0.85 K/uL    Eos (Absolute) 0.00 0.00 - 0.51 K/uL    Baso (Absolute) 0.02 0.00 - 0.12 K/uL    Immature Granulocytes (abs) 0.29 (H) 0.00 - 0.11 K/uL    NRBC (Absolute) 0.00 K/uL   Comp Metabolic Panel    Collection Time: 03/27/22  3:05 PM   Result Value Ref Range    Sodium 134 (L) 135 - 145 mmol/L    Potassium 4.0 3.6 - 5.5 mmol/L    Chloride 101 96 - 112 mmol/L    Co2 18 (L) 20 - 33 mmol/L    Anion Gap 15.0 7.0 - 16.0    Glucose 86 65 - 99 mg/dL    Bun 16 8 - 22 mg/dL    Creatinine 2.25 (H) 0.50 - 1.40 mg/dL    Calcium 8.8 8.5 - 10.5 mg/dL    AST(SGOT) 32 12 - 45 U/L    ALT(SGPT) 7 2 - 50 U/L    Alkaline Phosphatase 109 (H) 30 - 99 U/L    Total Bilirubin 0.2 0.1 - 1.5 mg/dL    Albumin 2.6 (L) 3.2 - 4.9 g/dL    Total Protein 5.4 (L) 6.0 - 8.2 g/dL    Globulin 2.8 1.9 - 3.5 g/dL    A-G Ratio 0.9 g/dL   ESTIMATED GFR    Collection Time: 03/27/22  3:05 PM   Result Value Ref Range    GFR (CKD-EPI) 25 (A) >60 mL/min/1.73 m 2   CBC WITH DIFFERENTIAL    Collection Time: 03/28/22  4:50 AM   Result Value Ref Range    WBC 13.4 (H) 4.8 - 10.8 K/uL    RBC 2.53 (L) 4.20 - 5.40 M/uL    Hemoglobin 7.1 (L) 12.0 - 16.0 g/dL    Hematocrit 22.7 (L) 37.0 - 47.0 %    MCV 89.7 81.4 - 97.8 fL    MCH 28.1 27.0 - 33.0 pg    MCHC 31.3 (L) 33.6 - 35.0 g/dL    RDW 57.6 (H) 35.9 - 50.0 fL    Platelet Count 359 164 - 446 K/uL    MPV 9.6 9.0 - 12.9 fL    Neutrophils-Polys 85.30 (H) 44.00 - 72.00 %    Lymphocytes 6.00 (L) 22.00 - 41.00 %    Monocytes 6.40 0.00 - 13.40  %    Eosinophils 0.10 0.00 - 6.90 %    Basophils 0.10 0.00 - 1.80 %    Immature Granulocytes 2.10 (H) 0.00 - 0.90 %    Nucleated RBC 0.00 /100 WBC    Neutrophils (Absolute) 11.42 (H) 2.00 - 7.15 K/uL    Lymphs (Absolute) 0.81 (L) 1.00 - 4.80 K/uL    Monos (Absolute) 0.86 (H) 0.00 - 0.85 K/uL    Eos (Absolute) 0.01 0.00 - 0.51 K/uL    Baso (Absolute) 0.02 0.00 - 0.12 K/uL    Immature Granulocytes (abs) 0.28 (H) 0.00 - 0.11 K/uL    NRBC (Absolute) 0.00 K/uL   Basic Metabolic Panel    Collection Time: 03/28/22  4:50 AM   Result Value Ref Range    Sodium 135 135 - 145 mmol/L    Potassium 3.8 3.6 - 5.5 mmol/L    Chloride 103 96 - 112 mmol/L    Co2 18 (L) 20 - 33 mmol/L    Glucose 87 65 - 99 mg/dL    Bun 18 8 - 22 mg/dL    Creatinine 2.53 (H) 0.50 - 1.40 mg/dL    Calcium 8.8 8.5 - 10.5 mg/dL    Anion Gap 14.0 7.0 - 16.0   MAGNESIUM    Collection Time: 03/28/22  4:50 AM   Result Value Ref Range    Magnesium 1.6 1.5 - 2.5 mg/dL   PHOSPHORUS    Collection Time: 03/28/22  4:50 AM   Result Value Ref Range    Phosphorus 2.4 (L) 2.5 - 4.5 mg/dL   ESTIMATED GFR    Collection Time: 03/28/22  4:50 AM   Result Value Ref Range    GFR (CKD-EPI) 22 (A) >60 mL/min/1.73 m 2   CBC WITH DIFFERENTIAL    Collection Time: 03/28/22 11:41 AM   Result Value Ref Range    WBC 14.5 (H) 4.8 - 10.8 K/uL    RBC 2.62 (L) 4.20 - 5.40 M/uL    Hemoglobin 7.4 (L) 12.0 - 16.0 g/dL    Hematocrit 23.4 (L) 37.0 - 47.0 %    MCV 89.3 81.4 - 97.8 fL    MCH 28.2 27.0 - 33.0 pg    MCHC 31.6 (L) 33.6 - 35.0 g/dL    RDW 56.9 (H) 35.9 - 50.0 fL    Platelet Count 352 164 - 446 K/uL    MPV 9.3 9.0 - 12.9 fL    Neutrophils-Polys 85.00 (H) 44.00 - 72.00 %    Lymphocytes 7.20 (L) 22.00 - 41.00 %    Monocytes 5.90 0.00 - 13.40 %    Eosinophils 0.00 0.00 - 6.90 %    Basophils 0.10 0.00 - 1.80 %    Immature Granulocytes 1.80 (H) 0.00 - 0.90 %    Nucleated RBC 0.00 /100 WBC    Neutrophils (Absolute) 12.32 (H) 2.00 - 7.15 K/uL    Lymphs (Absolute) 1.05 1.00 - 4.80 K/uL    Monos  (Absolute) 0.86 (H) 0.00 - 0.85 K/uL    Eos (Absolute) 0.00 0.00 - 0.51 K/uL    Baso (Absolute) 0.02 0.00 - 0.12 K/uL    Immature Granulocytes (abs) 0.26 (H) 0.00 - 0.11 K/uL    NRBC (Absolute) 0.00 K/uL   CMP    Collection Time: 03/28/22 11:41 AM   Result Value Ref Range    Sodium 135 135 - 145 mmol/L    Potassium 3.9 3.6 - 5.5 mmol/L    Chloride 104 96 - 112 mmol/L    Co2 15 (L) 20 - 33 mmol/L    Anion Gap 16.0 7.0 - 16.0    Glucose 97 65 - 99 mg/dL    Bun 19 8 - 22 mg/dL    Creatinine 2.54 (H) 0.50 - 1.40 mg/dL    Calcium 9.1 8.5 - 10.5 mg/dL    AST(SGOT) 33 12 - 45 U/L    ALT(SGPT) 7 2 - 50 U/L    Alkaline Phosphatase 115 (H) 30 - 99 U/L    Total Bilirubin 0.2 0.1 - 1.5 mg/dL    Albumin 2.5 (L) 3.2 - 4.9 g/dL    Total Protein 5.6 (L) 6.0 - 8.2 g/dL    Globulin 3.1 1.9 - 3.5 g/dL    A-G Ratio 0.8 g/dL   Lactic Acid -STAT Once    Collection Time: 03/28/22 11:41 AM   Result Value Ref Range    Lactic Acid 1.4 0.5 - 2.0 mmol/L   Prothrombin time (INR)    Collection Time: 03/28/22 11:41 AM   Result Value Ref Range    PT 39.4 (H) 12.0 - 14.6 sec    INR 4.22 (H) 0.87 - 1.13   IONIZED CALCIUM    Collection Time: 03/28/22 11:41 AM   Result Value Ref Range    Ionized Calcium 1.3 1.1 - 1.3 mmol/L   ESTIMATED GFR    Collection Time: 03/28/22 11:41 AM   Result Value Ref Range    GFR (CKD-EPI) 22 (A) >60 mL/min/1.73 m 2   URINALYSIS    Collection Time: 03/28/22 12:38 PM    Specimen: Urine   Result Value Ref Range    Color Red (A)     Character Bloody (A)     Specific Gravity 1.020 <1.035    Ph 6.0 5.0 - 8.0    Glucose Negative Negative mg/dL    Ketones Negative Negative mg/dL    Protein 100 (A) Negative mg/dL    Bilirubin Negative Negative    Urobilinogen, Urine 0.2 Negative    Nitrite Negative Negative    Leukocyte Esterase Moderate (A) Negative    Occult Blood Large (A) Negative    Micro Urine Req Microscopic    URINE DRUG SCREEN    Collection Time: 03/28/22 12:38 PM   Result Value Ref Range    Amphetamines Urine Negative  Negative    Barbiturates Negative Negative    Benzodiazepines Negative Negative    Cocaine Metabolite Negative Negative    Methadone Negative Negative    Opiates Positive (A) Negative    Oxycodone Negative Negative    Phencyclidine -Pcp Negative Negative    Propoxyphene Negative Negative    Cannabinoid Metab Negative Negative   URINE MICROSCOPIC (W/UA)    Collection Time: 03/28/22 12:38 PM   Result Value Ref Range    WBC 20-50 (A) /hpf    RBC >150 (A) /hpf    Bacteria Negative None /hpf    Epithelial Cells Rare /hpf    Hyaline Cast 0-2 /lpf       Current Facility-Administered Medications   Medication Frequency   • [MAR Hold - Suspended Admission] NS infusion Continuous   • [MAR Hold - Suspended Admission] amLODIPine (NORVASC) tablet 7.5 mg Q EVENING   • [MAR Hold - Suspended Admission] linezolid (ZYVOX) tablet 600 mg Q12HRS   • [MAR Hold - Suspended Admission] fluconazole (DIFLUCAN) tablet 100 mg DAILY   • [MAR Hold - Suspended Admission] vitamin D3 (cholecalciferol) tablet 1,000 Units DAILY   • [MAR Hold - Suspended Admission] Azelastine HCl SOLN 1 Spray TID   • [MAR Hold - Suspended Admission] potassium chloride SA (Kdur) tablet 20 mEq DAILY   • [MAR Hold - Suspended Admission] multivitamin (THERAGRAN) tablet 1 Tablet DAILY   • [MAR Hold - Suspended Admission] omeprazole (PRILOSEC) capsule 20 mg QAM AC   • [MAR Hold - Suspended Admission] simethicone (Mylicon) chewable tablet 125 mg TID WITH MEALS   • [MAR Hold - Suspended Admission] hydrOXYzine HCl (ATARAX) tablet 50 mg Q6HRS PRN   • [MAR Hold - Suspended Admission] melatonin tablet 3 mg HS PRN   • [MAR Hold - Suspended Admission] Respiratory Therapy Consult Continuous RT   • [MAR Hold - Suspended Admission] Pharmacy Consult Request ...Pain Management Review 1 Each PHARMACY TO DOSE   • [MAR Hold - Suspended Admission] hydrALAZINE (APRESOLINE) tablet 10 mg Q8HRS PRN   • [MAR Hold - Suspended Admission] acetaminophen (Tylenol) tablet 650 mg Q4HRS PRN   • [MAR Hold  - Suspended Admission] lactulose 20 GM/30ML solution 30 mL QDAY PRN   • [MAR Hold - Suspended Admission] artificial tears ophthalmic solution 1 Drop PRN   • [MAR Hold - Suspended Admission] benzocaine-menthol (Cepacol) lozenge 1 Lozenge Q2HRS PRN   • [MAR Hold - Suspended Admission] mag hydrox-al hydrox-simeth (MAALOX PLUS ES or MYLANTA DS) suspension 20 mL Q2HRS PRN   • [MAR Hold - Suspended Admission] ondansetron (ZOFRAN ODT) dispertab 4 mg 4X/DAY PRN    Or   • [MAR Hold - Suspended Admission] ondansetron (ZOFRAN) syringe/vial injection 4 mg 4X/DAY PRN   • [MAR Hold - Suspended Admission] sodium chloride (OCEAN) 0.65 % nasal spray 2 Spray PRN   • [MAR Hold - Suspended Admission] prochlorperazine (COMPAZINE) tablet 10 mg Q6HRS PRN   • [MAR Hold - Suspended Admission] baclofen (LIORESAL) tablet 10 mg TID   • [MAR Hold - Suspended Admission] calcium carbonate (TUMS) chewable tab 500 mg TID   • [MAR Hold - Suspended Admission] guaiFENesin ER (MUCINEX) tablet 600 mg Q12HRS   • [MAR Hold - Suspended Admission] hydroxychloroquine (Plaquenil) tablet 400 mg DAILY   • [MAR Hold - Suspended Admission] diphenhydrAMINE (BENADRYL) tablet/capsule 25 mg Q6HRS PRN   • [MAR Hold - Suspended Admission] ibuprofen (MOTRIN) tablet 600 mg Q6HRS PRN   • [MAR Hold - Suspended Admission] mirtazapine (Remeron) orally disintegrating tab 22.5 mg QHS   • nystatin (MYCOSTATIN) powder BID   • [MAR Hold - Suspended Admission] PARoxetine (PAXIL) tablet 60 mg DAILY   • [MAR Hold - Suspended Admission] propranolol (INDERAL) tablet 20 mg DAILY   • [MAR Hold - Suspended Admission] rivaroxaban (XARELTO) tablet 15 mg BID WITH MEALS    Followed by   • [MAR Hold - Suspended Admission] rivaroxaban (XARELTO) tablet 20 mg PM MEAL   • [MAR Hold - Suspended Admission] ROPINIRole (REQUIP) tablet 1 mg BID   • [MAR Hold - Suspended Admission] scopolamine (TRANSDERM-SCOP) patch 1 Patch Q72HRS   • [MAR Hold - Suspended Admission] simethicone (Mylicon) chewable  tablet 125 mg TID PRN     Current Outpatient Medications   Medication   • fluconazole (DIFLUCAN) 100 MG Tab   • linezolid (ZYVOX) 600 MG Tab   • multivitamin (THERAGRAN) Tab   • nystatin (MYCOSTATIN) powder   • omeprazole (PRILOSEC) 20 MG delayed-release capsule   • potassium chloride (KLOR-CON) 20 MEQ Pack   • vitamin D (CHOLECALCIFEROL) 1000 Unit Tab   • acetaminophen (TYLENOL) 325 MG Tab   • lactulose 10 GM/15ML Solution   • calcium carbonate (TUMS) 500 MG Chew Tab   • guaiFENesin ER (MUCINEX) 600 MG TABLET SR 12 HR   • ibuprofen (MOTRIN) 600 MG Tab   • rivaroxaban (XARELTO) 15 MG Tab tablet   • [START ON 4/12/2022] rivaroxaban (XARELTO) 20 MG Tab tablet   • scopolamine (TRANSDERM-SCOP) 1 mg/72hr PATCH 72 HR   • simethicone (MYLICON) 125 MG chewable tablet   • PARoxetine (PAXIL) 20 MG Tab   • prochlorperazine (COMPAZINE) 10 MG Tab   • Azelastine HCl 137 MCG/SPRAY Solution   • mirtazapine (REMERON) 15 MG Tab   • baclofen (LIORESAL) 10 MG Tab   • propranolol (INDERAL) 20 MG Tab   • amLODIPine (NORVASC) 5 MG Tab   • ROPINIRole (REQUIP) 1 MG Tab   • hydroxychloroquine (PLAQUENIL) 200 MG Tab     Facility-Administered Medications Ordered in Other Encounters   Medication Frequency   • senna-docusate (PERICOLACE or SENOKOT S) 8.6-50 MG per tablet 2 Tablet BID    And   • polyethylene glycol/lytes (MIRALAX) PACKET 1 Packet QDAY PRN    And   • magnesium hydroxide (MILK OF MAGNESIA) suspension 30 mL QDAY PRN    And   • bisacodyl (DULCOLAX) suppository 10 mg QDAY PRN   • NS infusion Continuous       Orders Placed This Encounter   Procedures   • Diet Order Diet: Low Fiber(GI Soft)     Standing Status:   Standing     Number of Occurrences:   1     Order Specific Question:   Diet:     Answer:   Low Fiber(GI Soft) [2]       Assessment:  Active Hospital Problems    Diagnosis    • *Debility    • Somnolence    • Acute kidney injury (HCC)    • Hyponatremia    • Vitamin D deficiency    • Allergic rhinitis    • Low blood potassium    •  Distended abdomen    • Edema, lower extremity    • Closed trimalleolar fracture of left ankle with routine healing    • Nausea    • Raynaud's disease without gangrene    • Primary hypertension    • Anxiety    • PTSD (post-traumatic stress disorder)    • Endometrial carcinoma (HCC)    • Hypocalcemia    • Acute deep vein thrombosis (DVT) of brachial vein (HCC)    • Restless legs    • Urinary incontinence    • Suprapubic catheter (HCC)    • S/P bladder repair    • UTI (urinary tract infection)    • Anemia    • Lupus (HCC)        I led and attended the weekly conference today, and agree with the IDT conference documentation and plan of care as noted below.      Date of conference: 3/28/2022    Goals and barriers: See IDT note.    Biggest barriers: poor endurance, mild cognitive impairment,     CM/social support: Wife supportive and works from home    Anticipated DC date: 4/8    Home health: PT/OT/SLP/[place]    [place] [facility] [place] up[place]CP, Dr. Dyson        Medical Decision Making and Plan:    Debility  Continue full rehab program  PT/OT/SLP, 1 hr each discipline, 5 days per week     Endometrial carcinoma  Stage III  Status post surgery with Dr. Dyson 3/3  Outpatient follow-up for further treatment recommendations     Urinary incontinence  Status post bladder repair  Continue suprapubic and Brenner catheter until follow-up with Dr. Dyson     Left trimalleolar ankle fracture  Status post ORIF 2/26 with Dr. Levy  Nonweightbearing in boot  Outpatient follow-up  May be able to x-ray patient while here in advance weightbearing status     Nausea  Chronic without emesis  Scopolamine patch  As needed Compazine     Right upper extremity DVT  Xarelto     Lupus  Raynaud's  Plaquenil     Essential hypertension  Amlodipine  Propranolol     Acute kidney injury   Start IV fluids  Consult hospitalist     Hyponatremia  Mild, start IV fluids     Restless  legs  Requip     Hypocalcemia  Supplementation     Hypokalemia  Supplementation     Anemia  Monitor likely secondary to chemotherapy  Monitor with weekly labs     Allergic rhinitis  Azelastine  Mucinex     Fibromyalgia  Chronic pain  Baclofen  Schedule Dilaudid  We will transition patient back to home 50 mg oxycodone     Anxiety/depression history  Paxil  Mirtazapine     Skin candidiasis  Nystatin     Vitamin D deficiency  Started supplementation    Bowel program  Continue bowel medications  Last BM 3/28    Bladder program  Check PVRs  Bladder scan for no voids  ICP for over 400 cc  Scheduled toileting    DVT prophylaxis  Kerwin Awan M.D.   Physical Medicine and Rehabilitation

## 2022-03-28 NOTE — CARE PLAN
"The patient is Unstable - High likelihood or risk of patient condition declining or worsening    Shift Goals  Clinical Goals: safety, monitor LOC  Patient Goals: comfort, sleep well    Progress made toward(s) clinical / shift goals:  Resting in bed after hs meds. Sleeping off and on, resp unlabored. Using call light for assist as needed.     Darline Good Fall risk Assessment Score: 16    High fall risk Interventions   - Bed and strip alarm   - Yellow sign by the door   - Yellow wrist band \"Fall risk\"  - Room near to the nurse station  - Do not leave patient unattended in the bathroom  - Fall risk education provided  - Call light within reach   - Yellow  socks   - Belongings within reach   - Bed in the lowest position     Problem: Skin Integrity  Goal: Patient's skin integrity will be maintained or improve  Outcome: Progressing  Kathrine care provided, barrier cream applied after incontinent episodes. Skin intact.     Patient is not progressing towards the following goals:    Problem: Bladder / Voiding  Goal: Patient will establish and maintain regular urinary output  Outcome: Not Met  Bright red fluid draining to garza and suprapubic collection bags. Clots noted in garza cath tubing, garza tubing flushed per order.     "

## 2022-03-29 ENCOUNTER — APPOINTMENT (OUTPATIENT)
Dept: RADIOLOGY | Facility: MEDICAL CENTER | Age: 57
DRG: 755 | End: 2022-03-29
Attending: STUDENT IN AN ORGANIZED HEALTH CARE EDUCATION/TRAINING PROGRAM
Payer: COMMERCIAL

## 2022-03-29 ENCOUNTER — HOSPITAL ENCOUNTER (INPATIENT)
Dept: RADIOLOGY | Facility: MEDICAL CENTER | Age: 57
DRG: 755 | End: 2022-03-29
Attending: STUDENT IN AN ORGANIZED HEALTH CARE EDUCATION/TRAINING PROGRAM | Admitting: PHYSICAL MEDICINE & REHABILITATION
Payer: COMMERCIAL

## 2022-03-29 LAB
CREAT UR-MCNC: 86.99 MG/DL
GRAM STN SPEC: NORMAL
SIGNIFICANT IND 70042: NORMAL
SITE SITE: NORMAL
SODIUM UR-SCNC: 62 MMOL/L
SOURCE SOURCE: NORMAL

## 2022-03-29 PROCEDURE — 700105 HCHG RX REV CODE 258: Performed by: STUDENT IN AN ORGANIZED HEALTH CARE EDUCATION/TRAINING PROGRAM

## 2022-03-29 PROCEDURE — 87205 SMEAR GRAM STAIN: CPT

## 2022-03-29 PROCEDURE — 700102 HCHG RX REV CODE 250 W/ 637 OVERRIDE(OP): Performed by: STUDENT IN AN ORGANIZED HEALTH CARE EDUCATION/TRAINING PROGRAM

## 2022-03-29 PROCEDURE — 700111 HCHG RX REV CODE 636 W/ 250 OVERRIDE (IP): Performed by: STUDENT IN AN ORGANIZED HEALTH CARE EDUCATION/TRAINING PROGRAM

## 2022-03-29 PROCEDURE — 84300 ASSAY OF URINE SODIUM: CPT

## 2022-03-29 PROCEDURE — 770004 HCHG ROOM/CARE - ONCOLOGY PRIVATE *

## 2022-03-29 PROCEDURE — 87186 SC STD MICRODIL/AGAR DIL: CPT | Mod: 91

## 2022-03-29 PROCEDURE — 74176 CT ABD & PELVIS W/O CONTRAST: CPT

## 2022-03-29 PROCEDURE — 82570 ASSAY OF URINE CREATININE: CPT

## 2022-03-29 PROCEDURE — 87086 URINE CULTURE/COLONY COUNT: CPT

## 2022-03-29 PROCEDURE — A9270 NON-COVERED ITEM OR SERVICE: HCPCS | Performed by: STUDENT IN AN ORGANIZED HEALTH CARE EDUCATION/TRAINING PROGRAM

## 2022-03-29 RX ORDER — OXYCODONE HYDROCHLORIDE 10 MG/1
10 TABLET ORAL EVERY 4 HOURS
Status: DISCONTINUED | OUTPATIENT
Start: 2022-03-29 | End: 2022-04-03

## 2022-03-29 RX ORDER — AZELASTINE HYDROCHLORIDE 137 UG/1
2 SPRAY, METERED NASAL 2 TIMES DAILY
Status: DISCONTINUED | OUTPATIENT
Start: 2022-03-29 | End: 2022-04-02

## 2022-03-29 RX ORDER — HYDROMORPHONE HYDROCHLORIDE 2 MG/1
1 TABLET ORAL EVERY 4 HOURS PRN
Status: DISCONTINUED | OUTPATIENT
Start: 2022-03-29 | End: 2022-03-29

## 2022-03-29 RX ADMIN — PAROXETINE HYDROCHLORIDE 60 MG: 20 TABLET, FILM COATED ORAL at 18:19

## 2022-03-29 RX ADMIN — BACLOFEN 10 MG: 10 TABLET ORAL at 12:05

## 2022-03-29 RX ADMIN — SODIUM CHLORIDE: 9 INJECTION, SOLUTION INTRAVENOUS at 05:27

## 2022-03-29 RX ADMIN — MIRTAZAPINE 22.5 MG: 15 TABLET, FILM COATED ORAL at 18:19

## 2022-03-29 RX ADMIN — NYSTATIN 100000 UNITS: 100000 POWDER TOPICAL at 16:46

## 2022-03-29 RX ADMIN — HYDROMORPHONE HYDROCHLORIDE 1 MG: 2 TABLET ORAL at 18:38

## 2022-03-29 RX ADMIN — AZELASTINE HYDROCHLORIDE 2 SPRAY: 137 SPRAY, METERED NASAL at 20:21

## 2022-03-29 RX ADMIN — CALCIUM CARBONATE 500 MG: 500 TABLET, CHEWABLE ORAL at 12:05

## 2022-03-29 RX ADMIN — GUAIFENESIN 600 MG: 600 TABLET, EXTENDED RELEASE ORAL at 18:19

## 2022-03-29 RX ADMIN — ROPINIROLE HYDROCHLORIDE 1 MG: 1 TABLET, FILM COATED ORAL at 18:19

## 2022-03-29 RX ADMIN — ONDANSETRON 4 MG: 2 INJECTION INTRAMUSCULAR; INTRAVENOUS at 20:15

## 2022-03-29 RX ADMIN — CALCIUM CARBONATE 500 MG: 500 TABLET, CHEWABLE ORAL at 18:19

## 2022-03-29 RX ADMIN — AMLODIPINE BESYLATE 7.5 MG: 5 TABLET ORAL at 18:18

## 2022-03-29 RX ADMIN — HYDROMORPHONE HYDROCHLORIDE 1 MG: 2 TABLET ORAL at 13:00

## 2022-03-29 ASSESSMENT — ENCOUNTER SYMPTOMS
FLANK PAIN: 0
HEADACHES: 0
DIZZINESS: 0
SINUS PAIN: 0
FEVER: 0
CONSTIPATION: 0
SHORTNESS OF BREATH: 0
NAUSEA: 0
CHILLS: 0
DIARRHEA: 0
VOMITING: 0
COUGH: 0
BLURRED VISION: 0
MYALGIAS: 0
ABDOMINAL PAIN: 1

## 2022-03-29 ASSESSMENT — PAIN DESCRIPTION - PAIN TYPE
TYPE: ACUTE PAIN
TYPE: ACUTE PAIN

## 2022-03-29 NOTE — CARE PLAN
Problem: OT Long Term Goals  Goal: LTG-By discharge, patient will complete basic self care tasks with supervision using AE/AD  Outcome: Not Met  Goal: LTG-By discharge, patient will perform bathroom transfers with supervision using AE/AD  Outcome: Not Met

## 2022-03-29 NOTE — ED NOTES
Report to Caity HICKS.  Pt transported to [facility] via transport with all belongings and SO.

## 2022-03-29 NOTE — DISCHARGE PLANNING
Care Transition Team Assessment    Chart reviewed.      The patient resides in a single story home with her spouse Gosia Talbot [phone].  The patient was in [facility] prior to this admission.   Noted in the last assessment, she obtains her rx from [facility] and [facility].    CM will continue to follow for needs.     Information Source  Orientation Level: Oriented to place,Oriented to situation,Oriented to person,Disoriented to time (knew the month & year, just not date)  Information Given By: Other (Comments) (chart review)  Who is responsible for making decisions for patient? : Spouse  Name(s) of Primary Decision Maker: Gosia Talbot    Readmission Evaluation  Is this a readmission?: Yes - unplanned readmission    Elopement Risk  Legal Hold: No  Ambulatory or Self Mobile in Wheelchair: No-Not an Elopement Risk  Elopement Risk: Not at Risk for Elopement         Discharge Preparedness  What is your plan after discharge?: Uncertain - pending medical team collaboration  What are your discharge supports?: Spouse  Prior Functional Level: Needs Assist with Activities of Daily Living    Functional Assesment  Prior Functional Level: Needs Assist with Activities of Daily Living    Finances  Financial Barriers to Discharge: No  Prescription Coverage: Yes    Vision / Hearing Impairment  Right Eye Vision: Impaired,Wears Glasses  Left Eye Vision: Impaired,Wears Glasses              Domestic Abuse  Have you ever been the victim of abuse or violence?: No         Discharge Risks or Barriers  Discharge risks or barriers?: No    Anticipated Discharge Information  Discharge Disposition: Disch to IP rehab facility or distinct part unit (62)

## 2022-03-29 NOTE — ED NOTES
Patient has bloody urine leakage from around the garza catheter. Urine is pooling, asked Estiven SUÁREZ for recommendations
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
## 2022-03-29 NOTE — PROGRESS NOTES
Late entry  Paged Dr. yDson's office regarding order for CT with contrast, pt GFR is 22.    Sofia returned page, stated she would change order to CT without contrast.

## 2022-03-29 NOTE — PROGRESS NOTES
Updated Dr. Dyson--pt lethargic with blood cultures drawn and no antibiotics on MAR. New orders to collect a urine culture from either garza or suprapubic catheter.

## 2022-03-29 NOTE — PROGRESS NOTES
4 Eyes Skin Assessment Completed by FABIOLA Reddy and FABIOLA Mera.    Head WDL  Ears WDL  Nose WDL  Mouth WDL  Neck WDL  Breast/Chest Redness/Rash  Shoulder Blades WDL  Spine WDL  (R) Arm/Elbow/Hand Edema  (L) Arm/Elbow/Hand WDL  Abdomen Scar, Scab and Incision, Edema, Redness and Blanching  Groin Redness and Blanching  Scrotum/Coccyx/Buttocks Redness and Blanching, Bruising  (R) Leg Edema  (L) Leg Edema, Abrasion  (R) Heel/Foot/Toe Redness, Blanching and Edema  (L) Heel/Foot/Toe Bruising, Swelling, Scab and Edema, Incisions          Devices In Places Pulse Ox, Brenner and Ortho Boot/Shoe, Suprapubic Catheter.      Interventions In Place Waffle Overlay, Pillows, Q2 Turns, Barrier Cream, Dri-Dakota Pads, Heels Loaded W/Pillows and Pressure Redistribution Mattress    Possible Skin Injury No    Pictures Uploaded Into Epic N/A  Wound Consult Placed N/A  RN Wound Prevention Protocol Ordered No

## 2022-03-30 LAB
ALBUMIN SERPL BCP-MCNC: 2.6 G/DL (ref 3.2–4.9)
ALBUMIN/GLOB SERPL: 0.8 G/DL
ALP SERPL-CCNC: 132 U/L (ref 30–99)
ALT SERPL-CCNC: 9 U/L (ref 2–50)
ANION GAP SERPL CALC-SCNC: 14 MMOL/L (ref 7–16)
AST SERPL-CCNC: 44 U/L (ref 12–45)
BASOPHILS # BLD AUTO: 0.1 % (ref 0–1.8)
BASOPHILS # BLD: 0.02 K/UL (ref 0–0.12)
BILIRUB SERPL-MCNC: 0.2 MG/DL (ref 0.1–1.5)
BUN SERPL-MCNC: 26 MG/DL (ref 8–22)
CALCIUM SERPL-MCNC: 9.1 MG/DL (ref 8.5–10.5)
CHLORIDE SERPL-SCNC: 104 MMOL/L (ref 96–112)
CO2 SERPL-SCNC: 16 MMOL/L (ref 20–33)
CREAT SERPL-MCNC: 3.06 MG/DL (ref 0.5–1.4)
EOSINOPHIL # BLD AUTO: 0 K/UL (ref 0–0.51)
EOSINOPHIL NFR BLD: 0 % (ref 0–6.9)
ERYTHROCYTE [DISTWIDTH] IN BLOOD BY AUTOMATED COUNT: 60.1 FL (ref 35.9–50)
GFR SERPLBLD CREATININE-BSD FMLA CKD-EPI: 17 ML/MIN/1.73 M 2
GLOBULIN SER CALC-MCNC: 3.1 G/DL (ref 1.9–3.5)
GLUCOSE SERPL-MCNC: 93 MG/DL (ref 65–99)
HCT VFR BLD AUTO: 23.3 % (ref 37–47)
HGB BLD-MCNC: 7.2 G/DL (ref 12–16)
IMM GRANULOCYTES # BLD AUTO: 0.36 K/UL (ref 0–0.11)
IMM GRANULOCYTES NFR BLD AUTO: 2.5 % (ref 0–0.9)
INR PPP: 1.55 (ref 0.87–1.13)
LYMPHOCYTES # BLD AUTO: 1.02 K/UL (ref 1–4.8)
LYMPHOCYTES NFR BLD: 7 % (ref 22–41)
MCH RBC QN AUTO: 28.2 PG (ref 27–33)
MCHC RBC AUTO-ENTMCNC: 30.9 G/DL (ref 33.6–35)
MCV RBC AUTO: 91.4 FL (ref 81.4–97.8)
MONOCYTES # BLD AUTO: 1 K/UL (ref 0–0.85)
MONOCYTES NFR BLD AUTO: 6.9 % (ref 0–13.4)
NEUTROPHILS # BLD AUTO: 12.11 K/UL (ref 2–7.15)
NEUTROPHILS NFR BLD: 83.5 % (ref 44–72)
NRBC # BLD AUTO: 0 K/UL
NRBC BLD-RTO: 0 /100 WBC
PLATELET # BLD AUTO: 333 K/UL (ref 164–446)
PMV BLD AUTO: 9.4 FL (ref 9–12.9)
POTASSIUM SERPL-SCNC: 3.8 MMOL/L (ref 3.6–5.5)
PROT SERPL-MCNC: 5.7 G/DL (ref 6–8.2)
PROTHROMBIN TIME: 18.1 SEC (ref 12–14.6)
RBC # BLD AUTO: 2.55 M/UL (ref 4.2–5.4)
SODIUM SERPL-SCNC: 134 MMOL/L (ref 135–145)
WBC # BLD AUTO: 14.5 K/UL (ref 4.8–10.8)

## 2022-03-30 PROCEDURE — 700102 HCHG RX REV CODE 250 W/ 637 OVERRIDE(OP): Performed by: STUDENT IN AN ORGANIZED HEALTH CARE EDUCATION/TRAINING PROGRAM

## 2022-03-30 PROCEDURE — 700111 HCHG RX REV CODE 636 W/ 250 OVERRIDE (IP): Performed by: STUDENT IN AN ORGANIZED HEALTH CARE EDUCATION/TRAINING PROGRAM

## 2022-03-30 PROCEDURE — 85610 PROTHROMBIN TIME: CPT

## 2022-03-30 PROCEDURE — 770004 HCHG ROOM/CARE - ONCOLOGY PRIVATE *

## 2022-03-30 PROCEDURE — 700105 HCHG RX REV CODE 258: Performed by: STUDENT IN AN ORGANIZED HEALTH CARE EDUCATION/TRAINING PROGRAM

## 2022-03-30 PROCEDURE — 36415 COLL VENOUS BLD VENIPUNCTURE: CPT

## 2022-03-30 PROCEDURE — 302151 K-PAD 14X20: Performed by: SPECIALIST

## 2022-03-30 PROCEDURE — 85025 COMPLETE CBC W/AUTO DIFF WBC: CPT

## 2022-03-30 PROCEDURE — 80053 COMPREHEN METABOLIC PANEL: CPT

## 2022-03-30 PROCEDURE — A9270 NON-COVERED ITEM OR SERVICE: HCPCS | Performed by: STUDENT IN AN ORGANIZED HEALTH CARE EDUCATION/TRAINING PROGRAM

## 2022-03-30 RX ORDER — PROPRANOLOL HYDROCHLORIDE 20 MG/1
20 TABLET ORAL DAILY
Status: DISCONTINUED | OUTPATIENT
Start: 2022-03-30 | End: 2022-04-04 | Stop reason: HOSPADM

## 2022-03-30 RX ADMIN — OXYCODONE HYDROCHLORIDE 10 MG: 10 TABLET ORAL at 05:01

## 2022-03-30 RX ADMIN — PROCHLORPERAZINE MALEATE 10 MG: 10 TABLET ORAL at 20:06

## 2022-03-30 RX ADMIN — CALCIUM CARBONATE 500 MG: 500 TABLET, CHEWABLE ORAL at 11:45

## 2022-03-30 RX ADMIN — OXYCODONE HYDROCHLORIDE 10 MG: 10 TABLET ORAL at 11:45

## 2022-03-30 RX ADMIN — AZELASTINE HYDROCHLORIDE 2 SPRAY: 137 SPRAY, METERED NASAL at 20:05

## 2022-03-30 RX ADMIN — HYDROXYCHLOROQUINE SULFATE 400 MG: 200 TABLET ORAL at 05:08

## 2022-03-30 RX ADMIN — MIRTAZAPINE 22.5 MG: 15 TABLET, FILM COATED ORAL at 17:13

## 2022-03-30 RX ADMIN — OXYCODONE HYDROCHLORIDE 10 MG: 10 TABLET ORAL at 15:56

## 2022-03-30 RX ADMIN — PROCHLORPERAZINE MALEATE 10 MG: 10 TABLET ORAL at 00:10

## 2022-03-30 RX ADMIN — ONDANSETRON 4 MG: 2 INJECTION INTRAMUSCULAR; INTRAVENOUS at 14:46

## 2022-03-30 RX ADMIN — AZELASTINE HYDROCHLORIDE 2 SPRAY: 137 SPRAY, METERED NASAL at 14:54

## 2022-03-30 RX ADMIN — GUAIFENESIN 600 MG: 600 TABLET, EXTENDED RELEASE ORAL at 17:11

## 2022-03-30 RX ADMIN — ROPINIROLE HYDROCHLORIDE 1 MG: 1 TABLET, FILM COATED ORAL at 17:12

## 2022-03-30 RX ADMIN — SODIUM CHLORIDE: 9 INJECTION, SOLUTION INTRAVENOUS at 02:00

## 2022-03-30 RX ADMIN — OXYCODONE HYDROCHLORIDE 10 MG: 10 TABLET ORAL at 00:10

## 2022-03-30 RX ADMIN — OXYCODONE HYDROCHLORIDE 10 MG: 10 TABLET ORAL at 20:05

## 2022-03-30 RX ADMIN — PAROXETINE HYDROCHLORIDE 60 MG: 20 TABLET, FILM COATED ORAL at 17:11

## 2022-03-30 RX ADMIN — AZELASTINE HYDROCHLORIDE 2 SPRAY: 137 SPRAY, METERED NASAL at 05:12

## 2022-03-30 RX ADMIN — OXYCODONE HYDROCHLORIDE 10 MG: 10 TABLET ORAL at 07:39

## 2022-03-30 RX ADMIN — NYSTATIN 100000 UNITS: 100000 POWDER TOPICAL at 18:00

## 2022-03-30 RX ADMIN — CALCIUM CARBONATE 500 MG: 500 TABLET, CHEWABLE ORAL at 05:06

## 2022-03-30 RX ADMIN — ONDANSETRON 4 MG: 2 INJECTION INTRAMUSCULAR; INTRAVENOUS at 05:02

## 2022-03-30 RX ADMIN — PROPRANOLOL HYDROCHLORIDE 20 MG: 20 TABLET ORAL at 17:13

## 2022-03-30 RX ADMIN — ROPINIROLE HYDROCHLORIDE 1 MG: 1 TABLET, FILM COATED ORAL at 05:12

## 2022-03-30 RX ADMIN — GUAIFENESIN 600 MG: 600 TABLET, EXTENDED RELEASE ORAL at 05:06

## 2022-03-30 RX ADMIN — CALCIUM CARBONATE 500 MG: 500 TABLET, CHEWABLE ORAL at 17:10

## 2022-03-30 RX ADMIN — NYSTATIN 100000 UNITS: 100000 POWDER TOPICAL at 05:07

## 2022-03-30 RX ADMIN — AMLODIPINE BESYLATE 7.5 MG: 5 TABLET ORAL at 17:10

## 2022-03-30 RX ADMIN — OMEPRAZOLE 20 MG: 20 CAPSULE, DELAYED RELEASE ORAL at 05:06

## 2022-03-30 RX ADMIN — ONDANSETRON 4 MG: 2 INJECTION INTRAMUSCULAR; INTRAVENOUS at 22:30

## 2022-03-30 ASSESSMENT — ENCOUNTER SYMPTOMS
DIARRHEA: 0
SHORTNESS OF BREATH: 0
CHILLS: 0
MYALGIAS: 0
VOMITING: 1
ABDOMINAL PAIN: 1
BLURRED VISION: 0
FEVER: 0
COUGH: 0
CONSTIPATION: 0

## 2022-03-30 ASSESSMENT — PAIN DESCRIPTION - PAIN TYPE
TYPE: ACUTE PAIN

## 2022-03-30 NOTE — CARE PLAN
Problem: Knowledge Deficit - Standard  Goal: Patient and family/care givers will demonstrate understanding of plan of care, disease process/condition, diagnostic tests and medications  Outcome: Progressing     Problem: Hemodynamics  Goal: Patient's hemodynamics, fluid balance and neurologic status will be stable or improve  Outcome: Progressing   The patient is Watcher - Medium risk of patient condition declining or worsening    Shift Goals  Clinical Goals: safety; pain management  Patient Goals: rest  Family Goals: n/a    Progress made toward(s) clinical / shift goals:  Pain at tolerable level per pt.     Patient is not progressing towards the following goals:

## 2022-03-30 NOTE — CARE PLAN
"The patient is Stable - Low risk of patient condition declining or worsening    Shift Goals  Clinical Goals: safety  Patient Goals: \"feel better\"    Progress made toward(s) clinical / shift goals:    Problem: Knowledge Deficit - Standard  Goal: Patient and family/care givers will demonstrate understanding of plan of care, disease process/condition, diagnostic tests and medications  Outcome: Progressing  Note: Pt was oriented this shift. Pt had CT without contrast today.   ZOHREH Black at bedside. POC discussed with patient and wife Gosia.      Problem: Fall Risk  Goal: Patient will remain free from falls  Outcome: Progressing  Note: Strip bed alarm on. Pt sat up to edge of bed with assistance. Pt is a high fall risk.        Patient is not progressing towards the following goals:      "

## 2022-03-30 NOTE — CARE PLAN
"The patient is Watcher - Medium risk of patient condition declining or worsening    Shift Goals  Clinical Goals: safety  Patient Goals: \"feel better\"    Progress made toward(s) clinical / shift goals:     Problem: Skin Integrity  Goal: Skin integrity is maintained or improved  Outcome: Progressing     Problem: Fall Risk  Goal: Patient will remain free from falls  Outcome: Progressing     Problem: Pain - Standard  Goal: Alleviation of pain or a reduction in pain to the patient’s comfort goal  Outcome: Progressing       Patient is not progressing towards the following goals:      "
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
## 2022-03-31 LAB
ALBUMIN SERPL BCP-MCNC: 2.6 G/DL (ref 3.2–4.9)
ALBUMIN/GLOB SERPL: 0.8 G/DL
ALP SERPL-CCNC: 131 U/L (ref 30–99)
ALT SERPL-CCNC: 10 U/L (ref 2–50)
ANION GAP SERPL CALC-SCNC: 17 MMOL/L (ref 7–16)
AST SERPL-CCNC: 42 U/L (ref 12–45)
BASOPHILS # BLD AUTO: 0.1 % (ref 0–1.8)
BASOPHILS # BLD: 0.02 K/UL (ref 0–0.12)
BILIRUB SERPL-MCNC: 0.2 MG/DL (ref 0.1–1.5)
BUN SERPL-MCNC: 27 MG/DL (ref 8–22)
CALCIUM SERPL-MCNC: 9.3 MG/DL (ref 8.5–10.5)
CHLORIDE SERPL-SCNC: 106 MMOL/L (ref 96–112)
CO2 SERPL-SCNC: 14 MMOL/L (ref 20–33)
CREAT SERPL-MCNC: 3.19 MG/DL (ref 0.5–1.4)
EOSINOPHIL # BLD AUTO: 0 K/UL (ref 0–0.51)
EOSINOPHIL NFR BLD: 0 % (ref 0–6.9)
ERYTHROCYTE [DISTWIDTH] IN BLOOD BY AUTOMATED COUNT: 59.8 FL (ref 35.9–50)
GFR SERPLBLD CREATININE-BSD FMLA CKD-EPI: 16 ML/MIN/1.73 M 2
GLOBULIN SER CALC-MCNC: 3.2 G/DL (ref 1.9–3.5)
GLUCOSE SERPL-MCNC: 88 MG/DL (ref 65–99)
HCT VFR BLD AUTO: 22.3 % (ref 37–47)
HGB BLD-MCNC: 7 G/DL (ref 12–16)
IMM GRANULOCYTES # BLD AUTO: 0.28 K/UL (ref 0–0.11)
IMM GRANULOCYTES NFR BLD AUTO: 1.9 % (ref 0–0.9)
LYMPHOCYTES # BLD AUTO: 0.61 K/UL (ref 1–4.8)
LYMPHOCYTES NFR BLD: 4.1 % (ref 22–41)
MCH RBC QN AUTO: 28.5 PG (ref 27–33)
MCHC RBC AUTO-ENTMCNC: 31.4 G/DL (ref 33.6–35)
MCV RBC AUTO: 90.7 FL (ref 81.4–97.8)
MONOCYTES # BLD AUTO: 0.62 K/UL (ref 0–0.85)
MONOCYTES NFR BLD AUTO: 4.2 % (ref 0–13.4)
NEUTROPHILS # BLD AUTO: 13.39 K/UL (ref 2–7.15)
NEUTROPHILS NFR BLD: 89.7 % (ref 44–72)
NRBC # BLD AUTO: 0 K/UL
NRBC BLD-RTO: 0 /100 WBC
PLATELET # BLD AUTO: 300 K/UL (ref 164–446)
PMV BLD AUTO: 9.2 FL (ref 9–12.9)
POTASSIUM SERPL-SCNC: 3.7 MMOL/L (ref 3.6–5.5)
PROT SERPL-MCNC: 5.8 G/DL (ref 6–8.2)
RBC # BLD AUTO: 2.46 M/UL (ref 4.2–5.4)
SODIUM SERPL-SCNC: 137 MMOL/L (ref 135–145)
WBC # BLD AUTO: 14.9 K/UL (ref 4.8–10.8)

## 2022-03-31 PROCEDURE — 97162 PT EVAL MOD COMPLEX 30 MIN: CPT

## 2022-03-31 PROCEDURE — 700105 HCHG RX REV CODE 258: Performed by: STUDENT IN AN ORGANIZED HEALTH CARE EDUCATION/TRAINING PROGRAM

## 2022-03-31 PROCEDURE — 700111 HCHG RX REV CODE 636 W/ 250 OVERRIDE (IP): Performed by: STUDENT IN AN ORGANIZED HEALTH CARE EDUCATION/TRAINING PROGRAM

## 2022-03-31 PROCEDURE — A9270 NON-COVERED ITEM OR SERVICE: HCPCS | Performed by: STUDENT IN AN ORGANIZED HEALTH CARE EDUCATION/TRAINING PROGRAM

## 2022-03-31 PROCEDURE — 85025 COMPLETE CBC W/AUTO DIFF WBC: CPT

## 2022-03-31 PROCEDURE — 97535 SELF CARE MNGMENT TRAINING: CPT

## 2022-03-31 PROCEDURE — 80053 COMPREHEN METABOLIC PANEL: CPT

## 2022-03-31 PROCEDURE — 36415 COLL VENOUS BLD VENIPUNCTURE: CPT

## 2022-03-31 PROCEDURE — 97166 OT EVAL MOD COMPLEX 45 MIN: CPT

## 2022-03-31 PROCEDURE — 700102 HCHG RX REV CODE 250 W/ 637 OVERRIDE(OP): Performed by: STUDENT IN AN ORGANIZED HEALTH CARE EDUCATION/TRAINING PROGRAM

## 2022-03-31 PROCEDURE — 770004 HCHG ROOM/CARE - ONCOLOGY PRIVATE *

## 2022-03-31 RX ORDER — FLUCONAZOLE 100 MG/1
100 TABLET ORAL DAILY
Status: DISCONTINUED | OUTPATIENT
Start: 2022-04-01 | End: 2022-04-04 | Stop reason: HOSPADM

## 2022-03-31 RX ADMIN — OXYCODONE HYDROCHLORIDE 10 MG: 10 TABLET ORAL at 21:56

## 2022-03-31 RX ADMIN — CALCIUM CARBONATE 500 MG: 500 TABLET, CHEWABLE ORAL at 18:05

## 2022-03-31 RX ADMIN — OXYCODONE HYDROCHLORIDE 10 MG: 10 TABLET ORAL at 08:04

## 2022-03-31 RX ADMIN — PAROXETINE HYDROCHLORIDE 60 MG: 20 TABLET, FILM COATED ORAL at 20:23

## 2022-03-31 RX ADMIN — NYSTATIN 100000 UNITS: 100000 POWDER TOPICAL at 18:00

## 2022-03-31 RX ADMIN — CALCIUM CARBONATE 500 MG: 500 TABLET, CHEWABLE ORAL at 12:56

## 2022-03-31 RX ADMIN — MIRTAZAPINE 22.5 MG: 15 TABLET, FILM COATED ORAL at 20:23

## 2022-03-31 RX ADMIN — HYDROXYCHLOROQUINE SULFATE 400 MG: 200 TABLET ORAL at 05:04

## 2022-03-31 RX ADMIN — PROPRANOLOL HYDROCHLORIDE 20 MG: 20 TABLET ORAL at 18:04

## 2022-03-31 RX ADMIN — AZELASTINE HYDROCHLORIDE 2 SPRAY: 137 SPRAY, METERED NASAL at 05:02

## 2022-03-31 RX ADMIN — OXYCODONE HYDROCHLORIDE 10 MG: 10 TABLET ORAL at 18:05

## 2022-03-31 RX ADMIN — OXYCODONE HYDROCHLORIDE 10 MG: 10 TABLET ORAL at 05:02

## 2022-03-31 RX ADMIN — OXYCODONE HYDROCHLORIDE 10 MG: 10 TABLET ORAL at 00:40

## 2022-03-31 RX ADMIN — OXYCODONE HYDROCHLORIDE 10 MG: 10 TABLET ORAL at 12:56

## 2022-03-31 RX ADMIN — CALCIUM CARBONATE 500 MG: 500 TABLET, CHEWABLE ORAL at 05:08

## 2022-03-31 RX ADMIN — SODIUM CHLORIDE: 9 INJECTION, SOLUTION INTRAVENOUS at 21:03

## 2022-03-31 RX ADMIN — AZELASTINE HYDROCHLORIDE 2 SPRAY: 137 SPRAY, METERED NASAL at 18:05

## 2022-03-31 RX ADMIN — ONDANSETRON 4 MG: 2 INJECTION INTRAMUSCULAR; INTRAVENOUS at 05:16

## 2022-03-31 RX ADMIN — AMLODIPINE BESYLATE 7.5 MG: 5 TABLET ORAL at 18:03

## 2022-03-31 RX ADMIN — ROPINIROLE HYDROCHLORIDE 1 MG: 1 TABLET, FILM COATED ORAL at 18:03

## 2022-03-31 RX ADMIN — GUAIFENESIN 600 MG: 600 TABLET, EXTENDED RELEASE ORAL at 18:04

## 2022-03-31 RX ADMIN — ROPINIROLE HYDROCHLORIDE 1 MG: 1 TABLET, FILM COATED ORAL at 05:05

## 2022-03-31 RX ADMIN — OMEPRAZOLE 20 MG: 20 CAPSULE, DELAYED RELEASE ORAL at 05:03

## 2022-03-31 RX ADMIN — GUAIFENESIN 600 MG: 600 TABLET, EXTENDED RELEASE ORAL at 05:05

## 2022-03-31 ASSESSMENT — ENCOUNTER SYMPTOMS
ABDOMINAL PAIN: 1
DIARRHEA: 0
FEVER: 0
BLURRED VISION: 0
MYALGIAS: 0
SHORTNESS OF BREATH: 0
CONSTIPATION: 0
CHILLS: 0
COUGH: 0
VOMITING: 1

## 2022-03-31 ASSESSMENT — COGNITIVE AND FUNCTIONAL STATUS - GENERAL
DRESSING REGULAR UPPER BODY CLOTHING: A LITTLE
SUGGESTED CMS G CODE MODIFIER DAILY ACTIVITY: CK
CLIMB 3 TO 5 STEPS WITH RAILING: TOTAL
STANDING UP FROM CHAIR USING ARMS: A LOT
TURNING FROM BACK TO SIDE WHILE IN FLAT BAD: A LOT
TOILETING: A LOT
HELP NEEDED FOR BATHING: A LOT
DAILY ACTIVITIY SCORE: 16
SUGGESTED CMS G CODE MODIFIER MOBILITY: CM
MOVING FROM LYING ON BACK TO SITTING ON SIDE OF FLAT BED: UNABLE
PERSONAL GROOMING: A LITTLE
MOVING TO AND FROM BED TO CHAIR: UNABLE
DRESSING REGULAR LOWER BODY CLOTHING: A LOT
WALKING IN HOSPITAL ROOM: TOTAL
MOBILITY SCORE: 8

## 2022-03-31 ASSESSMENT — GAIT ASSESSMENTS: GAIT LEVEL OF ASSIST: UNABLE TO PARTICIPATE

## 2022-03-31 ASSESSMENT — PAIN DESCRIPTION - PAIN TYPE
TYPE: ACUTE PAIN
TYPE: ACUTE PAIN

## 2022-03-31 ASSESSMENT — ACTIVITIES OF DAILY LIVING (ADL): TOILETING: INDEPENDENT

## 2022-03-31 NOTE — CARE PLAN
The patient is Watcher - Medium risk of patient condition declining or worsening    Shift Goals  Clinical Goals: safety, pain control, nausea control  Patient Goals: Rest  Family Goals: n/a    Progress made toward(s) clinical / shift goals:    Patient A&Ox4 forgetful and confused at times. Patient max assist, refusing ambulation, Q2 turns in place. Patient incontinent of BM. Suprapubic and garza CDI with hematuria noted. PIV CDI with blood return noted, infusing. Patient and wife updated on plan. Patient reports nausea and pain, medicated per MAR. Call light and belongings within reach. Hourly rounding in place. Fall precautions in place.   Problem: Skin Integrity  Goal: Skin integrity is maintained or improved  Outcome: Progressing  Note: Q2 turns in place. Pillows in use for support and positioning.    Problem: Fall Risk  Goal: Patient will remain free from falls  Outcome: Progressing  Note: Patient educated on fall risk. Bed alarm on and sounding. Patient calling appropriately for assistance at this time. Patients room close to nurses station. Hourly rounding in place.    Problem: Pain - Standard  Goal: Alleviation of pain or a reduction in pain to the patient’s comfort goal  Outcome: Progressing  Note: Patient reports pain, medicated per MAR. Patient reports relief with scheduled intervention. Heat pack also in use with pain relief. Hourly rounding in place.      Patient is not progressing towards the following goals:

## 2022-03-31 NOTE — CARE PLAN
The patient is Watcher - Medium risk of patient condition declining or worsening    Shift Goals  Clinical Goals: Pt will remain free from injury.  Patient Goals: Pt will verbalize improvement in her ear congestion  Family Goals: n/a    Progress made toward(s) clinical / shift goals:  Pt calling appropriately for assistance. Two person assist. BA on and sounding and room near nurse's station.       Problem: Knowledge Deficit - Standard  Goal: Patient and family/care givers will demonstrate understanding of plan of care, disease process/condition, diagnostic tests and medications  Outcome: Progressing     Problem: Respiratory  Goal: Patient will achieve/maintain optimum respiratory ventilation and gas exchange  Outcome: Progressing     Patient is not progressing towards the following goals: Pt using nasal spray for her sinus/ ear congestion but with minimal relief.

## 2022-03-31 NOTE — THERAPY
Physical Therapy   Initial Evaluation     Patient Name: Magali Leal  Age:  56 y.o., Sex:  female  Medical Record #: [id number]  Today's Date: 3/31/2022     Precautions  Precautions: Fall Risk;Non Weight Bearing Left Lower Extremity  Comments: CAM Boot L LE    Assessment  Patient is 56 y.o. female re-admitted from Rehab with AMS which she attributes to her ears with PMH of minimum stage III uterine cancer, s/p ex lap hys,bso,cystectomy with repair, suprapubic catheter placement on 3/3/22. Pt very receptive to PT, but remains limited by fear of falling. Pt noted to have B PF contractures, L > R, which impacts how her L LE fits in CAM boot. Pt able to complete partial sit to stand at EOB with bed height raised and max A x2 people for safety. Pt will benefit from acute PT interventions to address impairments noted below.     Plan    Recommend Physical Therapy 3 times per week until therapy goals are met for the following treatments:  Bed Mobility, Equipment, Gait Training, Neuro Re-Education / Balance, Self Care/Home Evaluation, Stair Training, Therapeutic Activities and Therapeutic Exercises    DC Equipment Recommendations: Unable to determine at this time  Discharge Recommendations: Recommend post-acute placement for additional physical therapy services prior to discharge home       03/31/22 1113   Precautions   Precautions Fall Risk;Non Weight Bearing Left Lower Extremity   Comments CAM Boot L LE   Vitals   O2 Delivery Device None - Room Air   Pain 0 - 10 Group   Therapist Pain Assessment During Activity;Nurse Notified   Prior Living Situation   Prior Services None   Housing / Facility [address] [facility]   Lives with - Patient's Self Care Capacity Spouse   Comments pt has been hosptialized/at rehab since 2/23/2022   Prior Level of Functional Mobility   Comments per EMR, limited mobility since hospitalizations due to NWB L LE and fear of falling   Cognition    Level of Consciousness Alert   Ability To Follow Commands 2  Step   Attention Impaired   Comments feels she needs a smaller walking boot. Reports she has received, but still not fitting well. PT removed L CAM boot and noted increased edema and pressure areas. Attempted to refit boot however, primarily limited by ankle PF contracture   Active ROM Lower Body    Active ROM Lower Body  X   Comments B LE limited by weakness L > R. B ankle contactures noted   Strength Lower Body   Lower Body Strength  X   Comments Grossly assessed R LE at 3+/5 and L LE at 3/5   Balance Assessment   Sitting Balance (Static) Fair +   Sitting Balance (Dynamic) Fair +   Standing Balance (Static) Trace   Standing Balance (Dynamic) Trace   Weight Shift Sitting Poor   Weight Shift Standing Absent   Comments w/ FWW, partial stand   Gait Analysis   Gait Level Of Assist Unable to Participate   Weight Bearing Status NWB L LE   Bed Mobility    Supine to Sit Minimal Assist  (with HOB at 15*, railing used)   Sit to Supine Moderate Assist  (to B LE)   Scooting Minimal Assist   Comments utilized log roll to ease OOB.   Functional Mobility   Sit to Stand Maximal Assist  (x2 people for safety, bed height elevated)   Bed, Chair, Wheelchair Transfer Unable to Participate   How much difficulty does the patient currently have...   Turning over in bed (including adjusting bedclothes, sheets and blankets)? 2   Sitting down on and standing up from a chair with arms (e.g., wheelchair, bedside commode, etc.) 1   Moving from lying on back to sitting on the side of the bed? 1   How much help from another person does the patient currently need...   Moving to and from a bed to a chair (including a wheelchair)? 2   Need to walk in a hospital room? 1   Climbing 3-5 steps with a railing? 1   6 clicks Mobility Score 8   Activity Tolerance   Sitting Edge of Bed 15 min   Standing 2-3 min total   Patient / Family Goals    Patient / Family Goal #1 to be able to walk 5 steps to access bathroom, use a wheelchair   Short Term Goals     Short Term Goal # 1 Pt will perform supine <> sit with HOB flat, no railing and SPV in 6 visits   Short Term Goal # 2 pt will perform sit <> stand and pivot transfers with FWW and Mod A in 6 visits   Short Term Goal # 3 pt will ambulate > 15 ft with FWW and Mod A to improve function in 6 visits   Short Term Goal # 4 pt will self propel WC x50 ft with Min A to access home environment in 6 visits   Education Group   Role of Physical Therapist Patient Response Patient;Acceptance;Explanation;Verbal Demonstration   Problem List    Problems Pain;Impaired Bed Mobility;Impaired Ambulation;Impaired Transfers;Functional Strength Deficit;Impaired Balance;Impaired Coordination;Decreased Activity Tolerance;Safety Awareness Deficits / Cognition   Anticipated Discharge Equipment and Recommendations   DC Equipment Recommendations Unable to determine at this time   Discharge Recommendations Recommend post-acute placement for additional physical therapy services prior to discharge home   Interdisciplinary Plan of Care Collaboration   IDT Collaboration with  Nursing;Occupational Therapist   Patient Position at End of Therapy In Bed;Bed Alarm On;Call Light within Reach;Tray Table within Reach;Family / Friend in Room   Collaboration Comments aware of PT eval, PT removed CAM boot post session due to edema and pressure points noted during evaluation

## 2022-03-31 NOTE — PROGRESS NOTES
Oncology/Hematology Progress Note               Author: Sofia Jean-Baptiste P.A.-C. Date & Time created: 3/30/2022  5:32 PM     Interval History:  No acute overnight events.  Feels like her right ear has drained overnight and is feeling better, still feels like her left ear is clogged. She had one episode of vomiting last night, but denies current nausea.  Pain controlled, still having cramping pain to bladder. No fever or chills.     Review of Systems:  Review of Systems   Constitutional: Positive for malaise/fatigue. Negative for chills and fever.   HENT: Positive for ear discharge and ear pain.    Eyes: Negative for blurred vision.   Respiratory: Negative for cough and shortness of breath.    Cardiovascular: Positive for leg swelling. Negative for chest pain.   Gastrointestinal: Positive for abdominal pain and vomiting. Negative for constipation and diarrhea.   Genitourinary: Positive for hematuria. Negative for dysuria.   Musculoskeletal: Negative for myalgias.       Physical Exam:  Physical Exam  Constitutional:       General: She is not in acute distress.     Appearance: She is ill-appearing.   HENT:      Mouth/Throat:      Mouth: Mucous membranes are moist.   Eyes:      Pupils: Pupils are equal, round, and reactive to light.   Cardiovascular:      Rate and Rhythm: Tachycardia present.      Pulses: Normal pulses.   Pulmonary:      Effort: Pulmonary effort is normal.   Abdominal:      General: Abdomen is flat. There is no distension.      Palpations: Abdomen is soft. There is no mass.      Comments: Midline incision well healed, suprapubic catheter no signs of infection, hematuria improved.    Genitourinary:     Comments: Brenner catheter in place, hematuria improved   Musculoskeletal:         General: Normal range of motion.      Right lower leg: No edema.      Left lower leg: Edema present.      Comments: Boot to LLE, 1+ pitting edema to LLE to inguinal fold   Skin:     General: Skin is warm and dry.    Neurological:      Mental Status: She is alert and oriented to person, place, and time.         Labs:          Recent Labs     22  0450 22  1141 22  0639   SODIUM 135 135 134*   POTASSIUM 3.8 3.9 3.8   CHLORIDE 103 104 104   CO2 18* 15* 16*   BUN 18 19 26*   CREATININE 2.53* 2.54* 3.06*   MAGNESIUM 1.6  --   --    PHOSPHORUS 2.4*  --   --    CALCIUM 8.8 9.1 9.1     Recent Labs     22  0450 22  1141 22  0639   ALTSGPT  --  7 9   ASTSGOT  --  33 44   ALKPHOSPHAT  --  115* 132*   TBILIRUBIN  --  0.2 0.2   GLUCOSE 87 97 93     Recent Labs     220 22  11422  0639   RBC 2.53* 2.62* 2.55*   HEMOGLOBIN 7.1* 7.4* 7.2*   HEMATOCRIT 22.7* 23.4* 23.3*   PLATELETCT 359 352 333   PROTHROMBTM  --  39.4* 18.1*   INR  --  4.22* 1.55*     Recent Labs     22  0450 22  11422  0639   WBC 13.4* 14.5* 14.5*   NEUTSPOLYS 85.30* 85.00* 83.50*   LYMPHOCYTES 6.00* 7.20* 7.00*   MONOCYTES 6.40 5.90 6.90   EOSINOPHILS 0.10 0.00 0.00   BASOPHILS 0.10 0.10 0.10   ASTSGOT  --  33 44   ALTSGPT  --  7 9   ALKPHOSPHAT  --  115* 132*   TBILIRUBIN  --  0.2 0.2     Recent Labs     22  0450 22  11422  0639   SODIUM 135 135 134*   POTASSIUM 3.8 3.9 3.8   CHLORIDE 103 104 104   CO2 18* 15* 16*   GLUCOSE 87 97 93   BUN 18 19 26*   CREATININE 2.53* 2.54* 3.06*   CALCIUM 8.8 9.1 9.1     Hemodynamics:  Temp (24hrs), Av.9 °C (98.5 °F), Min:36.8 °C (98.2 °F), Max:37.2 °C (98.9 °F)  Temperature: 37 °C (98.6 °F)  Pulse  Av.4  Min: 71  Max: 142   Blood Pressure: 129/68     Respiratory:    Respiration: 18, Pulse Oximetry: 95 %        RUL Breath Sounds: Clear, RML Breath Sounds: Clear, RLL Breath Sounds: Diminished, MORENO Breath Sounds: Clear, LLL Breath Sounds: Diminished  Fluids:    Intake/Output Summary (Last 24 hours) at 3/30/2022 1732  Last data filed at 3/30/2022 0400  Gross per 24 hour   Intake 120 ml   Output 450 ml   Net -330 ml         GI/Nutrition:  Orders Placed This Encounter   Procedures   • Diet Order Diet: Regular     Standing Status:   Standing     Number of Occurrences:   1     Order Specific Question:   Diet:     Answer:   Regular [1]     Medical Decision Making, by Problem:  Active Hospital Problems    Diagnosis    • *Altered mental status [R41.82]        Assessment: This is a 56 y.o. female with minimum stage III uterine cancer, s/p ex lap hys,bso,cystectomy with repair, suprapubic catheter placement on 3/3/22, who presented from rehab with AMS:    Plan:  1. AMS: improving, negative head CT, at rehab improved with dose of Narcan, possibly secondary to narcotics. Improved. Minimize narcotics, continue to monitor.   2. Uterine cancer: s/p 6 cycles neoadjuvant chemotherapy and hys/bso.  Repeat CT shows interval progression of disease, with new and increasing mediastinal, paraesophageal, and pelvic adenopathy.  Dr. Dyson will discuss CT scan tomorrow and treatment planning.   3. Pelvic pain: secondary to above, minimize narcotics, will give oxycodone 10 mg q 4 and monitor closely.   4. ILIA: Cr 3.06, likely obstructive, CT with bilateral hydronephrosis likely secondary to pelvic disease. Fena 1.3%.  Will continue to monitor and IVF. Dr. Dyson will review CT scan results and discuss possible stent placement.  5. Hematuria: improving, will continue to monitor. Previously on Xarelto for DVT, hold for now. INR improved today to 1.55.   6. Leukocytosis: afebrile, Ucx and Bcx with no growth to date.Will continue to monitor.   7. Upper extremity DVT: previously on Xarelto, currently held due to hematuria and increased INR.   8. Hx allergic rhinitis and ear pain: resume home azelastine hydrochloride, consider ENT consult for further management.   9.HTN: no hx of, on amlodipine at home for Raynaud's, will resume with propanolol daily   10. History of Lupus: continue home hydroxychloroquine   11. Dispo: continue inpatient management.     Quality-Core  Measures

## 2022-03-31 NOTE — THERAPY
"Occupational Therapy   Initial Evaluation     Patient Name: Magali Leal  Age:  56 y.o., Sex:  female  Medical Record #: [id number]  Today's Date: 3/31/2022     Precautions  Precautions: Fall Risk,Non Weight Bearing Left Lower Extremity  Comments: MARCIN HINOJOSA    Assessment  Patient is 56 y.o. female recently d/c'd to [facility], but readmitted for AMS. Found to have hematuria, renal failure, ILIA w/ B hydronephrosis, and c/o ears \"being clogged\" and \"whooshing\"\". PMHx of endometrial adenocarcinoma s/p multiple sxs and chemotherapy, PTSD, anxiety, fibromyalgia, and lupus. Pt known to this therapist and demo'd decreased attention, problem solving, and fearful of falling; RN reports hallucinations, although no report of them this session. Reports wife can assist PRN at d/c. Continues to be limited by decreased functional mobility, activity tolerance, cognition, strength, balance, adherence to precautions, and pain which are currently affecting pt's ability to complete ADLs/IADLs at baseline. Will continue to follow.       Plan    Recommend Occupational Therapy 4 times per week until therapy goals are met for the following treatments:  Adaptive Equipment, Neuro Re-Education / Balance, Self Care/Activities of Daily Living, Therapeutic Activities, and Therapeutic Exercises.    DC Equipment Recommendations: Unable to determine at this time  Discharge Recommendations: Recommend post-acute placement for additional occupational therapy services prior to discharge home      Objective     03/31/22 1018   Prior Living Situation   Prior Services None   Housing / Facility 1 Story House   Bathroom Set up Walk In Shower   Equipment Owned None   Lives with - Patient's Self Care Capacity Spouse   Comments Reports wife can assist PRN. Admitted from rehab   Prior Level of ADL Function   Self Feeding Independent   Grooming / Hygiene Independent   Bathing Independent   Dressing Independent   Toileting Independent   Prior Level of IADL " Function   Medication Management Independent   Laundry Independent   Kitchen Mobility Independent   Finances Independent   Home Management Independent   Shopping Independent   Prior Level Of Mobility Independent Without Device in Community;Independent Without Device in Home   History of Falls   History of Falls Yes   Date of Last Fall   (fall in house)   Precautions   Precautions Fall Risk;Non Weight Bearing Left Lower Extremity   Comments CAM boot on LLE, new AMS   Vitals   O2 Delivery Device None - Room Air   Pain 0 - 10 Group   Location Abdomen;Leg   Location Orientation Mid;Left   Therapist Pain Assessment Post Activity;During Activity;Nurse Notified  (not quantified)   Cognition    Cognition / Consciousness X   Level of Consciousness Alert   Ability To Follow Commands 1 Step   Safety Awareness Impaired   New Learning Impaired   Attention Impaired   Comments pleasant and cooperative; appears intermittently confused and perseverative. Fearful of falling   Passive ROM Upper Body   Passive ROM Upper Body WDL   Active ROM Upper Body   Active ROM Upper Body  WDL   Strength Upper Body   Upper Body Strength  X   Gross Strength Generalized Weakness, Equal Bilaterally.    Comments has decreased since d/c to rehab   Balance Assessment   Sitting Balance (Static) Fair +   Sitting Balance (Dynamic) Fair    Standing Balance (Static) Trace +   Standing Balance (Dynamic) Trace   Weight Shift Sitting Fair   Weight Shift Standing Absent   Comments w/ FWW and x2 assist; unable to maintain NWB LLE   Bed Mobility    Supine to Sit Minimal Assist   Sit to Supine Moderate Assist  (BLE)   Scooting Supervised   Comments HOB elevated and use of rail   ADL Assessment   Eating Supervision  (drinking and using spoon)   Grooming Supervision;Seated  (wiping face and head, oral care)   Lower Body Dressing Maximal Assist  (for donning shoe and adjusting boot)   Toileting Maximal Assist  (suprapubic catheter; x2 foleys)   Comments limited by fear  and cognition   Functional Mobility   Sit to Stand Maximal Assist  (x2ppl)   Mobility EOB and STS x3; only full stand #3   Edema / Skin Assessment   Comments LLE edema; proximal and distal   Activity Tolerance   Comments limited by fatigue and cognition   Patient / Family Goals   Patient / Family Goal #1 to get better   Short Term Goals   Short Term Goal # 1 BSC txf with min A   Short Term Goal # 2 maintain NWB LLE during txfs w/o v/cs   Short Term Goal # 3 toileting with SPV   Short Term Goal # 4 LB dressing with min A   Education Group   Education Provided Pathology of bedrest;Role of Occupational Therapist   Role of Occupational Therapist Patient Response Patient;Acceptance;Explanation;Verbal Demonstration   Pathology of Bedrest Patient Response Patient;Acceptance;Explanation;Verbal Demonstration;Reinforcement Needed   Problem List   Problem List Decreased Active Daily Living Skills;Decreased Homemaking Skills;Decreased Upper Extremity Strength Right;Decreased Upper Extremity Strength Left;Decreased Functional Mobility;Decreased Activity Tolerance;Safety Awareness Deficits / Cognition;Impaired Cognitive Function;Limited Knowledge of Post Op Precautions;Impaired Postural Control / Balance

## 2022-03-31 NOTE — PROGRESS NOTES
Pt alert and oriented x 4 but states she has been having hallucinations (possible nightmares) one in which she fell in the bathroom and hit her head and elbow. Wife states this was definitely a dream. Seeing her cat in her room as well. States pain of a 6/10 in her LLE which she states the oxycodone is helping. States severe cramping and pressure pain in bilateral kidneys and bladder. Minimal relief with oxy. Moderate amount of output from garza and suprapubic cath. Dark and bloody. PIV to L FA flushing well with positive blood return. Fluids infusing. Q 2 turns in place.  on. Boot to LLE and foot drop brace to RLE. Resting comfortably at this time. Call light within reach. Hourly rounding in place.

## 2022-04-01 LAB
ANION GAP SERPL CALC-SCNC: 19 MMOL/L (ref 7–16)
BACTERIA UR CULT: ABNORMAL
BASOPHILS # BLD AUTO: 0.1 % (ref 0–1.8)
BASOPHILS # BLD: 0.02 K/UL (ref 0–0.12)
BUN SERPL-MCNC: 32 MG/DL (ref 8–22)
CALCIUM SERPL-MCNC: 9.5 MG/DL (ref 8.5–10.5)
CHLORIDE SERPL-SCNC: 101 MMOL/L (ref 96–112)
CO2 SERPL-SCNC: 12 MMOL/L (ref 20–33)
CREAT SERPL-MCNC: 3.28 MG/DL (ref 0.5–1.4)
EOSINOPHIL # BLD AUTO: 0.01 K/UL (ref 0–0.51)
EOSINOPHIL NFR BLD: 0.1 % (ref 0–6.9)
ERYTHROCYTE [DISTWIDTH] IN BLOOD BY AUTOMATED COUNT: 59.7 FL (ref 35.9–50)
GFR SERPLBLD CREATININE-BSD FMLA CKD-EPI: 16 ML/MIN/1.73 M 2
GLUCOSE SERPL-MCNC: 87 MG/DL (ref 65–99)
GRAM STN SPEC: ABNORMAL
HCT VFR BLD AUTO: 23.4 % (ref 37–47)
HGB BLD-MCNC: 7.1 G/DL (ref 12–16)
IMM GRANULOCYTES # BLD AUTO: 0.21 K/UL (ref 0–0.11)
IMM GRANULOCYTES NFR BLD AUTO: 1.5 % (ref 0–0.9)
LYMPHOCYTES # BLD AUTO: 0.76 K/UL (ref 1–4.8)
LYMPHOCYTES NFR BLD: 5.3 % (ref 22–41)
MCH RBC QN AUTO: 27.5 PG (ref 27–33)
MCHC RBC AUTO-ENTMCNC: 30.3 G/DL (ref 33.6–35)
MCV RBC AUTO: 90.7 FL (ref 81.4–97.8)
MONOCYTES # BLD AUTO: 0.83 K/UL (ref 0–0.85)
MONOCYTES NFR BLD AUTO: 5.8 % (ref 0–13.4)
NEUTROPHILS # BLD AUTO: 12.46 K/UL (ref 2–7.15)
NEUTROPHILS NFR BLD: 87.2 % (ref 44–72)
NRBC # BLD AUTO: 0 K/UL
NRBC BLD-RTO: 0 /100 WBC
PLATELET # BLD AUTO: 266 K/UL (ref 164–446)
PMV BLD AUTO: 8.9 FL (ref 9–12.9)
POTASSIUM SERPL-SCNC: 3.1 MMOL/L (ref 3.6–5.5)
RBC # BLD AUTO: 2.58 M/UL (ref 4.2–5.4)
SIGNIFICANT IND 70042: ABNORMAL
SITE SITE: ABNORMAL
SODIUM SERPL-SCNC: 132 MMOL/L (ref 135–145)
SOURCE SOURCE: ABNORMAL
WBC # BLD AUTO: 14.3 K/UL (ref 4.8–10.8)

## 2022-04-01 PROCEDURE — 770004 HCHG ROOM/CARE - ONCOLOGY PRIVATE *

## 2022-04-01 PROCEDURE — A9270 NON-COVERED ITEM OR SERVICE: HCPCS | Performed by: NURSE PRACTITIONER

## 2022-04-01 PROCEDURE — 80048 BASIC METABOLIC PNL TOTAL CA: CPT

## 2022-04-01 PROCEDURE — A9270 NON-COVERED ITEM OR SERVICE: HCPCS | Performed by: STUDENT IN AN ORGANIZED HEALTH CARE EDUCATION/TRAINING PROGRAM

## 2022-04-01 PROCEDURE — 700102 HCHG RX REV CODE 250 W/ 637 OVERRIDE(OP): Performed by: STUDENT IN AN ORGANIZED HEALTH CARE EDUCATION/TRAINING PROGRAM

## 2022-04-01 PROCEDURE — 700102 HCHG RX REV CODE 250 W/ 637 OVERRIDE(OP): Performed by: NURSE PRACTITIONER

## 2022-04-01 PROCEDURE — 85025 COMPLETE CBC W/AUTO DIFF WBC: CPT

## 2022-04-01 PROCEDURE — 36415 COLL VENOUS BLD VENIPUNCTURE: CPT

## 2022-04-01 RX ORDER — CARBOXYMETHYLCELLULOSE SODIUM 5 MG/ML
1 SOLUTION/ DROPS OPHTHALMIC PRN
Status: DISCONTINUED | OUTPATIENT
Start: 2022-04-01 | End: 2022-04-04 | Stop reason: HOSPADM

## 2022-04-01 RX ORDER — OXYMETAZOLINE HYDROCHLORIDE 0.05 G/100ML
2 SPRAY NASAL EVERY 6 HOURS PRN
Status: DISPENSED | OUTPATIENT
Start: 2022-04-01 | End: 2022-04-04

## 2022-04-01 RX ORDER — NITROFURANTOIN 25; 75 MG/1; MG/1
100 CAPSULE ORAL 2 TIMES DAILY WITH MEALS
Status: DISCONTINUED | OUTPATIENT
Start: 2022-04-01 | End: 2022-04-04 | Stop reason: HOSPADM

## 2022-04-01 RX ORDER — LACTOBACILLUS RHAMNOSUS GG 10B CELL
1 CAPSULE ORAL
Status: DISCONTINUED | OUTPATIENT
Start: 2022-04-02 | End: 2022-04-04 | Stop reason: HOSPADM

## 2022-04-01 RX ADMIN — OXYCODONE HYDROCHLORIDE 10 MG: 10 TABLET ORAL at 14:59

## 2022-04-01 RX ADMIN — HYDROXYCHLOROQUINE SULFATE 400 MG: 200 TABLET ORAL at 09:11

## 2022-04-01 RX ADMIN — AZELASTINE HYDROCHLORIDE 2 SPRAY: 137 SPRAY, METERED NASAL at 18:08

## 2022-04-01 RX ADMIN — GUAIFENESIN 600 MG: 600 TABLET, EXTENDED RELEASE ORAL at 06:02

## 2022-04-01 RX ADMIN — ROPINIROLE HYDROCHLORIDE 1 MG: 1 TABLET, FILM COATED ORAL at 20:10

## 2022-04-01 RX ADMIN — AMLODIPINE BESYLATE 7.5 MG: 5 TABLET ORAL at 18:07

## 2022-04-01 RX ADMIN — CALCIUM CARBONATE 500 MG: 500 TABLET, CHEWABLE ORAL at 11:19

## 2022-04-01 RX ADMIN — PROPRANOLOL HYDROCHLORIDE 20 MG: 20 TABLET ORAL at 18:07

## 2022-04-01 RX ADMIN — NITROFURANTOIN MONOHYDRATE/MACROCRYSTALLINE 100 MG: 25; 75 CAPSULE ORAL at 20:10

## 2022-04-01 RX ADMIN — CALCIUM CARBONATE 500 MG: 500 TABLET, CHEWABLE ORAL at 18:07

## 2022-04-01 RX ADMIN — OXYCODONE HYDROCHLORIDE 10 MG: 10 TABLET ORAL at 06:03

## 2022-04-01 RX ADMIN — CALCIUM CARBONATE 500 MG: 500 TABLET, CHEWABLE ORAL at 06:02

## 2022-04-01 RX ADMIN — OXYCODONE HYDROCHLORIDE 10 MG: 10 TABLET ORAL at 22:00

## 2022-04-01 RX ADMIN — AZELASTINE HYDROCHLORIDE 2 SPRAY: 137 SPRAY, METERED NASAL at 06:02

## 2022-04-01 RX ADMIN — OXYCODONE HYDROCHLORIDE 10 MG: 10 TABLET ORAL at 02:00

## 2022-04-01 RX ADMIN — OXYCODONE HYDROCHLORIDE 10 MG: 10 TABLET ORAL at 18:07

## 2022-04-01 RX ADMIN — OMEPRAZOLE 20 MG: 20 CAPSULE, DELAYED RELEASE ORAL at 06:03

## 2022-04-01 RX ADMIN — OXYCODONE HYDROCHLORIDE 10 MG: 10 TABLET ORAL at 11:19

## 2022-04-01 RX ADMIN — NYSTATIN 100000 UNITS: 100000 POWDER TOPICAL at 06:02

## 2022-04-01 RX ADMIN — MIRTAZAPINE 22.5 MG: 15 TABLET, FILM COATED ORAL at 20:09

## 2022-04-01 RX ADMIN — GUAIFENESIN 600 MG: 600 TABLET, EXTENDED RELEASE ORAL at 18:07

## 2022-04-01 RX ADMIN — NYSTATIN 100000 UNITS: 100000 POWDER TOPICAL at 18:00

## 2022-04-01 RX ADMIN — ROPINIROLE HYDROCHLORIDE 1 MG: 1 TABLET, FILM COATED ORAL at 09:11

## 2022-04-01 RX ADMIN — PAROXETINE HYDROCHLORIDE 60 MG: 20 TABLET, FILM COATED ORAL at 20:09

## 2022-04-01 RX ADMIN — FLUCONAZOLE 100 MG: 100 TABLET ORAL at 06:03

## 2022-04-01 ASSESSMENT — PAIN DESCRIPTION - PAIN TYPE
TYPE: ACUTE PAIN

## 2022-04-01 ASSESSMENT — ENCOUNTER SYMPTOMS
SHORTNESS OF BREATH: 0
FEVER: 0
HEADACHES: 0
NAUSEA: 1
COUGH: 0
VOMITING: 1
ABDOMINAL PAIN: 0
CHILLS: 0
DIZZINESS: 0
BLURRED VISION: 1

## 2022-04-01 NOTE — DOCUMENTATION QUERY
"                                                                         [facility]                                                                       Query Response Note      PATIENT:               REGAN WOODS  ACCT #:                  [id number]  MRN:                     [id number]  :                      1965  ADMIT DATE:       3/28/2022 10:38 AM  DISCH DATE:          RESPONDING  PROVIDER #:        [id number]           QUERY TEXT:    Altered mental status is documented in the Medical Record.  Can a more specific diagnosis be provided?    NOTE:  If the appropriate response is not listed below, please respond with a new note.    The patient's Clinical Indicators include:  Documentation:  H&P - Sofia Jean-Baptiste P.A.-C. \"She was doing well in Rehab until this past weekend, when she developed alerted mental status.  She was given Narcan with some improvement of symptoms, however they persisted and she was sent to the ED\" ... \"1. AMS: negative head CT, at rehab improved with dose of Narcan, possibly secondary to narcotics. Improved. Minimize narcotics, \"  Options provided:   -- Acute toxic encephalopathy due to narcotics   -- Acute metabolic encephalopathy   -- Unable to determine      Query created by: Janette Veronica on 3/31/2022 2:04 PM    RESPONSE TEXT:    Most likely secondary to over narcotics @ rehab          Electronically signed by:  GINI GONZALES MD 3/31/2022 8:12 PM              "

## 2022-04-01 NOTE — PROGRESS NOTES
GYN/Oncology Progress Note               Author: EILEEN Dumont Date & Time created: 4/1/2022  11:16 AM     Interval History:  Patient doing well, still having episodes of emesis that are mucous from her ears. Pain is well controlled.     Review of Systems:  Review of Systems   Constitutional: Negative for chills and fever.   Eyes: Positive for blurred vision (right eye).   Respiratory: Negative for cough and shortness of breath.    Cardiovascular: Positive for leg swelling. Negative for chest pain.   Gastrointestinal: Positive for nausea and vomiting. Negative for abdominal pain.   Neurological: Negative for dizziness and headaches.       Physical Exam:  Physical Exam  Constitutional:       General: She is not in acute distress.     Appearance: She is ill-appearing.   Cardiovascular:      Pulses: Normal pulses.   Pulmonary:      Effort: Pulmonary effort is normal.   Abdominal:      Palpations: Abdomen is soft.      Comments: Midline well healed   Genitourinary:     Comments: Suprapubic in place  Musculoskeletal:         General: Swelling present.      Comments: Right boot   Skin:     General: Skin is warm and dry.      Capillary Refill: Capillary refill takes 2 to 3 seconds.   Neurological:      Mental Status: She is alert and oriented to person, place, and time.         Labs:          Recent Labs     03/30/22  0639 03/31/22 0038 04/01/22  0942   SODIUM 134* 137 132*   POTASSIUM 3.8 3.7 3.1*   CHLORIDE 104 106 101   CO2 16* 14* 12*   BUN 26* 27* 32*   CREATININE 3.06* 3.19* 3.28*   CALCIUM 9.1 9.3 9.5     Recent Labs     03/30/22  0639 03/31/22  0038 04/01/22  0942   ALTSGPT 9 10  --    ASTSGOT 44 42  --    ALKPHOSPHAT 132* 131*  --    TBILIRUBIN 0.2 0.2  --    GLUCOSE 93 88 87     Recent Labs     03/30/22  0639 03/31/22  0038 04/01/22  0942   RBC 2.55* 2.46* 2.58*   HEMOGLOBIN 7.2* 7.0* 7.1*   HEMATOCRIT 23.3* 22.3* 23.4*   PLATELETCT 333 300 266   PROTHROMBTM 18.1*  --   --    INR 1.55*  --   --       Recent Labs     22  0639 22  0038 22  0942   WBC 14.5* 14.9* 14.3*   NEUTSPOLYS 83.50* 89.70* 87.20*   LYMPHOCYTES 7.00* 4.10* 5.30*   MONOCYTES 6.90 4.20 5.80   EOSINOPHILS 0.00 0.00 0.10   BASOPHILS 0.10 0.10 0.10   ASTSGOT 44 42  --    ALTSGPT 9 10  --    ALKPHOSPHAT 132* 131*  --    TBILIRUBIN 0.2 0.2  --      Recent Labs     22  0639 22  0038 22  0942   SODIUM 134* 137 132*   POTASSIUM 3.8 3.7 3.1*   CHLORIDE 104 106 101   CO2 16* 14* 12*   GLUCOSE 93 88 87   BUN 26* 27* 32*   CREATININE 3.06* 3.19* 3.28*   CALCIUM 9.1 9.3 9.5     Hemodynamics:  Temp (24hrs), Av.9 °C (98.5 °F), Min:36.8 °C (98.2 °F), Max:37.2 °C (98.9 °F)  Temperature: 36.9 °C (98.4 °F)  Pulse  Av.4  Min: 71  Max: 142   Blood Pressure: 139/76     Respiratory:    Respiration: 18, Pulse Oximetry: 94 %        RUL Breath Sounds: Clear, RML Breath Sounds: Clear, RLL Breath Sounds: Diminished, MORENO Breath Sounds: Clear, LLL Breath Sounds: Diminished  Fluids:    Intake/Output Summary (Last 24 hours) at 2022 1116  Last data filed at 2022 0911  Gross per 24 hour   Intake 460 ml   Output 1175 ml   Net -715 ml        GI/Nutrition:  Orders Placed This Encounter   Procedures   • Diet Order Diet: Regular     Standing Status:   Standing     Number of Occurrences:   1     Order Specific Question:   Diet:     Answer:   Regular [1]     Medical Decision Making, by Problem:  Active Hospital Problems    Diagnosis    • *Altered mental status [R41.82]        Plan:  This is a 56 y.o. female with minimum stage III uterine cancer, s/p ex lap hys,bso,cystectomy with repair, suprapubic catheter placement on 3/3/22, who presented from rehab with AMS:     1. AMS: continues to improve, negative head CT, at rehab improved with dose of Narcan, possibly secondary to narcotics. Minimize narcotics, continue to monitor.   2. Uterine cancer: s/p 6 cycles neoadjuvant chemotherapy and hys/bso.  Repeat CT shows interval  progression of disease, with new and increasing mediastinal, paraesophageal, and pelvic adenopathy.  Dr. Dyson discussed progression of disease, given her current medical condition she would not be a candidate for any form of treatment. Dr. Dyson discussed that she most likely would not improve at this point in order to ever receive treatment. He has recommended hospice. Patient and wife to discuss further. Code status also discussed and the patient will let us know her answer.   3. Pelvic pain: secondary to above, minimize narcotics, currently well controlled with oxycodone 10 mg q 4, monitor closely.   4. ILIA: likely obstructive, CT with bilateral hydronephrosis likely secondary to pelvic disease. Fena 1.3%.  Will continue to monitor and IVF. Dr. Dyson has recommended no interventions at this time as this would prolong her suffering.   5. Hematuria: improving, will continue to monitor. Previously on Xarelto for DVT, continue hold for now.   6. Leukocytosis: afebrile, Ucx with scant growth of E.coli and E.faecium and large growth of Candida, will start Diflucan. Bcx with no growth to date.Will continue to monitor. Start Microbid.   7. Anemia: acute on chronic, still with mild hematuria. Continue to hold Xarelto and follow AM labs.   8. Upper extremity DVT: previously on Xarelto, currently held due to hematuria, no worsening swelling to RUE, will continue monitor.   9. Hx allergic rhinitis and ear pain: resume home azelastine hydrochloride, Dr. Dyson discussed with ENT and Aftrin was added to regimen q6h PRN  10.HTN: no hx of, on amlodipine at home for Raynaud's, will resume with propanolol daily   11. History of Lupus: continue home hydroxychloroquine   12. Dispo: continue inpatient management.      Patient seen with Dr. Dyson    Quality-Core Measures

## 2022-04-01 NOTE — PROGRESS NOTES
Oncology/Hematology Progress Note               Author: Sofia Jean-Baptiste P.A.-C. Date & Time created: 3/31/2022  9:07 PM     Interval History:  Doing well, family at bedside, worked with PT and OT today. Increased po water intake, still with some nausea and vomiting, which she relates to her ears being clogged and draining mucous. Pain well controlled. +BM.    Review of Systems:  Review of Systems   Constitutional: Positive for malaise/fatigue. Negative for chills and fever.   HENT: Positive for ear discharge and ear pain.    Eyes: Negative for blurred vision.   Respiratory: Negative for cough and shortness of breath.    Cardiovascular: Positive for leg swelling. Negative for chest pain.   Gastrointestinal: Positive for abdominal pain and vomiting. Negative for constipation and diarrhea.   Genitourinary: Positive for hematuria. Negative for dysuria.   Musculoskeletal: Negative for myalgias.       Physical Exam:  Physical Exam  Constitutional:       General: She is not in acute distress.     Appearance: She is ill-appearing.   HENT:      Mouth/Throat:      Mouth: Mucous membranes are moist.   Eyes:      Pupils: Pupils are equal, round, and reactive to light.   Cardiovascular:      Rate and Rhythm: Normal rate.      Pulses: Normal pulses.   Pulmonary:      Effort: Pulmonary effort is normal.   Abdominal:      General: Abdomen is flat. There is no distension.      Palpations: Abdomen is soft. There is no mass.      Comments: Midline incision well healed, suprapubic catheter no signs of infection, hematuria improved.    Genitourinary:     Comments: Brenner catheter in place, hematuria improved   Musculoskeletal:         General: Normal range of motion.      Right lower leg: No edema.      Left lower leg: Edema present.      Comments: Boot to LLE, 1+ pitting edema to LLE to inguinal fold   Skin:     General: Skin is warm and dry.   Neurological:      Mental Status: She is alert and oriented to person, place, and time.          Labs:          Recent Labs     22  0639 22  0038   SODIUM 134* 137   POTASSIUM 3.8 3.7   CHLORIDE 104 106   CO2 16* 14*   BUN 26* 27*   CREATININE 3.06* 3.19*   CALCIUM 9.1 9.3     Recent Labs     22  0639 22  0038   ALTSGPT 9 10   ASTSGOT 44 42   ALKPHOSPHAT 132* 131*   TBILIRUBIN 0.2 0.2   GLUCOSE 93 88     Recent Labs     22  0639 22  003   RBC 2.55* 2.46*   HEMOGLOBIN 7.2* 7.0*   HEMATOCRIT 23.3* 22.3*   PLATELETCT 333 300   PROTHROMBTM 18.1*  --    INR 1.55*  --      Recent Labs     2239 22   WBC 14.5* 14.9*   NEUTSPOLYS 83.50* 89.70*   LYMPHOCYTES 7.00* 4.10*   MONOCYTES 6.90 4.20   EOSINOPHILS 0.00 0.00   BASOPHILS 0.10 0.10   ASTSGOT 44 42   ALTSGPT 9 10   ALKPHOSPHAT 132* 131*   TBILIRUBIN 0.2 0.2     Recent Labs     22  0639 22  0038   SODIUM 134* 137   POTASSIUM 3.8 3.7   CHLORIDE 104 106   CO2 16* 14*   GLUCOSE 93 88   BUN 26* 27*   CREATININE 3.06* 3.19*   CALCIUM 9.1 9.3     Hemodynamics:  Temp (24hrs), Av °C (98.6 °F), Min:36.8 °C (98.2 °F), Max:37.2 °C (99 °F)  Temperature: 36.8 °C (98.2 °F)  Pulse  Av.4  Min: 71  Max: 142   Blood Pressure: 137/80     Respiratory:    Respiration: 18, Pulse Oximetry: 95 %        RUL Breath Sounds: Clear, RML Breath Sounds: Clear, RLL Breath Sounds: Diminished, MORENO Breath Sounds: Clear, LLL Breath Sounds: Diminished  Fluids:    Intake/Output Summary (Last 24 hours) at 3/30/2022 1732  Last data filed at 3/30/2022 0400  Gross per 24 hour   Intake 120 ml   Output 450 ml   Net -330 ml        GI/Nutrition:  Orders Placed This Encounter   Procedures   • Diet Order Diet: Regular     Standing Status:   Standing     Number of Occurrences:   1     Order Specific Question:   Diet:     Answer:   Regular [1]     Medical Decision Making, by Problem:  Active Hospital Problems    Diagnosis    • *Altered mental status [R41.82]        Assessment: This is a 56 y.o. female with minimum stage III  uterine cancer, s/p ex lap hys,bso,cystectomy with repair, suprapubic catheter placement on 3/3/22, who presented from rehab with AMS:    Plan:  1. AMS: continues to improve, negative head CT, at rehab improved with dose of Narcan, possibly secondary to narcotics. Minimize narcotics, continue to monitor.   2. Uterine cancer: s/p 6 cycles neoadjuvant chemotherapy and hys/bso.  Repeat CT shows interval progression of disease, with new and increasing mediastinal, paraesophageal, and pelvic adenopathy.  Dr. Dyson will discuss CT scan and treatment planning.   3. Pelvic pain: secondary to above, minimize narcotics, currently well controlled with oxycodone 10 mg q 4, monitor closely.   4. ILIA: Cr 3.19, likely obstructive, CT with bilateral hydronephrosis likely secondary to pelvic disease. Fena 1.3%.  Will continue to monitor and IVF. Dr. Dyson will review CT scan results.   5. Hematuria: improving, will continue to monitor. Previously on Xarelto for DVT, continue hold for now. INR improved today to 1.55.   6. Leukocytosis: afebrile, Ucx with scant growth of E.coli and E.faecium and large growth of Candida, will start Diflucan. Bcx with no growth to date.Will continue to monitor.   7. Anemia: acute on chronic, still with mild hematuria. Continue to hold Xarelto and follow AM labs.   8. Upper extremity DVT: previously on Xarelto, currently held due to hematuria, no worsening swelling to RUE, will continue monitor.   9. Hx allergic rhinitis and ear pain: resume home azelastine hydrochloride, Dr. Dyson discussed with ENT today  10.HTN: no hx of, on amlodipine at home for Raynaud's, will resume with propanolol daily   11. History of Lupus: continue home hydroxychloroquine   12. Dispo: continue inpatient management.     Case discussed with Dr. Dyson

## 2022-04-01 NOTE — DOCUMENTATION QUERY
[facility]                                                                       Query Response Note      PATIENT:               REGAN WOODS  ACCT #:                  [id number]  MRN:                     [id number]  :                      1965  ADMIT DATE:       2022 8:57 PM  DISCH DATE:        3/24/2022 3:44 PM  RESPONDING  PROVIDER #:        [id number]           QUERY TEXT:    UTI with Urine culture positive for Escherichia coli from the suprapubic catheter site is documented in the Medical Record. Please further clarify this condition as:    NOTE:  If an appropriate response is not listed below, please respond with a new note.    The patient's Clinical Indicators include:  3/14/22 Urine culture results: Escherichia coli  3/16/22 APRN PN: Leukocytosis:  unclear source of infection, UA culture showed e. Coli  3/21/22 APRN PN: UTI:  UA culture showed e. Coli    Treatment: UA and culture, Zosyn, Ancef  Risk Factors: Endometrial adenocarcinoma s/p hysterectomy, cystectomy w/repair and suprapubic catheter placement on 3/3/22    Thank you,  Carlos Barrera RN, BSN  Clinical   Connect via [facility]  .  Options provided:   -- Urinary tract infection (UTI) associated or due to suprapubic catheter   -- Urinary tract infection (UTI) not associated or due to suprapubic catheter   -- Unable to determine      Query created by: Carlos Barrera on 3/22/2022 7:01 AM    RESPONSE TEXT:    Urinary tract infection (UTI) associated or due to suprapubic catheter          Electronically signed by:  GINI GONZALES MD 3/31/2022 8:10 PM

## 2022-04-01 NOTE — DISCHARGE PLANNING
Following for post acute services out acute from [facility], Dr. Awan does not anticipate a significant improvement secondary to advance cancer and anticipate  Alternative post acute services when medically cleared. TCC no longer following

## 2022-04-01 NOTE — CARE PLAN
The patient is Watcher - Medium risk of patient condition declining or worsening    Shift Goals  Clinical Goals: Pain control  Patient Goals: Improvement in ear pressure/plugged; Pain control  Family Goals:     Progress made toward(s) clinical / shift goals: Patient medicated per MAR with relief in symptoms per patient. Patient states left ear is draining and pressure/plugged feeling intermittently improves, states use of heating pads helps alleviate symptoms.     Problem: Knowledge Deficit - Standard  Goal: Patient and family/care givers will demonstrate understanding of plan of care, disease process/condition, diagnostic tests and medications  Outcome: Progressing     Problem: Hemodynamics  Goal: Patient's hemodynamics, fluid balance and neurologic status will be stable or improve  Outcome: Progressing     Problem: Fluid Volume  Goal: Fluid volume balance will be maintained  Outcome: Progressing     Problem: Urinary - Renal Perfusion  Goal: Ability to achieve and maintain adequate renal perfusion and functioning will improve  Outcome: Progressing     Problem: Respiratory  Goal: Patient will achieve/maintain optimum respiratory ventilation and gas exchange  Outcome: Progressing     Problem: Mechanical Ventilation  Goal: Patient will be able to express needs and understand communication  Outcome: Progressing     Problem: Physical Regulation  Goal: Diagnostic test results will improve  Outcome: Progressing  Goal: Signs and symptoms of infection will decrease  Outcome: Progressing     Problem: Skin Integrity  Goal: Skin integrity is maintained or improved  Outcome: Progressing     Problem: Fall Risk  Goal: Patient will remain free from falls  Outcome: Progressing     Problem: Pain - Standard  Goal: Alleviation of pain or a reduction in pain to the patient’s comfort goal  Outcome: Progressing       Patient is not progressing towards the following goals:

## 2022-04-02 LAB
ANION GAP SERPL CALC-SCNC: 21 MMOL/L (ref 7–16)
BACTERIA BLD CULT: NORMAL
BACTERIA BLD CULT: NORMAL
BUN SERPL-MCNC: 36 MG/DL (ref 8–22)
CALCIUM SERPL-MCNC: 9.8 MG/DL (ref 8.5–10.5)
CHLORIDE SERPL-SCNC: 102 MMOL/L (ref 96–112)
CO2 SERPL-SCNC: 9 MMOL/L (ref 20–33)
CREAT SERPL-MCNC: 3.82 MG/DL (ref 0.5–1.4)
ERYTHROCYTE [DISTWIDTH] IN BLOOD BY AUTOMATED COUNT: 58.3 FL (ref 35.9–50)
GFR SERPLBLD CREATININE-BSD FMLA CKD-EPI: 13 ML/MIN/1.73 M 2
GLUCOSE SERPL-MCNC: 85 MG/DL (ref 65–99)
HCT VFR BLD AUTO: 22.2 % (ref 37–47)
HGB BLD-MCNC: 6.9 G/DL (ref 12–16)
MCH RBC QN AUTO: 28.2 PG (ref 27–33)
MCHC RBC AUTO-ENTMCNC: 31.1 G/DL (ref 33.6–35)
MCV RBC AUTO: 90.6 FL (ref 81.4–97.8)
PLATELET # BLD AUTO: 250 K/UL (ref 164–446)
PMV BLD AUTO: 9.3 FL (ref 9–12.9)
POTASSIUM SERPL-SCNC: 3.5 MMOL/L (ref 3.6–5.5)
RBC # BLD AUTO: 2.45 M/UL (ref 4.2–5.4)
SIGNIFICANT IND 70042: NORMAL
SIGNIFICANT IND 70042: NORMAL
SITE SITE: NORMAL
SITE SITE: NORMAL
SODIUM SERPL-SCNC: 132 MMOL/L (ref 135–145)
SOURCE SOURCE: NORMAL
SOURCE SOURCE: NORMAL
WBC # BLD AUTO: 15.1 K/UL (ref 4.8–10.8)

## 2022-04-02 PROCEDURE — 36415 COLL VENOUS BLD VENIPUNCTURE: CPT

## 2022-04-02 PROCEDURE — 80048 BASIC METABOLIC PNL TOTAL CA: CPT

## 2022-04-02 PROCEDURE — 770004 HCHG ROOM/CARE - ONCOLOGY PRIVATE *

## 2022-04-02 PROCEDURE — 85027 COMPLETE CBC AUTOMATED: CPT

## 2022-04-02 PROCEDURE — A9270 NON-COVERED ITEM OR SERVICE: HCPCS | Performed by: NURSE PRACTITIONER

## 2022-04-02 PROCEDURE — 700102 HCHG RX REV CODE 250 W/ 637 OVERRIDE(OP): Performed by: NURSE PRACTITIONER

## 2022-04-02 PROCEDURE — 700102 HCHG RX REV CODE 250 W/ 637 OVERRIDE(OP): Performed by: STUDENT IN AN ORGANIZED HEALTH CARE EDUCATION/TRAINING PROGRAM

## 2022-04-02 PROCEDURE — A9270 NON-COVERED ITEM OR SERVICE: HCPCS | Performed by: STUDENT IN AN ORGANIZED HEALTH CARE EDUCATION/TRAINING PROGRAM

## 2022-04-02 RX ORDER — AZELASTINE HYDROCHLORIDE 137 UG/1
2 SPRAY, METERED NASAL 3 TIMES DAILY
Status: DISCONTINUED | OUTPATIENT
Start: 2022-04-02 | End: 2022-04-04 | Stop reason: HOSPADM

## 2022-04-02 RX ADMIN — NITROFURANTOIN MONOHYDRATE/MACROCRYSTALLINE 100 MG: 25; 75 CAPSULE ORAL at 11:42

## 2022-04-02 RX ADMIN — HYDROXYCHLOROQUINE SULFATE 400 MG: 200 TABLET ORAL at 06:25

## 2022-04-02 RX ADMIN — Medication 1 CAPSULE: at 08:27

## 2022-04-02 RX ADMIN — OMEPRAZOLE 20 MG: 20 CAPSULE, DELAYED RELEASE ORAL at 06:25

## 2022-04-02 RX ADMIN — AZELASTINE HYDROCHLORIDE 2 SPRAY: 137 SPRAY, METERED NASAL at 21:00

## 2022-04-02 RX ADMIN — CALCIUM CARBONATE 500 MG: 500 TABLET, CHEWABLE ORAL at 06:25

## 2022-04-02 RX ADMIN — ROPINIROLE HYDROCHLORIDE 1 MG: 1 TABLET, FILM COATED ORAL at 17:53

## 2022-04-02 RX ADMIN — SENNOSIDES AND DOCUSATE SODIUM 2 TABLET: 50; 8.6 TABLET ORAL at 17:53

## 2022-04-02 RX ADMIN — OXYCODONE HYDROCHLORIDE 10 MG: 10 TABLET ORAL at 17:58

## 2022-04-02 RX ADMIN — NYSTATIN 100000 UNITS: 100000 POWDER TOPICAL at 06:26

## 2022-04-02 RX ADMIN — AZELASTINE HYDROCHLORIDE 2 SPRAY: 137 SPRAY, METERED NASAL at 06:00

## 2022-04-02 RX ADMIN — AMLODIPINE BESYLATE 7.5 MG: 5 TABLET ORAL at 17:52

## 2022-04-02 RX ADMIN — AZELASTINE HYDROCHLORIDE 2 SPRAY: 137 SPRAY, METERED NASAL at 14:20

## 2022-04-02 RX ADMIN — NYSTATIN 100000 UNITS: 100000 POWDER TOPICAL at 18:29

## 2022-04-02 RX ADMIN — CALCIUM CARBONATE 500 MG: 500 TABLET, CHEWABLE ORAL at 17:53

## 2022-04-02 RX ADMIN — OXYCODONE HYDROCHLORIDE 10 MG: 10 TABLET ORAL at 22:06

## 2022-04-02 RX ADMIN — ROPINIROLE HYDROCHLORIDE 1 MG: 1 TABLET, FILM COATED ORAL at 06:25

## 2022-04-02 RX ADMIN — OXYCODONE HYDROCHLORIDE 10 MG: 10 TABLET ORAL at 14:19

## 2022-04-02 RX ADMIN — FLUCONAZOLE 100 MG: 100 TABLET ORAL at 06:25

## 2022-04-02 RX ADMIN — CALCIUM CARBONATE 500 MG: 500 TABLET, CHEWABLE ORAL at 11:44

## 2022-04-02 RX ADMIN — OXYCODONE HYDROCHLORIDE 10 MG: 10 TABLET ORAL at 02:22

## 2022-04-02 RX ADMIN — MIRTAZAPINE 22.5 MG: 15 TABLET, FILM COATED ORAL at 17:52

## 2022-04-02 RX ADMIN — OXYCODONE HYDROCHLORIDE 10 MG: 10 TABLET ORAL at 09:29

## 2022-04-02 RX ADMIN — PROPRANOLOL HYDROCHLORIDE 20 MG: 20 TABLET ORAL at 17:58

## 2022-04-02 RX ADMIN — PAROXETINE HYDROCHLORIDE 60 MG: 20 TABLET, FILM COATED ORAL at 17:53

## 2022-04-02 RX ADMIN — OXYCODONE HYDROCHLORIDE 10 MG: 10 TABLET ORAL at 06:25

## 2022-04-02 RX ADMIN — GUAIFENESIN 600 MG: 600 TABLET, EXTENDED RELEASE ORAL at 17:53

## 2022-04-02 RX ADMIN — GUAIFENESIN 600 MG: 600 TABLET, EXTENDED RELEASE ORAL at 06:25

## 2022-04-02 ASSESSMENT — PAIN DESCRIPTION - PAIN TYPE
TYPE: ACUTE PAIN

## 2022-04-02 ASSESSMENT — ENCOUNTER SYMPTOMS
HEADACHES: 0
COUGH: 0
CHILLS: 0
SHORTNESS OF BREATH: 0
ABDOMINAL PAIN: 0
NAUSEA: 1
DIZZINESS: 0
VOMITING: 1
BLURRED VISION: 1
FEVER: 0

## 2022-04-02 NOTE — PROGRESS NOTES
0715 assumed care. Resting in bed and in no distress. Bed in low position, call light w/in reach, family @ bedside.     0830 few spoonfuls for breakfast. No swallowing problems noted.     1000 Medicated for pain per mar w/ adeq pain relief.

## 2022-04-02 NOTE — PROGRESS NOTES
Pt alert and oriented x 4 but with forgetfulness and less hallucinations today. Continues to complain of bladder and kidney/ flank pain. Messaged Dr Dyson in regards to urine culture results and hydronephrosis. No orders received. Pt with many questions regarding plan of care. Notified Dr Dyson will round to address questions. Suprapubic cath and garza to DD. Dark output out. Incontinent of stool. Paged Dr Dyson in regards to urine culture and hydronephrosis again this afternoon. Returned phone call received and stated he would be by in 10 minutes to discuss concerns with this RN and pt. Dr Dyson to the bedside and goals of care conversation completed. Addressed urine culture results with Monica DUARTE who stated she would talk to Dr Dyson. Pt and wife with many questions. Education provided. Pt resting comfortably at this time. Call light within reach. Hourly rounding in place.

## 2022-04-02 NOTE — PROGRESS NOTES
GYN/Oncology Progress Note               Author: EILEEN Dumont Date & Time created: 4/2/2022  11:27 AM     Interval History:  Patient is having difficulty staying awake during conversations, still having episodes of emesis that are mucous from her ears. Pain is well controlled.     Review of Systems:  Review of Systems   Constitutional: Negative for chills and fever.   Eyes: Positive for blurred vision (right eye).   Respiratory: Negative for cough and shortness of breath.    Cardiovascular: Positive for leg swelling. Negative for chest pain.   Gastrointestinal: Positive for nausea and vomiting. Negative for abdominal pain.   Neurological: Negative for dizziness and headaches.       Physical Exam:  Physical Exam  Constitutional:       General: She is not in acute distress.     Appearance: She is ill-appearing.   Cardiovascular:      Pulses: Normal pulses.   Pulmonary:      Effort: Pulmonary effort is normal.   Abdominal:      Palpations: Abdomen is soft.      Comments: Midline well healed   Genitourinary:     Comments: Suprapubic in place  Musculoskeletal:         General: Swelling present.      Comments: Right boot   Skin:     General: Skin is warm and dry.      Capillary Refill: Capillary refill takes 2 to 3 seconds.      Coloration: Skin is pale.   Neurological:      Mental Status: She is alert and oriented to person, place, and time.         Labs:          Recent Labs     03/31/22 0038 04/01/22  0942   SODIUM 137 132*   POTASSIUM 3.7 3.1*   CHLORIDE 106 101   CO2 14* 12*   BUN 27* 32*   CREATININE 3.19* 3.28*   CALCIUM 9.3 9.5     Recent Labs     03/31/22 0038 04/01/22  0942   ALTSGPT 10  --    ASTSGOT 42  --    ALKPHOSPHAT 131*  --    TBILIRUBIN 0.2  --    GLUCOSE 88 87     Recent Labs     03/31/22 0038 04/01/22  0942   RBC 2.46* 2.58*   HEMOGLOBIN 7.0* 7.1*   HEMATOCRIT 22.3* 23.4*   PLATELETCT 300 266     Recent Labs     03/31/22 0038 04/01/22  0942   WBC 14.9* 14.3*   NEUTSPOLYS 89.70* 87.20*    LYMPHOCYTES 4.10* 5.30*   MONOCYTES 4.20 5.80   EOSINOPHILS 0.00 0.10   BASOPHILS 0.10 0.10   ASTSGOT 42  --    ALTSGPT 10  --    ALKPHOSPHAT 131*  --    TBILIRUBIN 0.2  --      Recent Labs     22  0038 22  0942   SODIUM 137 132*   POTASSIUM 3.7 3.1*   CHLORIDE 106 101   CO2 14* 12*   GLUCOSE 88 87   BUN 27* 32*   CREATININE 3.19* 3.28*   CALCIUM 9.3 9.5     Hemodynamics:  Temp (24hrs), Av.6 °C (97.8 °F), Min:36.3 °C (97.4 °F), Max:36.9 °C (98.5 °F)  Temperature: 36.9 °C (98.5 °F)  Pulse  Av.3  Min: 71  Max: 142   Blood Pressure: 117/75     Respiratory:    Respiration: 14, Pulse Oximetry: 96 %        RUL Breath Sounds: Clear, RML Breath Sounds: Clear, RLL Breath Sounds: Clear;Diminished, MORENO Breath Sounds: Clear, LLL Breath Sounds: Clear;Diminished  Fluids:    Intake/Output Summary (Last 24 hours) at 2022 1116  Last data filed at 2022 0911  Gross per 24 hour   Intake 460 ml   Output 1175 ml   Net -715 ml        GI/Nutrition:  Orders Placed This Encounter   Procedures   • Diet Order Diet: Regular     Standing Status:   Standing     Number of Occurrences:   1     Order Specific Question:   Diet:     Answer:   Regular [1]     Medical Decision Making, by Problem:  Active Hospital Problems    Diagnosis    • *Altered mental status [R41.82]        Plan:  This is a 56 y.o. female with minimum stage III uterine cancer, s/p ex lap hys,bso,cystectomy with repair, suprapubic catheter placement on 3/3/22, who presented from rehab with AMS:     1. AMS: continues to improve, negative head CT, at rehab improved with dose of Narcan, possibly secondary to narcotics. Minimize narcotics, continue to monitor.   2. Uterine cancer: s/p 6 cycles neoadjuvant chemotherapy and hys/bso.  Repeat CT shows interval progression of disease, with new and increasing mediastinal, paraesophageal, and pelvic adenopathy.  Dr. Dyson discussed progression of disease, given her current medical condition she would not be a  candidate for any form of treatment. Dr. Dyson discussed that she most likely would not improve at this point in order to ever receive treatment. He has recommended hospice. Patient would like to speak to hospice representatives. DNR  3. Pelvic pain: secondary to above, minimize narcotics, currently well controlled with oxycodone 10 mg q 4, monitor closely.   4. ILIA: likely obstructive, CT with bilateral hydronephrosis likely secondary to pelvic disease. Fena 1.3%.  Will continue to monitor and IVF. Dr. Dyson has recommended no interventions at this time as this would prolong her suffering. Worsening kidney function and now metabolic alkalosis  5. Hematuria: improving, will continue to monitor. Previously on Xarelto for DVT, continue hold for now.   6. Leukocytosis: afebrile, Ucx with scant growth of E.coli and E.faecium and large growth of Candida, will start Diflucan. Bcx with no growth to date.Will continue to monitor. Start Microbid.   7. Anemia: acute on chronic, still with mild hematuria. Continue to hold Xarelto and follow AM labs. Hg 6.9, no tx at this time, recheck in am  8. Upper extremity DVT: previously on Xarelto, currently held due to hematuria, no worsening swelling to RUE, will continue monitor.   9. Hx allergic rhinitis and ear pain: resume home azelastine hydrochloride, Dr. Dyson discussed with ENT and Aftrin was added to regimen q6h PRN  10.HTN: no hx of, on amlodipine at home for Raynaud's, will resume with propanolol daily   11. History of Lupus: continue home hydroxychloroquine   12. Dispo: continue inpatient management.      Overall, patient appears to be declining medically. Goals of care not clearing made by patient, however we have recommended hospice. Patient would like to think about things.     Case discussed with Dr. Dyson    Quality-Core Measures

## 2022-04-02 NOTE — CARE PLAN
The patient is Watcher - Medium risk of patient condition declining or worsening    Shift Goals  Clinical Goals: pt will remain free from injury  Patient Goals: Pt will have a bowel movement  Family Goals: n/a    Progress made toward(s) clinical / shift goals:  Calling appropriately. No attempts to get out of bed. + bowel movement today.       Problem: Knowledge Deficit - Standard  Goal: Patient and family/care givers will demonstrate understanding of plan of care, disease process/condition, diagnostic tests and medications  Outcome: Progressing     Problem: Hemodynamics  Goal: Patient's hemodynamics, fluid balance and neurologic status will be stable or improve  Outcome: Progressing     Problem: Fluid Volume  Goal: Fluid volume balance will be maintained  Outcome: Progressing       Patient is not progressing towards the following goals:

## 2022-04-02 NOTE — CARE PLAN
The patient is Watcher - Medium risk of patient condition declining or worsening    Shift Goals  Clinical Goals: Pain management  Patient Goals: Sit on edge of bed for 15 minutes  Family Goals:     Progress made toward(s) clinical / shift goals:  Patient medicated with scheduled pain meds per MAR. Patient sat on edge of bed for 20 minutes.     Problem: Knowledge Deficit - Standard  Goal: Patient and family/care givers will demonstrate understanding of plan of care, disease process/condition, diagnostic tests and medications  Outcome: Progressing     Problem: Hemodynamics  Goal: Patient's hemodynamics, fluid balance and neurologic status will be stable or improve  Outcome: Progressing     Problem: Fluid Volume  Goal: Fluid volume balance will be maintained  Outcome: Progressing     Problem: Urinary - Renal Perfusion  Goal: Ability to achieve and maintain adequate renal perfusion and functioning will improve  Outcome: Progressing     Problem: Respiratory  Goal: Patient will achieve/maintain optimum respiratory ventilation and gas exchange  Outcome: Progressing     Problem: Mechanical Ventilation  Goal: Patient will be able to express needs and understand communication  Outcome: Progressing     Problem: Physical Regulation  Goal: Diagnostic test results will improve  Outcome: Progressing  Goal: Signs and symptoms of infection will decrease  Outcome: Progressing     Problem: Skin Integrity  Goal: Skin integrity is maintained or improved  Outcome: Progressing     Problem: Fall Risk  Goal: Patient will remain free from falls  Outcome: Progressing     Problem: Pain - Standard  Goal: Alleviation of pain or a reduction in pain to the patient’s comfort goal  Outcome: Progressing       Patient is not progressing towards the following goals:

## 2022-04-03 PROCEDURE — A9270 NON-COVERED ITEM OR SERVICE: HCPCS | Performed by: NURSE PRACTITIONER

## 2022-04-03 PROCEDURE — 700102 HCHG RX REV CODE 250 W/ 637 OVERRIDE(OP): Performed by: NURSE PRACTITIONER

## 2022-04-03 PROCEDURE — 700105 HCHG RX REV CODE 258: Performed by: STUDENT IN AN ORGANIZED HEALTH CARE EDUCATION/TRAINING PROGRAM

## 2022-04-03 PROCEDURE — A9270 NON-COVERED ITEM OR SERVICE: HCPCS | Performed by: STUDENT IN AN ORGANIZED HEALTH CARE EDUCATION/TRAINING PROGRAM

## 2022-04-03 PROCEDURE — 770004 HCHG ROOM/CARE - ONCOLOGY PRIVATE *

## 2022-04-03 PROCEDURE — 700102 HCHG RX REV CODE 250 W/ 637 OVERRIDE(OP): Performed by: STUDENT IN AN ORGANIZED HEALTH CARE EDUCATION/TRAINING PROGRAM

## 2022-04-03 RX ORDER — MORPHINE SULFATE 100 MG/5ML
10 SOLUTION ORAL EVERY 4 HOURS PRN
Status: DISCONTINUED | OUTPATIENT
Start: 2022-04-03 | End: 2022-04-03

## 2022-04-03 RX ORDER — OXYCODONE HCL 5 MG/5 ML
10 SOLUTION, ORAL ORAL EVERY 4 HOURS
Status: DISCONTINUED | OUTPATIENT
Start: 2022-04-03 | End: 2022-04-04 | Stop reason: HOSPADM

## 2022-04-03 RX ADMIN — SODIUM CHLORIDE: 9 INJECTION, SOLUTION INTRAVENOUS at 08:38

## 2022-04-03 RX ADMIN — NYSTATIN 100000 UNITS: 100000 POWDER TOPICAL at 08:35

## 2022-04-03 RX ADMIN — GUAIFENESIN 600 MG: 600 TABLET, EXTENDED RELEASE ORAL at 05:10

## 2022-04-03 RX ADMIN — HYDROXYCHLOROQUINE SULFATE 400 MG: 200 TABLET ORAL at 05:10

## 2022-04-03 RX ADMIN — OXYCODONE HYDROCHLORIDE 10 MG: 10 TABLET ORAL at 13:45

## 2022-04-03 RX ADMIN — OXYCODONE HYDROCHLORIDE 10 MG: 10 TABLET ORAL at 02:02

## 2022-04-03 RX ADMIN — AMLODIPINE BESYLATE 7.5 MG: 5 TABLET ORAL at 18:24

## 2022-04-03 RX ADMIN — CARBOXYMETHYLCELLULOSE SODIUM 1 DROP: 5 SOLUTION/ DROPS OPHTHALMIC at 09:42

## 2022-04-03 RX ADMIN — FLUCONAZOLE 100 MG: 100 TABLET ORAL at 05:10

## 2022-04-03 RX ADMIN — OXYCODONE HYDROCHLORIDE 10 MG: 5 SOLUTION ORAL at 21:17

## 2022-04-03 RX ADMIN — OMEPRAZOLE 20 MG: 20 CAPSULE, DELAYED RELEASE ORAL at 05:10

## 2022-04-03 RX ADMIN — NITROFURANTOIN MONOHYDRATE/MACROCRYSTALLINE 100 MG: 25; 75 CAPSULE ORAL at 08:35

## 2022-04-03 RX ADMIN — NITROFURANTOIN MONOHYDRATE/MACROCRYSTALLINE 100 MG: 25; 75 CAPSULE ORAL at 16:51

## 2022-04-03 RX ADMIN — ROPINIROLE HYDROCHLORIDE 1 MG: 1 TABLET, FILM COATED ORAL at 05:10

## 2022-04-03 RX ADMIN — ROPINIROLE HYDROCHLORIDE 1 MG: 1 TABLET, FILM COATED ORAL at 21:17

## 2022-04-03 RX ADMIN — AZELASTINE HYDROCHLORIDE: 137 SPRAY, METERED NASAL at 16:51

## 2022-04-03 RX ADMIN — OXYCODONE HYDROCHLORIDE 10 MG: 10 TABLET ORAL at 09:38

## 2022-04-03 RX ADMIN — CALCIUM CARBONATE 500 MG: 500 TABLET, CHEWABLE ORAL at 05:10

## 2022-04-03 RX ADMIN — NYSTATIN 100000 UNITS: 100000 POWDER TOPICAL at 18:34

## 2022-04-03 RX ADMIN — OXYCODONE HYDROCHLORIDE 10 MG: 10 TABLET ORAL at 05:16

## 2022-04-03 RX ADMIN — SODIUM CHLORIDE: 9 INJECTION, SOLUTION INTRAVENOUS at 18:32

## 2022-04-03 RX ADMIN — Medication 1 CAPSULE: at 08:38

## 2022-04-03 RX ADMIN — AZELASTINE HYDROCHLORIDE 2 SPRAY: 137 SPRAY, METERED NASAL at 21:17

## 2022-04-03 ASSESSMENT — ENCOUNTER SYMPTOMS
FEVER: 0
HEADACHES: 0
SHORTNESS OF BREATH: 0
COUGH: 0
CHILLS: 0
ABDOMINAL PAIN: 0
DIZZINESS: 0
BLURRED VISION: 1
NAUSEA: 1
VOMITING: 1

## 2022-04-03 ASSESSMENT — PAIN DESCRIPTION - PAIN TYPE
TYPE: ACUTE PAIN
TYPE: ACUTE PAIN

## 2022-04-03 NOTE — DISCHARGE PLANNING
Received Choice form at [phone]  Agency/Facility Name: [facility]  Referral sent per Choice form @ [phone]

## 2022-04-03 NOTE — PROGRESS NOTES
Brenner output 100cc, cloudy/serosang output. C/O low abdominal discomfort/medicated per mar. Per pt, providing adeq pain relief. Poor po intake, poor appetite per pt.

## 2022-04-03 NOTE — FACE TO FACE
Face to Face Note  -  Durable Medical Equipment    EILEEN Dumont - NPI: [id number]  I certify that this patient is under my care and that they had a durable medical equipment(DME)face to face encounter by myself that meets the physician DME face-to-face encounter requirements with this patient on:    Date of encounter:   Patient:                    MRN:                       YOB: 2022  Magali Leal  [id number]  1965     The encounter with the patient was in whole, or in part, for the following medical condition, which is the primary reason for durable medical equipment:  Other - hospice    I certify that, based on my findings, the following durable medical equipment is medically necessary:  Beds.    My Clinical findings support the need for the above equipment due to:  Other - bed bound, hospice

## 2022-04-03 NOTE — DISCHARGE PLANNING
Anticipated Discharge Disposition:  Home with Hospice      Action: RNCM spoke with patient's wife Gosia Talbot (#[phone]) and obtained hospice choice for: 1) [facility], 2) [facility]. Choice form faxed to [facility].       Barriers to discharge: Hospice agency acceptance, DME delivery and setup.      Plan: HCM will continue to follow to assist with dc needs.

## 2022-04-03 NOTE — CARE PLAN
The patient is Stable - Low risk of patient condition declining or worsening    Shift Goals  Clinical Goals: Pain control; Emotional and psychosocial support  Patient Goals: Go home  Family Goals: Figure out plan of care; Uphold pts wishes    Progress made toward(s) clinical / shift goals: Patient medicated with scheduled pain medication per MAR. Patient states current regimen controls pain adequately. Provided psychosocial and emotional support to patient and significant other, as well as active listening as they discussed their options, including hospice, and raised their questions and concerns.     Problem: Knowledge Deficit - Standard  Goal: Patient and family/care givers will demonstrate understanding of plan of care, disease process/condition, diagnostic tests and medications  Outcome: Progressing     Problem: Hemodynamics  Goal: Patient's hemodynamics, fluid balance and neurologic status will be stable or improve  Outcome: Progressing     Problem: Fluid Volume  Goal: Fluid volume balance will be maintained  Outcome: Progressing     Problem: Urinary - Renal Perfusion  Goal: Ability to achieve and maintain adequate renal perfusion and functioning will improve  Outcome: Progressing     Problem: Respiratory  Goal: Patient will achieve/maintain optimum respiratory ventilation and gas exchange  Outcome: Progressing     Problem: Mechanical Ventilation  Goal: Patient will be able to express needs and understand communication  Outcome: Progressing     Problem: Physical Regulation  Goal: Diagnostic test results will improve  Outcome: Progressing  Goal: Signs and symptoms of infection will decrease  Outcome: Progressing     Problem: Skin Integrity  Goal: Skin integrity is maintained or improved  Outcome: Progressing     Problem: Fall Risk  Goal: Patient will remain free from falls  Outcome: Progressing     Problem: Pain - Standard  Goal: Alleviation of pain or a reduction in pain to the patient’s comfort goal  Outcome:  Progressing       Patient is not progressing towards the following goals:

## 2022-04-03 NOTE — PROGRESS NOTES
GYN/Oncology Progress Note               Author: EILEEN Dumont Date & Time created: 4/3/2022  8:48 AM     Interval History:  Patient is having difficulty staying awake during conversations, more lethargic today. Still having episodes of emesis that are mucous from her ears. Pain is well controlled.     Review of Systems:  Review of Systems   Constitutional: Negative for chills and fever.   Eyes: Positive for blurred vision (right eye).   Respiratory: Negative for cough and shortness of breath.    Cardiovascular: Positive for leg swelling. Negative for chest pain.   Gastrointestinal: Positive for nausea and vomiting. Negative for abdominal pain.   Neurological: Negative for dizziness and headaches.       Physical Exam:  Physical Exam  Constitutional:       General: She is not in acute distress.     Appearance: She is ill-appearing.   Cardiovascular:      Pulses: Normal pulses.   Pulmonary:      Effort: Pulmonary effort is normal.   Abdominal:      Palpations: Abdomen is soft.      Comments: Midline well healed   Genitourinary:     Comments: Suprapubic in place  Musculoskeletal:         General: Swelling present.      Comments: Right boot   Skin:     General: Skin is warm and dry.      Capillary Refill: Capillary refill takes 2 to 3 seconds.      Coloration: Skin is pale.   Neurological:      Mental Status: She is alert and oriented to person, place, and time.         Labs:          Recent Labs     04/01/22  0942 04/02/22  1120   SODIUM 132* 132*   POTASSIUM 3.1* 3.5*   CHLORIDE 101 102   CO2 12* 9*   BUN 32* 36*   CREATININE 3.28* 3.82*   CALCIUM 9.5 9.8     Recent Labs     04/01/22  0942 04/02/22  1120   GLUCOSE 87 85     Recent Labs     04/01/22  0942 04/02/22  1120   RBC 2.58* 2.45*   HEMOGLOBIN 7.1* 6.9*   HEMATOCRIT 23.4* 22.2*   PLATELETCT 266 250     Recent Labs     04/01/22  0942 04/02/22  1120   WBC 14.3* 15.1*   NEUTSPOLYS 87.20*  --    LYMPHOCYTES 5.30*  --    MONOCYTES 5.80  --    EOSINOPHILS  0.10  --    BASOPHILS 0.10  --      Recent Labs     22  0942 22  1120   SODIUM 132* 132*   POTASSIUM 3.1* 3.5*   CHLORIDE 101 102   CO2 12* 9*   GLUCOSE 87 85   BUN 32* 36*   CREATININE 3.28* 3.82*   CALCIUM 9.5 9.8     Hemodynamics:  Temp (24hrs), Av.6 °C (97.9 °F), Min:36.5 °C (97.7 °F), Max:36.7 °C (98 °F)  Temperature: 36.7 °C (98 °F)  Pulse  Av.2  Min: 71  Max: 142   Blood Pressure: 115/75     Respiratory:    Respiration: 18, Pulse Oximetry: 97 %        RUL Breath Sounds: Clear, RML Breath Sounds: Clear, RLL Breath Sounds: Clear;Diminished, MORENO Breath Sounds: Clear, LLL Breath Sounds: Clear;Diminished  Fluids:    Intake/Output Summary (Last 24 hours) at 2022 1116  Last data filed at 2022 0911  Gross per 24 hour   Intake 460 ml   Output 1175 ml   Net -715 ml        GI/Nutrition:  Orders Placed This Encounter   Procedures   • Diet Order Diet: Regular     Standing Status:   Standing     Number of Occurrences:   1     Order Specific Question:   Diet:     Answer:   Regular [1]     Medical Decision Making, by Problem:  Active Hospital Problems    Diagnosis    • *Altered mental status [R41.82]        Plan:  This is a 56 y.o. female with minimum stage III uterine cancer, s/p ex lap hys,bso,cystectomy with repair, suprapubic catheter placement on 3/3/22, who presented from rehab with AMS:     1. AMS: continues to improve, negative head CT, at rehab improved with dose of Narcan, possibly secondary to narcotics. Minimize narcotics, continue to monitor.   2. Uterine cancer: s/p 6 cycles neoadjuvant chemotherapy and hys/bso.  Repeat CT shows interval progression of disease, with new and increasing mediastinal, paraesophageal, and pelvic adenopathy.  Dr. Dyson discussed progression of disease, given her current medical condition she would not be a candidate for any form of treatment. Dr. Dyson discussed that she most likely would not improve at this point in order to ever receive treatment. He has  recommended hospice. Patient and wife have decided to d/c home on hospice. DNR  3. Pelvic pain: secondary to above, minimize narcotics, currently well controlled with oxycodone 10 mg q 4, monitor closely.   4. ILIA: likely obstructive, CT with bilateral hydronephrosis likely secondary to pelvic disease. Fena 1.3%.  Will continue to monitor and IVF. Dr. Dyson has recommended no interventions at this time as this would prolong her suffering. Worsening kidney function and now metabolic alkalosis  5. Hematuria: improving, will continue to monitor. Previously on Xarelto for DVT, continue hold for now.   6. Leukocytosis: afebrile, Ucx with scant growth of E.coli and E.faecium and large growth of Candida, will start Diflucan. Bcx with no growth to date.Will continue to monitor. Start Microbid.   7. Anemia: acute on chronic, still with mild hematuria. Continue to hold Xarelto and follow AM labs. Hg 6.9, no tx at this time, recheck in am  8. Upper extremity DVT: previously on Xarelto, currently held due to hematuria, no worsening swelling to RUE, will continue monitor.   9. Hx allergic rhinitis and ear pain: resume home azelastine hydrochloride, Dr. Dyson discussed with ENT and Aftrin was added to regimen q6h PRN  10.HTN: no hx of, on amlodipine at home for Raynaud's, will resume with propanolol daily   11. History of Lupus: continue home hydroxychloroquine   12. Dispo: continue inpatient management.      Overall, patient appears to be declining medically. Patient would like to go home on hospice ASAP. I have placed an order for hospital bed and CM to follow up with choice of [facility]. Goal to d/c home with hospice on Monday.     Case discussed with Dr. Dyson    Quality-Core Measures

## 2022-04-03 NOTE — PROGRESS NOTES
"Patient and patient's significant other very emotional and anxious at the beginning of this shift regarding patient's prognosis and plan of care. Patient's significant other concerned about patient's kidneys and bladder continuing to fill with urine and not empty despite suprapubic and garza catheters in place. Patient's significant other asking about placement of nephrostomy tubes to provide patient relief and comfort. Discussed with patient and significant other the benefits and risks of placing nephrostomy tubes, both verbalized understanding. Conversation continued with significant other seeking to understand patient's wishes. Patient repeatedly stating they want to go home. This RN questioned if patient would want to undergo procedure for placement of nephrostomy tubes to drain urine to provide relief and comfort before going home. Patient again stating they want to go home. Eventually, patient clearly stated that they want to go home with Hospice as soon as possible. Patient states that she wants to \"pass over\" in her own home with her significant other, family, and pets. Patient states they do not want to die in the hospital and that she does not care what her cognitive state is or if she is lucid, she still wants to get home as soon as possible and \"pass over\" at home.   "

## 2022-04-03 NOTE — DISCHARGE PLANNING
Received Choice form at [phone]  Agency/Facility Name: [facility]  Referral sent per Choice form @ [phone]

## 2022-04-04 ENCOUNTER — PHARMACY VISIT (OUTPATIENT)
Dept: PHARMACY | Facility: MEDICAL CENTER | Age: 57
End: 2022-04-04
Payer: COMMERCIAL

## 2022-04-04 VITALS
OXYGEN SATURATION: 91 % | WEIGHT: 218.48 LBS | TEMPERATURE: 98.5 F | RESPIRATION RATE: 14 BRPM | SYSTOLIC BLOOD PRESSURE: 106 MMHG | HEIGHT: 67 IN | HEART RATE: 104 BPM | DIASTOLIC BLOOD PRESSURE: 68 MMHG | BODY MASS INDEX: 34.29 KG/M2

## 2022-04-04 PROCEDURE — 700102 HCHG RX REV CODE 250 W/ 637 OVERRIDE(OP): Performed by: NURSE PRACTITIONER

## 2022-04-04 PROCEDURE — A9270 NON-COVERED ITEM OR SERVICE: HCPCS | Performed by: NURSE PRACTITIONER

## 2022-04-04 PROCEDURE — RXMED WILLOW AMBULATORY MEDICATION CHARGE: Performed by: NURSE PRACTITIONER

## 2022-04-04 PROCEDURE — 700105 HCHG RX REV CODE 258: Performed by: STUDENT IN AN ORGANIZED HEALTH CARE EDUCATION/TRAINING PROGRAM

## 2022-04-04 PROCEDURE — A9270 NON-COVERED ITEM OR SERVICE: HCPCS | Performed by: STUDENT IN AN ORGANIZED HEALTH CARE EDUCATION/TRAINING PROGRAM

## 2022-04-04 PROCEDURE — 700102 HCHG RX REV CODE 250 W/ 637 OVERRIDE(OP): Performed by: STUDENT IN AN ORGANIZED HEALTH CARE EDUCATION/TRAINING PROGRAM

## 2022-04-04 RX ORDER — CARBOXYMETHYLCELLULOSE SODIUM 5 MG/ML
1 SOLUTION/ DROPS OPHTHALMIC PRN
Qty: 60 ML | Refills: 0 | Status: SHIPPED | OUTPATIENT
Start: 2022-04-04

## 2022-04-04 RX ORDER — LORAZEPAM 2 MG/ML
CONCENTRATE ORAL
Qty: 36 ML | Refills: 0 | OUTPATIENT
Start: 2022-04-04 | End: 2022-04-04

## 2022-04-04 RX ORDER — LORAZEPAM 2 MG/ML
CONCENTRATE ORAL
Qty: 30 ML | Refills: 0 | OUTPATIENT
Start: 2022-04-04

## 2022-04-04 RX ORDER — OXYMETAZOLINE HYDROCHLORIDE 0.05 G/100ML
2 SPRAY NASAL EVERY 6 HOURS PRN
Qty: 30 ML | Refills: 0 | Status: SHIPPED | OUTPATIENT
Start: 2022-04-04

## 2022-04-04 RX ORDER — OXYCODONE HCL 5 MG/5 ML
SOLUTION, ORAL ORAL
Qty: 180 ML | Refills: 0 | OUTPATIENT
Start: 2022-04-04

## 2022-04-04 RX ADMIN — OXYCODONE HYDROCHLORIDE 10 MG: 5 SOLUTION ORAL at 13:18

## 2022-04-04 RX ADMIN — SODIUM CHLORIDE: 9 INJECTION, SOLUTION INTRAVENOUS at 04:23

## 2022-04-04 RX ADMIN — OXYCODONE HYDROCHLORIDE 10 MG: 5 SOLUTION ORAL at 02:08

## 2022-04-04 RX ADMIN — OXYCODONE HYDROCHLORIDE 10 MG: 5 SOLUTION ORAL at 04:39

## 2022-04-04 RX ADMIN — NITROFURANTOIN MONOHYDRATE/MACROCRYSTALLINE 100 MG: 25; 75 CAPSULE ORAL at 09:10

## 2022-04-04 RX ADMIN — OXYCODONE HYDROCHLORIDE 10 MG: 5 SOLUTION ORAL at 16:49

## 2022-04-04 RX ADMIN — OXYMETAZOLINE HCL 2 SPRAY: 0.05 SPRAY NASAL at 09:10

## 2022-04-04 RX ADMIN — OXYCODONE HYDROCHLORIDE 10 MG: 5 SOLUTION ORAL at 09:11

## 2022-04-04 RX ADMIN — Medication 1 CAPSULE: at 09:10

## 2022-04-04 RX ADMIN — ROPINIROLE HYDROCHLORIDE 1 MG: 1 TABLET, FILM COATED ORAL at 04:40

## 2022-04-04 NOTE — PROGRESS NOTES
Pt is lethargic.  Can answer yes and no questions, recognizes family and friends at bedside.  Medicating for pain with oral oxy for good effects.  POC updated to SO Gosia.  Edwin to come  pt for transport home ta 1630. DME should be delivered by now. Medications already delivered.  Oral administration taught to pt's brother and SO, Gosia.  Support and comfort given to all in room.

## 2022-04-04 NOTE — DISCHARGE INSTRUCTIONS
Discharge Instructions    Discharged to home by ambulance with escort. Discharged via ambulance, hospital escort: Yes.  Special equipment needed: Not Applicable    Be sure to schedule a follow-up appointment with your primary care doctor or any specialists as instructed.     Discharge Plan:   Diet Plan: Discussed  Activity Level: Discussed  Confirmed Follow up Appointment: No (Comments) (Going home with hospice)  Confirmed Symptoms Management: Discussed  Medication Reconciliation Updated: Yes    I understand that a diet low in cholesterol, fat, and sodium is recommended for good health. Unless I have been given specific instructions below for another diet, I accept this instruction as my diet prescription.   Other diet: as tolerated      Special Instructions: None    · Is patient discharged on Warfarin / Coumadin?   No     Depression / Suicide Risk    As you are discharged from this [facility] facility, it is important to learn how to keep safe from harming yourself.    Recognize the warning signs:  · Abrupt changes in personality, positive or negative- including increase in energy   · Giving away possessions  · Change in eating patterns- significant weight changes-  positive or negative  · Change in sleeping patterns- unable to sleep or sleeping all the time   · Unwillingness or inability to communicate  · Depression  · Unusual sadness, discouragement and loneliness  · Talk of wanting to die  · Neglect of personal appearance   · Rebelliousness- reckless behavior  · Withdrawal from people/activities they love  · Confusion- inability to concentrate     If you or a loved one observes any of these behaviors or has concerns about self-harm, here's what you can do:  · Talk about it- your feelings and reasons for harming yourself  · Remove any means that you might use to hurt yourself (examples: pills, rope, extension cords, firearm)  · Get professional help from the community (Mental Health, Substance Abuse,  psychological counseling)  · Do not be alone:Call your Safe Contact- someone whom you trust who will be there for you.  · Call your local CRISIS HOTLINE 074-1049 or 664-567-2375  · Call your local Children's Mobile Crisis Response Team [place] (586) 101-1324 or www.[employer]  · Call the toll free National Suicide Prevention Hotlines   · National Suicide Prevention Lifeline 815-135-AVXY (4887)  · National Hope Line Network 800-SUICIDE (153-4706)

## 2022-04-04 NOTE — DISCHARGE PLANNING
DC Transport Scheduled    Received request at: 1326    Transport Company Scheduled:  [employer]  Spoke with Trey at [facility] to schedule transport.      Scheduled Date: 04/04/2022  Scheduled Time: 1630    [facility]      Notified care team of scheduled transport via Voalte.     If there are any changes needed to the DC transportation scheduled, please contact [facility] at ext. [phone] between the hours of 2313-3506 Mon-Fri. If outside those hours, contact the ED Case Manager at ext. [phone].

## 2022-04-04 NOTE — DISCHARGE PLANNING
JOSE D places a follow up call to Bertram at [facility] [phone] states patient has been accepted for service and with a start today.Per Bertram patient 's bed will be deliver to her home around 1530 today. Pt's wife Gosia Talbot  [phone]  and pt's brother at [facility] at bedside. CM waiting for confirmation of transportation time for [phone].CM will continue to follow.

## 2022-04-04 NOTE — CARE PLAN
The patient is Watcher - Medium risk of patient condition declining or worsening    Shift Goals  Clinical Goals: Comfort  Patient Goals: Rest  Family Goals: Comfort, get ready for D/C tomorrow    Progress made toward(s) clinical / shift goals:    Problem: Respiratory  Goal: Patient will achieve/maintain optimum respiratory ventilation and gas exchange  Outcome: Progressing     Problem: Skin Integrity  Goal: Skin integrity is maintained or improved  Outcome: Progressing     Problem: Pain - Standard  Goal: Alleviation of pain or a reduction in pain to the patient’s comfort goal  Outcome: Progressing       Patient is not progressing towards the following goals:

## 2022-04-04 NOTE — CARE PLAN
The patient is Watcher - Medium risk of patient condition declining or worsening    Shift Goals  Clinical Goals: work on discharge  Patient Goals: home  Family Goals: Figure out plan of care; Uphold pts wishes    Progress made toward(s) clinical / shift goals:    Problem: Knowledge Deficit - Standard  Goal: Patient and family/care givers will demonstrate understanding of plan of care, disease process/condition, diagnostic tests and medications  Outcome: Progressing  Note: Pt and family wanting home hospice. On call  notified of urgent request.      Problem: Skin Integrity  Goal: Skin integrity is maintained or improved  Outcome: Progressing  Note: Pt repositioned throughout shift. Skin kept clean and dry. Nystatin powder        Patient is not progressing towards the following goals:

## 2022-04-04 NOTE — THERAPY
Physical Therapy Contact Note    Per chart and RN patient is pending DC home with hospice today. Will hold PT today and resume tomorrow if DC unsuccessful.    Dori Sanchez, PT, DPT  [phone]

## 2022-04-04 NOTE — DISCHARGE SUMMARY
Discharge Summary    CHIEF COMPLAINT ON ADMISSION  Chief Complaint   Patient presents with   • Mental Status Change       Reason for Admission  ALOC    Admission Date  3/28/2022    CODE STATUS  DNAR/DNI    HPI & HOSPITAL COURSE  This is a 56 y.o. female admitted from acute rehab with ALOC possibly secondary to oversedation from narcotics, improved with narcan. Patient recently completed 6 cycles of neoadjuvant chemotherapy for endometrial cancer. She was admitted to the hospital for hypercalcemia and unfortunately feel and sustained a left trimalleolar ankle fracture and underwent ORIF. She then underwent a ex lap, hysterectomy, bilateral salpingo oophorectomy, cystectomy with repair and suprapubic catheter placement. She had a extended hospitalization secondary to deconditioning and then was discharged to rehab. She returned to the hospital on 3/28/22 for ALOC, which resolved. Unfortunately her endometrial cancer had progressed causing hydronephrosis leading to kidney failure. She was treated for UTI with macrobid and diflucan. She continued to decline physically. Dr. Dyson discussed hospice/comfort care with patient and spouse given progression of disease and renal failure. No interventions were made in regards to her hydronephrosis as this would prolong her suffering and her cancer would still continue to progress. She was not a candidate for any treatment. The decision was made to d/c home on hospice.     Therefore, she is discharged in guarded and stable condition to hospice.    The patient met 2-midnight criteria for an inpatient stay at the time of discharge.    Discharge Date  4/4/22    FOLLOW UP ITEMS POST DISCHARGE  Hospice    DISCHARGE DIAGNOSES  1. ALOC  2. End stage endometrial cancer  3. Acute kidney failure secondary to hydroneprosis, disease progression  4. Cancer related pain  5. UTI    FOLLOW UP  No future appointments.  [facility]  [address]  [place]  [address]  [phone]          MEDICATIONS ON DISCHARGE     Medication List        START taking these medications        Instructions   carboxymethylcellulose 0.5 % Soln  Commonly known as: REFRESH TEARS   Administer 1 Drop into the right eye as needed.  Dose: 1 Drop     LORazepam Intensol 2 MG/ML Conc  Generic drug: LORazepam   Give 1/2 ML (1mg) orally every hour as needed for comfort care/anxiety F41.9     oxyCODONE 5 MG/5ML solution  Commonly known as: ROXICODONE   Doctor's comments: ICD 10 G89.3, 3 day supply  Take 10 mL (10 mg) every 4 hours as needed for pain for up to 3 days     oxymetazoline 0.05 % Soln  Commonly known as: AFRIN   Administer 2 Sprays into affected nostril(S) every 6 hours as needed (congestion).  Dose: 2 Spray            CONTINUE taking these medications        Instructions   acetaminophen 325 MG Tabs  Commonly known as: Tylenol   Take 650 mg by mouth every four hours as needed. Indications: Fever, Pain  Dose: 650 mg     amLODIPine 5 MG Tabs  Commonly known as: NORVASC   Take 7.5 mg by mouth every evening.  Dose: 7.5 mg     Azelastine HCl 137 MCG/SPRAY Soln   Administer 1 Spray into affected nostril(S) in the morning, at noon, and at bedtime.  Dose: 1 Spray     baclofen 10 MG Tabs  Commonly known as: LIORESAL   Take 10 mg by mouth 3 times a day.  Dose: 10 mg     guaiFENesin  MG Tb12  Commonly known as: MUCINEX   Take 1 Tablet by mouth every 12 hours.  Dose: 600 mg     hydroxychloroquine 200 MG Tabs  Commonly known as: Plaquenil   Take 400 mg by mouth every day.  Dose: 400 mg     mirtazapine 15 MG Tabs  Commonly known as: Remeron   Take 22.5 mg by mouth every evening. 1.5 tablets = 22.5  Dose: 22.5 mg     multivitamin Tabs   Take 1 Tablet by mouth every day.  Dose: 1 Tablet     nystatin powder  Commonly known as: MYCOSTATIN   Apply 1 Application topically 2 times a day. Apply to moist intertriginous areas  Dose: 1 Application     omeprazole 20 MG delayed-release capsule  Commonly known  "as: PRILOSEC   Take 20 mg by mouth every day.  Dose: 20 mg     PARoxetine 20 MG Tabs  Commonly known as: PAXIL   Take 60 mg by mouth every day.  Dose: 60 mg     prochlorperazine 10 MG Tabs  Commonly known as: COMPAZINE   Take 10 mg by mouth every 6 hours as needed for Nausea/Vomiting.  Dose: 10 mg     propranolol 20 MG Tabs  Commonly known as: INDERAL   Take 20 mg by mouth every day. Indications: Feeling Anxious  Dose: 20 mg     ROPINIRole 1 MG Tabs  Commonly known as: REQUIP   Take 1 mg by mouth 2 times a day.  Dose: 1 mg     scopolamine 1 mg/72hr Pt72  Commonly known as: TRANSDERM-SCOP   Place 1 Patch on the skin every 72 hours.  Dose: 1 Patch            STOP taking these medications      calcium carbonate 500 MG Chew  Commonly known as: TUMS     fluconazole 100 MG Tabs  Commonly known as: DIFLUCAN     ibuprofen 600 MG Tabs  Commonly known as: MOTRIN     lactulose 10 GM/15ML Soln     linezolid 600 MG Tabs  Commonly known as: ZYVOX     potassium chloride 20 MEQ Pack  Commonly known as: KLOR-CON     rivaroxaban 15 MG Tabs tablet  Commonly known as: XARELTO     rivaroxaban 20 MG Tabs tablet  Commonly known as: XARELTO     simethicone 125 MG chewable tablet  Commonly known as: Mylicon     vitamin D 1000 Unit Tabs  Commonly known as: Cholecalciferol              Allergies  Allergies   Allergen Reactions   • Bactrim Ds Rash and Itching     \"Itchy rash\"   • Ciprofloxacin Rash     States \"something always goes wrong\" Dysthesia   • Morphine Unspecified     Family history of becoming violent \"Paranoia, aggression\"   • Tramadol Vomiting and Nausea   • Lavender Oil Swelling       DIET  Orders Placed This Encounter   Procedures   • Diet Order Diet: Regular     Standing Status:   Standing     Number of Occurrences:   1     Order Specific Question:   Diet:     Answer:   Regular [1]       ACTIVITY  As tolerated.  Weight bearing as tolerated    CONSULTATIONS  Hospice    PROCEDURES  None this admit    LABORATORY  Lab Results "   Component Value Date    SODIUM 132 (L) 04/02/2022    POTASSIUM 3.5 (L) 04/02/2022    CHLORIDE 102 04/02/2022    CO2 9 (LL) 04/02/2022    GLUCOSE 85 04/02/2022    BUN 36 (H) 04/02/2022    CREATININE 3.82 (H) 04/02/2022        Lab Results   Component Value Date    WBC 15.1 (H) 04/02/2022    HEMOGLOBIN 6.9 (L) 04/02/2022    HEMATOCRIT 22.2 (L) 04/02/2022    PLATELETCT 250 04/02/2022        Total time of the discharge process exceeds 30 minutes.

## 2022-04-04 NOTE — CARE PLAN
The patient is Watcher - Medium risk of patient condition declining or worsening    Shift Goals  Clinical Goals: work on discharge  Patient Goals: home  Family Goals: Figure out plan of care; Uphold pts wishes    Progress made toward(s) clinical / shift goals:    Problem: Knowledge Deficit - Standard  Goal: Patient and family/care givers will demonstrate understanding of plan of care, disease process/condition, diagnostic tests and medications  Outcome: Progressing  Note: Pt and family wanting home hospice. On call  notified of urgent request.      Problem: Skin Integrity  Goal: Skin integrity is maintained or improved  Outcome: Progressing  Note: Pt repositioned throughout shift. Skin kept clean and dry. Nystatin powder        Patient is not progressing towards the following goals:    The patient is Stable - Low risk of patient condition declining or worsening    Shift Goals  Clinical Goals: Comfort  Patient Goals: Rest  Family Goals: Comfort, get ready for D/C tomorrow    Progress made toward(s) clinical / shift goals:      Patient is not progressing towards the following goals:

## 2022-04-04 NOTE — DISCHARGE PLANNING
JOSE D spoke with [facility] hospice related to referral. Per Bertram provider has received the referral , reviewing patient's chart and will need to obtain an Auth from patient's insurance. CM will continue to follow an assist with any discharge needs.

## 2022-04-05 NOTE — DISCHARGE PLANNING
note:  RN called because pt is going home with hospice and is imminent. Pt wants to die at home.     CM called [employer] and talked to  who said that they have a BLS unit that's going to be available in 15 minutes and will be sent to  pt right away.

## 2022-04-11 NOTE — DOCUMENTATION QUERY
"                                                                         [facility]                                                                       Query Response Note      PATIENT:               REGAN WOODS  ACCT #:                  [id number]  MRN:                     [id number]  :                      1965  ADMIT DATE:       3/28/2022 10:38 AM  DISCH DATE:        2022 7:27 PM  RESPONDING  PROVIDER #:        [id number]           QUERY TEXT:    Acute on chronic  is documented in the medical record.  Please specify the type of anemia.   Please call me for any questions. Thank you.   CDI Specialist - Janette Veronica RN BSN CCDS - cell [phone]  NOTE:  If an appropriate response is not listed below, please respond with a new note.      The patient's Clinical Indicators include:  Documentation: Progress Note - Monica Baltazar, APRN 22 - \"Anemia: acute on chronic, still with mild hematuria.\"    Lab values:              3/30          3/31       04/01       04/02  HEMOGLOBIN   7.2*          7.0*   7.1*           6.9*  HEMATOCRIT         23.3* 22.3* 23.4*         22.2*  Options provided:   -- Acute on chronic blood loss anemia   -- Anemia due to/in/with neoplastic disease   -- Iron deficiency anemia   -- Unable to determine      Query created by: Janette Veronica on 2022 12:24 PM    RESPONSE TEXT:    Anemia due to/in/with neoplastic disease          Electronically signed by:  PETRA MAYORGA MD 2022 10:38 AM              "

## 2023-10-13 NOTE — PROGRESS NOTES
"Received report from NOC, assumed care of pt.  Fall precautions in place.  PIV patent with + blood return noted. PCA infusing, decreased basal rate to 0.5 mg/hr due to pt drowsiness. Pt is A&Ox4.  on. Updated Dr. Dyson via Voalte regarding PCA rate and imaging results.  Assessment complete. Left ankle deformity noted/swollen. Leg elevated on pillows, ice pack on. Pt rating pain 3/10 pain to ankle only \"this is the best I've felt (pain free) in years\".   Hourly rounding in place.   " 4 = No assist / stand by assistance

## 2024-01-04 NOTE — PROGRESS NOTES
Pt very restless with tremors. Tried a low stimulus environment but Pt still unable to rest comfortably. Called Dr. Dyson and got a one time dose of ativan. Will continue to monitor.    (2) well flexed

## (undated) DEVICE — SET LEADWIRE 5 LEAD BEDSIDE DISPOSABLE ECG (1SET OF 5/EA)

## (undated) DEVICE — CLIP MED INTNL HRZN TI ESCP - (25/BX)

## (undated) DEVICE — WRAP COBAN SELF-ADHERENT 6 IN X  5YDS STERILE TAN (12/CA)

## (undated) DEVICE — DRAPE 36X28IN RAD CARM BND BG - (25/CA) O

## (undated) DEVICE — KIT ANESTHESIA W/CIRCUIT & 3/LT BAG W/FILTER (20EA/CA)

## (undated) DEVICE — ELECTRODE 850 FOAM ADHESIVE - HYDROGEL RADIOTRNSPRNT (50/PK)

## (undated) DEVICE — PROTECTOR ULNA NERVE - (36PR/CA)

## (undated) DEVICE — LIGASURE TISSUE FUSION  - SINGLE USE (6/CA)

## (undated) DEVICE — WIRE GUIDE .038 X 150 FLEX - .

## (undated) DEVICE — ELECTRODE DUAL RETURN W/ CORD - (50/PK)

## (undated) DEVICE — DRAPE LAPAROTOMY T SHEET - (12EA/CA)

## (undated) DEVICE — SLEEVE, VASO, THIGH, MED

## (undated) DEVICE — SUTURE GENERAL

## (undated) DEVICE — PACK MAJOR BASIN - (2EA/CA)

## (undated) DEVICE — GLOVE SZ 7 BIOGEL PI MICRO - PF LF (50PR/BX 4BX/CA)

## (undated) DEVICE — SENSOR SPO2 NEO LNCS ADHESIVE (20/BX) SEE USER NOTES

## (undated) DEVICE — DRESSING ABDOMINAL PAD STERILE 8 X 10" (360EA/CA)"

## (undated) DEVICE — TUBING CLEARLINK DUO-VENT - C-FLO (48EA/CA)

## (undated) DEVICE — DRAIN J-VAC 10MM FLAT - (10/CA)

## (undated) DEVICE — GLOVE COTTON STERILE (50/CA)

## (undated) DEVICE — SUTURE 3-0 MONOCRYL PLUS PS-1 - 27 INCH (36/BX)

## (undated) DEVICE — NEEDLE INSUFFLATION FOR STEP - (12/BX)

## (undated) DEVICE — TRAY CATHETER FOLEY URINE METER W/STATLOCK 350ML (10EA/CA)

## (undated) DEVICE — GLOVE BIOGEL PI ORTHO SZ 7.5 PF LF (40PR/BX)

## (undated) DEVICE — SHEET TRANSVERSE LAP - (12EA/CA)

## (undated) DEVICE — GOWN SURGEONS X-LARGE - DISP. (30/CA)

## (undated) DEVICE — SUTURE 2-0 VICRYL PLUS SH - 27 INCH (36/BX)

## (undated) DEVICE — NEPTUNE 4 PORT MANIFOLD - (20/PK)

## (undated) DEVICE — MASK ANESTHESIA ADULT  - (100/CA)

## (undated) DEVICE — SODIUM CHL IRRIGATION 0.9% 1000ML (12EA/CA)

## (undated) DEVICE — CANISTER SUCTION 3000ML MECHANICAL FILTER AUTO SHUTOFF MEDI-VAC NONSTERILE LF DISP  (40EA/CA)

## (undated) DEVICE — HEAD HOLDER JUNIOR/ADULT

## (undated) DEVICE — SUTURE 3-0 ETHILON FS-1 - (36/BX) 30 INCH

## (undated) DEVICE — LACTATED RINGERS INJ 1000 ML - (14EA/CA 60CA/PF)

## (undated) DEVICE — KIT ROOM DECONTAMINATION

## (undated) DEVICE — GOWN WARMING STANDARD FLEX - (30/CA)

## (undated) DEVICE — TOWEL STOP TIMEOUT SAFETY FLAG (40EA/CA)

## (undated) DEVICE — GAUZE PACKING STRIP PLAIN STERILE - 1/2 IN X 5 YD (12/CS)

## (undated) DEVICE — PAD OR TABLE DA VINCI 2IN X 20IN X 72IN - (12EA/CA)

## (undated) DEVICE — DRESSING TRANSPARENT FILM TEGADERM 2.375 X 2.75"  (100EA/BX)"

## (undated) DEVICE — SOD. CHL. INJ. 0.9% 250 ML - (36/CA 50CA/PF)

## (undated) DEVICE — SPONGE GAUZESTER. 2X2 4-PL - (2/PK 50PK/BX 30BX/CS)

## (undated) DEVICE — SET TUBING PNEUMOCLEAR HIGH FLOW SMOKE EVACUATION (10EA/BX)

## (undated) DEVICE — SUTURE 0 COATED VICRYL 6-18IN - (12PK/BX)

## (undated) DEVICE — SYRINGE 30 ML LL (56/BX)

## (undated) DEVICE — GLOVE BIOGEL INDICATOR SZ 7.5 SURGICAL PF LTX - (50PR/BX 4BX/CA)

## (undated) DEVICE — GLOVE BIOGEL SZ 6.5 SURGICAL PF LTX (50PR/BX 4BX/CA)

## (undated) DEVICE — COVER FOOT UNIVERSAL DISP. - (25EA/CA)

## (undated) DEVICE — STAPLER SKIN DISP - (6/BX 10BX/CA) VISISTAT

## (undated) DEVICE — DRILL BIT 3.5 TWIST 310.35 (9TX2+1TX1=19)

## (undated) DEVICE — DRILL BIT 2.5 TWIST 310.25 (8TX2+1TX3=19)

## (undated) DEVICE — WATER IRRIGATION STERILE 1000ML (12EA/CA)

## (undated) DEVICE — SYRINGE NON SAFETY 5 CC 20 GA X 1-1/2 IN (100/BX 4BX/CA)

## (undated) DEVICE — CHLORAPREP 26 ML APPLICATOR - ORANGE TINT(25/CA)

## (undated) DEVICE — GLOVE BIOGEL PI ORTHO SZ 7 PF LF (40PR/BX)

## (undated) DEVICE — Device

## (undated) DEVICE — DRAPE LARGE 3 QUARTER - (20/CA)

## (undated) DEVICE — SUTURE 2-0 VICRYL PLUS TP-1 - (24/BX)

## (undated) DEVICE — DRESSING NON-ADHERING 8 X 3 - (50/BX)

## (undated) DEVICE — GELAQUASONIC 100 ULTRASOUND - 48/BX 20GM STERILE FOIL POUCH

## (undated) DEVICE — LEGGING LITHOTOMY 31 X 48 IN - (2EA/PK 20PK/CA)

## (undated) DEVICE — TOWELS CLOTH SURGICAL - (4/PK 20PK/CA)

## (undated) DEVICE — SUCTION INSTRUMENT YANKAUER BULBOUS TIP W/O VENT (50EA/CA)

## (undated) DEVICE — SUTURE 0 VICRYL PLUS CT-2 - 27 INCH (36/BX)

## (undated) DEVICE — GLOVE BIOGEL ECLIPSE PF LATEX SIZE 8.0  (50PR/BX)

## (undated) DEVICE — PADDING CAST 6 IN STERILE - 6 X 4 YDS (24/CA)

## (undated) DEVICE — BOVIE  BLADE 6 EXTENDED - (50/PK)

## (undated) DEVICE — DRESSING TRANSPARENT FILM TEGADERM 4 X 4.75" (50EA/BX)"

## (undated) DEVICE — ARMREST CRADLE FOAM - (2PR/PK 12PR/CA)

## (undated) DEVICE — CANISTER SUCTION RIGID RED 1500CC (40EA/CA)

## (undated) DEVICE — DRAPE UNDER BUTTOCKS FLUID - (20/CA)

## (undated) DEVICE — BLADE SURGICAL #15 - (50/BX 3BX/CA)

## (undated) DEVICE — PAD LAP STERILE 18 X 18 - (5/PK 40PK/CA)

## (undated) DEVICE — SUTURE 2-0 ETHILON FS - (36/BX) 18 INCH

## (undated) DEVICE — DRAPE C ARMOR (12EA/CA)

## (undated) DEVICE — TRAY SRGPRP PVP IOD WT PRP - (20/CA)

## (undated) DEVICE — AGENT HEMOSTATIC BELLOW HEMOBLAST (12EA/CA)

## (undated) DEVICE — SUTURE 3-0 VICRYL PLUS SH - 27 INCH (36/BX)

## (undated) DEVICE — CANNULA W/SEAL 5X100 Z-THRE - ADED KII (12/BX)

## (undated) DEVICE — DRAPE C-ARM LARGE 41IN X 74 IN - (10/BX 2BX/CA)

## (undated) DEVICE — TROCAR 5X100 BLADED Z-THREAD - KII (6/BX)

## (undated) DEVICE — PACK LOWER EXTREMITY - (2/CA)

## (undated) DEVICE — SUTURE 2-0 VICRYL PLUS CT-1 36 (36PK/BX)"

## (undated) DEVICE — GLOVE BIOGEL SZ 8.5 SURGICAL PF LTX - (50PR/BX 4BX/CA)

## (undated) DEVICE — SUTURE 0 VICRYL PLUS CT-1 - 36 INCH (36/BX)

## (undated) DEVICE — SET EXTENSION WITH 2 PORTS (48EA/CA) ***PART #2C8610 IS A SUBSTITUTE*****

## (undated) DEVICE — DECANTER FLD BLS - (50/CA)

## (undated) DEVICE — GRAFT MESH SEPRAFILM PRO PACK - 5/BX CONTAINS 6 3X5 PIECES

## (undated) DEVICE — GLOVE BIOGEL PI INDICATOR SZ 6.5 SURGICAL PF LF - (50/BX 4BX/CA)

## (undated) DEVICE — SUTURE 2-0 VICRYL PLUS CT-2 - 27 INCH (36/BX)

## (undated) DEVICE — PACK MINOR BASIN - (2EA/CA)

## (undated) DEVICE — SURGIFOAM (SIZE 100) - (6EA/CA)

## (undated) DEVICE — RESERVOIR SUCTION 100 CC - SILICONE (20EA/CA)

## (undated) DEVICE — DRAPE STRLE REG TOWEL 18X24 - (10/BX 4BX/CA)"

## (undated) DEVICE — DRAPE U ORTHOPEDIC - (10/BX)

## (undated) DEVICE — GLOVE BIOGEL INDICATOR SZ 7SURGICAL PF LTX - (50/BX 4BX/CA)

## (undated) DEVICE — CATHETER COUDE FOLEY 5CC - 14FR (12EA/CA)

## (undated) DEVICE — TUBE E-T HI-LO CUFF 7.0MM (10EA/PK)

## (undated) DEVICE — CLIP MED LG INTNL HRZN TI ESCP - (20/BX)

## (undated) DEVICE — CORETEMP DRAPE FORM-FITTED EASY DROPANDGO DRAPE FOR USE ON THE CORETEMP FLUID MANAGEMENT 56IN X 56IN

## (undated) DEVICE — SET SUCTION/IRRIGATION WITH DISPOSABLE TIP (6/CA )PART #0250-070-520 IS A SUB

## (undated) DEVICE — DRESSING LEUKOMED STERILE 11.75X4IN - (50/CA)

## (undated) DEVICE — SPONGE GAUZESTER 4 X 4 4PLY - (128PK/CA)

## (undated) DEVICE — DRILL BIT SYN 2.8 165MM (5TX2+3TX1=13)

## (undated) DEVICE — GOWN SURGICAL X-LARGE ULTRA - FILM-REINFORCED (20/CA)

## (undated) DEVICE — SYRINGE 50ML CATHETER TIP (40EA/BX 4BX/CA)

## (undated) DEVICE — CLIP LG INTNL HRZN TI ESCP LGT - (20/BX)

## (undated) DEVICE — DRAPE SURG STERI-DRAPE 7X11OD - (40EA/CA)

## (undated) DEVICE — TRAY SKIN SCRUB PVP WET (20EA/CA) PART #DYND70356 DISCONTINUED

## (undated) DEVICE — CATHETER FOLEY 30CC 18 FR - 2-WAY-100% SILICONE

## (undated) DEVICE — BOVIE BLADE COATED - (50/PK)

## (undated) DEVICE — SPLINT PLASTER 5 IN X 30 IN - (50EA/BX 6BX/CA)

## (undated) DEVICE — BANDAGE ELASTIC STERILE MATRIX 6 X 10 (20EA/CA)

## (undated) DEVICE — HEADREST PRONEVIEW LARGE - (10/CA)